# Patient Record
Sex: MALE | Race: WHITE
[De-identification: names, ages, dates, MRNs, and addresses within clinical notes are randomized per-mention and may not be internally consistent; named-entity substitution may affect disease eponyms.]

---

## 2017-02-09 ENCOUNTER — RX ONLY (OUTPATIENT)
Age: 67
Setting detail: RX ONLY
End: 2017-02-09

## 2017-02-09 PROBLEM — R21 RASH AND OTHER NONSPECIFIC SKIN ERUPTION: Status: ACTIVE | Noted: 2017-02-09

## 2017-02-13 ENCOUNTER — RX ONLY (OUTPATIENT)
Age: 67
Setting detail: RX ONLY
End: 2017-02-13

## 2017-02-23 PROBLEM — D49.2 NEOPLASM OF UNSPECIFIED BEHAVIOR OF BONE, SOFT TISSUE, AND SKIN: Status: RESOLVED | Noted: 2017-02-09 | Resolved: 2017-02-23

## 2017-03-02 ENCOUNTER — RX ONLY (OUTPATIENT)
Age: 67
Setting detail: RX ONLY
End: 2017-03-02

## 2017-03-29 PROBLEM — L40.9 PSORIASIS, UNSPECIFIED: Status: ACTIVE | Noted: 2017-03-29

## 2017-04-13 ENCOUNTER — RX ONLY (OUTPATIENT)
Age: 67
Setting detail: RX ONLY
End: 2017-04-13

## 2017-04-21 ENCOUNTER — RX ONLY (OUTPATIENT)
Age: 67
Setting detail: RX ONLY
End: 2017-04-21

## 2017-07-07 ENCOUNTER — RX ONLY (OUTPATIENT)
Age: 67
Setting detail: RX ONLY
End: 2017-07-07

## 2017-07-07 PROBLEM — D49.2 NEOPLASM OF UNSPECIFIED BEHAVIOR OF BONE, SOFT TISSUE, AND SKIN: Status: ACTIVE | Noted: 2017-07-07

## 2017-07-07 PROBLEM — R21 RASH AND OTHER NONSPECIFIC SKIN ERUPTION: Status: ACTIVE | Noted: 2017-07-07

## 2017-10-02 ENCOUNTER — APPOINTMENT (RX ONLY)
Dept: URBAN - METROPOLITAN AREA CLINIC 4 | Facility: CLINIC | Age: 67
Setting detail: DERMATOLOGY
End: 2017-10-02

## 2017-10-02 DIAGNOSIS — Z79.899 OTHER LONG TERM (CURRENT) DRUG THERAPY: ICD-10-CM

## 2017-10-02 DIAGNOSIS — L40.8 OTHER PSORIASIS: ICD-10-CM

## 2017-10-02 PROBLEM — J44.9 CHRONIC OBSTRUCTIVE PULMONARY DISEASE, UNSPECIFIED: Status: ACTIVE | Noted: 2017-10-02

## 2017-10-02 PROBLEM — I10 ESSENTIAL (PRIMARY) HYPERTENSION: Status: ACTIVE | Noted: 2017-10-02

## 2017-10-02 PROBLEM — J45.909 UNSPECIFIED ASTHMA, UNCOMPLICATED: Status: ACTIVE | Noted: 2017-10-02

## 2017-10-02 PROCEDURE — ? PRESCRIPTION

## 2017-10-02 PROCEDURE — ? MEDICATION COUNSELING

## 2017-10-02 PROCEDURE — ? METHOTREXATE INITIATION

## 2017-10-02 PROCEDURE — 99214 OFFICE O/P EST MOD 30 MIN: CPT

## 2017-10-02 PROCEDURE — ? ORDER TESTS

## 2017-10-02 RX ORDER — METHOTREXATE SODIUM 2.5 MG/1
TABLET ORAL
Qty: 32 | Refills: 0 | Status: ERX | COMMUNITY
Start: 2017-10-02

## 2017-10-02 RX ORDER — TRIAMCINOLONE ACETONIDE 1 MG/G
OINTMENT TOPICAL
Qty: 2 | Refills: 5 | Status: ERX | COMMUNITY
Start: 2017-10-02

## 2017-10-02 RX ADMIN — TRIAMCINOLONE ACETONIDE: 1 OINTMENT TOPICAL at 00:00

## 2017-10-02 RX ADMIN — METHOTREXATE SODIUM: 2.5 TABLET ORAL at 00:00

## 2017-10-02 ASSESSMENT — LOCATION DETAILED DESCRIPTION DERM
LOCATION DETAILED: STERNUM
LOCATION DETAILED: RIGHT SUPERIOR MEDIAL UPPER BACK

## 2017-10-02 ASSESSMENT — LOCATION SIMPLE DESCRIPTION DERM
LOCATION SIMPLE: RIGHT UPPER BACK
LOCATION SIMPLE: CHEST

## 2017-10-02 ASSESSMENT — LOCATION ZONE DERM: LOCATION ZONE: TRUNK

## 2017-10-02 NOTE — HPI: RASH (PSORIASIFORM DERMATITIS)
How Severe Is Your Dermatitis?: severe
Is This A New Presentation, Or A Follow-Up?: Follow Up Psoriasiform Dermatitis
Additional History: Pt states when he was taking methotrexate in the past he experienced felling cold all the time. No recent infections.

## 2017-10-30 ENCOUNTER — APPOINTMENT (RX ONLY)
Dept: URBAN - METROPOLITAN AREA CLINIC 4 | Facility: CLINIC | Age: 67
Setting detail: DERMATOLOGY
End: 2017-10-30

## 2017-10-30 DIAGNOSIS — L40.8 OTHER PSORIASIS: ICD-10-CM

## 2017-10-30 DIAGNOSIS — L0390 CELLULITIS AND ABSCESS OF UNSPECIFIED SITES: ICD-10-CM

## 2017-10-30 DIAGNOSIS — L0391 CELLULITIS AND ABSCESS OF UNSPECIFIED SITES: ICD-10-CM

## 2017-10-30 PROBLEM — L03.818 CELLULITIS OF OTHER SITES: Status: ACTIVE | Noted: 2017-10-30

## 2017-10-30 PROCEDURE — 96372 THER/PROPH/DIAG INJ SC/IM: CPT

## 2017-10-30 PROCEDURE — ? TREATMENT REGIMEN

## 2017-10-30 PROCEDURE — 99213 OFFICE O/P EST LOW 20 MIN: CPT | Mod: 25

## 2017-10-30 PROCEDURE — ? MEDICATION COUNSELING

## 2017-10-30 PROCEDURE — ? LAB REPORTS REVIEWED

## 2017-10-30 PROCEDURE — ? COUNSELING

## 2017-10-30 PROCEDURE — ? TREMFYA INJECTION

## 2017-10-30 ASSESSMENT — LOCATION ZONE DERM: LOCATION ZONE: TRUNK

## 2017-10-30 ASSESSMENT — LOCATION DETAILED DESCRIPTION DERM: LOCATION DETAILED: PERIUMBILICAL SKIN

## 2017-10-30 ASSESSMENT — LOCATION SIMPLE DESCRIPTION DERM: LOCATION SIMPLE: ABDOMEN

## 2017-10-30 NOTE — PROCEDURE: TREMFYA INJECTION
Expiration Date (Optional): 01/2019
Tremfya Amount: 100 mg
Administered By (Optional): Jose Lujan M.D
Treatment Number (Optional): 1
Consent: The risks of pain and injection site reactions were reviewed with the patient prior to the injection.
Lot # (Optional): [id number]
Detail Level: Simple

## 2017-11-28 ENCOUNTER — APPOINTMENT (RX ONLY)
Dept: URBAN - METROPOLITAN AREA CLINIC 4 | Facility: CLINIC | Age: 67
Setting detail: DERMATOLOGY
End: 2017-11-28

## 2017-11-28 DIAGNOSIS — L40.8 OTHER PSORIASIS: ICD-10-CM

## 2017-11-28 DIAGNOSIS — L30.9 DERMATITIS, UNSPECIFIED: ICD-10-CM

## 2017-11-28 PROCEDURE — ? ORDER TESTS

## 2017-11-28 PROCEDURE — ? BIOPSY BY SHAVE METHOD

## 2017-11-28 PROCEDURE — ? TREATMENT REGIMEN

## 2017-11-28 PROCEDURE — 99213 OFFICE O/P EST LOW 20 MIN: CPT | Mod: 25

## 2017-11-28 PROCEDURE — 11100: CPT

## 2017-11-28 PROCEDURE — 11101: CPT

## 2017-11-28 ASSESSMENT — LOCATION DETAILED DESCRIPTION DERM
LOCATION DETAILED: RIGHT SUPERIOR LATERAL UPPER BACK
LOCATION DETAILED: RIGHT LATERAL PROXIMAL UPPER ARM
LOCATION DETAILED: SUPERIOR THORACIC SPINE
LOCATION DETAILED: RIGHT ANTERIOR MEDIAL PROXIMAL UPPER ARM

## 2017-11-28 ASSESSMENT — LOCATION SIMPLE DESCRIPTION DERM
LOCATION SIMPLE: RIGHT UPPER BACK
LOCATION SIMPLE: RIGHT UPPER ARM
LOCATION SIMPLE: UPPER BACK

## 2017-11-28 ASSESSMENT — LOCATION ZONE DERM
LOCATION ZONE: ARM
LOCATION ZONE: TRUNK

## 2017-11-28 NOTE — PROCEDURE: TREATMENT REGIMEN
Detail Level: Zone
Plan: Pt scheduled this Friday to have tremfya shot, biopsy results pending, culture on right upper arm done today
Continue Regimen: Methotrexate 2.5 mg every Friday and TAC cream as prescribed

## 2017-11-28 NOTE — PROCEDURE: BIOPSY BY SHAVE METHOD
Electrodesiccation Text: The wound bed was treated with electrodesiccation after the biopsy was performed.
Additional Anesthesia Volume In Cc (Will Not Render If 0): 0
Biopsy Type: H and E
Bill For Surgical Tray: no
Consent: Written consent was obtained and risks were reviewed including but not limited to scarring, infection, bleeding, scabbing, incomplete removal, nerve damage and allergy to anesthesia.
Hemostasis: Drysol
Type Of Destruction Used: Curettage
Anesthesia Type: 1% lidocaine with epinephrine
Notification Instructions: Patient will be notified of biopsy results. However, patient instructed to call the office if not contacted within 2 weeks.
Biopsy Method: Personna blade
Cryotherapy Text: The wound bed was treated with cryotherapy after the biopsy was performed.
Lab: [id number]
Curettage Text: The wound bed was treated with curettage after the biopsy was performed.
Detail Level: Detailed
Electrodesiccation And Curettage Text: The wound bed was treated with electrodesiccation and curettage after the biopsy was performed.
Lab Facility: 
Dressing: bandage
Wound Care: Petrolatum
Anesthesia Volume In Cc: 0.5
Silver Nitrate Text: The wound bed was treated with silver nitrate after the biopsy was performed.
Post-Care Instructions: I reviewed with the patient in detail post-care instructions. Patient is to keep the biopsy site dry overnight, and then apply bacitracin twice daily until healed. Patient may apply hydrogen peroxide soaks to remove any crusting.
Billing Type: Third-Party Bill

## 2017-12-07 ENCOUNTER — APPOINTMENT (RX ONLY)
Dept: URBAN - METROPOLITAN AREA CLINIC 4 | Facility: CLINIC | Age: 67
Setting detail: DERMATOLOGY
End: 2017-12-07

## 2017-12-07 DIAGNOSIS — C85.8 OTHER SPECIFIED TYPES OF NON-HODGKIN LYMPHOMA: ICD-10-CM

## 2017-12-07 DIAGNOSIS — C84.A CUTANEOUS T-CELL LYMPHOMA, UNSPECIFIED: ICD-10-CM

## 2017-12-07 PROBLEM — C84.A0 CUTANEOUS T-CELL LYMPHOMA, UNSPECIFIED, UNSPECIFIED SITE: Status: ACTIVE | Noted: 2017-12-07

## 2017-12-07 PROBLEM — C85.80 OTHER SPECIFIED TYPES OF NON-HODGKIN LYMPHOMA, UNSPECIFIED SITE: Status: ACTIVE | Noted: 2017-12-07

## 2017-12-08 ENCOUNTER — RX ONLY (OUTPATIENT)
Age: 67
Setting detail: RX ONLY
End: 2017-12-08

## 2017-12-08 RX ORDER — LIDOCAINE 50 MG/G
OINTMENT TOPICAL
Qty: 1 | Refills: 2 | Status: ACTIVE

## 2017-12-08 RX ORDER — LIDOCAINE 50 MG/G
OINTMENT TOPICAL
Qty: 1 | Refills: 2 | Status: ERX | COMMUNITY
Start: 2017-12-08

## 2017-12-11 ENCOUNTER — HOSPITAL ENCOUNTER (OUTPATIENT)
Dept: RADIOLOGY | Facility: MEDICAL CENTER | Age: 67
End: 2017-12-11
Attending: DERMATOLOGY
Payer: COMMERCIAL

## 2017-12-11 DIAGNOSIS — C85.90 LYMPHOMA, UNSPECIFIED BODY REGION, UNSPECIFIED LYMPHOMA TYPE (HCC): ICD-10-CM

## 2017-12-11 PROCEDURE — A9552 F18 FDG: HCPCS

## 2018-01-01 ENCOUNTER — APPOINTMENT (OUTPATIENT)
Dept: CARDIOLOGY | Facility: MEDICAL CENTER | Age: 68
End: 2018-01-01
Attending: INTERNAL MEDICINE
Payer: MEDICARE

## 2018-01-01 ENCOUNTER — HOSPITAL ENCOUNTER (OUTPATIENT)
Facility: MEDICAL CENTER | Age: 68
End: 2018-12-13
Attending: INTERNAL MEDICINE | Admitting: INTERNAL MEDICINE
Payer: MEDICARE

## 2018-01-01 ENCOUNTER — APPOINTMENT (OUTPATIENT)
Dept: RADIOLOGY | Facility: MEDICAL CENTER | Age: 68
End: 2018-01-01
Attending: NURSE PRACTITIONER
Payer: MEDICARE

## 2018-01-01 ENCOUNTER — HOSPITAL ENCOUNTER (OUTPATIENT)
Dept: RADIOLOGY | Facility: MEDICAL CENTER | Age: 68
End: 2018-12-13
Attending: INTERNAL MEDICINE
Payer: MEDICARE

## 2018-01-01 VITALS
HEIGHT: 72 IN | OXYGEN SATURATION: 91 % | TEMPERATURE: 98.2 F | HEART RATE: 49 BPM | RESPIRATION RATE: 18 BRPM | WEIGHT: 229.94 LBS | BODY MASS INDEX: 31.14 KG/M2 | SYSTOLIC BLOOD PRESSURE: 120 MMHG | DIASTOLIC BLOOD PRESSURE: 73 MMHG

## 2018-01-01 DIAGNOSIS — C84.04: ICD-10-CM

## 2018-01-01 LAB
INR PPP: 0.99 (ref 0.87–1.13)
LV EJECT FRACT  99904: 60
LV EJECT FRACT MOD 2C 99903: 57.25
LV EJECT FRACT MOD 4C 99902: 76.75
LV EJECT FRACT MOD BP 99901: 67.24
PROTHROMBIN TIME: 13.2 SEC (ref 12–14.6)

## 2018-01-01 PROCEDURE — 93306 TTE W/DOPPLER COMPLETE: CPT

## 2018-01-01 PROCEDURE — A9552 F18 FDG: HCPCS

## 2018-01-01 PROCEDURE — 85610 PROTHROMBIN TIME: CPT

## 2018-01-01 PROCEDURE — 700111 HCHG RX REV CODE 636 W/ 250 OVERRIDE (IP)

## 2018-01-01 PROCEDURE — 700101 HCHG RX REV CODE 250

## 2018-01-01 PROCEDURE — 99153 MOD SED SAME PHYS/QHP EA: CPT

## 2018-01-01 PROCEDURE — 93306 TTE W/DOPPLER COMPLETE: CPT | Mod: 26 | Performed by: INTERNAL MEDICINE

## 2018-01-01 PROCEDURE — 160002 HCHG RECOVERY MINUTES (STAT)

## 2018-01-01 PROCEDURE — 700111 HCHG RX REV CODE 636 W/ 250 OVERRIDE (IP): Performed by: RADIOLOGY

## 2018-01-01 RX ORDER — MIDAZOLAM HYDROCHLORIDE 1 MG/ML
INJECTION INTRAMUSCULAR; INTRAVENOUS
Status: COMPLETED
Start: 2018-01-01 | End: 2018-01-01

## 2018-01-01 RX ORDER — LIDOCAINE HYDROCHLORIDE AND EPINEPHRINE BITARTRATE 20; .01 MG/ML; MG/ML
INJECTION, SOLUTION SUBCUTANEOUS
Status: COMPLETED
Start: 2018-01-01 | End: 2018-01-01

## 2018-01-01 RX ORDER — CEFAZOLIN SODIUM 1 G/3ML
INJECTION, POWDER, FOR SOLUTION INTRAMUSCULAR; INTRAVENOUS
Status: COMPLETED
Start: 2018-01-01 | End: 2018-01-01

## 2018-01-01 RX ORDER — CEFAZOLIN SODIUM 2 G/100ML
2 INJECTION, SOLUTION INTRAVENOUS ONCE
Status: COMPLETED | OUTPATIENT
Start: 2018-01-01 | End: 2018-01-01

## 2018-01-01 RX ORDER — SODIUM CHLORIDE 9 MG/ML
500 INJECTION, SOLUTION INTRAVENOUS
Status: DISCONTINUED | OUTPATIENT
Start: 2018-01-01 | End: 2018-01-01 | Stop reason: HOSPADM

## 2018-01-01 RX ORDER — MIDAZOLAM HYDROCHLORIDE 1 MG/ML
.5-2 INJECTION INTRAMUSCULAR; INTRAVENOUS PRN
Status: DISCONTINUED | OUTPATIENT
Start: 2018-01-01 | End: 2018-01-01 | Stop reason: HOSPADM

## 2018-01-01 RX ORDER — ONDANSETRON 2 MG/ML
4 INJECTION INTRAMUSCULAR; INTRAVENOUS PRN
Status: DISCONTINUED | OUTPATIENT
Start: 2018-01-01 | End: 2018-01-01 | Stop reason: HOSPADM

## 2018-01-01 RX ADMIN — FENTANYL CITRATE 50 MCG: 50 INJECTION, SOLUTION INTRAMUSCULAR; INTRAVENOUS at 14:30

## 2018-01-01 RX ADMIN — HEPARIN 500 UNITS: 100 SYRINGE at 14:36

## 2018-01-01 RX ADMIN — CEFAZOLIN 2000 MG: 330 INJECTION, POWDER, FOR SOLUTION INTRAMUSCULAR; INTRAVENOUS at 14:35

## 2018-01-01 RX ADMIN — MIDAZOLAM 2 MG: 1 INJECTION INTRAMUSCULAR; INTRAVENOUS at 14:30

## 2018-01-01 RX ADMIN — MIDAZOLAM 2 MG: 1 INJECTION INTRAMUSCULAR; INTRAVENOUS at 14:38

## 2018-01-01 RX ADMIN — LIDOCAINE HYDROCHLORIDE AND EPINEPHRINE: 20; 10 INJECTION, SOLUTION INFILTRATION; PERINEURAL at 14:36

## 2018-01-01 ASSESSMENT — PAIN SCALES - GENERAL
PAINLEVEL_OUTOF10: 0

## 2018-03-19 ENCOUNTER — APPOINTMENT (RX ONLY)
Dept: URBAN - METROPOLITAN AREA CLINIC 4 | Facility: CLINIC | Age: 68
Setting detail: DERMATOLOGY
End: 2018-03-19

## 2018-03-19 DIAGNOSIS — C84.A CUTANEOUS T-CELL LYMPHOMA, UNSPECIFIED: ICD-10-CM

## 2018-03-19 PROBLEM — C84.A0 CUTANEOUS T-CELL LYMPHOMA, UNSPECIFIED, UNSPECIFIED SITE: Status: ACTIVE | Noted: 2018-03-19

## 2018-03-19 PROCEDURE — ? COUNSELING

## 2018-03-19 PROCEDURE — ? PRESCRIPTION

## 2018-03-19 PROCEDURE — ? COUNSELING: TOPICAL STEROIDS

## 2018-03-19 PROCEDURE — 99214 OFFICE O/P EST MOD 30 MIN: CPT

## 2018-03-19 RX ORDER — CLOBETASOL PROPIONATE 0.5 MG/G
CREAM TOPICAL BID
Qty: 3 | Refills: 3 | Status: ERX | COMMUNITY
Start: 2018-03-19

## 2018-03-19 RX ADMIN — CLOBETASOL PROPIONATE: 0.5 CREAM TOPICAL at 18:45

## 2018-03-19 NOTE — HPI: SKIN LESION
Is This A New Presentation, Or A Follow-Up?: Growth
How Severe Is Your Skin Lesion?: mild
Has Your Skin Lesion Been Treated?: not been treated
Additional History: Pt needs to be up in [place] for three weeks for treatments.Apt on April 2nd for [facility], getting a ct scan due to accident on dec 13. Pt still has pain when coughing and sneezing.pt does not want to go to [place] until after April 4th.tumor on the arm has gone away and is not present. There is some swelling in the left neck that was palpate and is most likely edema.

## 2018-03-30 ENCOUNTER — RX ONLY (OUTPATIENT)
Age: 68
Setting detail: RX ONLY
End: 2018-03-30

## 2018-03-30 RX ORDER — CLOBETASOL PROPIONATE 0.05 G/100ML
SHAMPOO TOPICAL
Qty: 2 | Refills: 6 | Status: ERX | COMMUNITY
Start: 2018-03-30

## 2018-03-30 RX ORDER — CLOBETASOL PROPIONATE 0.5 MG/ML
SOLUTION TOPICAL
Qty: 2 | Refills: 6 | Status: ERX | COMMUNITY
Start: 2018-03-30

## 2018-04-19 ENCOUNTER — APPOINTMENT (RX ONLY)
Dept: URBAN - METROPOLITAN AREA CLINIC 4 | Facility: CLINIC | Age: 68
Setting detail: DERMATOLOGY
End: 2018-04-19

## 2018-04-19 DIAGNOSIS — C84.0 MYCOSIS FUNGOIDES: ICD-10-CM

## 2018-04-19 PROBLEM — C84.00 MYCOSIS FUNGOIDES, UNSPECIFIED SITE: Status: ACTIVE | Noted: 2018-04-19

## 2018-04-19 PROCEDURE — ? PRESCRIPTION

## 2018-04-19 PROCEDURE — ? TREATMENT REGIMEN

## 2018-04-19 PROCEDURE — ? ORDER TESTS

## 2018-04-19 PROCEDURE — ? COUNSELING

## 2018-04-19 PROCEDURE — 99214 OFFICE O/P EST MOD 30 MIN: CPT

## 2018-04-19 RX ORDER — MUPIROCIN 20 MG/G
OINTMENT TOPICAL
Qty: 1 | Refills: 4 | Status: ERX | COMMUNITY
Start: 2018-04-19

## 2018-04-19 RX ADMIN — MUPIROCIN: 20 OINTMENT TOPICAL at 18:48

## 2018-04-19 ASSESSMENT — LOCATION DETAILED DESCRIPTION DERM
LOCATION DETAILED: RIGHT ANTERIOR PROXIMAL THIGH
LOCATION DETAILED: RIGHT ANTERIOR PROXIMAL UPPER ARM
LOCATION DETAILED: LEFT MID-UPPER BACK
LOCATION DETAILED: GENITALIA
LOCATION DETAILED: RIGHT SUPERIOR UPPER BACK
LOCATION DETAILED: RIGHT DISTAL POSTERIOR THIGH
LOCATION DETAILED: LEFT MEDIAL SUPERIOR CHEST
LOCATION DETAILED: LEFT DISTAL POSTERIOR THIGH
LOCATION DETAILED: LEFT LATERAL ABDOMEN
LOCATION DETAILED: RIGHT SUPERIOR MEDIAL MIDBACK

## 2018-04-19 ASSESSMENT — LOCATION SIMPLE DESCRIPTION DERM
LOCATION SIMPLE: LEFT UPPER BACK
LOCATION SIMPLE: RIGHT UPPER ARM
LOCATION SIMPLE: RIGHT THIGH
LOCATION SIMPLE: RIGHT UPPER BACK
LOCATION SIMPLE: CHEST
LOCATION SIMPLE: GENITALIA
LOCATION SIMPLE: ABDOMEN
LOCATION SIMPLE: RIGHT POSTERIOR THIGH
LOCATION SIMPLE: RIGHT LOWER BACK
LOCATION SIMPLE: LEFT POSTERIOR THIGH

## 2018-04-19 ASSESSMENT — LOCATION ZONE DERM
LOCATION ZONE: TRUNK
LOCATION ZONE: GENITALIA
LOCATION ZONE: LEG
LOCATION ZONE: TRUNK
LOCATION ZONE: ARM

## 2018-04-19 NOTE — PROCEDURE: TREATMENT REGIMEN
Plan: Patient to get pet scan on the 30th of April at 1:15, patient aware of pre op and voiced understanding. Once pet scan and labs are done patient will then see dr. Lyon. Dr. Lyon will treat with a cd30 medication and then targeted radiation. Patient likes this option and will the. Follow up with dr. Chapa in [place] every six months or so.
Detail Level: Zone
Initiate Treatment: Mupiricin to open sores

## 2018-04-19 NOTE — HPI: BOILS
Is This A New Presentation, Or A Follow-Up?: Boil
How Severe Are Your Boils?: mild
Additional History: Patient has a history of ctcl, patient has been being tracked in [place] but due to financial reasons has only been able to keep one appointment. Patient is developing new tumors that are draining and are quite tender and painful. Patient is wondering what things can be coordinated locally to get treatment started.

## 2018-04-30 ENCOUNTER — HOSPITAL ENCOUNTER (OUTPATIENT)
Dept: RADIOLOGY | Facility: MEDICAL CENTER | Age: 68
End: 2018-04-30
Attending: DERMATOLOGY
Payer: COMMERCIAL

## 2018-04-30 DIAGNOSIS — C84.A3: ICD-10-CM

## 2018-04-30 PROCEDURE — A9552 F18 FDG: HCPCS

## 2018-05-03 ENCOUNTER — APPOINTMENT (RX ONLY)
Dept: URBAN - METROPOLITAN AREA CLINIC 4 | Facility: CLINIC | Age: 68
Setting detail: DERMATOLOGY
End: 2018-05-03

## 2018-05-03 DIAGNOSIS — C84.0 MYCOSIS FUNGOIDES: ICD-10-CM

## 2018-05-03 PROBLEM — L30.9 DERMATITIS, UNSPECIFIED: Status: ACTIVE | Noted: 2018-05-03

## 2018-05-03 PROBLEM — C84.00 MYCOSIS FUNGOIDES, UNSPECIFIED SITE: Status: ACTIVE | Noted: 2018-05-03

## 2018-05-03 PROCEDURE — ? TREATMENT REGIMEN

## 2018-05-03 PROCEDURE — ? PRESCRIPTION

## 2018-05-03 PROCEDURE — 99213 OFFICE O/P EST LOW 20 MIN: CPT | Mod: 25

## 2018-05-03 PROCEDURE — ? COUNSELING

## 2018-05-03 PROCEDURE — ? MEDICATION COUNSELING

## 2018-05-03 PROCEDURE — 11100: CPT

## 2018-05-03 PROCEDURE — ? BIOPSY BY SHAVE METHOD

## 2018-05-03 PROCEDURE — 11101: CPT

## 2018-05-03 RX ORDER — TRIAMCINOLONE ACETONIDE 1 MG/G
CREAM TOPICAL BID
Qty: 1 | Refills: 3 | Status: ERX | COMMUNITY
Start: 2018-05-03

## 2018-05-03 RX ORDER — MUPIROCIN 20 MG/G
OINTMENT TOPICAL
Qty: 1 | Refills: 3 | Status: ERX

## 2018-05-03 RX ORDER — GABAPENTIN 300 MG/1
CAPSULE ORAL
Qty: 120 | Refills: 1 | Status: ERX | COMMUNITY
Start: 2018-05-03

## 2018-05-03 RX ORDER — CEPHALEXIN 500 MG/1
CAPSULE ORAL
Qty: 30 | Refills: 0 | Status: ERX | COMMUNITY
Start: 2018-05-03

## 2018-05-03 RX ADMIN — GABAPENTIN: 300 CAPSULE ORAL at 18:33

## 2018-05-03 RX ADMIN — TRIAMCINOLONE ACETONIDE: 1 CREAM TOPICAL at 18:27

## 2018-05-03 RX ADMIN — CEPHALEXIN: 500 CAPSULE ORAL at 18:30

## 2018-05-03 ASSESSMENT — LOCATION DETAILED DESCRIPTION DERM
LOCATION DETAILED: LEFT ANTERIOR PROXIMAL THIGH
LOCATION DETAILED: RIGHT ANTERIOR PROXIMAL UPPER ARM
LOCATION DETAILED: LEFT SUPERIOR LATERAL UPPER BACK
LOCATION DETAILED: SUPRAPUBIC SKIN

## 2018-05-03 ASSESSMENT — LOCATION ZONE DERM
LOCATION ZONE: TRUNK
LOCATION ZONE: LEG
LOCATION ZONE: ARM
LOCATION ZONE: TRUNK

## 2018-05-03 ASSESSMENT — LOCATION SIMPLE DESCRIPTION DERM
LOCATION SIMPLE: LEFT THIGH
LOCATION SIMPLE: RIGHT UPPER ARM
LOCATION SIMPLE: LEFT UPPER BACK
LOCATION SIMPLE: GROIN

## 2018-05-03 NOTE — PROCEDURE: MEDICATION COUNSELING
Gabapentin Counseling: I discussed with the patient the risks of gabapentin including but not limited to dizziness, somnolence, fatigue and ataxia.
Fluconazole Counseling:  Patient counseled regarding adverse effects of fluconazole including but not limited to headache, diarrhea, nausea, upset stomach, liver function test abnormalities, taste disturbance, and stomach pain.  There is a rare possibility of liver failure that can occur when taking fluconazole.  The patient understands that monitoring of LFTs and kidney function test may be required, especially at baseline. The patient verbalized understanding of the proper use and possible adverse effects of fluconazole.  All of the patient's questions and concerns were addressed.
Cimetidine Pregnancy And Lactation Text: This medication is Pregnancy Category B and is considered safe during pregnancy. It is also excreted in breast milk and breast feeding isn't recommended.
Xolair Counseling:  Patient informed of potential adverse effects including but not limited to fever, muscle aches, rash and allergic reactions.  The patient verbalized understanding of the proper use and possible adverse effects of Xolair.  All of the patient's questions and concerns were addressed.
Imiquimod Counseling:  I discussed with the patient the risks of imiquimod including but not limited to erythema, scaling, itching, weeping, crusting, and pain.  Patient understands that the inflammatory response to imiquimod is variable from person to person and was educated regarded proper titration schedule.  If flu-like symptoms develop, patient knows to discontinue the medication and contact us.
Dupixent Counseling: I discussed with the patient the risks of dupilumab including but not limited to eye infection and irritation, cold sores, injection site reactions, worsening of asthma, allergic reactions and increased risk of parasitic infection.  Live vaccines should be avoided while taking dupilumab. Dupilumab will also interact with certain medications such as warfarin and cyclosporine. The patient understands that monitoring is required and they must alert us or the primary physician if symptoms of infection or other concerning signs are noted.
Eucrisa Pregnancy And Lactation Text: This medication has not been assigned a Pregnancy Risk Category but animal studies failed to show danger with the topical medication. It is unknown if the medication is excreted in breast milk.
Rituxan Pregnancy And Lactation Text: This medication is Pregnancy Category C and it isn't know if it is safe during pregnancy. It is unknown if this medication is excreted in breast milk but similar antibodies are known to be excreted.
Enbrel Counseling:  I discussed with the patient the risks of etanercept including but not limited to myelosuppression, immunosuppression, autoimmune hepatitis, demyelinating diseases, lymphoma, and infections.  The patient understands that monitoring is required including a PPD at baseline and must alert us or the primary physician if symptoms of infection or other concerning signs are noted.
Cosentyx Counseling:  I discussed with the patient the risks of Cosentyx including but not limited to worsening of Crohn's disease, immunosuppression, allergic reactions and infections.  The patient understands that monitoring is required including a PPD at baseline and must alert us or the primary physician if symptoms of infection or other concerning signs are noted.
Dapsone Pregnancy And Lactation Text: This medication is Pregnancy Category C and is not considered safe during pregnancy or breast feeding.
Stelara Pregnancy And Lactation Text: This medication is Pregnancy Category B and is considered safe during pregnancy. It is unknown if this medication is excreted in breast milk.
Valtrex Pregnancy And Lactation Text: this medication is Pregnancy Category B and is considered safe during pregnancy. This medication is not directly found in breast milk but it's metabolite acyclovir is present.
Benzoyl Peroxide Counseling: Patient counseled that medicine may cause skin irritation and bleach clothing.  In the event of skin irritation, the patient was advised to reduce the amount of the drug applied or use it less frequently.   The patient verbalized understanding of the proper use and possible adverse effects of benzoyl peroxide.  All of the patient's questions and concerns were addressed.
Simponi Pregnancy And Lactation Text: The risk during pregnancy and breastfeeding is uncertain with this medication.
Nsaids Counseling: NSAID Counseling: I discussed with the patient that NSAIDs should be taken with food. Prolonged use of NSAIDs can result in the development of stomach ulcers.  Patient advised to stop taking NSAIDs if abdominal pain occurs.  The patient verbalized understanding of the proper use and possible adverse effects of NSAIDs.  All of the patient's questions and concerns were addressed.
Methotrexate Counseling:  Patient counseled regarding adverse effects of methotrexate including but not limited to nausea, vomiting, abnormalities in liver function tests. Patients may develop mouth sores, rash, diarrhea, and abnormalities in blood counts. The patient understands that monitoring is required including LFT's and blood counts.  There is a rare possibility of scarring of the liver and lung problems that can occur when taking methotrexate. Persistent nausea, loss of appetite, pale stools, dark urine, cough, and shortness of breath should be reported immediately. Patient advised to discontinue methotrexate treatment at least three months before attempting to become pregnant.  I discussed the need for folate supplements while taking methotrexate.  These supplements can decrease side effects during methotrexate treatment. The patient verbalized understanding of the proper use and possible adverse effects of methotrexate.  All of the patient's questions and concerns were addressed.
Erivedge Counseling- I discussed with the patient the risks of Erivedge including but not limited to nausea, vomiting, diarrhea, constipation, weight loss, changes in the sense of taste, decreased appetite, muscle spasms, and hair loss.  The patient verbalized understanding of the proper use and possible adverse effects of Erivedge.  All of the patient's questions and concerns were addressed.
Arava Pregnancy And Lactation Text: This medication is Pregnancy Category X and is absolutely contraindicated during pregnancy. It is unknown if it is excreted in breast milk.
Otezla Pregnancy And Lactation Text: This medication is Pregnancy Category C and it isn't known if it is safe during pregnancy. It is unknown if it is excreted in breast milk.
Solaraze Counseling:  I discussed with the patient the risks of Solaraze including but not limited to erythema, scaling, itching, weeping, crusting, and pain.
High Dose Vitamin A Pregnancy And Lactation Text: High dose vitamin A therapy is contraindicated during pregnancy and breast feeding.
SSKI Counseling:  I discussed with the patient the risks of SSKI including but not limited to thyroid abnormalities, metallic taste, GI upset, fever, headache, acne, arthralgias, paraesthesias, lymphadenopathy, easy bleeding, arrhythmias, and allergic reaction.
Topical Sulfur Applications Counseling: Topical Sulfur Counseling: Patient counseled that this medication may cause skin irritation or allergic reactions.  In the event of skin irritation, the patient was advised to reduce the amount of the drug applied or use it less frequently.   The patient verbalized understanding of the proper use and possible adverse effects of topical sulfur application.  All of the patient's questions and concerns were addressed.
Elidel Pregnancy And Lactation Text: This medication is Pregnancy Category C. It is unknown if this medication is excreted in breast milk.
Minocycline Counseling: Patient advised regarding possible photosensitivity and discoloration of the teeth, skin, lips, tongue and gums.  Patient instructed to avoid sunlight, if possible.  When exposed to sunlight, patients should wear protective clothing, sunglasses, and sunscreen.  The patient was instructed to call the office immediately if the following severe adverse effects occur:  hearing changes, easy bruising/bleeding, severe headache, or vision changes.  The patient verbalized understanding of the proper use and possible adverse effects of minocycline.  All of the patient's questions and concerns were addressed.
Use Enhanced Medication Counseling?: No
Glycopyrrolate Counseling:  I discussed with the patient the risks of glycopyrrolate including but not limited to skin rash, drowsiness, dry mouth, difficulty urinating, and blurred vision.
Drysol Pregnancy And Lactation Text: This medication is considered safe during pregnancy and breast feeding.
Acitretin Pregnancy And Lactation Text: This medication is Pregnancy Category X and should not be given to women who are pregnant or may become pregnant in the future. This medication is excreted in breast milk.
Cellcept Pregnancy And Lactation Text: This medication is Pregnancy Category D and isn't considered safe during pregnancy. It is unknown if this medication is excreted in breast milk.
Isotretinoin Pregnancy And Lactation Text: This medication is Pregnancy Category X and is considered extremely dangerous during pregnancy. It is unknown if it is excreted in breast milk.
Doxepin Pregnancy And Lactation Text: This medication is Pregnancy Category C and it isn't known if it is safe during pregnancy. It is also excreted in breast milk and breast feeding isn't recommended.
Bactrim Pregnancy And Lactation Text: This medication is Pregnancy Category D and is known to cause fetal risk.  It is also excreted in breast milk.
Birth Control Pills Pregnancy And Lactation Text: This medication should be avoided if pregnant and for the first 30 days post-partum.
5-Fu Pregnancy And Lactation Text: This medication is Pregnancy Category X and contraindicated in pregnancy and in women who may become pregnant. It is unknown if this medication is excreted in breast milk.
Itraconazole Counseling:  I discussed with the patient the risks of itraconazole including but not limited to liver damage, nausea/vomiting, neuropathy, and severe allergy.  The patient understands that this medication is best absorbed when taken with acidic beverages such as non-diet cola or ginger ale.  The patient understands that monitoring is required including baseline LFTs and repeat LFTs at intervals.  The patient understands that they are to contact us or the primary physician if concerning signs are noted.
Clofazimine Pregnancy And Lactation Text: This medication is Pregnancy Category C and isn't considered safe during pregnancy. It is excreted in breast milk.
Thalidomide Counseling: I discussed with the patient the risks of thalidomide including but not limited to birth defects, anxiety, weakness, chest pain, dizziness, cough and severe allergy.
Hydroxyzine Counseling: Patient advised that the medication is sedating and not to drive a car after taking this medication.  Patient informed of potential adverse effects including but not limited to dry mouth, urinary retention, and blurry vision.  The patient verbalized understanding of the proper use and possible adverse effects of hydroxyzine.  All of the patient's questions and concerns were addressed.
Rituxan Counseling:  I discussed with the patient the risks of Rituxan infusions. Side effects can include infusion reactions, severe drug rashes including mucocutaneous reactions, reactivation of latent hepatitis and other infections and rarely progressive multifocal leukoencephalopathy.  All of the patient's questions and concerns were addressed.
Cephalexin Pregnancy And Lactation Text: This medication is Pregnancy Category B and considered safe during pregnancy.  It is also excreted in breast milk but can be used safely for shorter doses.
Erythromycin Pregnancy And Lactation Text: This medication is Pregnancy Category B and is considered safe during pregnancy. It is also excreted in breast milk.
Xolair Pregnancy And Lactation Text: This medication is Pregnancy Category B and is considered safe during pregnancy. This medication is excreted in breast milk.
Protopic Pregnancy And Lactation Text: This medication is Pregnancy Category C. It is unknown if this medication is excreted in breast milk when applied topically.
Acitretin Counseling:  I discussed with the patient the risks of acitretin including but not limited to hair loss, dry lips/skin/eyes, liver damage, hyperlipidemia, depression/suicidal ideation, photosensitivity.  Serious rare side effects can include but are not limited to pancreatitis, pseudotumor cerebri, bony changes, clot formation/stroke/heart attack.  Patient understands that alcohol is contraindicated since it can result in liver toxicity and significantly prolong the elimination of the drug by many years.
Azithromycin Counseling:  I discussed with the patient the risks of azithromycin including but not limited to GI upset, allergic reaction, drug rash, diarrhea, and yeast infections.
Arava Counseling:  Patient counseled regarding adverse effects of Arava including but not limited to nausea, vomiting, abnormalities in liver function tests. Patients may develop mouth sores, rash, diarrhea, and abnormalities in blood counts. The patient understands that monitoring is required including LFTs and blood counts.  There is a rare possibility of scarring of the liver and lung problems that can occur when taking methotrexate. Persistent nausea, loss of appetite, pale stools, dark urine, cough, and shortness of breath should be reported immediately. Patient advised to discontinue Arava treatment and consult with a physician prior to attempting conception. The patient will have to undergo a treatment to eliminate Arava from the body prior to conception.
Erythromycin Counseling:  I discussed with the patient the risks of erythromycin including but not limited to GI upset, allergic reaction, drug rash, diarrhea, increase in liver enzymes, and yeast infections.
Ketoconazole Counseling:   Patient counseled regarding improving absorption with orange juice.  Adverse effects include but are not limited to breast enlargement, headache, diarrhea, nausea, upset stomach, liver function test abnormalities, taste disturbance, and stomach pain.  There is a rare possibility of liver failure that can occur when taking ketoconazole. The patient understands that monitoring of LFTs may be required, especially at baseline. The patient verbalized understanding of the proper use and possible adverse effects of ketoconazole.  All of the patient's questions and concerns were addressed.
Tetracycline Counseling: Patient counseled regarding possible photosensitivity and increased risk for sunburn.  Patient instructed to avoid sunlight, if possible.  When exposed to sunlight, patients should wear protective clothing, sunglasses, and sunscreen.  The patient was instructed to call the office immediately if the following severe adverse effects occur:  hearing changes, easy bruising/bleeding, severe headache, or vision changes.  The patient verbalized understanding of the proper use and possible adverse effects of tetracycline.  All of the patient's questions and concerns were addressed. Patient understands to avoid pregnancy while on therapy due to potential birth defects.
Terbinafine Counseling: Patient counseling regarding adverse effects of terbinafine including but not limited to headache, diarrhea, rash, upset stomach, liver function test abnormalities, itching, taste/smell disturbance, nausea, abdominal pain, and flatulence.  There is a rare possibility of liver failure that can occur when taking terbinafine.  The patient understands that a baseline LFT and kidney function test may be required. The patient verbalized understanding of the proper use and possible adverse effects of terbinafine.  All of the patient's questions and concerns were addressed.
Taltz Counseling: I discussed with the patient the risks of ixekizumab including but not limited to immunosuppression, serious infections, worsening of inflammatory bowel disease and drug reactions.  The patient understands that monitoring is required including a PPD at baseline and must alert us or the primary physician if symptoms of infection or other concerning signs are noted.
Oxybutynin Counseling:  I discussed with the patient the risks of oxybutynin including but not limited to skin rash, drowsiness, dry mouth, difficulty urinating, and blurred vision.
Solaraze Pregnancy And Lactation Text: This medication is Pregnancy Category B and is considered safe. There is some data to suggest avoiding during the third trimester. It is unknown if this medication is excreted in breast milk.
Rifampin Pregnancy And Lactation Text: This medication is Pregnancy Category C and it isn't know if it is safe during pregnancy. It is also excreted in breast milk and should not be used if you are breast feeding.
Methotrexate Pregnancy And Lactation Text: This medication is Pregnancy Category X and is known to cause fetal harm. This medication is excreted in breast milk.
Detail Level: Simple
Tazorac Counseling:  Patient advised that medication is irritating and drying.  Patient may need to apply sparingly and wash off after an hour before eventually leaving it on overnight.  The patient verbalized understanding of the proper use and possible adverse effects of tazorac.  All of the patient's questions and concerns were addressed.
Cimetidine Counseling:  I discussed with the patient the risks of Cimetidine including but not limited to gynecomastia, headache, diarrhea, nausea, drowsiness, arrhythmias, pancreatitis, skin rashes, psychosis, bone marrow suppression and kidney toxicity.
Picato Counseling:  I discussed with the patient the risks of Picato including but not limited to erythema, scaling, itching, weeping, crusting, and pain.
Tremfya Counseling: I discussed with the patient the risks of guselkumab including but not limited to immunosuppression, serious infections, worsening of inflammatory bowel disease and drug reactions.  The patient understands that monitoring is required including a PPD at baseline and must alert us or the primary physician if symptoms of infection or other concerning signs are noted.
Bactrim Counseling:  I discussed with the patient the risks of sulfa antibiotics including but not limited to GI upset, allergic reaction, drug rash, diarrhea, dizziness, photosensitivity, and yeast infections.  Rarely, more serious reactions can occur including but not limited to aplastic anemia, agranulocytosis, methemoglobinemia, blood dyscrasias, liver or kidney failure, lung infiltrates or desquamative/blistering drug rashes.
Xeljanz Counseling: I discussed with the patient the risks of Xeljanz therapy including increased risk of infection, liver issues, headache, diarrhea, or cold symptoms. Live vaccines should be avoided. They were instructed to call if they have any problems.
Hydroquinone Counseling:  Patient advised that medication may result in skin irritation, lightening (hypopigmentation), dryness, and burning.  In the event of skin irritation, the patient was advised to reduce the amount of the drug applied or use it less frequently.  Rarely, spots that are treated with hydroquinone can become darker (pseudoochronosis).  Should this occur, patient instructed to stop medication and call the office. The patient verbalized understanding of the proper use and possible adverse effects of hydroquinone.  All of the patient's questions and concerns were addressed.
Tazorac Pregnancy And Lactation Text: This medication is not safe during pregnancy. It is unknown if this medication is excreted in breast milk.
Cephalexin Counseling: I counseled the patient regarding use of cephalexin as an antibiotic for prophylactic and/or therapeutic purposes. Cephalexin (commonly prescribed under brand name Keflex) is a cephalosporin antibiotic which is active against numerous classes of bacteria, including most skin bacteria. Side effects may include nausea, diarrhea, gastrointestinal upset, rash, hives, yeast infections, and in rare cases, hepatitis, kidney disease, seizures, fever, confusion, neurologic symptoms, and others. Patients with severe allergies to penicillin medications are cautioned that there is about a 10% incidence of cross-reactivity with cephalosporins. When possible, patients with penicillin allergies should use alternatives to cephalosporins for antibiotic therapy.
Cyclosporine Counseling:  I discussed with the patient the risks of cyclosporine including but not limited to hypertension, gingival hyperplasia,myelosuppression, immunosuppression, liver damage, kidney damage, neurotoxicity, lymphoma, and serious infections. The patient understands that monitoring is required including baseline blood pressure, CBC, CMP, lipid panel and uric acid, and then 1-2 times monthly CMP and blood pressure.
Topical Clindamycin Counseling: Patient counseled that this medication may cause skin irritation or allergic reactions.  In the event of skin irritation, the patient was advised to reduce the amount of the drug applied or use it less frequently.   The patient verbalized understanding of the proper use and possible adverse effects of clindamycin.  All of the patient's questions and concerns were addressed.
Stelara Counseling:  I discussed with the patient the risks of ustekinumab including but not limited to immunosuppression, malignancy, posterior leukoencephalopathy syndrome, and serious infections.  The patient understands that monitoring is required including a PPD at baseline and must alert us or the primary physician if symptoms of infection or other concerning signs are noted.
High Dose Vitamin A Counseling: Side effects reviewed, pt to contact office should one occur.
Odomzo Counseling- I discussed with the patient the risks of Odomzo including but not limited to nausea, vomiting, diarrhea, constipation, weight loss, changes in the sense of taste, decreased appetite, muscle spasms, and hair loss.  The patient verbalized understanding of the proper use and possible adverse effects of Odomzo.  All of the patient's questions and concerns were addressed.
Topical Clindamycin Pregnancy And Lactation Text: This medication is Pregnancy Category B and is considered safe during pregnancy. It is unknown if it is excreted in breast milk.
Eucrisa Counseling: Patient may experience a mild burning sensation during topical application. Eucrisa is not approved in children less than 2 years of age.
Albendazole Counseling:  I discussed with the patient the risks of albendazole including but not limited to cytopenia, kidney damage, nausea/vomiting and severe allergy.  The patient understands that this medication is being used in an off-label manner.
Doxycycline Counseling:  Patient counseled regarding possible photosensitivity and increased risk for sunburn.  Patient instructed to avoid sunlight, if possible.  When exposed to sunlight, patients should wear protective clothing, sunglasses, and sunscreen.  The patient was instructed to call the office immediately if the following severe adverse effects occur:  hearing changes, easy bruising/bleeding, severe headache, or vision changes.  The patient verbalized understanding of the proper use and possible adverse effects of doxycycline.  All of the patient's questions and concerns were addressed.
Quinolones Counseling:  I discussed with the patient the risks of fluoroquinolones including but not limited to GI upset, allergic reaction, drug rash, diarrhea, dizziness, photosensitivity, yeast infections, liver function test abnormalities, tendonitis/tendon rupture.
Azithromycin Pregnancy And Lactation Text: This medication is considered safe during pregnancy and is also secreted in breast milk.
Benzoyl Peroxide Pregnancy And Lactation Text: This medication is Pregnancy Category C. It is unknown if benzoyl peroxide is excreted in breast milk.
Zyclara Counseling:  I discussed with the patient the risks of imiquimod including but not limited to erythema, scaling, itching, weeping, crusting, and pain.  Patient understands that the inflammatory response to imiquimod is variable from person to person and was educated regarded proper titration schedule.  If flu-like symptoms develop, patient knows to discontinue the medication and contact us.
Metronidazole Pregnancy And Lactation Text: This medication is Pregnancy Category B and considered safe during pregnancy.  It is also excreted in breast milk.
Metronidazole Counseling:  I discussed with the patient the risks of metronidazole including but not limited to seizures, nausea/vomiting, a metallic taste in the mouth, nausea/vomiting and severe allergy.
Cellcept Counseling:  I discussed with the patient the risks of mycophenolate mofetil including but not limited to infection/immunosuppression, GI upset, hypokalemia, hypercholesterolemia, bone marrow suppression, lymphoproliferative disorders, malignancy, GI ulceration/bleed/perforation, colitis, interstitial lung disease, kidney failure, progressive multifocal leukoencephalopathy, and birth defects.  The patient understands that monitoring is required including a baseline creatinine and regular CBC testing. In addition, patient must alert us immediately if symptoms of infection or other concerning signs are noted.
Minoxidil Counseling: Minoxidil is a topical medication which can increase blood flow where it is applied. It is uncertain how this medication increases hair growth. Side effects are uncommon and include stinging and allergic reactions.
Griseofulvin Counseling:  I discussed with the patient the risks of griseofulvin including but not limited to photosensitivity, cytopenia, liver damage, nausea/vomiting and severe allergy.  The patient understands that this medication is best absorbed when taken with a fatty meal (e.g., ice cream or french fries).
Simponi Counseling:  I discussed with the patient the risks of golimumab including but not limited to myelosuppression, immunosuppression, autoimmune hepatitis, demyelinating diseases, lymphoma, and serious infections.  The patient understands that monitoring is required including a PPD at baseline and must alert us or the primary physician if symptoms of infection or other concerning signs are noted.
Cyclosporine Pregnancy And Lactation Text: This medication is Pregnancy Category C and it isn't know if it is safe during pregnancy. This medication is excreted in breast milk.
Ivermectin Counseling:  Patient instructed to take medication on an empty stomach with a full glass of water.  Patient informed of potential adverse effects including but not limited to nausea, diarrhea, dizziness, itching, and swelling of the extremities or lymph nodes.  The patient verbalized understanding of the proper use and possible adverse effects of ivermectin.  All of the patient's questions and concerns were addressed.
Spironolactone Pregnancy And Lactation Text: This medication can cause feminization of the male fetus and should be avoided during pregnancy. The active metabolite is also found in breast milk.
Clofazimine Counseling:  I discussed with the patient the risks of clofazimine including but not limited to skin and eye pigmentation, liver damage, nausea/vomiting, gastrointestinal bleeding and allergy.
Bexarotene Pregnancy And Lactation Text: This medication is Pregnancy Category X and should not be given to women who are pregnant or may become pregnant. This medication should not be used if you are breast feeding.
Ivermectin Pregnancy And Lactation Text: This medication is Pregnancy Category C and it isn't known if it is safe during pregnancy. It is also excreted in breast milk.
Elidel Counseling: Patient may experience a mild burning sensation during topical application. Elidel is not approved in children less than 2 years of age. There have been case reports of hematologic and skin malignancies in patients using topical calcineurin inhibitors although causality is questionable.
Doxepin Counseling:  Patient advised that the medication is sedating and not to drive a car after taking this medication. Patient informed of potential adverse effects including but not limited to dry mouth, urinary retention, and blurry vision.  The patient verbalized understanding of the proper use and possible adverse effects of doxepin.  All of the patient's questions and concerns were addressed.
Hydroxychloroquine Counseling:  I discussed with the patient that a baseline ophthalmologic exam is needed at the start of therapy and every year thereafter while on therapy. A CBC may also be warranted for monitoring.  The side effects of this medication were discussed with the patient, including but not limited to agranulocytosis, aplastic anemia, seizures, rashes, retinopathy, and liver toxicity. Patient instructed to call the office should any adverse effect occur.  The patient verbalized understanding of the proper use and possible adverse effects of Plaquenil.  All the patient's questions and concerns were addressed.
Fluconazole Pregnancy And Lactation Text: This medication is Pregnancy Category C and it isn't know if it is safe during pregnancy. It is also excreted in breast milk.
Dapsone Counseling: I discussed with the patient the risks of dapsone including but not limited to hemolytic anemia, agranulocytosis, rashes, methemoglobinemia, kidney failure, peripheral neuropathy, headaches, GI upset, and liver toxicity.  Patients who start dapsone require monitoring including baseline LFTs and weekly CBCs for the first month, then every month thereafter.  The patient verbalized understanding of the proper use and possible adverse effects of dapsone.  All of the patient's questions and concerns were addressed.
Spironolactone Counseling: Patient advised regarding risks of diarrhea, abdominal pain, hyperkalemia, birth defects (for female patients), liver toxicity and renal toxicity. The patient may need blood work to monitor liver and kidney function and potassium levels while on therapy. The patient verbalized understanding of the proper use and possible adverse effects of spironolactone.  All of the patient's questions and concerns were addressed.
Topical Retinoid counseling:  Patient advised to apply a pea-sized amount only at bedtime and wait 30 minutes after washing their face before applying.  If too drying, patient may add a non-comedogenic moisturizer. The patient verbalized understanding of the proper use and possible adverse effects of retinoids.  All of the patient's questions and concerns were addressed.
Sski Pregnancy And Lactation Text: This medication is Pregnancy Category D and isn't considered safe during pregnancy. It is excreted in breast milk.
Hydroxyzine Pregnancy And Lactation Text: This medication is not safe during pregnancy and should not be taken. It is also excreted in breast milk and breast feeding isn't recommended.
Glycopyrrolate Pregnancy And Lactation Text: This medication is Pregnancy Category B and is considered safe during pregnancy. It is unknown if it is excreted breast milk.
Nsaids Pregnancy And Lactation Text: These medications are considered safe up to 30 weeks gestation. It is excreted in breast milk.
Infliximab Counseling:  I discussed with the patient the risks of infliximab including but not limited to myelosuppression, immunosuppression, autoimmune hepatitis, demyelinating diseases, lymphoma, and serious infections.  The patient understands that monitoring is required including a PPD at baseline and must alert us or the primary physician if symptoms of infection or other concerning signs are noted.
Ketoconazole Pregnancy And Lactation Text: This medication is Pregnancy Category C and it isn't know if it is safe during pregnancy. It is also excreted in breast milk and breast feeding isn't recommended.
Drysol Counseling:  I discussed with the patient the risks of drysol/aluminum chloride including but not limited to skin rash, itching, irritation, burning.
Griseofulvin Pregnancy And Lactation Text: This medication is Pregnancy Category X and is known to cause serious birth defects. It is unknown if this medication is excreted in breast milk but breast feeding should be avoided.
Topical Sulfur Applications Pregnancy And Lactation Text: This medication is Pregnancy Category C and has an unknown safety profile during pregnancy. It is unknown if this topical medication is excreted in breast milk.
Otezla Counseling: The side effects of Otezla were discussed with the patient, including but not limited to worsening or new depression, weight loss, diarrhea, nausea, upper respiratory tract infection, and headache. Patient instructed to call the office should any adverse effect occur.  The patient verbalized understanding of the proper use and possible adverse effects of Otezla.  All the patient's questions and concerns were addressed.
Colchicine Counseling:  Patient counseled regarding adverse effects including but not limited to stomach upset (nausea, vomiting, stomach pain, or diarrhea).  Patient instructed to limit alcohol consumption while taking this medication.  Colchicine may reduce blood counts especially with prolonged use.  The patient understands that monitoring of kidney function and blood counts may be required, especially at baseline. The patient verbalized understanding of the proper use and possible adverse effects of colchicine.  All of the patient's questions and concerns were addressed.
Azathioprine Counseling:  I discussed with the patient the risks of azathioprine including but not limited to myelosuppression, immunosuppression, hepatotoxicity, lymphoma, and infections.  The patient understands that monitoring is required including baseline LFTs, Creatinine, possible TPMP genotyping and weekly CBCs for the first month and then every 2 weeks thereafter.  The patient verbalized understanding of the proper use and possible adverse effects of azathioprine.  All of the patient's questions and concerns were addressed.
Xelpaulaz Pregnancy And Lactation Text: This medication is Pregnancy Category D and is not considered safe during pregnancy.  The risk during breast feeding is also uncertain.
Bexarotene Counseling:  I discussed with the patient the risks of bexarotene including but not limited to hair loss, dry lips/skin/eyes, liver abnormalities, hyperlipidemia, pancreatitis, depression/suicidal ideation, photosensitivity, drug rash/allergic reactions, hypothyroidism, anemia, leukopenia, infection, cataracts, and teratogenicity.  Patient understands that they will need regular blood tests to check lipid profile, liver function tests, white blood cell count, thyroid function tests and pregnancy test if applicable.
Doxycycline Pregnancy And Lactation Text: This medication is Pregnancy Category D and not consider safe during pregnancy. It is also excreted in breast milk but is considered safe for shorter treatment courses.
Protopic Counseling: Patient may experience a mild burning sensation during topical application. Protopic is not approved in children less than 2 years of age. There have been case reports of hematologic and skin malignancies in patients using topical calcineurin inhibitors although causality is questionable.
Carac Counseling:  I discussed with the patient the risks of Carac including but not limited to erythema, scaling, itching, weeping, crusting, and pain.
Humira Counseling:  I discussed with the patient the risks of adalimumab including but not limited to myelosuppression, immunosuppression, autoimmune hepatitis, demyelinating diseases, lymphoma, and serious infections.  The patient understands that monitoring is required including a PPD at baseline and must alert us or the primary physician if symptoms of infection or other concerning signs are noted.
Tetracycline Pregnancy And Lactation Text: This medication is Pregnancy Category D and not consider safe during pregnancy. It is also excreted in breast milk.
Dupixent Pregnancy And Lactation Text: This medication likely crosses the placenta but the risk for the fetus is uncertain. This medication is excreted in breast milk.
Rifampin Counseling: I discussed with the patient the risks of rifampin including but not limited to liver damage, kidney damage, red-orange body fluids, nausea/vomiting and severe allergy.
Prednisone Counseling:  I discussed with the patient the risks of prolonged use of prednisone including but not limited to weight gain, insomnia, osteoporosis, mood changes, diabetes, susceptibility to infection, glaucoma and high blood pressure.  In cases where prednisone use is prolonged, patients should be monitored with blood pressure checks, serum glucose levels and an eye exam.  Additionally, the patient may need to be placed on GI prophylaxis, PCP prophylaxis, and calcium and vitamin D supplementation and/or a bisphosphonate.  The patient verbalized understanding of the proper use and the possible adverse effects of prednisone.  All of the patient's questions and concerns were addressed.
5-Fu Counseling: 5-Fluorouracil Counseling:  I discussed with the patient the risks of 5-fluorouracil including but not limited to erythema, scaling, itching, weeping, crusting, and pain.
Isotretinoin Counseling: Patient should get monthly blood tests, not donate blood, not drive at night if vision affected, not share medication, and not undergo elective surgery for 6 months after tx completed. Side effects reviewed, pt to contact office should one occur.
Hydroxychloroquine Pregnancy And Lactation Text: This medication has been shown to cause fetal harm but it isn't assigned a Pregnancy Risk Category. There are small amounts excreted in breast milk.
Birth Control Pills Counseling: Birth Control Pill Counseling: I discussed with the patient the potential side effects of OCPs including but not limited to increased risk of stroke, heart attack, thrombophlebitis, deep venous thrombosis, hepatic adenomas, breast changes, GI upset, headaches, and depression.  The patient verbalized understanding of the proper use and possible adverse effects of OCPs. All of the patient's questions and concerns were addressed.
Valtrex Counseling: I discussed with the patient the risks of valacyclovir including but not limited to kidney damage, nausea, vomiting and severe allergy.  The patient understands that if the infection seems to be worsening or is not improving, they are to call.

## 2018-05-03 NOTE — PROCEDURE: BIOPSY BY SHAVE METHOD
Anesthesia Type: 1% lidocaine with epinephrine
Anesthesia Volume In Cc: 0.5
Wound Care: Petrolatum
Bill 99419 For Specimen Handling/Conveyance To Laboratory?: no
Type Of Destruction Used: Curettage
Consent: Written consent was obtained and risks were reviewed including but not limited to scarring, infection, bleeding, scabbing, incomplete removal, nerve damage and allergy to anesthesia.
Biopsy Method: Personna blade
X Size Of Lesion In Cm: 0
Post-Care Instructions: I reviewed with the patient in detail post-care instructions. Patient is to keep the biopsy site dry overnight, and then apply bacitracin twice daily until healed. Patient may apply hydrogen peroxide soaks to remove any crusting.
Electrodesiccation Text: The wound bed was treated with electrodesiccation after the biopsy was performed.
Curettage Text: The wound bed was treated with curettage after the biopsy was performed.
Dressing: bandage
Notification Instructions: Patient will be notified of biopsy results. However, patient instructed to call the office if not contacted within 2 weeks.
Biopsy Type: H and E
Cryotherapy Text: The wound bed was treated with cryotherapy after the biopsy was performed.
Lab: [id number]
Hemostasis: Drysol
Lab Facility: 
Was A Bandage Applied: Yes
Billing Type: Third-Party Bill
Silver Nitrate Text: The wound bed was treated with silver nitrate after the biopsy was performed.
Detail Level: Detailed
Electrodesiccation And Curettage Text: The wound bed was treated with electrodesiccation and curettage after the biopsy was performed.
Body Location Override (Optional - Billing Will Still Be Based On Selected Body Map Location If Applicable): right groin

## 2018-05-03 NOTE — PROCEDURE: TREATMENT REGIMEN
Detail Level: Zone
Initiate Treatment: Pt is scheduled to see Dr. Lyon on Monday may 7th at 400.
Plan: Gabapentin 300 mg up to four times a day , mupirocin to lesions and biopsy sites, Keflex 500mg TID x10 days

## 2018-05-24 ENCOUNTER — RX ONLY (OUTPATIENT)
Age: 68
Setting detail: RX ONLY
End: 2018-05-24

## 2018-05-24 RX ORDER — CEPHALEXIN 500 MG/1
CAPSULE ORAL
Qty: 42 | Refills: 0 | Status: ERX

## 2018-06-08 ENCOUNTER — RX ONLY (OUTPATIENT)
Age: 68
Setting detail: RX ONLY
End: 2018-06-08

## 2018-06-08 RX ORDER — GABAPENTIN 300 MG/1
CAPSULE ORAL
Qty: 120 | Refills: 0 | Status: ERX

## 2018-12-13 NOTE — PROGRESS NOTES
IR Procedure RN's Note:    Patient consented by Dr. Chacko in [facility]; pt and MD signed consent and placed in chart; verified by Nando ARREDONDO RN.    Implanted port placement done by Dr. Saavedra; RIGHT IJ access site; pt assessed upon arrival, pt A/Ox4, pt aware of the procedure; BARD PowerPort Clear [place] 8F x 23cm REF#[id number] LOT#[id number]; pt appears comfortable throughout the procedure with no signs of distress or discomfort; ETCO2 monitored with a baseline of 30 mmHg and ranged between 29-44 mmHg throughout the procedure; access site CDI, soft with no signs of hematoma or bleeding, gauze and tegaderm for dressing; telephone report given to Nando; pt is awake and on 2L NC O2 post proceudre and during transport; pt transported to [facility].

## 2018-12-13 NOTE — OR SURGEON
Immediate Post- Operative Note        PostOp Diagnosis: lymphoma      Procedure(s): chest port      Estimated Blood Loss: Less than 5 ml        Complications: None            12/13/2018     2:51 PM     Osmel Saavedra

## 2018-12-13 NOTE — DISCHARGE INSTRUCTIONS
ACTIVITY: Rest and take it easy for the first 24 hours.  A responsible adult is recommended to remain with you during that time.  It is normal to feel sleepy.  We encourage you to not do anything that requires balance, judgment or coordination.    MILD FLU-LIKE SYMPTOMS ARE NORMAL. YOU MAY EXPERIENCE GENERALIZED MUSCLE ACHES, THROAT IRRITATION, HEADACHE AND/OR SOME NAUSEA.    FOR 24 HOURS DO NOT:  Drive, operate machinery or run household appliances.  Drink beer or alcoholic beverages.   Make important decisions or sign legal documents.    DIET: To avoid nausea, slowly advance diet as tolerated, avoiding spicy or greasy foods for the first day.  Add more substantial food to your diet according to your physician's instructions.  Babies can be fed formula or breast milk as soon as they are hungry.  INCREASE FLUIDS AND FIBER TO AVOID CONSTIPATION.    SURGICAL DRESSING/BATHING: Leave dressing in place until first chemo. Keep dry. You may shower in 3 days, BUT KEEP DRESSING DRY. Do not submerge in water or get dressing wet.     FOLLOW-UP APPOINTMENT:  A follow-up appointment should be arranged with your doctor; call to schedule.    You should CALL YOUR PHYSICIAN if you develop:  Fever greater than 101 degrees F.  Pain not relieved by medication, or persistent nausea or vomiting.  Excessive bleeding (blood soaking through dressing) or unexpected drainage from the wound.  Extreme redness or swelling around the incision site, drainage of pus or foul smelling drainage.  Inability to urinate or empty your bladder within 8 hours.  Problems with breathing or chest pain.    You should call 911 if you develop problems with breathing or chest pain.  If you are unable to contact your doctor or surgical center, you should go to the nearest emergency room or urgent care center.  Physician's telephone #: [phone]    If any questions arise, call your doctor.  If your doctor is not available, please feel free to call the  Surgical Center at [phone].  The Center is open Monday through Friday from 7AM to 7PM.  You can also call the HEALTH HOTLINE open 24 hours/day, 7 days/week and speak to a nurse at [phone], or toll free at [phone].    A registered nurse may call you a few days after your surgery to see how you are doing after your procedure.    MEDICATIONS: Resume taking daily medication.  Take prescribed pain medication with food.  If no medication is prescribed, you may take non-aspirin pain medication if needed.  PAIN MEDICATION CAN BE VERY CONSTIPATING.  Take a stool softener or laxative such as senokot, pericolace, or milk of magnesia if needed.    If your physician has prescribed pain medication that includes Acetaminophen (Tylenol), do not take additional Acetaminophen (Tylenol) while taking the prescribed medication.    Depression / Suicide Risk    As you are discharged from this [facility] facility, it is important to learn how to keep safe from harming yourself.    Recognize the warning signs:  · Abrupt changes in personality, positive or negative- including increase in energy   · Giving away possessions  · Change in eating patterns- significant weight changes-  positive or negative  · Change in sleeping patterns- unable to sleep or sleeping all the time   · Unwillingness or inability to communicate  · Depression  · Unusual sadness, discouragement and loneliness  · Talk of wanting to die  · Neglect of personal appearance   · Rebelliousness- reckless behavior  · Withdrawal from people/activities they love  · Confusion- inability to concentrate     If you or a loved one observes any of these behaviors or has concerns about self-harm, here's what you can do:  · Talk about it- your feelings and reasons for harming yourself  · Remove any means that you might use to hurt yourself (examples: pills, rope, extension cords, firearm)  · Get professional help from the community (Mental Health, Substance Abuse,  psychological counseling)  · Do not be alone:Call your Safe Contact- someone whom you trust who will be there for you.  · Call your local CRISIS HOTLINE 164-5975 or 437-607-0017  · Call your local Children's Mobile Crisis Response Team [place] (026) 584-1331 or www.[employer]id[employer]  · Call the toll free National Suicide Prevention Hotlines   · National Suicide Prevention Lifeline 255-294-TSUZ (9834)  · National Hope Line Network 800-SUICIDE (977-3382)    Implanted Port Insertion, Care After  Refer to this sheet in the next few weeks. These instructions provide you with information on caring for yourself after your procedure. Your health care provider may also give you more specific instructions. Your treatment has been planned according to current medical practices, but problems sometimes occur. Call your health care provider if you have any problems or questions after your procedure.  WHAT TO EXPECT AFTER THE PROCEDURE  After your procedure, it is typical to have the following:   · Discomfort at the port insertion site. Ice packs to the area will help.  · Bruising on the skin over the port. This will subside in 3-4 days.  HOME CARE INSTRUCTIONS  · After your port is placed, you will get a 's information card. The card has information about your port. Keep this card with you at all times.    · Know what kind of port you have. There are many types of ports available.    · Wear a medical alert bracelet in case of an emergency. This can help alert health care workers that you have a port.    · The port can stay in for as long as your health care provider believes it is necessary.    · A home health care nurse may give medicines and take care of the port.    · You or a family member can get special training and directions for giving medicine and taking care of the port at home.    SEEK MEDICAL CARE IF:   · Your port does not flush or you are unable to get a blood return.    · You have a fever or  chills.  SEEK IMMEDIATE MEDICAL CARE IF:  · You have new fluid or pus coming from your incision.    · You notice a bad smell coming from your incision site.    · You have swelling, pain, or more redness at the incision or port site.    · You have chest pain or shortness of breath.     This information is not intended to replace advice given to you by your health care provider. Make sure you discuss any questions you have with your health care provider.     Document Released: 10/08/2014 Document Revised: 12/23/2014 Document Reviewed: 10/08/2014  [employer] Interactive Patient Education ©2016 [employer]

## 2018-12-14 NOTE — PROGRESS NOTES
1511 Pt arrived to PPU with IR RN. AAOx4. VSS. Denies pain and nausea. Right upper chest dressing is CDI and soft. Belongings returned to pt bedside. Family brought to bedside.   1534 Clarified with IR RNViry that pt can leave port accessed for lab work in [place] tomorrow and for first chemo next week.   1600 Discharge instructions reviewed with pt and family.   1614 PIV removed, pt dressed, ambulated to bathroom and voided.   1620 Pt discharged via wheelchair to responsible adult with volunteer escort.

## 2019-01-01 ENCOUNTER — HOME CARE VISIT (OUTPATIENT)
Dept: HOSPICE | Facility: HOSPICE | Age: 69
End: 2019-01-01
Payer: MEDICARE

## 2019-01-01 ENCOUNTER — HOSPITAL ENCOUNTER (INPATIENT)
Facility: MEDICAL CENTER | Age: 69
LOS: 20 days | DRG: 314 | End: 2019-05-28
Attending: EMERGENCY MEDICINE | Admitting: HOSPITALIST
Payer: MEDICARE

## 2019-01-01 ENCOUNTER — APPOINTMENT (OUTPATIENT)
Dept: RADIOLOGY | Facility: MEDICAL CENTER | Age: 69
DRG: 314 | End: 2019-01-01
Attending: INTERNAL MEDICINE
Payer: MEDICARE

## 2019-01-01 ENCOUNTER — HOSPICE ADMISSION (OUTPATIENT)
Dept: HOSPICE | Facility: HOSPICE | Age: 69
End: 2019-01-01
Payer: MEDICARE

## 2019-01-01 ENCOUNTER — APPOINTMENT (OUTPATIENT)
Dept: RADIOLOGY | Facility: MEDICAL CENTER | Age: 69
DRG: 314 | End: 2019-01-01
Attending: HOSPITALIST
Payer: MEDICARE

## 2019-01-01 ENCOUNTER — APPOINTMENT (OUTPATIENT)
Dept: RADIOLOGY | Facility: MEDICAL CENTER | Age: 69
DRG: 314 | End: 2019-01-01
Attending: EMERGENCY MEDICINE
Payer: MEDICARE

## 2019-01-01 ENCOUNTER — HOSPITAL ENCOUNTER (INPATIENT)
Facility: MEDICAL CENTER | Age: 69
LOS: 5 days | DRG: 842 | End: 2019-04-13
Attending: EMERGENCY MEDICINE | Admitting: HOSPITALIST
Payer: MEDICARE

## 2019-01-01 ENCOUNTER — HOSPITAL ENCOUNTER (INPATIENT)
Facility: MEDICAL CENTER | Age: 69
LOS: 4 days | DRG: 871 | End: 2019-06-01
Attending: INTERNAL MEDICINE | Admitting: INTERNAL MEDICINE
Payer: COMMERCIAL

## 2019-01-01 ENCOUNTER — APPOINTMENT (OUTPATIENT)
Dept: CARDIOLOGY | Facility: MEDICAL CENTER | Age: 69
DRG: 314 | End: 2019-01-01
Attending: HOSPITALIST
Payer: MEDICARE

## 2019-01-01 ENCOUNTER — APPOINTMENT (OUTPATIENT)
Dept: RADIOLOGY | Facility: MEDICAL CENTER | Age: 69
DRG: 842 | End: 2019-01-01
Attending: EMERGENCY MEDICINE
Payer: MEDICARE

## 2019-01-01 VITALS
OXYGEN SATURATION: 96 % | SYSTOLIC BLOOD PRESSURE: 77 MMHG | HEART RATE: 84 BPM | TEMPERATURE: 97.5 F | DIASTOLIC BLOOD PRESSURE: 43 MMHG | WEIGHT: 238.1 LBS | HEIGHT: 68 IN | BODY MASS INDEX: 36.09 KG/M2 | RESPIRATION RATE: 20 BRPM

## 2019-01-01 VITALS — RESPIRATION RATE: 18 BRPM | BODY MASS INDEX: 36.07 KG/M2 | WEIGHT: 238 LBS | HEIGHT: 68 IN

## 2019-01-01 VITALS
TEMPERATURE: 98.1 F | WEIGHT: 228.84 LBS | HEART RATE: 73 BPM | DIASTOLIC BLOOD PRESSURE: 62 MMHG | OXYGEN SATURATION: 99 % | HEIGHT: 72 IN | SYSTOLIC BLOOD PRESSURE: 111 MMHG | BODY MASS INDEX: 31 KG/M2 | RESPIRATION RATE: 15 BRPM

## 2019-01-01 VITALS — HEART RATE: 98 BPM | RESPIRATION RATE: 20 BRPM

## 2019-01-01 DIAGNOSIS — R65.20 SEVERE SEPSIS (HCC): ICD-10-CM

## 2019-01-01 DIAGNOSIS — A41.9 SEPSIS, DUE TO UNSPECIFIED ORGANISM: ICD-10-CM

## 2019-01-01 DIAGNOSIS — L03.90 CELLULITIS, UNSPECIFIED CELLULITIS SITE: ICD-10-CM

## 2019-01-01 DIAGNOSIS — C84.00 MYCOSIS FUNGOIDES, UNSPECIFIED BODY REGION (HCC): ICD-10-CM

## 2019-01-01 DIAGNOSIS — E87.0 HYPERNATREMIA: ICD-10-CM

## 2019-01-01 DIAGNOSIS — R65.21 SEPTIC SHOCK (HCC): ICD-10-CM

## 2019-01-01 DIAGNOSIS — D64.9 ANEMIA, UNSPECIFIED TYPE: ICD-10-CM

## 2019-01-01 DIAGNOSIS — A41.9 SEPTIC SHOCK (HCC): ICD-10-CM

## 2019-01-01 DIAGNOSIS — A41.9 SEVERE SEPSIS (HCC): ICD-10-CM

## 2019-01-01 DIAGNOSIS — B95.7 BACTEREMIA DUE TO COAGULASE-NEGATIVE STAPHYLOCOCCUS: ICD-10-CM

## 2019-01-01 DIAGNOSIS — R60.1 GENERALIZED EDEMA: ICD-10-CM

## 2019-01-01 DIAGNOSIS — C84.99: ICD-10-CM

## 2019-01-01 DIAGNOSIS — C84.18 SEZARY DISEASE INVOLVING LYMPH NODES OF MULTIPLE REGIONS (HCC): ICD-10-CM

## 2019-01-01 DIAGNOSIS — J45.40 MODERATE PERSISTENT ASTHMA WITHOUT COMPLICATION: ICD-10-CM

## 2019-01-01 DIAGNOSIS — C84.A0 CUTANEOUS T-CELL LYMPHOMA, UNSPECIFIED BODY REGION (HCC): ICD-10-CM

## 2019-01-01 DIAGNOSIS — C84.A0 PLEOMORPHIC SMALL OR MEDIUM-SIZED CELL CUTANEOUS T-CELL LYMPHOMA (HCC): ICD-10-CM

## 2019-01-01 DIAGNOSIS — R78.81 BACTEREMIA DUE TO COAGULASE-NEGATIVE STAPHYLOCOCCUS: ICD-10-CM

## 2019-01-01 DIAGNOSIS — K21.9 GASTROESOPHAGEAL REFLUX DISEASE, ESOPHAGITIS PRESENCE NOT SPECIFIED: ICD-10-CM

## 2019-01-01 DIAGNOSIS — R65.21 SEVERE SEPSIS WITH SEPTIC SHOCK (HCC): ICD-10-CM

## 2019-01-01 DIAGNOSIS — A41.9 SEVERE SEPSIS WITH SEPTIC SHOCK (HCC): ICD-10-CM

## 2019-01-01 DIAGNOSIS — N17.9 AKI (ACUTE KIDNEY INJURY) (HCC): ICD-10-CM

## 2019-01-01 LAB
ALBUMIN SERPL BCP-MCNC: 1.5 G/DL (ref 3.2–4.9)
ALBUMIN SERPL BCP-MCNC: 1.9 G/DL (ref 3.2–4.9)
ALBUMIN SERPL BCP-MCNC: 2 G/DL (ref 3.2–4.9)
ALBUMIN SERPL BCP-MCNC: 2.3 G/DL (ref 3.2–4.9)
ALBUMIN SERPL BCP-MCNC: 3 G/DL (ref 3.2–4.9)
ALBUMIN SERPL BCP-MCNC: <1.5 G/DL (ref 3.2–4.9)
ALBUMIN SERPL BCP-MCNC: <1.5 G/DL (ref 3.2–4.9)
ALBUMIN/GLOB SERPL: 0.6 G/DL
ALBUMIN/GLOB SERPL: 0.7 G/DL
ALBUMIN/GLOB SERPL: 0.9 G/DL
ALBUMIN/GLOB SERPL: 1 G/DL
ALP SERPL-CCNC: 106 U/L (ref 30–99)
ALP SERPL-CCNC: 62 U/L (ref 30–99)
ALP SERPL-CCNC: 76 U/L (ref 30–99)
ALP SERPL-CCNC: 86 U/L (ref 30–99)
ALT SERPL-CCNC: 11 U/L (ref 2–50)
ALT SERPL-CCNC: 7 U/L (ref 2–50)
ALT SERPL-CCNC: 9 U/L (ref 2–50)
ALT SERPL-CCNC: 9 U/L (ref 2–50)
ANION GAP SERPL CALC-SCNC: 10 MMOL/L (ref 0–11.9)
ANION GAP SERPL CALC-SCNC: 10 MMOL/L (ref 0–11.9)
ANION GAP SERPL CALC-SCNC: 3 MMOL/L (ref 0–11.9)
ANION GAP SERPL CALC-SCNC: 4 MMOL/L (ref 0–11.9)
ANION GAP SERPL CALC-SCNC: 5 MMOL/L (ref 0–11.9)
ANION GAP SERPL CALC-SCNC: 6 MMOL/L (ref 0–11.9)
ANION GAP SERPL CALC-SCNC: 7 MMOL/L (ref 0–11.9)
ANION GAP SERPL CALC-SCNC: 7 MMOL/L (ref 0–11.9)
ANION GAP SERPL CALC-SCNC: 8 MMOL/L (ref 0–11.9)
ANION GAP SERPL CALC-SCNC: 9 MMOL/L (ref 0–11.9)
ANISOCYTOSIS BLD QL SMEAR: ABNORMAL
APPEARANCE UR: ABNORMAL
APPEARANCE UR: CLEAR
APPEARANCE UR: CLEAR
APTT PPP: 35.9 SEC (ref 24.7–36)
AST SERPL-CCNC: 11 U/L (ref 12–45)
AST SERPL-CCNC: 12 U/L (ref 12–45)
AST SERPL-CCNC: 16 U/L (ref 12–45)
AST SERPL-CCNC: 17 U/L (ref 12–45)
BACTERIA #/AREA URNS HPF: NEGATIVE /HPF
BACTERIA BLD CULT: ABNORMAL
BACTERIA BLD CULT: ABNORMAL
BACTERIA BLD CULT: NORMAL
BACTERIA WND AEROBE CULT: ABNORMAL
BACTERIA WND AEROBE CULT: NORMAL
BASOPHILS # BLD AUTO: 0 % (ref 0–1.8)
BASOPHILS # BLD AUTO: 0.2 % (ref 0–1.8)
BASOPHILS # BLD AUTO: 0.9 % (ref 0–1.8)
BASOPHILS # BLD AUTO: 1.8 % (ref 0–1.8)
BASOPHILS # BLD AUTO: 2.5 % (ref 0–1.8)
BASOPHILS # BLD: 0 K/UL (ref 0–0.12)
BASOPHILS # BLD: 0.01 K/UL (ref 0–0.12)
BASOPHILS # BLD: 0.05 K/UL (ref 0–0.12)
BASOPHILS # BLD: 0.06 K/UL (ref 0–0.12)
BASOPHILS # BLD: 0.09 K/UL (ref 0–0.12)
BASOPHILS # BLD: 0.13 K/UL (ref 0–0.12)
BASOPHILS # BLD: 0.16 K/UL (ref 0–0.12)
BILIRUB SERPL-MCNC: 0.3 MG/DL (ref 0.1–1.5)
BILIRUB SERPL-MCNC: 0.4 MG/DL (ref 0.1–1.5)
BILIRUB SERPL-MCNC: 0.4 MG/DL (ref 0.1–1.5)
BILIRUB SERPL-MCNC: 0.5 MG/DL (ref 0.1–1.5)
BILIRUB UR QL STRIP.AUTO: NEGATIVE
BLASTS NFR BLD MANUAL: 0 %
BUN SERPL-MCNC: 10 MG/DL (ref 8–22)
BUN SERPL-MCNC: 11 MG/DL (ref 8–22)
BUN SERPL-MCNC: 13 MG/DL (ref 8–22)
BUN SERPL-MCNC: 15 MG/DL (ref 8–22)
BUN SERPL-MCNC: 15 MG/DL (ref 8–22)
BUN SERPL-MCNC: 16 MG/DL (ref 8–22)
BUN SERPL-MCNC: 17 MG/DL (ref 8–22)
BUN SERPL-MCNC: 18 MG/DL (ref 8–22)
BUN SERPL-MCNC: 18 MG/DL (ref 8–22)
BUN SERPL-MCNC: 19 MG/DL (ref 8–22)
BUN SERPL-MCNC: 20 MG/DL (ref 8–22)
BUN SERPL-MCNC: 21 MG/DL (ref 8–22)
BUN SERPL-MCNC: 22 MG/DL (ref 8–22)
BUN SERPL-MCNC: 23 MG/DL (ref 8–22)
BUN SERPL-MCNC: 23 MG/DL (ref 8–22)
BUN SERPL-MCNC: 27 MG/DL (ref 8–22)
BUN SERPL-MCNC: 31 MG/DL (ref 8–22)
BURR CELLS BLD QL SMEAR: NORMAL
C DIFF DNA SPEC QL NAA+PROBE: NEGATIVE
C DIFF TOX GENS STL QL NAA+PROBE: NEGATIVE
CALCIUM SERPL-MCNC: 6.7 MG/DL (ref 8.5–10.5)
CALCIUM SERPL-MCNC: 6.8 MG/DL (ref 8.5–10.5)
CALCIUM SERPL-MCNC: 6.9 MG/DL (ref 8.5–10.5)
CALCIUM SERPL-MCNC: 6.9 MG/DL (ref 8.5–10.5)
CALCIUM SERPL-MCNC: 7 MG/DL (ref 8.5–10.5)
CALCIUM SERPL-MCNC: 7.1 MG/DL (ref 8.5–10.5)
CALCIUM SERPL-MCNC: 7.2 MG/DL (ref 8.5–10.5)
CALCIUM SERPL-MCNC: 7.2 MG/DL (ref 8.5–10.5)
CALCIUM SERPL-MCNC: 7.3 MG/DL (ref 8.5–10.5)
CALCIUM SERPL-MCNC: 7.3 MG/DL (ref 8.5–10.5)
CALCIUM SERPL-MCNC: 7.4 MG/DL (ref 8.5–10.5)
CALCIUM SERPL-MCNC: 7.5 MG/DL (ref 8.5–10.5)
CALCIUM SERPL-MCNC: 7.7 MG/DL (ref 8.5–10.5)
CALCIUM SERPL-MCNC: 7.9 MG/DL (ref 8.5–10.5)
CHLORIDE SERPL-SCNC: 101 MMOL/L (ref 96–112)
CHLORIDE SERPL-SCNC: 102 MMOL/L (ref 96–112)
CHLORIDE SERPL-SCNC: 103 MMOL/L (ref 96–112)
CHLORIDE SERPL-SCNC: 105 MMOL/L (ref 96–112)
CHLORIDE SERPL-SCNC: 106 MMOL/L (ref 96–112)
CHLORIDE SERPL-SCNC: 107 MMOL/L (ref 96–112)
CHLORIDE SERPL-SCNC: 107 MMOL/L (ref 96–112)
CHLORIDE SERPL-SCNC: 108 MMOL/L (ref 96–112)
CHLORIDE SERPL-SCNC: 108 MMOL/L (ref 96–112)
CHLORIDE SERPL-SCNC: 109 MMOL/L (ref 96–112)
CHLORIDE SERPL-SCNC: 109 MMOL/L (ref 96–112)
CHLORIDE SERPL-SCNC: 112 MMOL/L (ref 96–112)
CHLORIDE SERPL-SCNC: 113 MMOL/L (ref 96–112)
CHLORIDE SERPL-SCNC: 113 MMOL/L (ref 96–112)
CHLORIDE SERPL-SCNC: 114 MMOL/L (ref 96–112)
CHLORIDE SERPL-SCNC: 115 MMOL/L (ref 96–112)
CHLORIDE SERPL-SCNC: 115 MMOL/L (ref 96–112)
CHLORIDE SERPL-SCNC: 117 MMOL/L (ref 96–112)
CHLORIDE SERPL-SCNC: 117 MMOL/L (ref 96–112)
CHLORIDE SERPL-SCNC: 118 MMOL/L (ref 96–112)
CHLORIDE SERPL-SCNC: 119 MMOL/L (ref 96–112)
CHLORIDE SERPL-SCNC: 123 MMOL/L (ref 96–112)
CHLORIDE SERPL-SCNC: 123 MMOL/L (ref 96–112)
CHLORIDE SERPL-SCNC: 124 MMOL/L (ref 96–112)
CK SERPL-CCNC: 39 U/L (ref 0–154)
CO2 SERPL-SCNC: 17 MMOL/L (ref 20–33)
CO2 SERPL-SCNC: 19 MMOL/L (ref 20–33)
CO2 SERPL-SCNC: 19 MMOL/L (ref 20–33)
CO2 SERPL-SCNC: 21 MMOL/L (ref 20–33)
CO2 SERPL-SCNC: 21 MMOL/L (ref 20–33)
CO2 SERPL-SCNC: 22 MMOL/L (ref 20–33)
CO2 SERPL-SCNC: 23 MMOL/L (ref 20–33)
CO2 SERPL-SCNC: 23 MMOL/L (ref 20–33)
CO2 SERPL-SCNC: 24 MMOL/L (ref 20–33)
CO2 SERPL-SCNC: 24 MMOL/L (ref 20–33)
CO2 SERPL-SCNC: 25 MMOL/L (ref 20–33)
CO2 SERPL-SCNC: 26 MMOL/L (ref 20–33)
CO2 SERPL-SCNC: 27 MMOL/L (ref 20–33)
CO2 SERPL-SCNC: 28 MMOL/L (ref 20–33)
CO2 SERPL-SCNC: 28 MMOL/L (ref 20–33)
COLOR UR: YELLOW
CREAT SERPL-MCNC: 0.75 MG/DL (ref 0.5–1.4)
CREAT SERPL-MCNC: 0.75 MG/DL (ref 0.5–1.4)
CREAT SERPL-MCNC: 0.76 MG/DL (ref 0.5–1.4)
CREAT SERPL-MCNC: 0.82 MG/DL (ref 0.5–1.4)
CREAT SERPL-MCNC: 0.88 MG/DL (ref 0.5–1.4)
CREAT SERPL-MCNC: 0.9 MG/DL (ref 0.5–1.4)
CREAT SERPL-MCNC: 1.02 MG/DL (ref 0.5–1.4)
CREAT SERPL-MCNC: 1.07 MG/DL (ref 0.5–1.4)
CREAT SERPL-MCNC: 1.09 MG/DL (ref 0.5–1.4)
CREAT SERPL-MCNC: 1.19 MG/DL (ref 0.5–1.4)
CREAT SERPL-MCNC: 1.22 MG/DL (ref 0.5–1.4)
CREAT SERPL-MCNC: 1.29 MG/DL (ref 0.5–1.4)
CREAT SERPL-MCNC: 1.34 MG/DL (ref 0.5–1.4)
CREAT SERPL-MCNC: 1.41 MG/DL (ref 0.5–1.4)
CREAT SERPL-MCNC: 1.45 MG/DL (ref 0.5–1.4)
CREAT SERPL-MCNC: 1.49 MG/DL (ref 0.5–1.4)
CREAT SERPL-MCNC: 1.51 MG/DL (ref 0.5–1.4)
CREAT SERPL-MCNC: 1.55 MG/DL (ref 0.5–1.4)
CREAT SERPL-MCNC: 1.56 MG/DL (ref 0.5–1.4)
CREAT SERPL-MCNC: 1.59 MG/DL (ref 0.5–1.4)
CREAT SERPL-MCNC: 1.59 MG/DL (ref 0.5–1.4)
CREAT SERPL-MCNC: 1.6 MG/DL (ref 0.5–1.4)
CREAT SERPL-MCNC: 1.69 MG/DL (ref 0.5–1.4)
CREAT SERPL-MCNC: 1.73 MG/DL (ref 0.5–1.4)
CREAT SERPL-MCNC: 2.04 MG/DL (ref 0.5–1.4)
CREAT SERPL-MCNC: 2.34 MG/DL (ref 0.5–1.4)
DACRYOCYTES BLD QL SMEAR: NORMAL
EKG IMPRESSION: NORMAL
EOSINOPHIL # BLD AUTO: 0 K/UL (ref 0–0.51)
EOSINOPHIL # BLD AUTO: 0.11 K/UL (ref 0–0.51)
EOSINOPHIL # BLD AUTO: 0.16 K/UL (ref 0–0.51)
EOSINOPHIL # BLD AUTO: 0.18 K/UL (ref 0–0.51)
EOSINOPHIL # BLD AUTO: 0.25 K/UL (ref 0–0.51)
EOSINOPHIL # BLD AUTO: 0.27 K/UL (ref 0–0.51)
EOSINOPHIL # BLD AUTO: 0.34 K/UL (ref 0–0.51)
EOSINOPHIL # BLD AUTO: 0.37 K/UL (ref 0–0.51)
EOSINOPHIL # BLD AUTO: 0.42 K/UL (ref 0–0.51)
EOSINOPHIL # BLD AUTO: 0.44 K/UL (ref 0–0.51)
EOSINOPHIL # BLD AUTO: 0.45 K/UL (ref 0–0.51)
EOSINOPHIL # BLD AUTO: 0.53 K/UL (ref 0–0.51)
EOSINOPHIL # BLD AUTO: 0.53 K/UL (ref 0–0.51)
EOSINOPHIL # BLD AUTO: 0.55 K/UL (ref 0–0.51)
EOSINOPHIL # BLD AUTO: 0.66 K/UL (ref 0–0.51)
EOSINOPHIL # BLD AUTO: 0.76 K/UL (ref 0–0.51)
EOSINOPHIL # BLD AUTO: 0.81 K/UL (ref 0–0.51)
EOSINOPHIL # BLD AUTO: 0.95 K/UL (ref 0–0.51)
EOSINOPHIL # BLD AUTO: 1.13 K/UL (ref 0–0.51)
EOSINOPHIL # BLD AUTO: 1.47 K/UL (ref 0–0.51)
EOSINOPHIL NFR BLD: 0 % (ref 0–6.9)
EOSINOPHIL NFR BLD: 0.9 % (ref 0–6.9)
EOSINOPHIL NFR BLD: 1.8 % (ref 0–6.9)
EOSINOPHIL NFR BLD: 10.5 % (ref 0–6.9)
EOSINOPHIL NFR BLD: 10.5 % (ref 0–6.9)
EOSINOPHIL NFR BLD: 12.2 % (ref 0–6.9)
EOSINOPHIL NFR BLD: 12.6 % (ref 0–6.9)
EOSINOPHIL NFR BLD: 18.6 % (ref 0–6.9)
EOSINOPHIL NFR BLD: 2.5 % (ref 0–6.9)
EOSINOPHIL NFR BLD: 2.6 % (ref 0–6.9)
EOSINOPHIL NFR BLD: 21.6 % (ref 0–6.9)
EOSINOPHIL NFR BLD: 3.5 % (ref 0–6.9)
EOSINOPHIL NFR BLD: 3.5 % (ref 0–6.9)
EOSINOPHIL NFR BLD: 5.2 % (ref 0–6.9)
EOSINOPHIL NFR BLD: 6.1 % (ref 0–6.9)
EOSINOPHIL NFR BLD: 7 % (ref 0–6.9)
EOSINOPHIL NFR BLD: 7.8 % (ref 0–6.9)
EOSINOPHIL NFR BLD: 7.8 % (ref 0–6.9)
EOSINOPHIL NFR BLD: 8 % (ref 0–6.9)
EOSINOPHIL NFR BLD: 8.7 % (ref 0–6.9)
EPI CELLS #/AREA URNS HPF: ABNORMAL /HPF
EPI CELLS #/AREA URNS HPF: NEGATIVE /HPF
EPI CELLS #/AREA URNS HPF: NEGATIVE /HPF
ERYTHROCYTE [DISTWIDTH] IN BLOOD BY AUTOMATED COUNT: 50.6 FL (ref 35.9–50)
ERYTHROCYTE [DISTWIDTH] IN BLOOD BY AUTOMATED COUNT: 50.7 FL (ref 35.9–50)
ERYTHROCYTE [DISTWIDTH] IN BLOOD BY AUTOMATED COUNT: 51.3 FL (ref 35.9–50)
ERYTHROCYTE [DISTWIDTH] IN BLOOD BY AUTOMATED COUNT: 52 FL (ref 35.9–50)
ERYTHROCYTE [DISTWIDTH] IN BLOOD BY AUTOMATED COUNT: 52.4 FL (ref 35.9–50)
ERYTHROCYTE [DISTWIDTH] IN BLOOD BY AUTOMATED COUNT: 52.6 FL (ref 35.9–50)
ERYTHROCYTE [DISTWIDTH] IN BLOOD BY AUTOMATED COUNT: 54.2 FL (ref 35.9–50)
ERYTHROCYTE [DISTWIDTH] IN BLOOD BY AUTOMATED COUNT: 55.4 FL (ref 35.9–50)
ERYTHROCYTE [DISTWIDTH] IN BLOOD BY AUTOMATED COUNT: 55.6 FL (ref 35.9–50)
ERYTHROCYTE [DISTWIDTH] IN BLOOD BY AUTOMATED COUNT: 58.4 FL (ref 35.9–50)
ERYTHROCYTE [DISTWIDTH] IN BLOOD BY AUTOMATED COUNT: 58.9 FL (ref 35.9–50)
ERYTHROCYTE [DISTWIDTH] IN BLOOD BY AUTOMATED COUNT: 59.9 FL (ref 35.9–50)
ERYTHROCYTE [DISTWIDTH] IN BLOOD BY AUTOMATED COUNT: 60 FL (ref 35.9–50)
ERYTHROCYTE [DISTWIDTH] IN BLOOD BY AUTOMATED COUNT: 60.9 FL (ref 35.9–50)
ERYTHROCYTE [DISTWIDTH] IN BLOOD BY AUTOMATED COUNT: 61.5 FL (ref 35.9–50)
ERYTHROCYTE [DISTWIDTH] IN BLOOD BY AUTOMATED COUNT: 61.8 FL (ref 35.9–50)
ERYTHROCYTE [DISTWIDTH] IN BLOOD BY AUTOMATED COUNT: 61.8 FL (ref 35.9–50)
ERYTHROCYTE [DISTWIDTH] IN BLOOD BY AUTOMATED COUNT: 62.3 FL (ref 35.9–50)
ERYTHROCYTE [DISTWIDTH] IN BLOOD BY AUTOMATED COUNT: 62.4 FL (ref 35.9–50)
ERYTHROCYTE [DISTWIDTH] IN BLOOD BY AUTOMATED COUNT: 63.3 FL (ref 35.9–50)
ERYTHROCYTE [DISTWIDTH] IN BLOOD BY AUTOMATED COUNT: 63.4 FL (ref 35.9–50)
ERYTHROCYTE [DISTWIDTH] IN BLOOD BY AUTOMATED COUNT: 66 FL (ref 35.9–50)
FERRITIN SERPL-MCNC: 491.3 NG/ML (ref 22–322)
FOLATE SERPL-MCNC: 6.8 NG/ML
GLOBULIN SER CALC-MCNC: 2.6 G/DL (ref 1.9–3.5)
GLOBULIN SER CALC-MCNC: 2.9 G/DL (ref 1.9–3.5)
GLOBULIN SER CALC-MCNC: 2.9 G/DL (ref 1.9–3.5)
GLOBULIN SER CALC-MCNC: 3.1 G/DL (ref 1.9–3.5)
GLUCOSE BLD-MCNC: 100 MG/DL (ref 65–99)
GLUCOSE BLD-MCNC: 82 MG/DL (ref 65–99)
GLUCOSE SERPL-MCNC: 100 MG/DL (ref 65–99)
GLUCOSE SERPL-MCNC: 100 MG/DL (ref 65–99)
GLUCOSE SERPL-MCNC: 101 MG/DL (ref 65–99)
GLUCOSE SERPL-MCNC: 102 MG/DL (ref 65–99)
GLUCOSE SERPL-MCNC: 104 MG/DL (ref 65–99)
GLUCOSE SERPL-MCNC: 104 MG/DL (ref 65–99)
GLUCOSE SERPL-MCNC: 105 MG/DL (ref 65–99)
GLUCOSE SERPL-MCNC: 106 MG/DL (ref 65–99)
GLUCOSE SERPL-MCNC: 107 MG/DL (ref 65–99)
GLUCOSE SERPL-MCNC: 109 MG/DL (ref 65–99)
GLUCOSE SERPL-MCNC: 110 MG/DL (ref 65–99)
GLUCOSE SERPL-MCNC: 116 MG/DL (ref 65–99)
GLUCOSE SERPL-MCNC: 121 MG/DL (ref 65–99)
GLUCOSE SERPL-MCNC: 126 MG/DL (ref 65–99)
GLUCOSE SERPL-MCNC: 128 MG/DL (ref 65–99)
GLUCOSE SERPL-MCNC: 185 MG/DL (ref 65–99)
GLUCOSE SERPL-MCNC: 64 MG/DL (ref 65–99)
GLUCOSE SERPL-MCNC: 78 MG/DL (ref 65–99)
GLUCOSE SERPL-MCNC: 85 MG/DL (ref 65–99)
GLUCOSE SERPL-MCNC: 94 MG/DL (ref 65–99)
GLUCOSE SERPL-MCNC: 95 MG/DL (ref 65–99)
GLUCOSE SERPL-MCNC: 97 MG/DL (ref 65–99)
GLUCOSE SERPL-MCNC: 98 MG/DL (ref 65–99)
GLUCOSE SERPL-MCNC: 99 MG/DL (ref 65–99)
GLUCOSE UR STRIP.AUTO-MCNC: NEGATIVE MG/DL
GRAM STN SPEC: ABNORMAL
GRAM STN SPEC: NORMAL
HCT VFR BLD AUTO: 27.7 % (ref 42–52)
HCT VFR BLD AUTO: 30.5 % (ref 42–52)
HCT VFR BLD AUTO: 30.6 % (ref 42–52)
HCT VFR BLD AUTO: 30.6 % (ref 42–52)
HCT VFR BLD AUTO: 30.8 % (ref 42–52)
HCT VFR BLD AUTO: 30.8 % (ref 42–52)
HCT VFR BLD AUTO: 31.1 % (ref 42–52)
HCT VFR BLD AUTO: 31.6 % (ref 42–52)
HCT VFR BLD AUTO: 31.9 % (ref 42–52)
HCT VFR BLD AUTO: 32 % (ref 42–52)
HCT VFR BLD AUTO: 32 % (ref 42–52)
HCT VFR BLD AUTO: 32.5 % (ref 42–52)
HCT VFR BLD AUTO: 32.6 % (ref 42–52)
HCT VFR BLD AUTO: 33.3 % (ref 42–52)
HCT VFR BLD AUTO: 33.6 % (ref 42–52)
HCT VFR BLD AUTO: 34.2 % (ref 42–52)
HCT VFR BLD AUTO: 34.4 % (ref 42–52)
HCT VFR BLD AUTO: 35.1 % (ref 42–52)
HCT VFR BLD AUTO: 35.6 % (ref 42–52)
HCT VFR BLD AUTO: 35.9 % (ref 42–52)
HCT VFR BLD AUTO: 37.8 % (ref 42–52)
HCT VFR BLD AUTO: 39.3 % (ref 42–52)
HGB BLD-MCNC: 10 G/DL (ref 14–18)
HGB BLD-MCNC: 10.4 G/DL (ref 14–18)
HGB BLD-MCNC: 10.5 G/DL (ref 14–18)
HGB BLD-MCNC: 10.6 G/DL (ref 14–18)
HGB BLD-MCNC: 10.7 G/DL (ref 14–18)
HGB BLD-MCNC: 11.2 G/DL (ref 14–18)
HGB BLD-MCNC: 11.4 G/DL (ref 14–18)
HGB BLD-MCNC: 11.8 G/DL (ref 14–18)
HGB BLD-MCNC: 8.2 G/DL (ref 14–18)
HGB BLD-MCNC: 9.1 G/DL (ref 14–18)
HGB BLD-MCNC: 9.1 G/DL (ref 14–18)
HGB BLD-MCNC: 9.3 G/DL (ref 14–18)
HGB BLD-MCNC: 9.4 G/DL (ref 14–18)
HGB BLD-MCNC: 9.4 G/DL (ref 14–18)
HGB BLD-MCNC: 9.5 G/DL (ref 14–18)
HGB BLD-MCNC: 9.6 G/DL (ref 14–18)
HGB BLD-MCNC: 9.6 G/DL (ref 14–18)
HGB BLD-MCNC: 9.7 G/DL (ref 14–18)
HGB BLD-MCNC: 9.7 G/DL (ref 14–18)
HGB BLD-MCNC: 9.8 G/DL (ref 14–18)
HGB BLD-MCNC: 9.9 G/DL (ref 14–18)
HGB BLD-MCNC: 9.9 G/DL (ref 14–18)
HYALINE CASTS #/AREA URNS LPF: ABNORMAL /LPF
IMM GRANULOCYTES # BLD AUTO: 0.02 K/UL (ref 0–0.11)
IMM GRANULOCYTES NFR BLD AUTO: 0.4 % (ref 0–0.9)
INR PPP: 1.22 (ref 0.87–1.13)
INR PPP: 1.34 (ref 0.87–1.13)
IRON SATN MFR SERPL: ABNORMAL % (ref 15–55)
IRON SERPL-MCNC: 22 UG/DL (ref 50–180)
KETONES UR STRIP.AUTO-MCNC: ABNORMAL MG/DL
KETONES UR STRIP.AUTO-MCNC: NEGATIVE MG/DL
KETONES UR STRIP.AUTO-MCNC: NEGATIVE MG/DL
LACTATE BLD-SCNC: 1 MMOL/L (ref 0.5–2)
LACTATE BLD-SCNC: 1.6 MMOL/L (ref 0.5–2)
LACTATE BLD-SCNC: 1.7 MMOL/L (ref 0.5–2)
LACTATE BLD-SCNC: 2.9 MMOL/L (ref 0.5–2)
LEUKOCYTE ESTERASE UR QL STRIP.AUTO: ABNORMAL
LEUKOCYTE ESTERASE UR QL STRIP.AUTO: NEGATIVE
LEUKOCYTE ESTERASE UR QL STRIP.AUTO: NEGATIVE
LG PLATELETS BLD QL SMEAR: NORMAL
LIPASE SERPL-CCNC: 18 U/L (ref 11–82)
LV EJECT FRACT  99904: 65
LYMPHOCYTES # BLD AUTO: 0.76 K/UL (ref 1–4.8)
LYMPHOCYTES # BLD AUTO: 0.78 K/UL (ref 1–4.8)
LYMPHOCYTES # BLD AUTO: 0.82 K/UL (ref 1–4.8)
LYMPHOCYTES # BLD AUTO: 0.99 K/UL (ref 1–4.8)
LYMPHOCYTES # BLD AUTO: 1.05 K/UL (ref 1–4.8)
LYMPHOCYTES # BLD AUTO: 1.07 K/UL (ref 1–4.8)
LYMPHOCYTES # BLD AUTO: 1.32 K/UL (ref 1–4.8)
LYMPHOCYTES # BLD AUTO: 1.37 K/UL (ref 1–4.8)
LYMPHOCYTES # BLD AUTO: 1.48 K/UL (ref 1–4.8)
LYMPHOCYTES # BLD AUTO: 1.52 K/UL (ref 1–4.8)
LYMPHOCYTES # BLD AUTO: 1.52 K/UL (ref 1–4.8)
LYMPHOCYTES # BLD AUTO: 1.54 K/UL (ref 1–4.8)
LYMPHOCYTES # BLD AUTO: 1.54 K/UL (ref 1–4.8)
LYMPHOCYTES # BLD AUTO: 1.64 K/UL (ref 1–4.8)
LYMPHOCYTES # BLD AUTO: 1.78 K/UL (ref 1–4.8)
LYMPHOCYTES # BLD AUTO: 1.87 K/UL (ref 1–4.8)
LYMPHOCYTES # BLD AUTO: 2.16 K/UL (ref 1–4.8)
LYMPHOCYTES # BLD AUTO: 2.21 K/UL (ref 1–4.8)
LYMPHOCYTES # BLD AUTO: 2.31 K/UL (ref 1–4.8)
LYMPHOCYTES # BLD AUTO: 3.15 K/UL (ref 1–4.8)
LYMPHOCYTES NFR BLD: 11.8 % (ref 22–41)
LYMPHOCYTES NFR BLD: 13.2 % (ref 22–41)
LYMPHOCYTES NFR BLD: 13.3 % (ref 22–41)
LYMPHOCYTES NFR BLD: 14.1 % (ref 22–41)
LYMPHOCYTES NFR BLD: 14.9 % (ref 22–41)
LYMPHOCYTES NFR BLD: 15.3 % (ref 22–41)
LYMPHOCYTES NFR BLD: 15.5 % (ref 22–41)
LYMPHOCYTES NFR BLD: 17.7 % (ref 22–41)
LYMPHOCYTES NFR BLD: 18.4 % (ref 22–41)
LYMPHOCYTES NFR BLD: 19.1 % (ref 22–41)
LYMPHOCYTES NFR BLD: 20 % (ref 22–41)
LYMPHOCYTES NFR BLD: 21.7 % (ref 22–41)
LYMPHOCYTES NFR BLD: 21.7 % (ref 22–41)
LYMPHOCYTES NFR BLD: 25.4 % (ref 22–41)
LYMPHOCYTES NFR BLD: 27.9 % (ref 22–41)
LYMPHOCYTES NFR BLD: 31.6 % (ref 22–41)
LYMPHOCYTES NFR BLD: 33.9 % (ref 22–41)
LYMPHOCYTES NFR BLD: 35.4 % (ref 22–41)
LYMPHOCYTES NFR BLD: 39.5 % (ref 22–41)
LYMPHOCYTES NFR BLD: 7.8 % (ref 22–41)
MACROCYTES BLD QL SMEAR: ABNORMAL
MANUAL DIFF BLD: ABNORMAL
MANUAL DIFF BLD: NORMAL
MCH RBC QN AUTO: 27 PG (ref 27–33)
MCH RBC QN AUTO: 27.5 PG (ref 27–33)
MCH RBC QN AUTO: 27.5 PG (ref 27–33)
MCH RBC QN AUTO: 27.7 PG (ref 27–33)
MCH RBC QN AUTO: 27.8 PG (ref 27–33)
MCH RBC QN AUTO: 27.8 PG (ref 27–33)
MCH RBC QN AUTO: 27.9 PG (ref 27–33)
MCH RBC QN AUTO: 27.9 PG (ref 27–33)
MCH RBC QN AUTO: 28 PG (ref 27–33)
MCH RBC QN AUTO: 28.1 PG (ref 27–33)
MCH RBC QN AUTO: 28.3 PG (ref 27–33)
MCH RBC QN AUTO: 28.5 PG (ref 27–33)
MCH RBC QN AUTO: 28.7 PG (ref 27–33)
MCH RBC QN AUTO: 28.8 PG (ref 27–33)
MCH RBC QN AUTO: 28.9 PG (ref 27–33)
MCHC RBC AUTO-ENTMCNC: 28.8 G/DL (ref 33.7–35.3)
MCHC RBC AUTO-ENTMCNC: 29.5 G/DL (ref 33.7–35.3)
MCHC RBC AUTO-ENTMCNC: 29.5 G/DL (ref 33.7–35.3)
MCHC RBC AUTO-ENTMCNC: 29.6 G/DL (ref 33.7–35.3)
MCHC RBC AUTO-ENTMCNC: 29.7 G/DL (ref 33.7–35.3)
MCHC RBC AUTO-ENTMCNC: 29.8 G/DL (ref 33.7–35.3)
MCHC RBC AUTO-ENTMCNC: 30 G/DL (ref 33.7–35.3)
MCHC RBC AUTO-ENTMCNC: 30.1 G/DL (ref 33.7–35.3)
MCHC RBC AUTO-ENTMCNC: 30.2 G/DL (ref 33.7–35.3)
MCHC RBC AUTO-ENTMCNC: 30.2 G/DL (ref 33.7–35.3)
MCHC RBC AUTO-ENTMCNC: 30.4 G/DL (ref 33.7–35.3)
MCHC RBC AUTO-ENTMCNC: 30.5 G/DL (ref 33.7–35.3)
MCHC RBC AUTO-ENTMCNC: 30.5 G/DL (ref 33.7–35.3)
MCHC RBC AUTO-ENTMCNC: 30.7 G/DL (ref 33.7–35.3)
MCHC RBC AUTO-ENTMCNC: 30.9 G/DL (ref 33.7–35.3)
MCHC RBC AUTO-ENTMCNC: 31.2 G/DL (ref 33.7–35.3)
MCHC RBC AUTO-ENTMCNC: 31.5 G/DL (ref 33.7–35.3)
MCHC RBC AUTO-ENTMCNC: 31.5 G/DL (ref 33.7–35.3)
MCV RBC AUTO: 90.8 FL (ref 81.4–97.8)
MCV RBC AUTO: 91.1 FL (ref 81.4–97.8)
MCV RBC AUTO: 91.1 FL (ref 81.4–97.8)
MCV RBC AUTO: 91.3 FL (ref 81.4–97.8)
MCV RBC AUTO: 91.5 FL (ref 81.4–97.8)
MCV RBC AUTO: 92.2 FL (ref 81.4–97.8)
MCV RBC AUTO: 92.4 FL (ref 81.4–97.8)
MCV RBC AUTO: 92.5 FL (ref 81.4–97.8)
MCV RBC AUTO: 92.6 FL (ref 81.4–97.8)
MCV RBC AUTO: 92.7 FL (ref 81.4–97.8)
MCV RBC AUTO: 93.1 FL (ref 81.4–97.8)
MCV RBC AUTO: 93.1 FL (ref 81.4–97.8)
MCV RBC AUTO: 93.3 FL (ref 81.4–97.8)
MCV RBC AUTO: 93.5 FL (ref 81.4–97.8)
MCV RBC AUTO: 93.8 FL (ref 81.4–97.8)
MCV RBC AUTO: 93.9 FL (ref 81.4–97.8)
MCV RBC AUTO: 93.9 FL (ref 81.4–97.8)
MCV RBC AUTO: 94.4 FL (ref 81.4–97.8)
MCV RBC AUTO: 95.5 FL (ref 81.4–97.8)
METAMYELOCYTES NFR BLD MANUAL: 0.9 %
MICRO URNS: ABNORMAL
MICROCYTES BLD QL SMEAR: ABNORMAL
MONOCYTES # BLD AUTO: 0 K/UL (ref 0–0.85)
MONOCYTES # BLD AUTO: 0 K/UL (ref 0–0.85)
MONOCYTES # BLD AUTO: 0.05 K/UL (ref 0–0.85)
MONOCYTES # BLD AUTO: 0.08 K/UL (ref 0–0.85)
MONOCYTES # BLD AUTO: 0.08 K/UL (ref 0–0.85)
MONOCYTES # BLD AUTO: 0.12 K/UL (ref 0–0.85)
MONOCYTES # BLD AUTO: 0.13 K/UL (ref 0–0.85)
MONOCYTES # BLD AUTO: 0.23 K/UL (ref 0–0.85)
MONOCYTES # BLD AUTO: 0.24 K/UL (ref 0–0.85)
MONOCYTES # BLD AUTO: 0.25 K/UL (ref 0–0.85)
MONOCYTES # BLD AUTO: 0.29 K/UL (ref 0–0.85)
MONOCYTES # BLD AUTO: 0.3 K/UL (ref 0–0.85)
MONOCYTES # BLD AUTO: 0.3 K/UL (ref 0–0.85)
MONOCYTES # BLD AUTO: 0.33 K/UL (ref 0–0.85)
MONOCYTES # BLD AUTO: 0.35 K/UL (ref 0–0.85)
MONOCYTES # BLD AUTO: 0.37 K/UL (ref 0–0.85)
MONOCYTES # BLD AUTO: 0.41 K/UL (ref 0–0.85)
MONOCYTES # BLD AUTO: 0.59 K/UL (ref 0–0.85)
MONOCYTES NFR BLD AUTO: 0 % (ref 0–13.4)
MONOCYTES NFR BLD AUTO: 0 % (ref 0–13.4)
MONOCYTES NFR BLD AUTO: 0.9 % (ref 0–13.4)
MONOCYTES NFR BLD AUTO: 1.7 % (ref 0–13.4)
MONOCYTES NFR BLD AUTO: 1.8 % (ref 0–13.4)
MONOCYTES NFR BLD AUTO: 2.6 % (ref 0–13.4)
MONOCYTES NFR BLD AUTO: 3.5 % (ref 0–13.4)
MONOCYTES NFR BLD AUTO: 4.3 % (ref 0–13.4)
MONOCYTES NFR BLD AUTO: 4.4 % (ref 0–13.4)
MONOCYTES NFR BLD AUTO: 4.4 % (ref 0–13.4)
MONOCYTES NFR BLD AUTO: 4.5 % (ref 0–13.4)
MONOCYTES NFR BLD AUTO: 5.2 % (ref 0–13.4)
MONOCYTES NFR BLD AUTO: 5.6 % (ref 0–13.4)
MONOCYTES NFR BLD AUTO: 6 % (ref 0–13.4)
MONOCYTES NFR BLD AUTO: 9.2 % (ref 0–13.4)
MORPHOLOGY BLD-IMP: NORMAL
MRSA DNA SPEC QL NAA+PROBE: NORMAL
MYELOCYTES NFR BLD MANUAL: 0.9 %
MYELOCYTES NFR BLD MANUAL: 1.7 %
MYELOCYTES NFR BLD MANUAL: 1.7 %
MYELOCYTES NFR BLD MANUAL: 1.8 %
NEUTROPHILS # BLD AUTO: 2.74 K/UL (ref 1.82–7.42)
NEUTROPHILS # BLD AUTO: 2.8 K/UL (ref 1.82–7.42)
NEUTROPHILS # BLD AUTO: 3.09 K/UL (ref 1.82–7.42)
NEUTROPHILS # BLD AUTO: 3.26 K/UL (ref 1.82–7.42)
NEUTROPHILS # BLD AUTO: 3.6 K/UL (ref 1.82–7.42)
NEUTROPHILS # BLD AUTO: 3.69 K/UL (ref 1.82–7.42)
NEUTROPHILS # BLD AUTO: 3.95 K/UL (ref 1.82–7.42)
NEUTROPHILS # BLD AUTO: 3.98 K/UL (ref 1.82–7.42)
NEUTROPHILS # BLD AUTO: 4.38 K/UL (ref 1.82–7.42)
NEUTROPHILS # BLD AUTO: 4.74 K/UL (ref 1.82–7.42)
NEUTROPHILS # BLD AUTO: 5.09 K/UL (ref 1.82–7.42)
NEUTROPHILS # BLD AUTO: 5.51 K/UL (ref 1.82–7.42)
NEUTROPHILS # BLD AUTO: 5.62 K/UL (ref 1.82–7.42)
NEUTROPHILS # BLD AUTO: 5.72 K/UL (ref 1.82–7.42)
NEUTROPHILS # BLD AUTO: 5.99 K/UL (ref 1.82–7.42)
NEUTROPHILS # BLD AUTO: 6.22 K/UL (ref 1.82–7.42)
NEUTROPHILS # BLD AUTO: 8.66 K/UL (ref 1.82–7.42)
NEUTROPHILS # BLD AUTO: 8.85 K/UL (ref 1.82–7.42)
NEUTROPHILS # BLD AUTO: 9.44 K/UL (ref 1.82–7.42)
NEUTROPHILS # BLD AUTO: 9.93 K/UL (ref 1.82–7.42)
NEUTROPHILS NFR BLD: 43 % (ref 44–72)
NEUTROPHILS NFR BLD: 45.6 % (ref 44–72)
NEUTROPHILS NFR BLD: 46.5 % (ref 44–72)
NEUTROPHILS NFR BLD: 50.4 % (ref 44–72)
NEUTROPHILS NFR BLD: 51.8 % (ref 44–72)
NEUTROPHILS NFR BLD: 58.2 % (ref 44–72)
NEUTROPHILS NFR BLD: 59 % (ref 44–72)
NEUTROPHILS NFR BLD: 59.7 % (ref 44–72)
NEUTROPHILS NFR BLD: 60.9 % (ref 44–72)
NEUTROPHILS NFR BLD: 61.4 % (ref 44–72)
NEUTROPHILS NFR BLD: 61.9 % (ref 44–72)
NEUTROPHILS NFR BLD: 62.3 % (ref 44–72)
NEUTROPHILS NFR BLD: 66.1 % (ref 44–72)
NEUTROPHILS NFR BLD: 67 % (ref 44–72)
NEUTROPHILS NFR BLD: 67.5 % (ref 44–72)
NEUTROPHILS NFR BLD: 69.9 % (ref 44–72)
NEUTROPHILS NFR BLD: 74.6 % (ref 44–72)
NEUTROPHILS NFR BLD: 77.5 % (ref 44–72)
NEUTROPHILS NFR BLD: 81.4 % (ref 44–72)
NEUTROPHILS NFR BLD: 84.3 % (ref 44–72)
NEUTS BAND NFR BLD MANUAL: 0.8 % (ref 0–10)
NEUTS BAND NFR BLD MANUAL: 0.8 % (ref 0–10)
NEUTS BAND NFR BLD MANUAL: 0.9 % (ref 0–10)
NEUTS BAND NFR BLD MANUAL: 10.5 % (ref 0–10)
NEUTS BAND NFR BLD MANUAL: 11.5 % (ref 0–10)
NEUTS BAND NFR BLD MANUAL: 14 % (ref 0–10)
NEUTS BAND NFR BLD MANUAL: 2.6 % (ref 0–10)
NEUTS BAND NFR BLD MANUAL: 2.6 % (ref 0–10)
NEUTS BAND NFR BLD MANUAL: 2.7 % (ref 0–10)
NEUTS BAND NFR BLD MANUAL: 3.5 % (ref 0–10)
NEUTS BAND NFR BLD MANUAL: 3.5 % (ref 0–10)
NEUTS BAND NFR BLD MANUAL: 4.2 % (ref 0–10)
NEUTS BAND NFR BLD MANUAL: 4.4 % (ref 0–10)
NEUTS BAND NFR BLD MANUAL: 6.1 % (ref 0–10)
NEUTS BAND NFR BLD MANUAL: 7.8 % (ref 0–10)
NEUTS BAND NFR BLD MANUAL: 8.7 % (ref 0–10)
NEUTS BAND NFR BLD MANUAL: 9.6 % (ref 0–10)
NITRITE UR QL STRIP.AUTO: NEGATIVE
NRBC # BLD AUTO: 0 K/UL
NRBC # BLD AUTO: 0.02 K/UL
NRBC BLD-RTO: 0 /100 WBC
NRBC BLD-RTO: 0.2 /100 WBC
NRBC BLD-RTO: 0.3 /100 WBC
NRBC BLD-RTO: 0.4 /100 WBC
OVALOCYTES BLD QL SMEAR: NORMAL
OVALOCYTES BLD QL SMEAR: NORMAL
PH UR STRIP.AUTO: 5 [PH]
PH UR STRIP.AUTO: 5 [PH]
PH UR STRIP.AUTO: 6.5 [PH]
PLATELET # BLD AUTO: 164 K/UL (ref 164–446)
PLATELET # BLD AUTO: 175 K/UL (ref 164–446)
PLATELET # BLD AUTO: 185 K/UL (ref 164–446)
PLATELET # BLD AUTO: 186 K/UL (ref 164–446)
PLATELET # BLD AUTO: 194 K/UL (ref 164–446)
PLATELET # BLD AUTO: 195 K/UL (ref 164–446)
PLATELET # BLD AUTO: 225 K/UL (ref 164–446)
PLATELET # BLD AUTO: 252 K/UL (ref 164–446)
PLATELET # BLD AUTO: 253 K/UL (ref 164–446)
PLATELET # BLD AUTO: 273 K/UL (ref 164–446)
PLATELET # BLD AUTO: 274 K/UL (ref 164–446)
PLATELET # BLD AUTO: 300 K/UL (ref 164–446)
PLATELET # BLD AUTO: 314 K/UL (ref 164–446)
PLATELET # BLD AUTO: 318 K/UL (ref 164–446)
PLATELET # BLD AUTO: 323 K/UL (ref 164–446)
PLATELET # BLD AUTO: 323 K/UL (ref 164–446)
PLATELET # BLD AUTO: 333 K/UL (ref 164–446)
PLATELET # BLD AUTO: 339 K/UL (ref 164–446)
PLATELET # BLD AUTO: 344 K/UL (ref 164–446)
PLATELET # BLD AUTO: 349 K/UL (ref 164–446)
PLATELET # BLD AUTO: 365 K/UL (ref 164–446)
PLATELET # BLD AUTO: 389 K/UL (ref 164–446)
PLATELET BLD QL SMEAR: NORMAL
PMV BLD AUTO: 10 FL (ref 9–12.9)
PMV BLD AUTO: 8.3 FL (ref 9–12.9)
PMV BLD AUTO: 8.3 FL (ref 9–12.9)
PMV BLD AUTO: 8.4 FL (ref 9–12.9)
PMV BLD AUTO: 8.5 FL (ref 9–12.9)
PMV BLD AUTO: 8.6 FL (ref 9–12.9)
PMV BLD AUTO: 8.6 FL (ref 9–12.9)
PMV BLD AUTO: 9 FL (ref 9–12.9)
PMV BLD AUTO: 9.1 FL (ref 9–12.9)
PMV BLD AUTO: 9.3 FL (ref 9–12.9)
PMV BLD AUTO: 9.4 FL (ref 9–12.9)
PMV BLD AUTO: 9.5 FL (ref 9–12.9)
PMV BLD AUTO: 9.7 FL (ref 9–12.9)
PMV BLD AUTO: 9.8 FL (ref 9–12.9)
PMV BLD AUTO: 9.9 FL (ref 9–12.9)
POIKILOCYTOSIS BLD QL SMEAR: NORMAL
POLYCHROMASIA BLD QL SMEAR: NORMAL
POTASSIUM SERPL-SCNC: 3.4 MMOL/L (ref 3.6–5.5)
POTASSIUM SERPL-SCNC: 3.5 MMOL/L (ref 3.6–5.5)
POTASSIUM SERPL-SCNC: 3.5 MMOL/L (ref 3.6–5.5)
POTASSIUM SERPL-SCNC: 3.7 MMOL/L (ref 3.6–5.5)
POTASSIUM SERPL-SCNC: 3.8 MMOL/L (ref 3.6–5.5)
POTASSIUM SERPL-SCNC: 3.8 MMOL/L (ref 3.6–5.5)
POTASSIUM SERPL-SCNC: 3.9 MMOL/L (ref 3.6–5.5)
POTASSIUM SERPL-SCNC: 4 MMOL/L (ref 3.6–5.5)
POTASSIUM SERPL-SCNC: 4.1 MMOL/L (ref 3.6–5.5)
POTASSIUM SERPL-SCNC: 4.2 MMOL/L (ref 3.6–5.5)
POTASSIUM SERPL-SCNC: 4.3 MMOL/L (ref 3.6–5.5)
POTASSIUM SERPL-SCNC: 4.3 MMOL/L (ref 3.6–5.5)
POTASSIUM SERPL-SCNC: 4.4 MMOL/L (ref 3.6–5.5)
POTASSIUM SERPL-SCNC: 4.5 MMOL/L (ref 3.6–5.5)
POTASSIUM SERPL-SCNC: 4.5 MMOL/L (ref 3.6–5.5)
POTASSIUM SERPL-SCNC: 5.1 MMOL/L (ref 3.6–5.5)
POTASSIUM SERPL-SCNC: 5.6 MMOL/L (ref 3.6–5.5)
PROT SERPL-MCNC: 4.8 G/DL (ref 6–8.2)
PROT SERPL-MCNC: 4.9 G/DL (ref 6–8.2)
PROT SERPL-MCNC: 5.1 G/DL (ref 6–8.2)
PROT SERPL-MCNC: 5.9 G/DL (ref 6–8.2)
PROT UR QL STRIP: 30 MG/DL
PROT UR QL STRIP: 30 MG/DL
PROT UR QL STRIP: NEGATIVE MG/DL
PROTHROMBIN TIME: 15.5 SEC (ref 12–14.6)
PROTHROMBIN TIME: 16.7 SEC (ref 12–14.6)
RBC # BLD AUTO: 2.9 M/UL (ref 4.7–6.1)
RBC # BLD AUTO: 3.25 M/UL (ref 4.7–6.1)
RBC # BLD AUTO: 3.28 M/UL (ref 4.7–6.1)
RBC # BLD AUTO: 3.35 M/UL (ref 4.7–6.1)
RBC # BLD AUTO: 3.36 M/UL (ref 4.7–6.1)
RBC # BLD AUTO: 3.38 M/UL (ref 4.7–6.1)
RBC # BLD AUTO: 3.39 M/UL (ref 4.7–6.1)
RBC # BLD AUTO: 3.4 M/UL (ref 4.7–6.1)
RBC # BLD AUTO: 3.42 M/UL (ref 4.7–6.1)
RBC # BLD AUTO: 3.46 M/UL (ref 4.7–6.1)
RBC # BLD AUTO: 3.47 M/UL (ref 4.7–6.1)
RBC # BLD AUTO: 3.5 M/UL (ref 4.7–6.1)
RBC # BLD AUTO: 3.56 M/UL (ref 4.7–6.1)
RBC # BLD AUTO: 3.56 M/UL (ref 4.7–6.1)
RBC # BLD AUTO: 3.58 M/UL (ref 4.7–6.1)
RBC # BLD AUTO: 3.7 M/UL (ref 4.7–6.1)
RBC # BLD AUTO: 3.79 M/UL (ref 4.7–6.1)
RBC # BLD AUTO: 3.79 M/UL (ref 4.7–6.1)
RBC # BLD AUTO: 3.84 M/UL (ref 4.7–6.1)
RBC # BLD AUTO: 3.88 M/UL (ref 4.7–6.1)
RBC # BLD AUTO: 4.14 M/UL (ref 4.7–6.1)
RBC # BLD AUTO: 4.22 M/UL (ref 4.7–6.1)
RBC # URNS HPF: ABNORMAL /HPF
RBC BLD AUTO: NORMAL
RBC BLD AUTO: PRESENT
RBC UR QL AUTO: ABNORMAL
RBC UR QL AUTO: NEGATIVE
RBC UR QL AUTO: NEGATIVE
SIGNIFICANT IND 70042: ABNORMAL
SIGNIFICANT IND 70042: ABNORMAL
SIGNIFICANT IND 70042: NORMAL
SITE SITE: ABNORMAL
SITE SITE: ABNORMAL
SITE SITE: NORMAL
SMUDGE CELLS BLD QL SMEAR: NORMAL
SMUDGE CELLS BLD QL SMEAR: NORMAL
SODIUM SERPL-SCNC: 135 MMOL/L (ref 135–145)
SODIUM SERPL-SCNC: 135 MMOL/L (ref 135–145)
SODIUM SERPL-SCNC: 136 MMOL/L (ref 135–145)
SODIUM SERPL-SCNC: 137 MMOL/L (ref 135–145)
SODIUM SERPL-SCNC: 138 MMOL/L (ref 135–145)
SODIUM SERPL-SCNC: 139 MMOL/L (ref 135–145)
SODIUM SERPL-SCNC: 140 MMOL/L (ref 135–145)
SODIUM SERPL-SCNC: 141 MMOL/L (ref 135–145)
SODIUM SERPL-SCNC: 142 MMOL/L (ref 135–145)
SODIUM SERPL-SCNC: 143 MMOL/L (ref 135–145)
SODIUM SERPL-SCNC: 145 MMOL/L (ref 135–145)
SODIUM SERPL-SCNC: 145 MMOL/L (ref 135–145)
SODIUM SERPL-SCNC: 147 MMOL/L (ref 135–145)
SODIUM SERPL-SCNC: 148 MMOL/L (ref 135–145)
SODIUM SERPL-SCNC: 149 MMOL/L (ref 135–145)
SODIUM SERPL-SCNC: 150 MMOL/L (ref 135–145)
SODIUM SERPL-SCNC: 151 MMOL/L (ref 135–145)
SODIUM SERPL-SCNC: 154 MMOL/L (ref 135–145)
SODIUM SERPL-SCNC: 156 MMOL/L (ref 135–145)
SODIUM SERPL-SCNC: 157 MMOL/L (ref 135–145)
SODIUM SERPL-SCNC: 157 MMOL/L (ref 135–145)
SOURCE SOURCE: ABNORMAL
SOURCE SOURCE: ABNORMAL
SOURCE SOURCE: NORMAL
SP GR UR STRIP.AUTO: 1.02
SP GR UR STRIP.AUTO: 1.02
SP GR UR STRIP.AUTO: 1.03
TIBC SERPL-MCNC: <105 UG/DL (ref 250–450)
UROBILINOGEN UR STRIP.AUTO-MCNC: 0.2 MG/DL
VANCOMYCIN SERPL-MCNC: 18.5 UG/ML
VANCOMYCIN SERPL-MCNC: 23.4 UG/ML
VANCOMYCIN SERPL-MCNC: 25.5 UG/ML
VANCOMYCIN TROUGH SERPL-MCNC: 31.2 UG/ML (ref 10–20)
VIT B12 SERPL-MCNC: 425 PG/ML (ref 211–911)
WBC # BLD AUTO: 10.3 K/UL (ref 4.8–10.8)
WBC # BLD AUTO: 10.5 K/UL (ref 4.8–10.8)
WBC # BLD AUTO: 11.7 K/UL (ref 4.8–10.8)
WBC # BLD AUTO: 12.2 K/UL (ref 4.8–10.8)
WBC # BLD AUTO: 5.1 K/UL (ref 4.8–10.8)
WBC # BLD AUTO: 5.2 K/UL (ref 4.8–10.8)
WBC # BLD AUTO: 5.4 K/UL (ref 4.8–10.8)
WBC # BLD AUTO: 5.5 K/UL (ref 4.8–10.8)
WBC # BLD AUTO: 5.6 K/UL (ref 4.8–10.8)
WBC # BLD AUTO: 6 K/UL (ref 4.8–10.8)
WBC # BLD AUTO: 6 K/UL (ref 4.8–10.8)
WBC # BLD AUTO: 6.4 K/UL (ref 4.8–10.8)
WBC # BLD AUTO: 6.8 K/UL (ref 4.8–10.8)
WBC # BLD AUTO: 7 K/UL (ref 4.8–10.8)
WBC # BLD AUTO: 7.2 K/UL (ref 4.8–10.8)
WBC # BLD AUTO: 7.2 K/UL (ref 4.8–10.8)
WBC # BLD AUTO: 7.6 K/UL (ref 4.8–10.8)
WBC # BLD AUTO: 8.6 K/UL (ref 4.8–10.8)
WBC # BLD AUTO: 8.9 K/UL (ref 4.8–10.8)
WBC # BLD AUTO: 9.3 K/UL (ref 4.8–10.8)
WBC #/AREA URNS HPF: ABNORMAL /HPF
YEAST BUDDING URNS QL: PRESENT /HPF
YEAST HYPHAE #/AREA URNS HPF: PRESENT /HPF

## 2019-01-01 PROCEDURE — A9270 NON-COVERED ITEM OR SERVICE: HCPCS | Performed by: NURSE PRACTITIONER

## 2019-01-01 PROCEDURE — A9270 NON-COVERED ITEM OR SERVICE: HCPCS | Performed by: INTERNAL MEDICINE

## 2019-01-01 PROCEDURE — 99497 ADVNCD CARE PLAN 30 MIN: CPT | Performed by: INTERNAL MEDICINE

## 2019-01-01 PROCEDURE — A9270 NON-COVERED ITEM OR SERVICE: HCPCS | Performed by: HOSPITALIST

## 2019-01-01 PROCEDURE — 85007 BL SMEAR W/DIFF WBC COUNT: CPT

## 2019-01-01 PROCEDURE — 700102 HCHG RX REV CODE 250 W/ 637 OVERRIDE(OP): Performed by: NURSE PRACTITIONER

## 2019-01-01 PROCEDURE — 700102 HCHG RX REV CODE 250 W/ 637 OVERRIDE(OP): Performed by: INTERNAL MEDICINE

## 2019-01-01 PROCEDURE — 36556 INSERT NON-TUNNEL CV CATH: CPT

## 2019-01-01 PROCEDURE — 302053 SPONGE GAUZE N/S 4X4 200/PK: Performed by: HOSPITALIST

## 2019-01-01 PROCEDURE — 302307 BAG SINGLE USE 2000ML COLLECT: Performed by: INTERNAL MEDICINE

## 2019-01-01 PROCEDURE — 80048 BASIC METABOLIC PNL TOTAL CA: CPT

## 2019-01-01 PROCEDURE — 700111 HCHG RX REV CODE 636 W/ 250 OVERRIDE (IP): Performed by: NURSE PRACTITIONER

## 2019-01-01 PROCEDURE — 85610 PROTHROMBIN TIME: CPT

## 2019-01-01 PROCEDURE — 700111 HCHG RX REV CODE 636 W/ 250 OVERRIDE (IP): Mod: JG | Performed by: INTERNAL MEDICINE

## 2019-01-01 PROCEDURE — 770022 HCHG ROOM/CARE - ICU (200)

## 2019-01-01 PROCEDURE — 770004 HCHG ROOM/CARE - ONCOLOGY PRIVATE *

## 2019-01-01 PROCEDURE — 99231 SBSQ HOSP IP/OBS SF/LOW 25: CPT | Mod: 25 | Performed by: NURSE PRACTITIONER

## 2019-01-01 PROCEDURE — 770021 HCHG ROOM/CARE - ISO PRIVATE

## 2019-01-01 PROCEDURE — 700105 HCHG RX REV CODE 258: Performed by: EMERGENCY MEDICINE

## 2019-01-01 PROCEDURE — 36415 COLL VENOUS BLD VENIPUNCTURE: CPT

## 2019-01-01 PROCEDURE — 700111 HCHG RX REV CODE 636 W/ 250 OVERRIDE (IP): Performed by: INTERNAL MEDICINE

## 2019-01-01 PROCEDURE — 87077 CULTURE AEROBIC IDENTIFY: CPT

## 2019-01-01 PROCEDURE — 85027 COMPLETE CBC AUTOMATED: CPT

## 2019-01-01 PROCEDURE — 87205 SMEAR GRAM STAIN: CPT

## 2019-01-01 PROCEDURE — 71260 CT THORAX DX C+: CPT

## 2019-01-01 PROCEDURE — 700111 HCHG RX REV CODE 636 W/ 250 OVERRIDE (IP): Performed by: HOSPITALIST

## 2019-01-01 PROCEDURE — 99233 SBSQ HOSP IP/OBS HIGH 50: CPT | Performed by: HOSPITALIST

## 2019-01-01 PROCEDURE — 99285 EMERGENCY DEPT VISIT HI MDM: CPT

## 2019-01-01 PROCEDURE — 700102 HCHG RX REV CODE 250 W/ 637 OVERRIDE(OP): Performed by: HOSPITALIST

## 2019-01-01 PROCEDURE — 99232 SBSQ HOSP IP/OBS MODERATE 35: CPT | Performed by: INTERNAL MEDICINE

## 2019-01-01 PROCEDURE — 700101 HCHG RX REV CODE 250

## 2019-01-01 PROCEDURE — 82728 ASSAY OF FERRITIN: CPT

## 2019-01-01 PROCEDURE — 99239 HOSP IP/OBS DSCHRG MGMT >30: CPT | Performed by: INTERNAL MEDICINE

## 2019-01-01 PROCEDURE — 700105 HCHG RX REV CODE 258: Performed by: HOSPITALIST

## 2019-01-01 PROCEDURE — 82040 ASSAY OF SERUM ALBUMIN: CPT

## 2019-01-01 PROCEDURE — G0299 HHS/HOSPICE OF RN EA 15 MIN: HCPCS

## 2019-01-01 PROCEDURE — 99232 SBSQ HOSP IP/OBS MODERATE 35: CPT | Performed by: HOSPITALIST

## 2019-01-01 PROCEDURE — 83605 ASSAY OF LACTIC ACID: CPT

## 2019-01-01 PROCEDURE — 85730 THROMBOPLASTIN TIME PARTIAL: CPT

## 2019-01-01 PROCEDURE — 99233 SBSQ HOSP IP/OBS HIGH 50: CPT | Performed by: INTERNAL MEDICINE

## 2019-01-01 PROCEDURE — 82550 ASSAY OF CK (CPK): CPT

## 2019-01-01 PROCEDURE — 700101 HCHG RX REV CODE 250: Performed by: INTERNAL MEDICINE

## 2019-01-01 PROCEDURE — 80202 ASSAY OF VANCOMYCIN: CPT

## 2019-01-01 PROCEDURE — 93005 ELECTROCARDIOGRAM TRACING: CPT | Performed by: HOSPITALIST

## 2019-01-01 PROCEDURE — T2045 HOSPICE GENERAL CARE: HCPCS

## 2019-01-01 PROCEDURE — 97166 OT EVAL MOD COMPLEX 45 MIN: CPT

## 2019-01-01 PROCEDURE — 99232 SBSQ HOSP IP/OBS MODERATE 35: CPT | Performed by: NURSE PRACTITIONER

## 2019-01-01 PROCEDURE — 99497 ADVNCD CARE PLAN 30 MIN: CPT | Performed by: NURSE PRACTITIONER

## 2019-01-01 PROCEDURE — 93308 TTE F-UP OR LMTD: CPT | Mod: 26 | Performed by: INTERNAL MEDICINE

## 2019-01-01 PROCEDURE — 700105 HCHG RX REV CODE 258: Performed by: INTERNAL MEDICINE

## 2019-01-01 PROCEDURE — 97163 PT EVAL HIGH COMPLEX 45 MIN: CPT

## 2019-01-01 PROCEDURE — 99358 PROLONG SERVICE W/O CONTACT: CPT | Performed by: HOSPITALIST

## 2019-01-01 PROCEDURE — 99223 1ST HOSP IP/OBS HIGH 75: CPT | Performed by: INTERNAL MEDICINE

## 2019-01-01 PROCEDURE — 99153 MOD SED SAME PHYS/QHP EA: CPT

## 2019-01-01 PROCEDURE — 87102 FUNGUS ISOLATION CULTURE: CPT

## 2019-01-01 PROCEDURE — 97161 PT EVAL LOW COMPLEX 20 MIN: CPT

## 2019-01-01 PROCEDURE — C1751 CATH, INF, PER/CENT/MIDLINE: HCPCS

## 2019-01-01 PROCEDURE — 306802 CATHETER,INSTAFLOW BOWEL: Performed by: HOSPITALIST

## 2019-01-01 PROCEDURE — 02PYX3Z REMOVAL OF INFUSION DEVICE FROM GREAT VESSEL, EXTERNAL APPROACH: ICD-10-PCS | Performed by: RADIOLOGY

## 2019-01-01 PROCEDURE — 99291 CRITICAL CARE FIRST HOUR: CPT | Performed by: HOSPITALIST

## 2019-01-01 PROCEDURE — 99221 1ST HOSP IP/OBS SF/LOW 40: CPT | Performed by: NURSE PRACTITIONER

## 2019-01-01 PROCEDURE — 87040 BLOOD CULTURE FOR BACTERIA: CPT | Mod: 91

## 2019-01-01 PROCEDURE — 81001 URINALYSIS AUTO W/SCOPE: CPT

## 2019-01-01 PROCEDURE — 304561 HCHG STAT O2

## 2019-01-01 PROCEDURE — 99291 CRITICAL CARE FIRST HOUR: CPT

## 2019-01-01 PROCEDURE — 36556 INSERT NON-TUNNEL CV CATH: CPT | Mod: LT | Performed by: INTERNAL MEDICINE

## 2019-01-01 PROCEDURE — 302307 BAG SINGLE USE 2000ML COLLECT: Performed by: HOSPITALIST

## 2019-01-01 PROCEDURE — 97530 THERAPEUTIC ACTIVITIES: CPT

## 2019-01-01 PROCEDURE — 302009 SKINTEGRITY WOUND CLEANSER: Performed by: HOSPITALIST

## 2019-01-01 PROCEDURE — G0155 HHCP-SVS OF CSW,EA 15 MIN: HCPCS

## 2019-01-01 PROCEDURE — 99291 CRITICAL CARE FIRST HOUR: CPT | Mod: 25 | Performed by: INTERNAL MEDICINE

## 2019-01-01 PROCEDURE — 87040 BLOOD CULTURE FOR BACTERIA: CPT

## 2019-01-01 PROCEDURE — 665036 HSPC NOTICE OF ELECTION NOE

## 2019-01-01 PROCEDURE — 87106 FUNGI IDENTIFICATION YEAST: CPT

## 2019-01-01 PROCEDURE — 87186 SC STD MICRODIL/AGAR DIL: CPT

## 2019-01-01 PROCEDURE — 306565 RIGID MIT RESTRAINT(PAIR): Performed by: NURSE PRACTITIONER

## 2019-01-01 PROCEDURE — 71045 X-RAY EXAM CHEST 1 VIEW: CPT

## 2019-01-01 PROCEDURE — 85025 COMPLETE CBC W/AUTO DIFF WBC: CPT

## 2019-01-01 PROCEDURE — 700117 HCHG RX CONTRAST REV CODE 255: Performed by: INTERNAL MEDICINE

## 2019-01-01 PROCEDURE — 99292 CRITICAL CARE ADDL 30 MIN: CPT | Mod: 25 | Performed by: INTERNAL MEDICINE

## 2019-01-01 PROCEDURE — 306802 CATHETER,INSTAFLOW BOWEL: Performed by: INTERNAL MEDICINE

## 2019-01-01 PROCEDURE — 82962 GLUCOSE BLOOD TEST: CPT

## 2019-01-01 PROCEDURE — 99239 HOSP IP/OBS DSCHRG MGMT >30: CPT | Mod: 25 | Performed by: INTERNAL MEDICINE

## 2019-01-01 PROCEDURE — 96367 TX/PROPH/DG ADDL SEQ IV INF: CPT

## 2019-01-01 PROCEDURE — 99498 ADVNCD CARE PLAN ADDL 30 MIN: CPT | Performed by: NURSE PRACTITIONER

## 2019-01-01 PROCEDURE — 700111 HCHG RX REV CODE 636 W/ 250 OVERRIDE (IP)

## 2019-01-01 PROCEDURE — 05HY33Z INSERTION OF INFUSION DEVICE INTO UPPER VEIN, PERCUTANEOUS APPROACH: ICD-10-PCS | Performed by: INTERNAL MEDICINE

## 2019-01-01 PROCEDURE — 83550 IRON BINDING TEST: CPT

## 2019-01-01 PROCEDURE — 87150 DNA/RNA AMPLIFIED PROBE: CPT

## 2019-01-01 PROCEDURE — 93005 ELECTROCARDIOGRAM TRACING: CPT | Performed by: EMERGENCY MEDICINE

## 2019-01-01 PROCEDURE — 83540 ASSAY OF IRON: CPT

## 2019-01-01 PROCEDURE — 02HV33Z INSERTION OF INFUSION DEVICE INTO SUPERIOR VENA CAVA, PERCUTANEOUS APPROACH: ICD-10-PCS | Performed by: INTERNAL MEDICINE

## 2019-01-01 PROCEDURE — 302146: Performed by: NURSE PRACTITIONER

## 2019-01-01 PROCEDURE — 87070 CULTURE OTHR SPECIMN AEROBIC: CPT

## 2019-01-01 PROCEDURE — 83690 ASSAY OF LIPASE: CPT

## 2019-01-01 PROCEDURE — 96375 TX/PRO/DX INJ NEW DRUG ADDON: CPT

## 2019-01-01 PROCEDURE — 87493 C DIFF AMPLIFIED PROBE: CPT

## 2019-01-01 PROCEDURE — 80053 COMPREHEN METABOLIC PANEL: CPT

## 2019-01-01 PROCEDURE — 770001 HCHG ROOM/CARE - MED/SURG/GYN PRIV*

## 2019-01-01 PROCEDURE — 97535 SELF CARE MNGMENT TRAINING: CPT

## 2019-01-01 PROCEDURE — 96366 THER/PROPH/DIAG IV INF ADDON: CPT

## 2019-01-01 PROCEDURE — 93010 ELECTROCARDIOGRAM REPORT: CPT | Performed by: INTERNAL MEDICINE

## 2019-01-01 PROCEDURE — 76937 US GUIDE VASCULAR ACCESS: CPT

## 2019-01-01 PROCEDURE — 700111 HCHG RX REV CODE 636 W/ 250 OVERRIDE (IP): Performed by: EMERGENCY MEDICINE

## 2019-01-01 PROCEDURE — 99221 1ST HOSP IP/OBS SF/LOW 40: CPT | Performed by: INTERNAL MEDICINE

## 2019-01-01 PROCEDURE — 700111 HCHG RX REV CODE 636 W/ 250 OVERRIDE (IP): Performed by: RADIOLOGY

## 2019-01-01 PROCEDURE — 96365 THER/PROPH/DIAG IV INF INIT: CPT

## 2019-01-01 PROCEDURE — 99292 CRITICAL CARE ADDL 30 MIN: CPT | Performed by: INTERNAL MEDICINE

## 2019-01-01 PROCEDURE — 99223 1ST HOSP IP/OBS HIGH 75: CPT | Performed by: HOSPITALIST

## 2019-01-01 PROCEDURE — 700111 HCHG RX REV CODE 636 W/ 250 OVERRIDE (IP): Mod: JG | Performed by: HOSPITALIST

## 2019-01-01 PROCEDURE — 82607 VITAMIN B-12: CPT

## 2019-01-01 PROCEDURE — 51798 US URINE CAPACITY MEASURE: CPT

## 2019-01-01 PROCEDURE — 82746 ASSAY OF FOLIC ACID SERUM: CPT

## 2019-01-01 PROCEDURE — 05HY33Z INSERTION OF INFUSION DEVICE INTO UPPER VEIN, PERCUTANEOUS APPROACH: ICD-10-PCS | Performed by: HOSPITALIST

## 2019-01-01 PROCEDURE — 93325 DOPPLER ECHO COLOR FLOW MAPG: CPT

## 2019-01-01 PROCEDURE — 0JPT0WZ REMOVAL OF TOTALLY IMPLANTABLE VASCULAR ACCESS DEVICE FROM TRUNK SUBCUTANEOUS TISSUE AND FASCIA, OPEN APPROACH: ICD-10-PCS | Performed by: RADIOLOGY

## 2019-01-01 PROCEDURE — 87641 MR-STAPH DNA AMP PROBE: CPT

## 2019-01-01 RX ORDER — ALBUTEROL SULFATE 90 UG/1
2 AEROSOL, METERED RESPIRATORY (INHALATION) EVERY 4 HOURS PRN
Status: DISCONTINUED | OUTPATIENT
Start: 2019-01-01 | End: 2019-01-01 | Stop reason: HOSPADM

## 2019-01-01 RX ORDER — OXYCODONE HYDROCHLORIDE 10 MG/1
20 TABLET ORAL
Status: DISCONTINUED | OUTPATIENT
Start: 2019-01-01 | End: 2019-01-01 | Stop reason: HOSPADM

## 2019-01-01 RX ORDER — SODIUM CHLORIDE 450 MG/100ML
INJECTION, SOLUTION INTRAVENOUS CONTINUOUS
Status: DISCONTINUED | OUTPATIENT
Start: 2019-01-01 | End: 2019-01-01

## 2019-01-01 RX ORDER — ONDANSETRON 4 MG/1
4 TABLET, ORALLY DISINTEGRATING ORAL EVERY 4 HOURS PRN
Status: DISCONTINUED | OUTPATIENT
Start: 2019-01-01 | End: 2019-01-01 | Stop reason: HOSPADM

## 2019-01-01 RX ORDER — BUDESONIDE AND FORMOTEROL FUMARATE DIHYDRATE 160; 4.5 UG/1; UG/1
2 AEROSOL RESPIRATORY (INHALATION) 2 TIMES DAILY
COMMUNITY

## 2019-01-01 RX ORDER — LORAZEPAM 2 MG/ML
1 CONCENTRATE ORAL
Status: DISCONTINUED | OUTPATIENT
Start: 2019-01-01 | End: 2019-01-01 | Stop reason: HOSPADM

## 2019-01-01 RX ORDER — OXYCODONE HYDROCHLORIDE 10 MG/1
10-20 TABLET ORAL
Qty: 60 TAB | Refills: 0 | Status: SHIPPED | OUTPATIENT
Start: 2019-01-01 | End: 2019-01-01

## 2019-01-01 RX ORDER — HYDROMORPHONE HYDROCHLORIDE 2 MG/ML
.2-.4 INJECTION, SOLUTION INTRAMUSCULAR; INTRAVENOUS; SUBCUTANEOUS
Status: DISCONTINUED | OUTPATIENT
Start: 2019-01-01 | End: 2019-01-01

## 2019-01-01 RX ORDER — BUDESONIDE AND FORMOTEROL FUMARATE DIHYDRATE 160; 4.5 UG/1; UG/1
2 AEROSOL RESPIRATORY (INHALATION) 2 TIMES DAILY
Status: DISCONTINUED | OUTPATIENT
Start: 2019-01-01 | End: 2019-01-01 | Stop reason: HOSPADM

## 2019-01-01 RX ORDER — ACETAMINOPHEN 500 MG
1000 TABLET ORAL EVERY 6 HOURS
Status: DISCONTINUED | OUTPATIENT
Start: 2019-01-01 | End: 2019-01-01

## 2019-01-01 RX ORDER — AMOXICILLIN AND CLAVULANATE POTASSIUM 875; 125 MG/1; MG/1
1 TABLET, FILM COATED ORAL EVERY 12 HOURS
Status: DISCONTINUED | OUTPATIENT
Start: 2019-01-01 | End: 2019-01-01

## 2019-01-01 RX ORDER — SODIUM CHLORIDE 9 MG/ML
250 INJECTION, SOLUTION INTRAVENOUS CONTINUOUS
Status: DISCONTINUED | OUTPATIENT
Start: 2019-01-01 | End: 2019-01-01 | Stop reason: HOSPADM

## 2019-01-01 RX ORDER — DOXYCYCLINE 100 MG/1
100 TABLET ORAL EVERY 12 HOURS
Qty: 20 TAB | Refills: 0 | Status: SHIPPED | OUTPATIENT
Start: 2019-01-01 | End: 2019-01-01

## 2019-01-01 RX ORDER — SODIUM CHLORIDE 9 MG/ML
INJECTION, SOLUTION INTRAVENOUS CONTINUOUS
Status: DISCONTINUED | OUTPATIENT
Start: 2019-01-01 | End: 2019-01-01

## 2019-01-01 RX ORDER — ALBUTEROL SULFATE 90 UG/1
1 AEROSOL, METERED RESPIRATORY (INHALATION) EVERY 4 HOURS PRN
COMMUNITY

## 2019-01-01 RX ORDER — OMEPRAZOLE 20 MG/1
20 CAPSULE, DELAYED RELEASE ORAL DAILY
Status: ON HOLD | COMMUNITY
End: 2019-01-01

## 2019-01-01 RX ORDER — MORPHINE SULFATE 10 MG/ML
10 INJECTION, SOLUTION INTRAMUSCULAR; INTRAVENOUS
Status: DISCONTINUED | OUTPATIENT
Start: 2019-01-01 | End: 2019-01-01 | Stop reason: HOSPADM

## 2019-01-01 RX ORDER — ONDANSETRON 8 MG/1
8 TABLET, ORALLY DISINTEGRATING ORAL EVERY 8 HOURS PRN
Status: DISCONTINUED | OUTPATIENT
Start: 2019-01-01 | End: 2019-01-01 | Stop reason: HOSPADM

## 2019-01-01 RX ORDER — POTASSIUM CHLORIDE 20 MEQ/1
60 TABLET, EXTENDED RELEASE ORAL ONCE
Status: COMPLETED | OUTPATIENT
Start: 2019-01-01 | End: 2019-01-01

## 2019-01-01 RX ORDER — ACETAMINOPHEN 325 MG/1
650 TABLET ORAL EVERY 6 HOURS PRN
Status: DISCONTINUED | OUTPATIENT
Start: 2019-01-01 | End: 2019-01-01 | Stop reason: HOSPADM

## 2019-01-01 RX ORDER — OXYCODONE HYDROCHLORIDE 5 MG/1
5-10 TABLET ORAL
Status: DISCONTINUED | OUTPATIENT
Start: 2019-01-01 | End: 2019-01-01 | Stop reason: HOSPADM

## 2019-01-01 RX ORDER — MIRTAZAPINE 15 MG/1
7.5 TABLET, FILM COATED ORAL
Status: DISCONTINUED | OUTPATIENT
Start: 2019-01-01 | End: 2019-01-01

## 2019-01-01 RX ORDER — ACETAMINOPHEN 325 MG/1
650 TABLET ORAL EVERY 4 HOURS PRN
Status: DISCONTINUED | OUTPATIENT
Start: 2019-01-01 | End: 2019-01-01 | Stop reason: HOSPADM

## 2019-01-01 RX ORDER — ALBUTEROL SULFATE 90 UG/1
1 AEROSOL, METERED RESPIRATORY (INHALATION) EVERY 4 HOURS PRN
Status: DISCONTINUED | OUTPATIENT
Start: 2019-01-01 | End: 2019-01-01 | Stop reason: HOSPADM

## 2019-01-01 RX ORDER — AMOXICILLIN 250 MG
2 CAPSULE ORAL 2 TIMES DAILY
Status: DISCONTINUED | OUTPATIENT
Start: 2019-01-01 | End: 2019-01-01 | Stop reason: HOSPADM

## 2019-01-01 RX ORDER — HYDROMORPHONE HYDROCHLORIDE 1 MG/ML
0.5 INJECTION, SOLUTION INTRAMUSCULAR; INTRAVENOUS; SUBCUTANEOUS ONCE
Status: DISPENSED | OUTPATIENT
Start: 2019-01-01 | End: 2019-01-01

## 2019-01-01 RX ORDER — ONDANSETRON 2 MG/ML
8 INJECTION INTRAMUSCULAR; INTRAVENOUS EVERY 8 HOURS PRN
Status: DISCONTINUED | OUTPATIENT
Start: 2019-01-01 | End: 2019-01-01 | Stop reason: HOSPADM

## 2019-01-01 RX ORDER — HYDROMORPHONE HYDROCHLORIDE 2 MG/ML
0.5 INJECTION, SOLUTION INTRAMUSCULAR; INTRAVENOUS; SUBCUTANEOUS
Status: DISCONTINUED | OUTPATIENT
Start: 2019-01-01 | End: 2019-01-01

## 2019-01-01 RX ORDER — POLYVINYL ALCOHOL 14 MG/ML
2 SOLUTION/ DROPS OPHTHALMIC PRN
Status: DISCONTINUED | OUTPATIENT
Start: 2019-01-01 | End: 2019-01-01 | Stop reason: HOSPADM

## 2019-01-01 RX ORDER — HYDROMORPHONE HYDROCHLORIDE 1 MG/ML
0.5 INJECTION, SOLUTION INTRAMUSCULAR; INTRAVENOUS; SUBCUTANEOUS
Status: COMPLETED | OUTPATIENT
Start: 2019-01-01 | End: 2019-01-01

## 2019-01-01 RX ORDER — CALCIUM GLUCONATE 94 MG/ML
1 INJECTION, SOLUTION INTRAVENOUS ONCE
Status: DISCONTINUED | OUTPATIENT
Start: 2019-01-01 | End: 2019-01-01

## 2019-01-01 RX ORDER — LIDOCAINE HYDROCHLORIDE AND EPINEPHRINE BITARTRATE 20; .01 MG/ML; MG/ML
INJECTION, SOLUTION SUBCUTANEOUS
Status: COMPLETED
Start: 2019-01-01 | End: 2019-01-01

## 2019-01-01 RX ORDER — SODIUM CHLORIDE 9 MG/ML
500 INJECTION, SOLUTION INTRAVENOUS
Status: DISCONTINUED | OUTPATIENT
Start: 2019-01-01 | End: 2019-01-01 | Stop reason: HOSPADM

## 2019-01-01 RX ORDER — FLUCONAZOLE 100 MG/1
400 TABLET ORAL DAILY
Status: DISCONTINUED | OUTPATIENT
Start: 2019-01-01 | End: 2019-01-01

## 2019-01-01 RX ORDER — LORAZEPAM 2 MG/ML
1 INJECTION INTRAMUSCULAR
Status: DISCONTINUED | OUTPATIENT
Start: 2019-01-01 | End: 2019-01-01 | Stop reason: HOSPADM

## 2019-01-01 RX ORDER — POLYETHYLENE GLYCOL 3350 17 G/17G
1 POWDER, FOR SOLUTION ORAL
Status: DISCONTINUED | OUTPATIENT
Start: 2019-01-01 | End: 2019-01-01

## 2019-01-01 RX ORDER — MIDAZOLAM HYDROCHLORIDE 1 MG/ML
INJECTION INTRAMUSCULAR; INTRAVENOUS
Status: COMPLETED
Start: 2019-01-01 | End: 2019-01-01

## 2019-01-01 RX ORDER — POLYETHYLENE GLYCOL 3350 17 G/17G
1 POWDER, FOR SOLUTION ORAL
Status: DISCONTINUED | OUTPATIENT
Start: 2019-01-01 | End: 2019-01-01 | Stop reason: HOSPADM

## 2019-01-01 RX ORDER — OMEPRAZOLE 20 MG/1
20 CAPSULE, DELAYED RELEASE ORAL DAILY
Status: DISCONTINUED | OUTPATIENT
Start: 2019-01-01 | End: 2019-01-01

## 2019-01-01 RX ORDER — ONDANSETRON 2 MG/ML
4 INJECTION INTRAMUSCULAR; INTRAVENOUS EVERY 4 HOURS PRN
Status: DISCONTINUED | OUTPATIENT
Start: 2019-01-01 | End: 2019-01-01 | Stop reason: HOSPADM

## 2019-01-01 RX ORDER — DEXTROSE MONOHYDRATE 50 MG/ML
INJECTION, SOLUTION INTRAVENOUS CONTINUOUS
Status: DISCONTINUED | OUTPATIENT
Start: 2019-01-01 | End: 2019-01-01

## 2019-01-01 RX ORDER — SODIUM CHLORIDE 9 MG/ML
1000 INJECTION, SOLUTION INTRAVENOUS ONCE
Status: COMPLETED | OUTPATIENT
Start: 2019-01-01 | End: 2019-01-01

## 2019-01-01 RX ORDER — OXYCODONE HYDROCHLORIDE 10 MG/1
10 TABLET ORAL
Status: DISCONTINUED | OUTPATIENT
Start: 2019-01-01 | End: 2019-01-01 | Stop reason: HOSPADM

## 2019-01-01 RX ORDER — POTASSIUM CHLORIDE 20 MEQ/1
40 TABLET, EXTENDED RELEASE ORAL ONCE
Status: COMPLETED | OUTPATIENT
Start: 2019-01-01 | End: 2019-01-01

## 2019-01-01 RX ORDER — CHOLECALCIFEROL (VITAMIN D3) 125 MCG
1000 CAPSULE ORAL DAILY
Status: DISCONTINUED | OUTPATIENT
Start: 2019-01-01 | End: 2019-01-01 | Stop reason: HOSPADM

## 2019-01-01 RX ORDER — MORPHINE SULFATE 4 MG/ML
2-5 INJECTION, SOLUTION INTRAMUSCULAR; INTRAVENOUS EVERY 4 HOURS PRN
Status: DISCONTINUED | OUTPATIENT
Start: 2019-01-01 | End: 2019-01-01

## 2019-01-01 RX ORDER — MIDODRINE HYDROCHLORIDE 5 MG/1
5 TABLET ORAL
Status: DISCONTINUED | OUTPATIENT
Start: 2019-01-01 | End: 2019-01-01

## 2019-01-01 RX ORDER — MORPHINE SULFATE 10 MG/ML
5 INJECTION, SOLUTION INTRAMUSCULAR; INTRAVENOUS
Status: DISCONTINUED | OUTPATIENT
Start: 2019-01-01 | End: 2019-01-01 | Stop reason: HOSPADM

## 2019-01-01 RX ORDER — DOXYCYCLINE 100 MG/1
100 TABLET ORAL EVERY 12 HOURS
Status: DISCONTINUED | OUTPATIENT
Start: 2019-01-01 | End: 2019-01-01

## 2019-01-01 RX ORDER — HYDROMORPHONE HYDROCHLORIDE 2 MG/ML
.2-.6 INJECTION, SOLUTION INTRAMUSCULAR; INTRAVENOUS; SUBCUTANEOUS
Status: DISCONTINUED | OUTPATIENT
Start: 2019-01-01 | End: 2019-01-01

## 2019-01-01 RX ORDER — LACTULOSE 20 G/30ML
30 SOLUTION ORAL 3 TIMES DAILY
Status: DISCONTINUED | OUTPATIENT
Start: 2019-01-01 | End: 2019-01-01

## 2019-01-01 RX ORDER — DULOXETIN HYDROCHLORIDE 20 MG/1
20 CAPSULE, DELAYED RELEASE ORAL DAILY
Status: DISCONTINUED | OUTPATIENT
Start: 2019-01-01 | End: 2019-01-01

## 2019-01-01 RX ORDER — DOCUSATE SODIUM 100 MG/1
100 CAPSULE, LIQUID FILLED ORAL EVERY 12 HOURS
Status: DISCONTINUED | OUTPATIENT
Start: 2019-01-01 | End: 2019-01-01 | Stop reason: HOSPADM

## 2019-01-01 RX ORDER — HYDROCODONE BITARTRATE AND ACETAMINOPHEN 10; 325 MG/1; MG/1
1 TABLET ORAL EVERY 4 HOURS PRN
Status: DISCONTINUED | OUTPATIENT
Start: 2019-01-01 | End: 2019-01-01

## 2019-01-01 RX ORDER — DOXYCYCLINE 100 MG/1
100 TABLET ORAL EVERY 12 HOURS
Status: DISCONTINUED | OUTPATIENT
Start: 2019-01-01 | End: 2019-01-01 | Stop reason: HOSPADM

## 2019-01-01 RX ORDER — BUDESONIDE AND FORMOTEROL FUMARATE DIHYDRATE 160; 4.5 UG/1; UG/1
2 AEROSOL RESPIRATORY (INHALATION)
Status: DISCONTINUED | OUTPATIENT
Start: 2019-01-01 | End: 2019-01-01 | Stop reason: SDUPTHER

## 2019-01-01 RX ORDER — OXYCODONE HYDROCHLORIDE 5 MG/1
5-10 TABLET ORAL EVERY 4 HOURS PRN
Status: DISCONTINUED | OUTPATIENT
Start: 2019-01-01 | End: 2019-01-01

## 2019-01-01 RX ORDER — DEXTROSE MONOHYDRATE, SODIUM CHLORIDE, AND POTASSIUM CHLORIDE 50; 1.49; 4.5 G/1000ML; G/1000ML; G/1000ML
INJECTION, SOLUTION INTRAVENOUS CONTINUOUS
Status: DISCONTINUED | OUTPATIENT
Start: 2019-01-01 | End: 2019-01-01

## 2019-01-01 RX ORDER — OMEPRAZOLE 20 MG/1
20 CAPSULE, DELAYED RELEASE ORAL DAILY
Status: DISCONTINUED | OUTPATIENT
Start: 2019-01-01 | End: 2019-01-01 | Stop reason: HOSPADM

## 2019-01-01 RX ORDER — ONDANSETRON 2 MG/ML
4 INJECTION INTRAMUSCULAR; INTRAVENOUS PRN
Status: ACTIVE | OUTPATIENT
Start: 2019-01-01 | End: 2019-01-01

## 2019-01-01 RX ORDER — LORAZEPAM 2 MG/ML
1-2 INJECTION INTRAMUSCULAR
Status: DISCONTINUED | OUTPATIENT
Start: 2019-01-01 | End: 2019-01-01 | Stop reason: HOSPADM

## 2019-01-01 RX ORDER — DIPHENHYDRAMINE HYDROCHLORIDE 50 MG/ML
25 INJECTION INTRAMUSCULAR; INTRAVENOUS ONCE
Status: COMPLETED | OUTPATIENT
Start: 2019-01-01 | End: 2019-01-01

## 2019-01-01 RX ORDER — HYDROMORPHONE HYDROCHLORIDE 1 MG/ML
0.5 INJECTION, SOLUTION INTRAMUSCULAR; INTRAVENOUS; SUBCUTANEOUS ONCE
Status: COMPLETED | OUTPATIENT
Start: 2019-01-01 | End: 2019-01-01

## 2019-01-01 RX ORDER — FERROUS SULFATE 325(65) MG
325 TABLET ORAL 2 TIMES DAILY WITH MEALS
Status: DISCONTINUED | OUTPATIENT
Start: 2019-01-01 | End: 2019-01-01

## 2019-01-01 RX ORDER — AMOXICILLIN 250 MG
2 CAPSULE ORAL 2 TIMES DAILY
Status: DISCONTINUED | OUTPATIENT
Start: 2019-01-01 | End: 2019-01-01

## 2019-01-01 RX ORDER — HEPARIN SODIUM 5000 [USP'U]/ML
5000 INJECTION, SOLUTION INTRAVENOUS; SUBCUTANEOUS EVERY 8 HOURS
Status: DISCONTINUED | OUTPATIENT
Start: 2019-01-01 | End: 2019-01-01 | Stop reason: HOSPADM

## 2019-01-01 RX ORDER — ASCORBIC ACID 500 MG
500 TABLET ORAL DAILY
Status: DISCONTINUED | OUTPATIENT
Start: 2019-01-01 | End: 2019-01-01

## 2019-01-01 RX ORDER — LORAZEPAM 2 MG/ML
0.5 INJECTION INTRAMUSCULAR ONCE
Status: DISCONTINUED | OUTPATIENT
Start: 2019-01-01 | End: 2019-01-01 | Stop reason: CLARIF

## 2019-01-01 RX ORDER — DIPHENHYDRAMINE HYDROCHLORIDE 50 MG/ML
12.5 INJECTION INTRAMUSCULAR; INTRAVENOUS EVERY 6 HOURS PRN
Status: DISCONTINUED | OUTPATIENT
Start: 2019-01-01 | End: 2019-01-01

## 2019-01-01 RX ORDER — HYDROMORPHONE HYDROCHLORIDE 2 MG/ML
0.5 INJECTION, SOLUTION INTRAMUSCULAR; INTRAVENOUS; SUBCUTANEOUS
Status: DISCONTINUED | OUTPATIENT
Start: 2019-01-01 | End: 2019-01-01 | Stop reason: HOSPADM

## 2019-01-01 RX ORDER — HYDROMORPHONE HYDROCHLORIDE 1 MG/ML
1 INJECTION, SOLUTION INTRAMUSCULAR; INTRAVENOUS; SUBCUTANEOUS
Status: DISCONTINUED | OUTPATIENT
Start: 2019-01-01 | End: 2019-01-01 | Stop reason: HOSPADM

## 2019-01-01 RX ORDER — SODIUM CHLORIDE 9 MG/ML
30 INJECTION, SOLUTION INTRAVENOUS
Status: DISCONTINUED | OUTPATIENT
Start: 2019-01-01 | End: 2019-01-01

## 2019-01-01 RX ORDER — ACETAMINOPHEN 650 MG/1
650 SUPPOSITORY RECTAL EVERY 4 HOURS PRN
Status: DISCONTINUED | OUTPATIENT
Start: 2019-01-01 | End: 2019-01-01 | Stop reason: HOSPADM

## 2019-01-01 RX ORDER — BISACODYL 10 MG
10 SUPPOSITORY, RECTAL RECTAL
Status: DISCONTINUED | OUTPATIENT
Start: 2019-01-01 | End: 2019-01-01

## 2019-01-01 RX ORDER — HYDROCODONE BITARTRATE AND ACETAMINOPHEN 10; 325 MG/1; MG/1
1 TABLET ORAL EVERY 4 HOURS PRN
Status: ON HOLD | COMMUNITY
End: 2019-01-01

## 2019-01-01 RX ORDER — SODIUM CHLORIDE 9 MG/ML
30 INJECTION, SOLUTION INTRAVENOUS ONCE
Status: COMPLETED | OUTPATIENT
Start: 2019-01-01 | End: 2019-01-01

## 2019-01-01 RX ORDER — GLYCOPYRROLATE 0.2 MG/ML
0.2 INJECTION INTRAMUSCULAR; INTRAVENOUS EVERY 4 HOURS PRN
Status: DISCONTINUED | OUTPATIENT
Start: 2019-01-01 | End: 2019-01-01 | Stop reason: HOSPADM

## 2019-01-01 RX ORDER — MIDAZOLAM HYDROCHLORIDE 1 MG/ML
.5-2 INJECTION INTRAMUSCULAR; INTRAVENOUS PRN
Status: ACTIVE | OUTPATIENT
Start: 2019-01-01 | End: 2019-01-01

## 2019-01-01 RX ORDER — FUROSEMIDE 10 MG/ML
20 INJECTION INTRAMUSCULAR; INTRAVENOUS
Status: DISCONTINUED | OUTPATIENT
Start: 2019-01-01 | End: 2019-01-01

## 2019-01-01 RX ORDER — SODIUM CHLORIDE 9 MG/ML
500 INJECTION, SOLUTION INTRAVENOUS
Status: ACTIVE | OUTPATIENT
Start: 2019-01-01 | End: 2019-01-01

## 2019-01-01 RX ORDER — LANOLIN ALCOHOL/MO/W.PET/CERES
1000 CREAM (GRAM) TOPICAL DAILY
Status: ON HOLD | COMMUNITY
End: 2019-01-01

## 2019-01-01 RX ORDER — HYDROMORPHONE HYDROCHLORIDE 2 MG/ML
0.25 INJECTION, SOLUTION INTRAMUSCULAR; INTRAVENOUS; SUBCUTANEOUS
Status: DISCONTINUED | OUTPATIENT
Start: 2019-01-01 | End: 2019-01-01

## 2019-01-01 RX ORDER — ATROPINE SULFATE 10 MG/ML
2 SOLUTION/ DROPS OPHTHALMIC EVERY 4 HOURS PRN
Status: DISCONTINUED | OUTPATIENT
Start: 2019-01-01 | End: 2019-01-01 | Stop reason: HOSPADM

## 2019-01-01 RX ORDER — BISACODYL 10 MG
10 SUPPOSITORY, RECTAL RECTAL
Status: DISCONTINUED | OUTPATIENT
Start: 2019-01-01 | End: 2019-01-01 | Stop reason: HOSPADM

## 2019-01-01 RX ADMIN — HEPARIN SODIUM 5000 UNITS: 5000 INJECTION, SOLUTION INTRAVENOUS; SUBCUTANEOUS at 05:33

## 2019-01-01 RX ADMIN — LORAZEPAM 1 MG: 2 SOLUTION, CONCENTRATE ORAL at 10:34

## 2019-01-01 RX ADMIN — OXYCODONE HYDROCHLORIDE 10 MG: 5 TABLET ORAL at 07:47

## 2019-01-01 RX ADMIN — AMPICILLIN SODIUM AND SULBACTAM SODIUM 3 G: 2; 1 INJECTION, POWDER, FOR SOLUTION INTRAMUSCULAR; INTRAVENOUS at 13:01

## 2019-01-01 RX ADMIN — POTASSIUM CHLORIDE, DEXTROSE MONOHYDRATE AND SODIUM CHLORIDE: 150; 5; 450 INJECTION, SOLUTION INTRAVENOUS at 09:31

## 2019-01-01 RX ADMIN — FLUCONAZOLE 400 MG: 100 TABLET ORAL at 04:31

## 2019-01-01 RX ADMIN — FLUCONAZOLE 400 MG: 100 TABLET ORAL at 05:22

## 2019-01-01 RX ADMIN — NYSTATIN 500000 UNITS: 100000 SUSPENSION ORAL at 13:05

## 2019-01-01 RX ADMIN — OMEPRAZOLE 20 MG: 20 CAPSULE, DELAYED RELEASE ORAL at 05:33

## 2019-01-01 RX ADMIN — HEPARIN SODIUM 5000 UNITS: 5000 INJECTION, SOLUTION INTRAVENOUS; SUBCUTANEOUS at 21:33

## 2019-01-01 RX ADMIN — MORPHINE SULFATE 5 MG: 10 INJECTION INTRAVENOUS at 12:20

## 2019-01-01 RX ADMIN — LACTULOSE 30 ML: 10 SOLUTION ORAL at 04:48

## 2019-01-01 RX ADMIN — CYANOCOBALAMIN TAB 500 MCG 1000 MCG: 500 TAB at 04:29

## 2019-01-01 RX ADMIN — OXYCODONE HYDROCHLORIDE 10 MG: 5 TABLET ORAL at 03:02

## 2019-01-01 RX ADMIN — ACETAMINOPHEN 1000 MG: 500 TABLET ORAL at 00:28

## 2019-01-01 RX ADMIN — DOXYCYCLINE 100 MG: 100 TABLET, FILM COATED ORAL at 06:19

## 2019-01-01 RX ADMIN — MIDODRINE HYDROCHLORIDE 5 MG: 5 TABLET ORAL at 11:08

## 2019-01-01 RX ADMIN — MORPHINE SULFATE 5 MG: 10 INJECTION INTRAVENOUS at 09:09

## 2019-01-01 RX ADMIN — HYDROMORPHONE HYDROCHLORIDE 0.5 MG: 1 INJECTION, SOLUTION INTRAMUSCULAR; INTRAVENOUS; SUBCUTANEOUS at 11:54

## 2019-01-01 RX ADMIN — BUDESONIDE AND FORMOTEROL FUMARATE DIHYDRATE 2 PUFF: 160; 4.5 AEROSOL RESPIRATORY (INHALATION) at 06:08

## 2019-01-01 RX ADMIN — NYSTATIN 500000 UNITS: 100000 SUSPENSION ORAL at 17:49

## 2019-01-01 RX ADMIN — LORAZEPAM 1 MG: 2 INJECTION INTRAMUSCULAR; INTRAVENOUS at 23:52

## 2019-01-01 RX ADMIN — MORPHINE SULFATE 5 MG: 10 INJECTION INTRAVENOUS at 08:28

## 2019-01-01 RX ADMIN — MIRTAZAPINE 7.5 MG: 15 TABLET, FILM COATED ORAL at 22:09

## 2019-01-01 RX ADMIN — LACTULOSE 30 ML: 10 SOLUTION ORAL at 11:59

## 2019-01-01 RX ADMIN — THERA TABS 1 TABLET: TAB at 16:43

## 2019-01-01 RX ADMIN — MIDODRINE HYDROCHLORIDE 5 MG: 5 TABLET ORAL at 06:12

## 2019-01-01 RX ADMIN — MIDODRINE HYDROCHLORIDE 5 MG: 5 TABLET ORAL at 10:44

## 2019-01-01 RX ADMIN — MIDODRINE HYDROCHLORIDE 5 MG: 5 TABLET ORAL at 08:05

## 2019-01-01 RX ADMIN — SODIUM CHLORIDE: 4.5 INJECTION, SOLUTION INTRAVENOUS at 01:21

## 2019-01-01 RX ADMIN — OXYCODONE HYDROCHLORIDE 10 MG: 5 TABLET ORAL at 22:57

## 2019-01-01 RX ADMIN — AMOXICILLIN AND CLAVULANATE POTASSIUM 1 TABLET: 875; 125 TABLET, FILM COATED ORAL at 21:22

## 2019-01-01 RX ADMIN — HEPARIN SODIUM 5000 UNITS: 5000 INJECTION, SOLUTION INTRAVENOUS; SUBCUTANEOUS at 20:46

## 2019-01-01 RX ADMIN — FERROUS SULFATE TAB 325 MG (65 MG ELEMENTAL FE) 325 MG: 325 (65 FE) TAB at 09:07

## 2019-01-01 RX ADMIN — SILVER SULFADIAZINE 1 G: 10 CREAM TOPICAL at 11:30

## 2019-01-01 RX ADMIN — MIDODRINE HYDROCHLORIDE 5 MG: 5 TABLET ORAL at 17:16

## 2019-01-01 RX ADMIN — CYANOCOBALAMIN TAB 500 MCG 1000 MCG: 500 TAB at 05:03

## 2019-01-01 RX ADMIN — FERROUS SULFATE TAB 325 MG (65 MG ELEMENTAL FE) 325 MG: 325 (65 FE) TAB at 17:14

## 2019-01-01 RX ADMIN — POTASSIUM CHLORIDE, DEXTROSE MONOHYDRATE AND SODIUM CHLORIDE: 150; 5; 450 INJECTION, SOLUTION INTRAVENOUS at 02:37

## 2019-01-01 RX ADMIN — AMPICILLIN SODIUM AND SULBACTAM SODIUM 3 G: 2; 1 INJECTION, POWDER, FOR SOLUTION INTRAMUSCULAR; INTRAVENOUS at 17:38

## 2019-01-01 RX ADMIN — MORPHINE SULFATE 5 MG: 10 INJECTION INTRAVENOUS at 01:04

## 2019-01-01 RX ADMIN — MIDODRINE HYDROCHLORIDE 5 MG: 5 TABLET ORAL at 11:39

## 2019-01-01 RX ADMIN — AMPICILLIN SODIUM AND SULBACTAM SODIUM 3 G: 2; 1 INJECTION, POWDER, FOR SOLUTION INTRAMUSCULAR; INTRAVENOUS at 00:52

## 2019-01-01 RX ADMIN — IOHEXOL 100 ML: 350 INJECTION, SOLUTION INTRAVENOUS at 21:45

## 2019-01-01 RX ADMIN — DEXTROSE MONOHYDRATE: 50 INJECTION, SOLUTION INTRAVENOUS at 13:52

## 2019-01-01 RX ADMIN — MIDODRINE HYDROCHLORIDE 5 MG: 5 TABLET ORAL at 12:27

## 2019-01-01 RX ADMIN — AMPICILLIN SODIUM AND SULBACTAM SODIUM 3 G: 2; 1 INJECTION, POWDER, FOR SOLUTION INTRAMUSCULAR; INTRAVENOUS at 14:39

## 2019-01-01 RX ADMIN — FUROSEMIDE 20 MG: 10 INJECTION, SOLUTION INTRAMUSCULAR; INTRAVENOUS at 17:27

## 2019-01-01 RX ADMIN — MIDAZOLAM 1 MG: 1 INJECTION INTRAMUSCULAR; INTRAVENOUS at 10:51

## 2019-01-01 RX ADMIN — THERA TABS 1 TABLET: TAB at 05:22

## 2019-01-01 RX ADMIN — LORAZEPAM 1 MG: 2 INJECTION INTRAMUSCULAR; INTRAVENOUS at 18:34

## 2019-01-01 RX ADMIN — THERA TABS 1 TABLET: TAB at 05:20

## 2019-01-01 RX ADMIN — VANCOMYCIN HYDROCHLORIDE 2600 MG: 100 INJECTION, POWDER, LYOPHILIZED, FOR SOLUTION INTRAVENOUS at 14:46

## 2019-01-01 RX ADMIN — LACTULOSE 30 ML: 10 SOLUTION ORAL at 05:21

## 2019-01-01 RX ADMIN — DOXYCYCLINE 100 MG: 100 TABLET, FILM COATED ORAL at 17:14

## 2019-01-01 RX ADMIN — BUDESONIDE AND FORMOTEROL FUMARATE DIHYDRATE 2 PUFF: 160; 4.5 AEROSOL RESPIRATORY (INHALATION) at 17:24

## 2019-01-01 RX ADMIN — OXYCODONE HYDROCHLORIDE 10 MG: 10 TABLET ORAL at 10:41

## 2019-01-01 RX ADMIN — MIDAZOLAM 2 MG: 1 INJECTION INTRAMUSCULAR; INTRAVENOUS at 10:01

## 2019-01-01 RX ADMIN — DEXTROSE MONOHYDRATE: 50 INJECTION, SOLUTION INTRAVENOUS at 12:04

## 2019-01-01 RX ADMIN — OXYCODONE HYDROCHLORIDE 10 MG: 10 TABLET ORAL at 01:29

## 2019-01-01 RX ADMIN — HYDROMORPHONE HYDROCHLORIDE 0.25 MG: 2 INJECTION, SOLUTION INTRAMUSCULAR; INTRAVENOUS; SUBCUTANEOUS at 02:28

## 2019-01-01 RX ADMIN — FERROUS SULFATE TAB 325 MG (65 MG ELEMENTAL FE) 325 MG: 325 (65 FE) TAB at 06:28

## 2019-01-01 RX ADMIN — FUROSEMIDE 20 MG: 10 INJECTION, SOLUTION INTRAMUSCULAR; INTRAVENOUS at 09:53

## 2019-01-01 RX ADMIN — DEXTROSE MONOHYDRATE: 50 INJECTION, SOLUTION INTRAVENOUS at 11:16

## 2019-01-01 RX ADMIN — ACETAMINOPHEN 650 MG: 325 TABLET, FILM COATED ORAL at 19:23

## 2019-01-01 RX ADMIN — DEXTROSE MONOHYDRATE: 50 INJECTION, SOLUTION INTRAVENOUS at 17:40

## 2019-01-01 RX ADMIN — OXYCODONE HYDROCHLORIDE 10 MG: 5 TABLET ORAL at 01:49

## 2019-01-01 RX ADMIN — VANCOMYCIN HYDROCHLORIDE 1600 MG: 100 INJECTION, POWDER, LYOPHILIZED, FOR SOLUTION INTRAVENOUS at 05:01

## 2019-01-01 RX ADMIN — OXYCODONE HYDROCHLORIDE 5 MG: 5 TABLET ORAL at 17:16

## 2019-01-01 RX ADMIN — VANCOMYCIN HYDROCHLORIDE 2000 MG: 100 INJECTION, POWDER, LYOPHILIZED, FOR SOLUTION INTRAVENOUS at 06:00

## 2019-01-01 RX ADMIN — LACTULOSE 30 ML: 10 SOLUTION ORAL at 17:24

## 2019-01-01 RX ADMIN — THERA TABS 1 TABLET: TAB at 05:57

## 2019-01-01 RX ADMIN — MIDODRINE HYDROCHLORIDE 5 MG: 5 TABLET ORAL at 17:43

## 2019-01-01 RX ADMIN — AMPICILLIN SODIUM AND SULBACTAM SODIUM 3 G: 2; 1 INJECTION, POWDER, FOR SOLUTION INTRAMUSCULAR; INTRAVENOUS at 13:13

## 2019-01-01 RX ADMIN — NYSTATIN 500000 UNITS: 100000 SUSPENSION ORAL at 20:08

## 2019-01-01 RX ADMIN — HYDROMORPHONE HYDROCHLORIDE 0.5 MG: 2 INJECTION, SOLUTION INTRAMUSCULAR; INTRAVENOUS; SUBCUTANEOUS at 14:14

## 2019-01-01 RX ADMIN — FERROUS SULFATE TAB 325 MG (65 MG ELEMENTAL FE) 325 MG: 325 (65 FE) TAB at 09:12

## 2019-01-01 RX ADMIN — NYSTATIN 500000 UNITS: 100000 SUSPENSION ORAL at 12:27

## 2019-01-01 RX ADMIN — ALBUTEROL SULFATE 1 PUFF: 90 AEROSOL, METERED RESPIRATORY (INHALATION) at 16:17

## 2019-01-01 RX ADMIN — HEPARIN SODIUM 5000 UNITS: 5000 INJECTION, SOLUTION INTRAVENOUS; SUBCUTANEOUS at 13:32

## 2019-01-01 RX ADMIN — VANCOMYCIN HYDROCHLORIDE 1200 MG: 100 INJECTION, POWDER, LYOPHILIZED, FOR SOLUTION INTRAVENOUS at 23:35

## 2019-01-01 RX ADMIN — AMOXICILLIN AND CLAVULANATE POTASSIUM 1 TABLET: 875; 125 TABLET, FILM COATED ORAL at 19:37

## 2019-01-01 RX ADMIN — CYANOCOBALAMIN TAB 500 MCG 1000 MCG: 500 TAB at 05:35

## 2019-01-01 RX ADMIN — MIDODRINE HYDROCHLORIDE 5 MG: 5 TABLET ORAL at 08:45

## 2019-01-01 RX ADMIN — HYDROMORPHONE HYDROCHLORIDE 0.5 MG: 2 INJECTION, SOLUTION INTRAMUSCULAR; INTRAVENOUS; SUBCUTANEOUS at 20:52

## 2019-01-01 RX ADMIN — DOXYCYCLINE 100 MG: 100 TABLET, FILM COATED ORAL at 17:42

## 2019-01-01 RX ADMIN — LORAZEPAM 1 MG: 2 INJECTION INTRAMUSCULAR; INTRAVENOUS at 04:02

## 2019-01-01 RX ADMIN — OXYCODONE HYDROCHLORIDE 10 MG: 5 TABLET ORAL at 13:04

## 2019-01-01 RX ADMIN — AMPICILLIN SODIUM AND SULBACTAM SODIUM 3 G: 2; 1 INJECTION, POWDER, FOR SOLUTION INTRAMUSCULAR; INTRAVENOUS at 05:37

## 2019-01-01 RX ADMIN — MIDODRINE HYDROCHLORIDE 5 MG: 5 TABLET ORAL at 11:48

## 2019-01-01 RX ADMIN — LACTULOSE 30 ML: 10 SOLUTION ORAL at 06:26

## 2019-01-01 RX ADMIN — DEXTROSE MONOHYDRATE: 50 INJECTION, SOLUTION INTRAVENOUS at 22:41

## 2019-01-01 RX ADMIN — FLUCONAZOLE 400 MG: 100 TABLET ORAL at 05:59

## 2019-01-01 RX ADMIN — LACTULOSE 30 ML: 10 SOLUTION ORAL at 04:32

## 2019-01-01 RX ADMIN — VANCOMYCIN HYDROCHLORIDE 1400 MG: 100 INJECTION, POWDER, LYOPHILIZED, FOR SOLUTION INTRAVENOUS at 03:31

## 2019-01-01 RX ADMIN — MORPHINE SULFATE 5 MG: 10 INJECTION INTRAVENOUS at 15:49

## 2019-01-01 RX ADMIN — FUROSEMIDE 20 MG: 10 INJECTION, SOLUTION INTRAMUSCULAR; INTRAVENOUS at 15:39

## 2019-01-01 RX ADMIN — HEPARIN SODIUM 5000 UNITS: 5000 INJECTION, SOLUTION INTRAVENOUS; SUBCUTANEOUS at 05:19

## 2019-01-01 RX ADMIN — BUDESONIDE AND FORMOTEROL FUMARATE DIHYDRATE 2 PUFF: 160; 4.5 AEROSOL RESPIRATORY (INHALATION) at 17:13

## 2019-01-01 RX ADMIN — THERA TABS 1 TABLET: TAB at 04:47

## 2019-01-01 RX ADMIN — MIDODRINE HYDROCHLORIDE 5 MG: 5 TABLET ORAL at 08:10

## 2019-01-01 RX ADMIN — SILVER SULFADIAZINE 1 G: 10 CREAM TOPICAL at 01:35

## 2019-01-01 RX ADMIN — DOXYCYCLINE 100 MG: 100 TABLET, FILM COATED ORAL at 16:50

## 2019-01-01 RX ADMIN — HYDROMORPHONE HYDROCHLORIDE 0.4 MG: 2 INJECTION, SOLUTION INTRAMUSCULAR; INTRAVENOUS; SUBCUTANEOUS at 18:59

## 2019-01-01 RX ADMIN — HYDROMORPHONE HYDROCHLORIDE 0.4 MG: 2 INJECTION, SOLUTION INTRAMUSCULAR; INTRAVENOUS; SUBCUTANEOUS at 00:09

## 2019-01-01 RX ADMIN — LIDOCAINE HYDROCHLORIDE AND EPINEPHRINE: 20; 10 INJECTION, SOLUTION INFILTRATION; PERINEURAL at 10:59

## 2019-01-01 RX ADMIN — BUDESONIDE AND FORMOTEROL FUMARATE DIHYDRATE 2 PUFF: 160; 4.5 AEROSOL RESPIRATORY (INHALATION) at 21:16

## 2019-01-01 RX ADMIN — THERA TABS 1 TABLET: TAB at 06:12

## 2019-01-01 RX ADMIN — OXYCODONE HYDROCHLORIDE 10 MG: 5 TABLET ORAL at 05:21

## 2019-01-01 RX ADMIN — OXYCODONE HYDROCHLORIDE 10 MG: 5 TABLET ORAL at 11:08

## 2019-01-01 RX ADMIN — LORAZEPAM 1 MG: 2 INJECTION INTRAMUSCULAR; INTRAVENOUS at 18:04

## 2019-01-01 RX ADMIN — HEPARIN SODIUM 5000 UNITS: 5000 INJECTION, SOLUTION INTRAVENOUS; SUBCUTANEOUS at 13:13

## 2019-01-01 RX ADMIN — VANCOMYCIN HYDROCHLORIDE 1400 MG: 100 INJECTION, POWDER, LYOPHILIZED, FOR SOLUTION INTRAVENOUS at 14:04

## 2019-01-01 RX ADMIN — MIDODRINE HYDROCHLORIDE 5 MG: 5 TABLET ORAL at 08:55

## 2019-01-01 RX ADMIN — DEXTROSE MONOHYDRATE: 50 INJECTION, SOLUTION INTRAVENOUS at 03:23

## 2019-01-01 RX ADMIN — HEPARIN SODIUM 5000 UNITS: 5000 INJECTION, SOLUTION INTRAVENOUS; SUBCUTANEOUS at 06:44

## 2019-01-01 RX ADMIN — OMEPRAZOLE 20 MG: 20 CAPSULE, DELAYED RELEASE ORAL at 06:01

## 2019-01-01 RX ADMIN — MORPHINE SULFATE 5 MG: 10 INJECTION INTRAVENOUS at 03:41

## 2019-01-01 RX ADMIN — MORPHINE SULFATE 5 MG: 10 INJECTION INTRAVENOUS at 16:54

## 2019-01-01 RX ADMIN — AMOXICILLIN AND CLAVULANATE POTASSIUM 1 TABLET: 875; 125 TABLET, FILM COATED ORAL at 06:27

## 2019-01-01 RX ADMIN — LORAZEPAM 2 MG: 2 INJECTION INTRAMUSCULAR; INTRAVENOUS at 11:03

## 2019-01-01 RX ADMIN — NYSTATIN 500000 UNITS: 100000 SUSPENSION ORAL at 14:50

## 2019-01-01 RX ADMIN — HEPARIN SODIUM 5000 UNITS: 5000 INJECTION, SOLUTION INTRAVENOUS; SUBCUTANEOUS at 21:36

## 2019-01-01 RX ADMIN — OXYCODONE HYDROCHLORIDE 10 MG: 5 TABLET ORAL at 17:42

## 2019-01-01 RX ADMIN — NYSTATIN 500000 UNITS: 100000 SUSPENSION ORAL at 17:13

## 2019-01-01 RX ADMIN — OXYCODONE HYDROCHLORIDE 10 MG: 5 TABLET ORAL at 03:01

## 2019-01-01 RX ADMIN — HEPARIN SODIUM 5000 UNITS: 5000 INJECTION, SOLUTION INTRAVENOUS; SUBCUTANEOUS at 22:32

## 2019-01-01 RX ADMIN — DIPHENHYDRAMINE HYDROCHLORIDE 25 MG: 50 INJECTION INTRAMUSCULAR; INTRAVENOUS at 14:01

## 2019-01-01 RX ADMIN — AMPICILLIN SODIUM AND SULBACTAM SODIUM 3 G: 2; 1 INJECTION, POWDER, FOR SOLUTION INTRAMUSCULAR; INTRAVENOUS at 11:30

## 2019-01-01 RX ADMIN — HEPARIN SODIUM 5000 UNITS: 5000 INJECTION, SOLUTION INTRAVENOUS; SUBCUTANEOUS at 17:28

## 2019-01-01 RX ADMIN — AMPICILLIN SODIUM AND SULBACTAM SODIUM 3 G: 2; 1 INJECTION, POWDER, FOR SOLUTION INTRAMUSCULAR; INTRAVENOUS at 16:14

## 2019-01-01 RX ADMIN — BUDESONIDE AND FORMOTEROL FUMARATE DIHYDRATE 2 PUFF: 160; 4.5 AEROSOL RESPIRATORY (INHALATION) at 05:57

## 2019-01-01 RX ADMIN — LORAZEPAM 1 MG: 2 INJECTION INTRAMUSCULAR; INTRAVENOUS at 15:49

## 2019-01-01 RX ADMIN — LACTULOSE 30 ML: 10 SOLUTION ORAL at 11:48

## 2019-01-01 RX ADMIN — FERROUS SULFATE TAB 325 MG (65 MG ELEMENTAL FE) 325 MG: 325 (65 FE) TAB at 05:22

## 2019-01-01 RX ADMIN — OXYCODONE HYDROCHLORIDE 10 MG: 5 TABLET ORAL at 21:14

## 2019-01-01 RX ADMIN — THERA TABS 1 TABLET: TAB at 06:44

## 2019-01-01 RX ADMIN — LACTULOSE 30 ML: 10 SOLUTION ORAL at 11:39

## 2019-01-01 RX ADMIN — HEPARIN SODIUM 5000 UNITS: 5000 INJECTION, SOLUTION INTRAVENOUS; SUBCUTANEOUS at 06:27

## 2019-01-01 RX ADMIN — HEPARIN SODIUM 5000 UNITS: 5000 INJECTION, SOLUTION INTRAVENOUS; SUBCUTANEOUS at 05:56

## 2019-01-01 RX ADMIN — BUDESONIDE AND FORMOTEROL FUMARATE DIHYDRATE 2 PUFF: 160; 4.5 AEROSOL RESPIRATORY (INHALATION) at 06:18

## 2019-01-01 RX ADMIN — OXYCODONE HYDROCHLORIDE 10 MG: 5 TABLET ORAL at 13:34

## 2019-01-01 RX ADMIN — HYDROMORPHONE HYDROCHLORIDE 0.4 MG: 2 INJECTION, SOLUTION INTRAMUSCULAR; INTRAVENOUS; SUBCUTANEOUS at 09:17

## 2019-01-01 RX ADMIN — OXYCODONE HYDROCHLORIDE 5 MG: 5 TABLET ORAL at 03:28

## 2019-01-01 RX ADMIN — THERA TABS 1 TABLET: TAB at 06:27

## 2019-01-01 RX ADMIN — FLUCONAZOLE 400 MG: 100 TABLET ORAL at 06:12

## 2019-01-01 RX ADMIN — HEPARIN SODIUM 5000 UNITS: 5000 INJECTION, SOLUTION INTRAVENOUS; SUBCUTANEOUS at 06:12

## 2019-01-01 RX ADMIN — BUDESONIDE AND FORMOTEROL FUMARATE DIHYDRATE 2 PUFF: 160; 4.5 AEROSOL RESPIRATORY (INHALATION) at 17:15

## 2019-01-01 RX ADMIN — OMEPRAZOLE 20 MG: 20 CAPSULE, DELAYED RELEASE ORAL at 20:27

## 2019-01-01 RX ADMIN — FLUCONAZOLE 400 MG: 100 TABLET ORAL at 05:56

## 2019-01-01 RX ADMIN — LACTULOSE 30 ML: 10 SOLUTION ORAL at 17:13

## 2019-01-01 RX ADMIN — SODIUM CHLORIDE 3114 ML: 9 INJECTION, SOLUTION INTRAVENOUS at 14:01

## 2019-01-01 RX ADMIN — HEPARIN SODIUM 5000 UNITS: 5000 INJECTION, SOLUTION INTRAVENOUS; SUBCUTANEOUS at 20:26

## 2019-01-01 RX ADMIN — MIDODRINE HYDROCHLORIDE 5 MG: 5 TABLET ORAL at 11:59

## 2019-01-01 RX ADMIN — AMPICILLIN SODIUM AND SULBACTAM SODIUM 3 G: 2; 1 INJECTION, POWDER, FOR SOLUTION INTRAMUSCULAR; INTRAVENOUS at 14:01

## 2019-01-01 RX ADMIN — OXYCODONE HYDROCHLORIDE 10 MG: 10 TABLET ORAL at 09:31

## 2019-01-01 RX ADMIN — DEXTROSE MONOHYDRATE: 50 INJECTION, SOLUTION INTRAVENOUS at 00:22

## 2019-01-01 RX ADMIN — LIDOCAINE HYDROCHLORIDE 5 ML: 20 SOLUTION OROPHARYNGEAL at 12:04

## 2019-01-01 RX ADMIN — LACTULOSE 30 ML: 10 SOLUTION ORAL at 13:10

## 2019-01-01 RX ADMIN — HEPARIN SODIUM 5000 UNITS: 5000 INJECTION, SOLUTION INTRAVENOUS; SUBCUTANEOUS at 22:02

## 2019-01-01 RX ADMIN — DOXYCYCLINE 100 MG: 100 TABLET, FILM COATED ORAL at 04:29

## 2019-01-01 RX ADMIN — LORAZEPAM 1 MG: 2 INJECTION INTRAMUSCULAR; INTRAVENOUS at 21:47

## 2019-01-01 RX ADMIN — DOXYCYCLINE 100 MG: 100 TABLET, FILM COATED ORAL at 17:28

## 2019-01-01 RX ADMIN — ALTEPLASE 2 MG: 2.2 INJECTION, POWDER, LYOPHILIZED, FOR SOLUTION INTRAVENOUS at 06:04

## 2019-01-01 RX ADMIN — BUDESONIDE AND FORMOTEROL FUMARATE DIHYDRATE 2 PUFF: 160; 4.5 AEROSOL RESPIRATORY (INHALATION) at 05:22

## 2019-01-01 RX ADMIN — OXYCODONE HYDROCHLORIDE 10 MG: 5 TABLET ORAL at 03:27

## 2019-01-01 RX ADMIN — OXYCODONE HYDROCHLORIDE 10 MG: 10 TABLET ORAL at 01:16

## 2019-01-01 RX ADMIN — HEPARIN SODIUM 5000 UNITS: 5000 INJECTION, SOLUTION INTRAVENOUS; SUBCUTANEOUS at 04:32

## 2019-01-01 RX ADMIN — AMPICILLIN SODIUM AND SULBACTAM SODIUM 3 G: 2; 1 INJECTION, POWDER, FOR SOLUTION INTRAMUSCULAR; INTRAVENOUS at 11:51

## 2019-01-01 RX ADMIN — LACTULOSE 30 ML: 10 SOLUTION ORAL at 16:50

## 2019-01-01 RX ADMIN — HYDROMORPHONE HYDROCHLORIDE 0.5 MG: 2 INJECTION, SOLUTION INTRAMUSCULAR; INTRAVENOUS; SUBCUTANEOUS at 14:32

## 2019-01-01 RX ADMIN — HYDROMORPHONE HYDROCHLORIDE 0.5 MG: 2 INJECTION, SOLUTION INTRAMUSCULAR; INTRAVENOUS; SUBCUTANEOUS at 22:01

## 2019-01-01 RX ADMIN — AMPICILLIN SODIUM AND SULBACTAM SODIUM 3 G: 2; 1 INJECTION, POWDER, FOR SOLUTION INTRAMUSCULAR; INTRAVENOUS at 23:32

## 2019-01-01 RX ADMIN — OXYCODONE HYDROCHLORIDE 10 MG: 10 TABLET ORAL at 23:32

## 2019-01-01 RX ADMIN — FLUCONAZOLE 400 MG: 100 TABLET ORAL at 06:47

## 2019-01-01 RX ADMIN — POTASSIUM CHLORIDE 40 MEQ: 1500 TABLET, EXTENDED RELEASE ORAL at 09:53

## 2019-01-01 RX ADMIN — THERA TABS 1 TABLET: TAB at 05:21

## 2019-01-01 RX ADMIN — DEXTROSE MONOHYDRATE: 50 INJECTION, SOLUTION INTRAVENOUS at 04:04

## 2019-01-01 RX ADMIN — OXYCODONE HYDROCHLORIDE AND ACETAMINOPHEN 500 MG: 500 TABLET ORAL at 06:44

## 2019-01-01 RX ADMIN — FUROSEMIDE 20 MG: 10 INJECTION, SOLUTION INTRAMUSCULAR; INTRAVENOUS at 05:36

## 2019-01-01 RX ADMIN — NYSTATIN 500000 UNITS: 100000 SUSPENSION ORAL at 09:08

## 2019-01-01 RX ADMIN — HEPARIN SODIUM 5000 UNITS: 5000 INJECTION, SOLUTION INTRAVENOUS; SUBCUTANEOUS at 21:16

## 2019-01-01 RX ADMIN — HEPARIN SODIUM 5000 UNITS: 5000 INJECTION, SOLUTION INTRAVENOUS; SUBCUTANEOUS at 13:05

## 2019-01-01 RX ADMIN — MIDODRINE HYDROCHLORIDE 5 MG: 5 TABLET ORAL at 16:50

## 2019-01-01 RX ADMIN — HEPARIN SODIUM 5000 UNITS: 5000 INJECTION, SOLUTION INTRAVENOUS; SUBCUTANEOUS at 06:29

## 2019-01-01 RX ADMIN — FERROUS SULFATE TAB 325 MG (65 MG ELEMENTAL FE) 325 MG: 325 (65 FE) TAB at 17:12

## 2019-01-01 RX ADMIN — DULOXETINE HYDROCHLORIDE 20 MG: 20 CAPSULE, DELAYED RELEASE ORAL at 05:19

## 2019-01-01 RX ADMIN — FERROUS SULFATE TAB 325 MG (65 MG ELEMENTAL FE) 325 MG: 325 (65 FE) TAB at 16:51

## 2019-01-01 RX ADMIN — AMPICILLIN SODIUM AND SULBACTAM SODIUM 3 G: 2; 1 INJECTION, POWDER, FOR SOLUTION INTRAMUSCULAR; INTRAVENOUS at 01:17

## 2019-01-01 RX ADMIN — SILVER SULFADIAZINE 1 G: 10 CREAM TOPICAL at 14:00

## 2019-01-01 RX ADMIN — FENTANYL CITRATE 25 MCG: 50 INJECTION, SOLUTION INTRAMUSCULAR; INTRAVENOUS at 10:01

## 2019-01-01 RX ADMIN — BUDESONIDE AND FORMOTEROL FUMARATE DIHYDRATE 2 PUFF: 160; 4.5 AEROSOL RESPIRATORY (INHALATION) at 04:29

## 2019-01-01 RX ADMIN — AMPICILLIN SODIUM AND SULBACTAM SODIUM 3 G: 2; 1 INJECTION, POWDER, FOR SOLUTION INTRAMUSCULAR; INTRAVENOUS at 05:32

## 2019-01-01 RX ADMIN — BUDESONIDE AND FORMOTEROL FUMARATE DIHYDRATE 2 PUFF: 160; 4.5 AEROSOL RESPIRATORY (INHALATION) at 05:59

## 2019-01-01 RX ADMIN — BUDESONIDE AND FORMOTEROL FUMARATE DIHYDRATE 2 PUFF: 160; 4.5 AEROSOL RESPIRATORY (INHALATION) at 21:22

## 2019-01-01 RX ADMIN — BUDESONIDE AND FORMOTEROL FUMARATE DIHYDRATE 2 PUFF: 160; 4.5 AEROSOL RESPIRATORY (INHALATION) at 05:20

## 2019-01-01 RX ADMIN — THERA TABS 1 TABLET: TAB at 04:31

## 2019-01-01 RX ADMIN — OXYCODONE HYDROCHLORIDE AND ACETAMINOPHEN 500 MG: 500 TABLET ORAL at 05:57

## 2019-01-01 RX ADMIN — BUDESONIDE AND FORMOTEROL FUMARATE DIHYDRATE 2 PUFF: 160; 4.5 AEROSOL RESPIRATORY (INHALATION) at 17:28

## 2019-01-01 RX ADMIN — AMPICILLIN SODIUM AND SULBACTAM SODIUM 3 G: 2; 1 INJECTION, POWDER, FOR SOLUTION INTRAMUSCULAR; INTRAVENOUS at 05:07

## 2019-01-01 RX ADMIN — NYSTATIN 500000 UNITS: 100000 SUSPENSION ORAL at 22:10

## 2019-01-01 RX ADMIN — ACETAMINOPHEN 650 MG: 325 TABLET, FILM COATED ORAL at 03:27

## 2019-01-01 RX ADMIN — HEPARIN SODIUM 5000 UNITS: 5000 INJECTION, SOLUTION INTRAVENOUS; SUBCUTANEOUS at 13:01

## 2019-01-01 RX ADMIN — BUDESONIDE AND FORMOTEROL FUMARATE DIHYDRATE 2 PUFF: 160; 4.5 AEROSOL RESPIRATORY (INHALATION) at 05:18

## 2019-01-01 RX ADMIN — OXYCODONE HYDROCHLORIDE 5 MG: 5 TABLET ORAL at 20:53

## 2019-01-01 RX ADMIN — SODIUM CHLORIDE 250 ML: 9 INJECTION, SOLUTION INTRAVENOUS at 16:15

## 2019-01-01 RX ADMIN — HEPARIN SODIUM 5000 UNITS: 5000 INJECTION, SOLUTION INTRAVENOUS; SUBCUTANEOUS at 14:14

## 2019-01-01 RX ADMIN — BUDESONIDE AND FORMOTEROL FUMARATE DIHYDRATE 2 PUFF: 160; 4.5 AEROSOL RESPIRATORY (INHALATION) at 10:59

## 2019-01-01 RX ADMIN — LORAZEPAM 1 MG: 2 INJECTION INTRAMUSCULAR; INTRAVENOUS at 12:18

## 2019-01-01 RX ADMIN — BUDESONIDE AND FORMOTEROL FUMARATE DIHYDRATE 2 PUFF: 160; 4.5 AEROSOL RESPIRATORY (INHALATION) at 17:20

## 2019-01-01 RX ADMIN — ACETAMINOPHEN 650 MG: 325 TABLET, FILM COATED ORAL at 14:10

## 2019-01-01 RX ADMIN — HYDROMORPHONE HYDROCHLORIDE 0.4 MG: 2 INJECTION, SOLUTION INTRAMUSCULAR; INTRAVENOUS; SUBCUTANEOUS at 09:07

## 2019-01-01 RX ADMIN — DAPTOMYCIN 700 MG: 350 INJECTION, POWDER, LYOPHILIZED, FOR SOLUTION INTRAVENOUS at 15:10

## 2019-01-01 RX ADMIN — LACTULOSE 30 ML: 10 SOLUTION ORAL at 12:30

## 2019-01-01 RX ADMIN — OMEPRAZOLE 20 MG: 20 CAPSULE, DELAYED RELEASE ORAL at 05:35

## 2019-01-01 RX ADMIN — MIDODRINE HYDROCHLORIDE 5 MG: 5 TABLET ORAL at 06:29

## 2019-01-01 RX ADMIN — OXYCODONE HYDROCHLORIDE 10 MG: 5 TABLET ORAL at 20:34

## 2019-01-01 RX ADMIN — OXYCODONE HYDROCHLORIDE 10 MG: 10 TABLET ORAL at 13:20

## 2019-01-01 RX ADMIN — OXYCODONE HYDROCHLORIDE 5 MG: 5 TABLET ORAL at 17:14

## 2019-01-01 RX ADMIN — MORPHINE SULFATE 5 MG: 10 INJECTION INTRAVENOUS at 06:02

## 2019-01-01 RX ADMIN — NYSTATIN 500000 UNITS: 100000 SUSPENSION ORAL at 18:25

## 2019-01-01 RX ADMIN — AMPICILLIN SODIUM AND SULBACTAM SODIUM 3 G: 2; 1 INJECTION, POWDER, FOR SOLUTION INTRAMUSCULAR; INTRAVENOUS at 05:22

## 2019-01-01 RX ADMIN — LACTULOSE 30 ML: 10 SOLUTION ORAL at 12:19

## 2019-01-01 RX ADMIN — FLUCONAZOLE 400 MG: 100 TABLET ORAL at 05:19

## 2019-01-01 RX ADMIN — AMPICILLIN SODIUM AND SULBACTAM SODIUM 3 G: 2; 1 INJECTION, POWDER, FOR SOLUTION INTRAMUSCULAR; INTRAVENOUS at 05:59

## 2019-01-01 RX ADMIN — AMPICILLIN SODIUM AND SULBACTAM SODIUM 3 G: 2; 1 INJECTION, POWDER, FOR SOLUTION INTRAMUSCULAR; INTRAVENOUS at 01:33

## 2019-01-01 RX ADMIN — HEPARIN SODIUM 5000 UNITS: 5000 INJECTION, SOLUTION INTRAVENOUS; SUBCUTANEOUS at 22:09

## 2019-01-01 RX ADMIN — OXYCODONE HYDROCHLORIDE 10 MG: 5 TABLET ORAL at 23:42

## 2019-01-01 RX ADMIN — FUROSEMIDE 20 MG: 10 INJECTION, SOLUTION INTRAMUSCULAR; INTRAVENOUS at 05:05

## 2019-01-01 RX ADMIN — LACTULOSE 30 ML: 10 SOLUTION ORAL at 11:08

## 2019-01-01 RX ADMIN — OXYCODONE HYDROCHLORIDE 10 MG: 10 TABLET ORAL at 05:03

## 2019-01-01 RX ADMIN — NYSTATIN 500000 UNITS: 100000 SUSPENSION ORAL at 21:21

## 2019-01-01 RX ADMIN — ACETAMINOPHEN 1000 MG: 500 TABLET ORAL at 20:23

## 2019-01-01 RX ADMIN — LACTULOSE 30 ML: 10 SOLUTION ORAL at 13:04

## 2019-01-01 RX ADMIN — AMPICILLIN SODIUM AND SULBACTAM SODIUM 3 G: 2; 1 INJECTION, POWDER, FOR SOLUTION INTRAMUSCULAR; INTRAVENOUS at 05:05

## 2019-01-01 RX ADMIN — HEPARIN SODIUM 5000 UNITS: 5000 INJECTION, SOLUTION INTRAVENOUS; SUBCUTANEOUS at 05:07

## 2019-01-01 RX ADMIN — NYSTATIN 500000 UNITS: 100000 SUSPENSION ORAL at 11:09

## 2019-01-01 RX ADMIN — MIDODRINE HYDROCHLORIDE 5 MG: 5 TABLET ORAL at 09:11

## 2019-01-01 RX ADMIN — OXYCODONE HYDROCHLORIDE AND ACETAMINOPHEN 500 MG: 500 TABLET ORAL at 05:20

## 2019-01-01 RX ADMIN — ACETAMINOPHEN 650 MG: 325 TABLET, FILM COATED ORAL at 11:19

## 2019-01-01 RX ADMIN — AMPICILLIN SODIUM AND SULBACTAM SODIUM 3 G: 2; 1 INJECTION, POWDER, FOR SOLUTION INTRAMUSCULAR; INTRAVENOUS at 02:29

## 2019-01-01 RX ADMIN — HEPARIN SODIUM 5000 UNITS: 5000 INJECTION, SOLUTION INTRAVENOUS; SUBCUTANEOUS at 14:50

## 2019-01-01 RX ADMIN — DEXTROSE MONOHYDRATE: 50 INJECTION, SOLUTION INTRAVENOUS at 07:08

## 2019-01-01 RX ADMIN — OXYCODONE HYDROCHLORIDE 5 MG: 5 TABLET ORAL at 11:04

## 2019-01-01 RX ADMIN — HEPARIN SODIUM 5000 UNITS: 5000 INJECTION, SOLUTION INTRAVENOUS; SUBCUTANEOUS at 13:20

## 2019-01-01 RX ADMIN — MORPHINE SULFATE 5 MG: 10 INJECTION INTRAVENOUS at 07:29

## 2019-01-01 RX ADMIN — DEXTROSE MONOHYDRATE: 50 INJECTION, SOLUTION INTRAVENOUS at 10:37

## 2019-01-01 RX ADMIN — OXYCODONE HYDROCHLORIDE 10 MG: 10 TABLET ORAL at 06:11

## 2019-01-01 RX ADMIN — MORPHINE SULFATE 5 MG: 10 INJECTION INTRAVENOUS at 23:53

## 2019-01-01 RX ADMIN — MIDODRINE HYDROCHLORIDE 5 MG: 5 TABLET ORAL at 07:56

## 2019-01-01 RX ADMIN — AMOXICILLIN AND CLAVULANATE POTASSIUM 1 TABLET: 875; 125 TABLET, FILM COATED ORAL at 06:48

## 2019-01-01 RX ADMIN — LACTULOSE 30 ML: 10 SOLUTION ORAL at 06:12

## 2019-01-01 RX ADMIN — BUDESONIDE AND FORMOTEROL FUMARATE DIHYDRATE 2 PUFF: 160; 4.5 AEROSOL RESPIRATORY (INHALATION) at 04:35

## 2019-01-01 RX ADMIN — NYSTATIN 500000 UNITS: 100000 SUSPENSION ORAL at 12:56

## 2019-01-01 RX ADMIN — AMPICILLIN SODIUM AND SULBACTAM SODIUM 3 G: 2; 1 INJECTION, POWDER, FOR SOLUTION INTRAMUSCULAR; INTRAVENOUS at 21:45

## 2019-01-01 RX ADMIN — ACETAMINOPHEN 650 MG: 325 TABLET, FILM COATED ORAL at 03:36

## 2019-01-01 RX ADMIN — HYDROMORPHONE HYDROCHLORIDE 0.25 MG: 2 INJECTION, SOLUTION INTRAMUSCULAR; INTRAVENOUS; SUBCUTANEOUS at 02:14

## 2019-01-01 RX ADMIN — AMPICILLIN SODIUM AND SULBACTAM SODIUM 3 G: 2; 1 INJECTION, POWDER, FOR SOLUTION INTRAMUSCULAR; INTRAVENOUS at 06:00

## 2019-01-01 RX ADMIN — ACETAMINOPHEN 1000 MG: 500 TABLET ORAL at 05:35

## 2019-01-01 RX ADMIN — MORPHINE SULFATE 5 MG: 10 INJECTION INTRAVENOUS at 11:03

## 2019-01-01 RX ADMIN — BUDESONIDE AND FORMOTEROL FUMARATE DIHYDRATE 2 PUFF: 160; 4.5 AEROSOL RESPIRATORY (INHALATION) at 18:23

## 2019-01-01 RX ADMIN — FERROUS SULFATE TAB 325 MG (65 MG ELEMENTAL FE) 325 MG: 325 (65 FE) TAB at 17:42

## 2019-01-01 RX ADMIN — HYDROMORPHONE HYDROCHLORIDE 0.5 MG: 2 INJECTION, SOLUTION INTRAMUSCULAR; INTRAVENOUS; SUBCUTANEOUS at 16:13

## 2019-01-01 RX ADMIN — MIDODRINE HYDROCHLORIDE 5 MG: 5 TABLET ORAL at 13:04

## 2019-01-01 RX ADMIN — OXYCODONE HYDROCHLORIDE 10 MG: 5 TABLET ORAL at 08:17

## 2019-01-01 RX ADMIN — FLUCONAZOLE 400 MG: 100 TABLET ORAL at 05:07

## 2019-01-01 RX ADMIN — MIDODRINE HYDROCHLORIDE 5 MG: 5 TABLET ORAL at 19:48

## 2019-01-01 RX ADMIN — BUDESONIDE AND FORMOTEROL FUMARATE DIHYDRATE 2 PUFF: 160; 4.5 AEROSOL RESPIRATORY (INHALATION) at 06:00

## 2019-01-01 RX ADMIN — MIDODRINE HYDROCHLORIDE 5 MG: 5 TABLET ORAL at 17:28

## 2019-01-01 RX ADMIN — BUDESONIDE AND FORMOTEROL FUMARATE DIHYDRATE 2 PUFF: 160; 4.5 AEROSOL RESPIRATORY (INHALATION) at 06:29

## 2019-01-01 RX ADMIN — DOXYCYCLINE 100 MG: 100 TABLET, FILM COATED ORAL at 04:30

## 2019-01-01 RX ADMIN — CYANOCOBALAMIN TAB 500 MCG 1000 MCG: 500 TAB at 06:01

## 2019-01-01 RX ADMIN — MIDODRINE HYDROCHLORIDE 5 MG: 5 TABLET ORAL at 12:19

## 2019-01-01 RX ADMIN — OXYCODONE HYDROCHLORIDE 10 MG: 10 TABLET ORAL at 20:54

## 2019-01-01 RX ADMIN — MIDODRINE HYDROCHLORIDE 5 MG: 5 TABLET ORAL at 18:25

## 2019-01-01 RX ADMIN — NYSTATIN 500000 UNITS: 100000 SUSPENSION ORAL at 13:40

## 2019-01-01 RX ADMIN — LIDOCAINE HYDROCHLORIDE 5 ML: 20 SOLUTION OROPHARYNGEAL at 03:04

## 2019-01-01 RX ADMIN — HYDROMORPHONE HYDROCHLORIDE 0.25 MG: 2 INJECTION, SOLUTION INTRAMUSCULAR; INTRAVENOUS; SUBCUTANEOUS at 14:35

## 2019-01-01 RX ADMIN — MIDODRINE HYDROCHLORIDE 5 MG: 5 TABLET ORAL at 05:22

## 2019-01-01 RX ADMIN — NYSTATIN 500000 UNITS: 100000 SUSPENSION ORAL at 10:37

## 2019-01-01 RX ADMIN — DAPTOMYCIN 700 MG: 350 INJECTION, POWDER, LYOPHILIZED, FOR SOLUTION INTRAVENOUS at 13:57

## 2019-01-01 RX ADMIN — MORPHINE SULFATE 5 MG: 10 INJECTION INTRAVENOUS at 18:34

## 2019-01-01 RX ADMIN — HYDROMORPHONE HYDROCHLORIDE 0.5 MG: 2 INJECTION, SOLUTION INTRAMUSCULAR; INTRAVENOUS; SUBCUTANEOUS at 13:27

## 2019-01-01 RX ADMIN — SILVER SULFADIAZINE 1 G: 10 CREAM TOPICAL at 04:30

## 2019-01-01 RX ADMIN — MORPHINE SULFATE 5 MG: 10 INJECTION INTRAVENOUS at 09:39

## 2019-01-01 RX ADMIN — LORAZEPAM 1 MG: 2 INJECTION INTRAMUSCULAR; INTRAVENOUS at 08:28

## 2019-01-01 RX ADMIN — OXYCODONE HYDROCHLORIDE 5 MG: 5 TABLET ORAL at 04:25

## 2019-01-01 RX ADMIN — SILVER SULFADIAZINE 1 G: 10 CREAM TOPICAL at 06:08

## 2019-01-01 RX ADMIN — AMPICILLIN SODIUM AND SULBACTAM SODIUM 3 G: 2; 1 INJECTION, POWDER, FOR SOLUTION INTRAMUSCULAR; INTRAVENOUS at 21:05

## 2019-01-01 RX ADMIN — AMPICILLIN SODIUM AND SULBACTAM SODIUM 3 G: 2; 1 INJECTION, POWDER, FOR SOLUTION INTRAMUSCULAR; INTRAVENOUS at 17:27

## 2019-01-01 RX ADMIN — HEPARIN SODIUM 5000 UNITS: 5000 INJECTION, SOLUTION INTRAVENOUS; SUBCUTANEOUS at 21:37

## 2019-01-01 RX ADMIN — HEPARIN SODIUM 5000 UNITS: 5000 INJECTION, SOLUTION INTRAVENOUS; SUBCUTANEOUS at 13:41

## 2019-01-01 RX ADMIN — FLUCONAZOLE 400 MG: 100 TABLET ORAL at 06:28

## 2019-01-01 RX ADMIN — BUDESONIDE AND FORMOTEROL FUMARATE DIHYDRATE 2 PUFF: 160; 4.5 AEROSOL RESPIRATORY (INHALATION) at 17:29

## 2019-01-01 RX ADMIN — DULOXETINE HYDROCHLORIDE 20 MG: 20 CAPSULE, DELAYED RELEASE ORAL at 17:49

## 2019-01-01 RX ADMIN — HEPARIN SODIUM 5000 UNITS: 5000 INJECTION, SOLUTION INTRAVENOUS; SUBCUTANEOUS at 21:19

## 2019-01-01 RX ADMIN — OXYCODONE HYDROCHLORIDE 5 MG: 5 TABLET ORAL at 09:42

## 2019-01-01 RX ADMIN — CYANOCOBALAMIN TAB 500 MCG 1000 MCG: 500 TAB at 06:19

## 2019-01-01 RX ADMIN — FERROUS SULFATE TAB 325 MG (65 MG ELEMENTAL FE) 325 MG: 325 (65 FE) TAB at 08:17

## 2019-01-01 RX ADMIN — POTASSIUM CHLORIDE, DEXTROSE MONOHYDRATE AND SODIUM CHLORIDE: 150; 5; 450 INJECTION, SOLUTION INTRAVENOUS at 17:55

## 2019-01-01 RX ADMIN — OXYCODONE HYDROCHLORIDE AND ACETAMINOPHEN 500 MG: 500 TABLET ORAL at 04:47

## 2019-01-01 RX ADMIN — VANCOMYCIN HYDROCHLORIDE 1400 MG: 100 INJECTION, POWDER, LYOPHILIZED, FOR SOLUTION INTRAVENOUS at 13:11

## 2019-01-01 RX ADMIN — ACETAMINOPHEN 650 MG: 325 TABLET, FILM COATED ORAL at 20:27

## 2019-01-01 RX ADMIN — NYSTATIN 500000 UNITS: 100000 SUSPENSION ORAL at 09:44

## 2019-01-01 RX ADMIN — FERROUS SULFATE TAB 325 MG (65 MG ELEMENTAL FE) 325 MG: 325 (65 FE) TAB at 06:12

## 2019-01-01 RX ADMIN — OMEPRAZOLE 20 MG: 20 CAPSULE, DELAYED RELEASE ORAL at 05:36

## 2019-01-01 RX ADMIN — AMPICILLIN SODIUM AND SULBACTAM SODIUM 3 G: 2; 1 INJECTION, POWDER, FOR SOLUTION INTRAMUSCULAR; INTRAVENOUS at 00:03

## 2019-01-01 RX ADMIN — MIDODRINE HYDROCHLORIDE 5 MG: 5 TABLET ORAL at 13:11

## 2019-01-01 RX ADMIN — MORPHINE SULFATE 5 MG: 10 INJECTION INTRAVENOUS at 10:17

## 2019-01-01 RX ADMIN — AMPICILLIN SODIUM AND SULBACTAM SODIUM 3 G: 2; 1 INJECTION, POWDER, FOR SOLUTION INTRAMUSCULAR; INTRAVENOUS at 11:32

## 2019-01-01 RX ADMIN — BUDESONIDE AND FORMOTEROL FUMARATE DIHYDRATE 2 PUFF: 160; 4.5 AEROSOL RESPIRATORY (INHALATION) at 21:45

## 2019-01-01 RX ADMIN — FENTANYL CITRATE 25 MCG: 50 INJECTION, SOLUTION INTRAMUSCULAR; INTRAVENOUS at 04:19

## 2019-01-01 RX ADMIN — HEPARIN SODIUM 5000 UNITS: 5000 INJECTION, SOLUTION INTRAVENOUS; SUBCUTANEOUS at 13:45

## 2019-01-01 RX ADMIN — HEPARIN SODIUM 5000 UNITS: 5000 INJECTION, SOLUTION INTRAVENOUS; SUBCUTANEOUS at 05:01

## 2019-01-01 RX ADMIN — OXYCODONE HYDROCHLORIDE 10 MG: 5 TABLET ORAL at 04:55

## 2019-01-01 RX ADMIN — LORAZEPAM 1 MG: 2 INJECTION INTRAMUSCULAR; INTRAVENOUS at 13:21

## 2019-01-01 RX ADMIN — OXYCODONE HYDROCHLORIDE AND ACETAMINOPHEN 500 MG: 500 TABLET ORAL at 04:31

## 2019-01-01 RX ADMIN — NYSTATIN 500000 UNITS: 100000 SUSPENSION ORAL at 10:01

## 2019-01-01 RX ADMIN — ACETAMINOPHEN 650 MG: 325 TABLET, FILM COATED ORAL at 16:29

## 2019-01-01 RX ADMIN — HYDROMORPHONE HYDROCHLORIDE 0.25 MG: 2 INJECTION, SOLUTION INTRAMUSCULAR; INTRAVENOUS; SUBCUTANEOUS at 00:56

## 2019-01-01 RX ADMIN — MORPHINE SULFATE 5 MG: 10 INJECTION INTRAVENOUS at 12:18

## 2019-01-01 RX ADMIN — HEPARIN SODIUM 5000 UNITS: 5000 INJECTION, SOLUTION INTRAVENOUS; SUBCUTANEOUS at 05:21

## 2019-01-01 RX ADMIN — SILVER SULFADIAZINE 2 G: 10 CREAM TOPICAL at 17:26

## 2019-01-01 RX ADMIN — NYSTATIN 500000 UNITS: 100000 SUSPENSION ORAL at 17:24

## 2019-01-01 RX ADMIN — THERA TABS 1 TABLET: TAB at 06:28

## 2019-01-01 RX ADMIN — LORAZEPAM 1 MG: 2 INJECTION INTRAMUSCULAR; INTRAVENOUS at 09:43

## 2019-01-01 RX ADMIN — MIDODRINE HYDROCHLORIDE 5 MG: 5 TABLET ORAL at 17:12

## 2019-01-01 RX ADMIN — OXYCODONE HYDROCHLORIDE 10 MG: 5 TABLET ORAL at 00:00

## 2019-01-01 RX ADMIN — FENTANYL CITRATE 25 MCG: 50 INJECTION, SOLUTION INTRAMUSCULAR; INTRAVENOUS at 02:07

## 2019-01-01 RX ADMIN — BUDESONIDE AND FORMOTEROL FUMARATE DIHYDRATE 2 PUFF: 160; 4.5 AEROSOL RESPIRATORY (INHALATION) at 17:35

## 2019-01-01 RX ADMIN — DOXYCYCLINE 100 MG: 100 TABLET, FILM COATED ORAL at 06:28

## 2019-01-01 RX ADMIN — HEPARIN SODIUM 5000 UNITS: 5000 INJECTION, SOLUTION INTRAVENOUS; SUBCUTANEOUS at 05:59

## 2019-01-01 RX ADMIN — ACETAMINOPHEN 1000 MG: 500 TABLET ORAL at 05:36

## 2019-01-01 RX ADMIN — OXYCODONE HYDROCHLORIDE 10 MG: 5 TABLET ORAL at 06:28

## 2019-01-01 RX ADMIN — HYDROMORPHONE HYDROCHLORIDE 0.5 MG: 2 INJECTION, SOLUTION INTRAMUSCULAR; INTRAVENOUS; SUBCUTANEOUS at 17:08

## 2019-01-01 RX ADMIN — FERROUS SULFATE TAB 325 MG (65 MG ELEMENTAL FE) 325 MG: 325 (65 FE) TAB at 08:05

## 2019-01-01 RX ADMIN — MIDODRINE HYDROCHLORIDE 5 MG: 5 TABLET ORAL at 17:24

## 2019-01-01 RX ADMIN — CALCIUM CHLORIDE 1 G: 100 INJECTION, SOLUTION INTRAVENOUS at 02:38

## 2019-01-01 RX ADMIN — OXYCODONE HYDROCHLORIDE 10 MG: 10 TABLET ORAL at 17:27

## 2019-01-01 RX ADMIN — MIDAZOLAM 1 MG: 1 INJECTION INTRAMUSCULAR; INTRAVENOUS at 10:58

## 2019-01-01 RX ADMIN — FLUCONAZOLE 400 MG: 100 TABLET ORAL at 05:20

## 2019-01-01 RX ADMIN — DOXYCYCLINE 100 MG: 100 TABLET, FILM COATED ORAL at 05:20

## 2019-01-01 RX ADMIN — OXYCODONE HYDROCHLORIDE 10 MG: 5 TABLET ORAL at 11:39

## 2019-01-01 RX ADMIN — NYSTATIN 500000 UNITS: 100000 SUSPENSION ORAL at 14:14

## 2019-01-01 RX ADMIN — OXYCODONE HYDROCHLORIDE AND ACETAMINOPHEN 500 MG: 500 TABLET ORAL at 05:22

## 2019-01-01 RX ADMIN — FLUCONAZOLE 400 MG: 100 TABLET ORAL at 05:21

## 2019-01-01 RX ADMIN — ACETAMINOPHEN 1000 MG: 500 TABLET ORAL at 13:01

## 2019-01-01 RX ADMIN — LORAZEPAM 1 MG: 2 INJECTION INTRAMUSCULAR; INTRAVENOUS at 03:41

## 2019-01-01 RX ADMIN — MORPHINE SULFATE 5 MG: 10 INJECTION INTRAVENOUS at 04:16

## 2019-01-01 RX ADMIN — OXYCODONE HYDROCHLORIDE 10 MG: 5 TABLET ORAL at 20:26

## 2019-01-01 RX ADMIN — FLUCONAZOLE 400 MG: 100 TABLET ORAL at 15:50

## 2019-01-01 RX ADMIN — SILVER SULFADIAZINE 1 G: 10 CREAM TOPICAL at 03:37

## 2019-01-01 RX ADMIN — BUDESONIDE AND FORMOTEROL FUMARATE DIHYDRATE 2 PUFF: 160; 4.5 AEROSOL RESPIRATORY (INHALATION) at 05:08

## 2019-01-01 RX ADMIN — SODIUM CHLORIDE: 4.5 INJECTION, SOLUTION INTRAVENOUS at 06:03

## 2019-01-01 RX ADMIN — SILVER SULFADIAZINE 1 G: 10 CREAM TOPICAL at 20:33

## 2019-01-01 RX ADMIN — HYDROMORPHONE HYDROCHLORIDE: 10 INJECTION, SOLUTION INTRAMUSCULAR; INTRAVENOUS; SUBCUTANEOUS at 11:54

## 2019-01-01 RX ADMIN — BUDESONIDE AND FORMOTEROL FUMARATE DIHYDRATE 2 PUFF: 160; 4.5 AEROSOL RESPIRATORY (INHALATION) at 08:55

## 2019-01-01 RX ADMIN — OXYCODONE HYDROCHLORIDE 10 MG: 5 TABLET ORAL at 21:44

## 2019-01-01 RX ADMIN — AMPICILLIN SODIUM AND SULBACTAM SODIUM 3 G: 2; 1 INJECTION, POWDER, FOR SOLUTION INTRAMUSCULAR; INTRAVENOUS at 17:48

## 2019-01-01 RX ADMIN — ACETAMINOPHEN 1000 MG: 500 TABLET ORAL at 13:04

## 2019-01-01 RX ADMIN — SILVER SULFADIAZINE 1 G: 10 CREAM TOPICAL at 06:23

## 2019-01-01 RX ADMIN — SODIUM CHLORIDE: 9 INJECTION, SOLUTION INTRAVENOUS at 22:19

## 2019-01-01 RX ADMIN — DOXYCYCLINE 100 MG: 100 TABLET, FILM COATED ORAL at 17:50

## 2019-01-01 RX ADMIN — BUDESONIDE AND FORMOTEROL FUMARATE DIHYDRATE 2 PUFF: 160; 4.5 AEROSOL RESPIRATORY (INHALATION) at 05:04

## 2019-01-01 RX ADMIN — LACTULOSE 30 ML: 10 SOLUTION ORAL at 17:16

## 2019-01-01 RX ADMIN — ACETAMINOPHEN 650 MG: 325 TABLET, FILM COATED ORAL at 21:46

## 2019-01-01 RX ADMIN — DOXYCYCLINE 100 MG: 100 TABLET, FILM COATED ORAL at 05:22

## 2019-01-01 RX ADMIN — OXYCODONE HYDROCHLORIDE AND ACETAMINOPHEN 500 MG: 500 TABLET ORAL at 06:28

## 2019-01-01 RX ADMIN — OMEPRAZOLE 20 MG: 20 CAPSULE, DELAYED RELEASE ORAL at 04:29

## 2019-01-01 RX ADMIN — LACTULOSE 30 ML: 10 SOLUTION ORAL at 17:42

## 2019-01-01 RX ADMIN — HYDROMORPHONE HYDROCHLORIDE 0.4 MG: 2 INJECTION, SOLUTION INTRAMUSCULAR; INTRAVENOUS; SUBCUTANEOUS at 16:43

## 2019-01-01 RX ADMIN — HYDROMORPHONE HYDROCHLORIDE 0.4 MG: 2 INJECTION, SOLUTION INTRAMUSCULAR; INTRAVENOUS; SUBCUTANEOUS at 21:49

## 2019-01-01 RX ADMIN — HYDROMORPHONE HYDROCHLORIDE 0.5 MG: 2 INJECTION, SOLUTION INTRAMUSCULAR; INTRAVENOUS; SUBCUTANEOUS at 16:54

## 2019-01-01 RX ADMIN — LACTULOSE 30 ML: 10 SOLUTION ORAL at 17:51

## 2019-01-01 RX ADMIN — DAPTOMYCIN 700 MG: 350 INJECTION, POWDER, LYOPHILIZED, FOR SOLUTION INTRAVENOUS at 15:26

## 2019-01-01 RX ADMIN — MORPHINE SULFATE 5 MG: 10 INJECTION INTRAVENOUS at 11:10

## 2019-01-01 RX ADMIN — OXYCODONE HYDROCHLORIDE 10 MG: 10 TABLET ORAL at 15:37

## 2019-01-01 RX ADMIN — HYDROMORPHONE HYDROCHLORIDE 0.25 MG: 2 INJECTION, SOLUTION INTRAMUSCULAR; INTRAVENOUS; SUBCUTANEOUS at 08:11

## 2019-01-01 RX ADMIN — OXYCODONE HYDROCHLORIDE 10 MG: 10 TABLET ORAL at 04:30

## 2019-01-01 RX ADMIN — HYDROMORPHONE HYDROCHLORIDE 0.5 MG: 2 INJECTION, SOLUTION INTRAMUSCULAR; INTRAVENOUS; SUBCUTANEOUS at 10:42

## 2019-01-01 RX ADMIN — SILVER SULFADIAZINE 1 G: 10 CREAM TOPICAL at 16:00

## 2019-01-01 RX ADMIN — AMPICILLIN SODIUM AND SULBACTAM SODIUM 3 G: 2; 1 INJECTION, POWDER, FOR SOLUTION INTRAMUSCULAR; INTRAVENOUS at 17:19

## 2019-01-01 RX ADMIN — AMOXICILLIN AND CLAVULANATE POTASSIUM 1 TABLET: 875; 125 TABLET, FILM COATED ORAL at 05:21

## 2019-01-01 RX ADMIN — LORAZEPAM 1 MG: 2 INJECTION INTRAMUSCULAR; INTRAVENOUS at 22:52

## 2019-01-01 RX ADMIN — AMPICILLIN SODIUM AND SULBACTAM SODIUM 3 G: 2; 1 INJECTION, POWDER, FOR SOLUTION INTRAMUSCULAR; INTRAVENOUS at 00:56

## 2019-01-01 RX ADMIN — AMPICILLIN SODIUM AND SULBACTAM SODIUM 3 G: 2; 1 INJECTION, POWDER, FOR SOLUTION INTRAMUSCULAR; INTRAVENOUS at 00:00

## 2019-01-01 RX ADMIN — OXYCODONE HYDROCHLORIDE 10 MG: 10 TABLET ORAL at 03:59

## 2019-01-01 RX ADMIN — POTASSIUM CHLORIDE, DEXTROSE MONOHYDRATE AND SODIUM CHLORIDE: 150; 5; 450 INJECTION, SOLUTION INTRAVENOUS at 01:22

## 2019-01-01 RX ADMIN — POTASSIUM CHLORIDE, DEXTROSE MONOHYDRATE AND SODIUM CHLORIDE: 150; 5; 450 INJECTION, SOLUTION INTRAVENOUS at 17:34

## 2019-01-01 RX ADMIN — DOXYCYCLINE 100 MG: 100 TABLET, FILM COATED ORAL at 05:03

## 2019-01-01 RX ADMIN — NYSTATIN 500000 UNITS: 100000 SUSPENSION ORAL at 10:03

## 2019-01-01 RX ADMIN — SODIUM CHLORIDE: 4.5 INJECTION, SOLUTION INTRAVENOUS at 17:42

## 2019-01-01 RX ADMIN — FERROUS SULFATE TAB 325 MG (65 MG ELEMENTAL FE) 325 MG: 325 (65 FE) TAB at 08:45

## 2019-01-01 RX ADMIN — OXYCODONE HYDROCHLORIDE 10 MG: 5 TABLET ORAL at 06:27

## 2019-01-01 RX ADMIN — BUDESONIDE AND FORMOTEROL FUMARATE DIHYDRATE 2 PUFF: 160; 4.5 AEROSOL RESPIRATORY (INHALATION) at 21:04

## 2019-01-01 RX ADMIN — DOXYCYCLINE 100 MG: 100 TABLET, FILM COATED ORAL at 16:35

## 2019-01-01 RX ADMIN — AMOXICILLIN AND CLAVULANATE POTASSIUM 1 TABLET: 875; 125 TABLET, FILM COATED ORAL at 06:12

## 2019-01-01 RX ADMIN — AMPICILLIN SODIUM AND SULBACTAM SODIUM 3 G: 2; 1 INJECTION, POWDER, FOR SOLUTION INTRAMUSCULAR; INTRAVENOUS at 00:27

## 2019-01-01 RX ADMIN — FERROUS SULFATE TAB 325 MG (65 MG ELEMENTAL FE) 325 MG: 325 (65 FE) TAB at 17:16

## 2019-01-01 RX ADMIN — OMEPRAZOLE 20 MG: 20 CAPSULE, DELAYED RELEASE ORAL at 06:19

## 2019-01-01 RX ADMIN — AMPICILLIN SODIUM AND SULBACTAM SODIUM 3 G: 2; 1 INJECTION, POWDER, FOR SOLUTION INTRAMUSCULAR; INTRAVENOUS at 17:36

## 2019-01-01 RX ADMIN — AMPICILLIN SODIUM AND SULBACTAM SODIUM 3 G: 2; 1 INJECTION, POWDER, FOR SOLUTION INTRAMUSCULAR; INTRAVENOUS at 12:27

## 2019-01-01 RX ADMIN — HEPARIN SODIUM 5000 UNITS: 5000 INJECTION, SOLUTION INTRAVENOUS; SUBCUTANEOUS at 15:49

## 2019-01-01 RX ADMIN — DEXTROSE MONOHYDRATE: 50 INJECTION, SOLUTION INTRAVENOUS at 00:19

## 2019-01-01 RX ADMIN — HYDROMORPHONE HYDROCHLORIDE 0.25 MG: 2 INJECTION, SOLUTION INTRAMUSCULAR; INTRAVENOUS; SUBCUTANEOUS at 08:18

## 2019-01-01 RX ADMIN — HEPARIN SODIUM 5000 UNITS: 5000 INJECTION, SOLUTION INTRAVENOUS; SUBCUTANEOUS at 20:09

## 2019-01-01 RX ADMIN — AMPICILLIN SODIUM AND SULBACTAM SODIUM 3 G: 2; 1 INJECTION, POWDER, FOR SOLUTION INTRAMUSCULAR; INTRAVENOUS at 10:40

## 2019-01-01 RX ADMIN — HEPARIN SODIUM 5000 UNITS: 5000 INJECTION, SOLUTION INTRAVENOUS; SUBCUTANEOUS at 09:30

## 2019-01-01 RX ADMIN — DAPTOMYCIN 700 MG: 350 INJECTION, POWDER, LYOPHILIZED, FOR SOLUTION INTRAVENOUS at 15:22

## 2019-01-01 RX ADMIN — CALCIUM GLUCONATE 1 G: 98 INJECTION, SOLUTION INTRAVENOUS at 08:50

## 2019-01-01 RX ADMIN — AMPICILLIN SODIUM AND SULBACTAM SODIUM 3 G: 2; 1 INJECTION, POWDER, FOR SOLUTION INTRAMUSCULAR; INTRAVENOUS at 10:38

## 2019-01-01 RX ADMIN — BUDESONIDE AND FORMOTEROL FUMARATE DIHYDRATE 2 PUFF: 160; 4.5 AEROSOL RESPIRATORY (INHALATION) at 06:48

## 2019-01-01 RX ADMIN — OXYCODONE HYDROCHLORIDE 5 MG: 5 TABLET ORAL at 18:30

## 2019-01-01 RX ADMIN — MIDODRINE HYDROCHLORIDE 5 MG: 5 TABLET ORAL at 14:15

## 2019-01-01 RX ADMIN — LORAZEPAM 2 MG: 2 INJECTION INTRAMUSCULAR; INTRAVENOUS at 17:16

## 2019-01-01 RX ADMIN — NYSTATIN 500000 UNITS: 100000 SUSPENSION ORAL at 20:26

## 2019-01-01 RX ADMIN — MIDODRINE HYDROCHLORIDE 5 MG: 5 TABLET ORAL at 17:51

## 2019-01-01 RX ADMIN — HEPARIN 500 UNITS: 100 SYRINGE at 05:35

## 2019-01-01 RX ADMIN — FERROUS SULFATE TAB 325 MG (65 MG ELEMENTAL FE) 325 MG: 325 (65 FE) TAB at 17:28

## 2019-01-01 RX ADMIN — AMPICILLIN SODIUM AND SULBACTAM SODIUM 3 G: 2; 1 INJECTION, POWDER, FOR SOLUTION INTRAMUSCULAR; INTRAVENOUS at 01:11

## 2019-01-01 RX ADMIN — LORAZEPAM 1 MG: 2 INJECTION INTRAMUSCULAR; INTRAVENOUS at 09:08

## 2019-01-01 RX ADMIN — MORPHINE SULFATE 5 MG: 10 INJECTION INTRAVENOUS at 21:47

## 2019-01-01 RX ADMIN — FERROUS SULFATE TAB 325 MG (65 MG ELEMENTAL FE) 325 MG: 325 (65 FE) TAB at 17:24

## 2019-01-01 RX ADMIN — NYSTATIN 500000 UNITS: 100000 SUSPENSION ORAL at 17:48

## 2019-01-01 RX ADMIN — AMPICILLIN SODIUM AND SULBACTAM SODIUM 3 G: 2; 1 INJECTION, POWDER, FOR SOLUTION INTRAMUSCULAR; INTRAVENOUS at 20:16

## 2019-01-01 RX ADMIN — ACETAMINOPHEN 1000 MG: 500 TABLET ORAL at 00:52

## 2019-01-01 RX ADMIN — FLUCONAZOLE 400 MG: 100 TABLET ORAL at 06:26

## 2019-01-01 RX ADMIN — HYDROMORPHONE HYDROCHLORIDE 0.5 MG: 1 INJECTION, SOLUTION INTRAMUSCULAR; INTRAVENOUS; SUBCUTANEOUS at 01:45

## 2019-01-01 RX ADMIN — OXYCODONE HYDROCHLORIDE 10 MG: 10 TABLET ORAL at 21:33

## 2019-01-01 RX ADMIN — DOXYCYCLINE 100 MG: 100 TABLET, FILM COATED ORAL at 06:27

## 2019-01-01 RX ADMIN — AMPICILLIN SODIUM AND SULBACTAM SODIUM 3 G: 2; 1 INJECTION, POWDER, FOR SOLUTION INTRAMUSCULAR; INTRAVENOUS at 05:56

## 2019-01-01 RX ADMIN — VANCOMYCIN HYDROCHLORIDE 2000 MG: 100 INJECTION, POWDER, LYOPHILIZED, FOR SOLUTION INTRAVENOUS at 05:42

## 2019-01-01 RX ADMIN — ACETAMINOPHEN 650 MG: 325 TABLET, FILM COATED ORAL at 05:03

## 2019-01-01 RX ADMIN — FERROUS SULFATE TAB 325 MG (65 MG ELEMENTAL FE) 325 MG: 325 (65 FE) TAB at 16:43

## 2019-01-01 RX ADMIN — FLUCONAZOLE 400 MG: 100 TABLET ORAL at 14:53

## 2019-01-01 RX ADMIN — OXYCODONE HYDROCHLORIDE AND ACETAMINOPHEN 500 MG: 500 TABLET ORAL at 05:21

## 2019-01-01 RX ADMIN — FLUCONAZOLE 400 MG: 100 TABLET ORAL at 04:47

## 2019-01-01 RX ADMIN — BUDESONIDE AND FORMOTEROL FUMARATE DIHYDRATE 2 PUFF: 160; 4.5 AEROSOL RESPIRATORY (INHALATION) at 06:13

## 2019-01-01 RX ADMIN — BUDESONIDE AND FORMOTEROL FUMARATE DIHYDRATE 2 PUFF: 160; 4.5 AEROSOL RESPIRATORY (INHALATION) at 17:42

## 2019-01-01 RX ADMIN — HYDROMORPHONE HYDROCHLORIDE 0.25 MG: 2 INJECTION, SOLUTION INTRAMUSCULAR; INTRAVENOUS; SUBCUTANEOUS at 05:58

## 2019-01-01 RX ADMIN — LIDOCAINE HYDROCHLORIDE 5 ML: 20 SOLUTION OROPHARYNGEAL at 09:06

## 2019-01-01 RX ADMIN — AMPICILLIN SODIUM AND SULBACTAM SODIUM 3 G: 2; 1 INJECTION, POWDER, FOR SOLUTION INTRAMUSCULAR; INTRAVENOUS at 02:28

## 2019-01-01 RX ADMIN — DEXTROSE MONOHYDRATE: 50 INJECTION, SOLUTION INTRAVENOUS at 01:00

## 2019-01-01 RX ADMIN — ACETAMINOPHEN 1000 MG: 500 TABLET ORAL at 17:38

## 2019-01-01 RX ADMIN — DOXYCYCLINE 100 MG: 100 TABLET, FILM COATED ORAL at 17:51

## 2019-01-01 RX ADMIN — HEPARIN SODIUM 5000 UNITS: 5000 INJECTION, SOLUTION INTRAVENOUS; SUBCUTANEOUS at 05:58

## 2019-01-01 RX ADMIN — HEPARIN SODIUM 5000 UNITS: 5000 INJECTION, SOLUTION INTRAVENOUS; SUBCUTANEOUS at 13:04

## 2019-01-01 RX ADMIN — MIDAZOLAM HYDROCHLORIDE 1 MG: 1 INJECTION, SOLUTION INTRAMUSCULAR; INTRAVENOUS at 10:51

## 2019-01-01 RX ADMIN — HEPARIN SODIUM 5000 UNITS: 5000 INJECTION, SOLUTION INTRAVENOUS; SUBCUTANEOUS at 21:11

## 2019-01-01 RX ADMIN — MORPHINE SULFATE 5 MG: 10 INJECTION INTRAVENOUS at 21:33

## 2019-01-01 RX ADMIN — OMEPRAZOLE 20 MG: 20 CAPSULE, DELAYED RELEASE ORAL at 05:04

## 2019-01-01 RX ADMIN — SODIUM CHLORIDE 1000 ML: 9 INJECTION, SOLUTION INTRAVENOUS at 05:20

## 2019-01-01 RX ADMIN — OXYCODONE HYDROCHLORIDE 10 MG: 5 TABLET ORAL at 21:36

## 2019-01-01 RX ADMIN — HEPARIN SODIUM 5000 UNITS: 5000 INJECTION, SOLUTION INTRAVENOUS; SUBCUTANEOUS at 00:27

## 2019-01-01 RX ADMIN — DOXYCYCLINE 100 MG: 100 TABLET, FILM COATED ORAL at 04:47

## 2019-01-01 RX ADMIN — OXYCODONE HYDROCHLORIDE 10 MG: 10 TABLET ORAL at 16:47

## 2019-01-01 RX ADMIN — OXYCODONE HYDROCHLORIDE AND ACETAMINOPHEN 500 MG: 500 TABLET ORAL at 09:44

## 2019-01-01 RX ADMIN — HEPARIN SODIUM 5000 UNITS: 5000 INJECTION, SOLUTION INTRAVENOUS; SUBCUTANEOUS at 04:48

## 2019-01-01 RX ADMIN — FERROUS SULFATE TAB 325 MG (65 MG ELEMENTAL FE) 325 MG: 325 (65 FE) TAB at 11:08

## 2019-01-01 RX ADMIN — MIDODRINE HYDROCHLORIDE 5 MG: 5 TABLET ORAL at 17:15

## 2019-01-01 RX ADMIN — OXYCODONE HYDROCHLORIDE 10 MG: 5 TABLET ORAL at 05:58

## 2019-01-01 RX ADMIN — HEPARIN SODIUM 5000 UNITS: 5000 INJECTION, SOLUTION INTRAVENOUS; SUBCUTANEOUS at 15:22

## 2019-01-01 RX ADMIN — MORPHINE SULFATE 5 MG: 10 INJECTION INTRAVENOUS at 13:21

## 2019-01-01 RX ADMIN — OXYCODONE HYDROCHLORIDE 10 MG: 5 TABLET ORAL at 10:37

## 2019-01-01 RX ADMIN — BUDESONIDE AND FORMOTEROL FUMARATE DIHYDRATE 2 PUFF: 160; 4.5 AEROSOL RESPIRATORY (INHALATION) at 07:17

## 2019-01-01 RX ADMIN — HEPARIN SODIUM 5000 UNITS: 5000 INJECTION, SOLUTION INTRAVENOUS; SUBCUTANEOUS at 23:37

## 2019-01-01 RX ADMIN — MIDODRINE HYDROCHLORIDE 5 MG: 5 TABLET ORAL at 12:30

## 2019-01-01 RX ADMIN — OXYCODONE HYDROCHLORIDE 10 MG: 5 TABLET ORAL at 00:37

## 2019-01-01 RX ADMIN — FENTANYL CITRATE 50 MCG: 50 INJECTION, SOLUTION INTRAMUSCULAR; INTRAVENOUS at 10:52

## 2019-01-01 RX ADMIN — LACTULOSE 30 ML: 10 SOLUTION ORAL at 18:00

## 2019-01-01 RX ADMIN — OXYCODONE HYDROCHLORIDE 10 MG: 5 TABLET ORAL at 17:27

## 2019-01-01 RX ADMIN — HEPARIN SODIUM 5000 UNITS: 5000 INJECTION, SOLUTION INTRAVENOUS; SUBCUTANEOUS at 13:10

## 2019-01-01 RX ADMIN — DULOXETINE HYDROCHLORIDE 20 MG: 20 CAPSULE, DELAYED RELEASE ORAL at 05:59

## 2019-01-01 RX ADMIN — DOXYCYCLINE 100 MG: 100 TABLET, FILM COATED ORAL at 17:18

## 2019-01-01 RX ADMIN — LACTULOSE 30 ML: 10 SOLUTION ORAL at 06:45

## 2019-01-01 RX ADMIN — BUDESONIDE AND FORMOTEROL FUMARATE DIHYDRATE 2 PUFF: 160; 4.5 AEROSOL RESPIRATORY (INHALATION) at 16:51

## 2019-01-01 RX ADMIN — NYSTATIN 500000 UNITS: 100000 SUSPENSION ORAL at 22:45

## 2019-01-01 RX ADMIN — POTASSIUM CHLORIDE 60 MEQ: 1500 TABLET, EXTENDED RELEASE ORAL at 07:56

## 2019-01-01 RX ADMIN — OXYCODONE HYDROCHLORIDE 10 MG: 5 TABLET ORAL at 17:50

## 2019-01-01 RX ADMIN — HEPARIN SODIUM 5000 UNITS: 5000 INJECTION, SOLUTION INTRAVENOUS; SUBCUTANEOUS at 14:18

## 2019-01-01 RX ADMIN — OXYCODONE HYDROCHLORIDE 5 MG: 5 TABLET ORAL at 23:34

## 2019-01-01 RX ADMIN — MIRTAZAPINE 7.5 MG: 15 TABLET, FILM COATED ORAL at 21:20

## 2019-01-01 RX ADMIN — LACTULOSE 30 ML: 10 SOLUTION ORAL at 05:38

## 2019-01-01 RX ADMIN — SILVER SULFADIAZINE 1 G: 10 CREAM TOPICAL at 14:10

## 2019-01-01 RX ADMIN — OXYCODONE HYDROCHLORIDE 10 MG: 5 TABLET ORAL at 10:43

## 2019-01-01 RX ADMIN — FERROUS SULFATE TAB 325 MG (65 MG ELEMENTAL FE) 325 MG: 325 (65 FE) TAB at 17:51

## 2019-01-01 RX ADMIN — OXYCODONE HYDROCHLORIDE 10 MG: 5 TABLET ORAL at 22:05

## 2019-01-01 RX ADMIN — LACTULOSE 30 ML: 10 SOLUTION ORAL at 17:14

## 2019-01-01 RX ADMIN — SODIUM CHLORIDE: 9 INJECTION, SOLUTION INTRAVENOUS at 16:15

## 2019-01-01 RX ADMIN — HYDROMORPHONE HYDROCHLORIDE 0.25 MG: 2 INJECTION, SOLUTION INTRAMUSCULAR; INTRAVENOUS; SUBCUTANEOUS at 15:21

## 2019-01-01 RX ADMIN — BUDESONIDE AND FORMOTEROL FUMARATE DIHYDRATE 2 PUFF: 160; 4.5 AEROSOL RESPIRATORY (INHALATION) at 05:36

## 2019-01-01 RX ADMIN — HEPARIN SODIUM 5000 UNITS: 5000 INJECTION, SOLUTION INTRAVENOUS; SUBCUTANEOUS at 22:01

## 2019-01-01 RX ADMIN — LACTULOSE 30 ML: 10 SOLUTION ORAL at 10:43

## 2019-01-01 RX ADMIN — BUDESONIDE AND FORMOTEROL FUMARATE DIHYDRATE 2 PUFF: 160; 4.5 AEROSOL RESPIRATORY (INHALATION) at 08:17

## 2019-01-01 RX ADMIN — LACTULOSE 30 ML: 10 SOLUTION ORAL at 06:27

## 2019-01-01 RX ADMIN — FERROUS SULFATE TAB 325 MG (65 MG ELEMENTAL FE) 325 MG: 325 (65 FE) TAB at 09:45

## 2019-01-01 RX ADMIN — MORPHINE SULFATE 5 MG: 10 INJECTION INTRAVENOUS at 17:16

## 2019-01-01 RX ADMIN — NYSTATIN 500000 UNITS: 100000 SUSPENSION ORAL at 21:36

## 2019-01-01 RX ADMIN — HYDROMORPHONE HYDROCHLORIDE 0.4 MG: 2 INJECTION, SOLUTION INTRAMUSCULAR; INTRAVENOUS; SUBCUTANEOUS at 11:50

## 2019-01-01 RX ADMIN — HEPARIN SODIUM 5000 UNITS: 5000 INJECTION, SOLUTION INTRAVENOUS; SUBCUTANEOUS at 22:54

## 2019-01-01 RX ADMIN — OXYCODONE HYDROCHLORIDE AND ACETAMINOPHEN 500 MG: 500 TABLET ORAL at 06:12

## 2019-01-01 RX ADMIN — MORPHINE SULFATE 2 MG: 4 INJECTION INTRAVENOUS at 14:04

## 2019-01-01 RX ADMIN — AMPICILLIN SODIUM AND SULBACTAM SODIUM 3 G: 2; 1 INJECTION, POWDER, FOR SOLUTION INTRAMUSCULAR; INTRAVENOUS at 13:00

## 2019-01-01 RX ADMIN — OXYCODONE HYDROCHLORIDE 5 MG: 5 TABLET ORAL at 08:05

## 2019-01-01 RX ADMIN — OXYCODONE HYDROCHLORIDE 10 MG: 5 TABLET ORAL at 13:33

## 2019-01-01 RX ADMIN — HYDROMORPHONE HYDROCHLORIDE 0.5 MG: 2 INJECTION, SOLUTION INTRAMUSCULAR; INTRAVENOUS; SUBCUTANEOUS at 14:01

## 2019-01-01 RX ADMIN — HYDROMORPHONE HYDROCHLORIDE 0.25 MG: 2 INJECTION, SOLUTION INTRAMUSCULAR; INTRAVENOUS; SUBCUTANEOUS at 05:32

## 2019-01-01 RX ADMIN — MIDODRINE HYDROCHLORIDE 5 MG: 5 TABLET ORAL at 08:17

## 2019-01-01 RX ADMIN — AMPICILLIN SODIUM AND SULBACTAM SODIUM 3 G: 2; 1 INJECTION, POWDER, FOR SOLUTION INTRAMUSCULAR; INTRAVENOUS at 18:23

## 2019-01-01 RX ADMIN — FENTANYL CITRATE 50 MCG: 50 INJECTION, SOLUTION INTRAMUSCULAR; INTRAVENOUS at 10:58

## 2019-01-01 RX ADMIN — BUDESONIDE AND FORMOTEROL FUMARATE DIHYDRATE 2 PUFF: 160; 4.5 AEROSOL RESPIRATORY (INHALATION) at 17:57

## 2019-01-01 RX ADMIN — MIDODRINE HYDROCHLORIDE 5 MG: 5 TABLET ORAL at 17:50

## 2019-01-01 RX ADMIN — OXYCODONE HYDROCHLORIDE AND ACETAMINOPHEN 500 MG: 500 TABLET ORAL at 06:27

## 2019-01-01 RX ADMIN — AMOXICILLIN AND CLAVULANATE POTASSIUM 1 TABLET: 875; 125 TABLET, FILM COATED ORAL at 17:24

## 2019-01-01 RX ADMIN — LORAZEPAM 1 MG: 2 INJECTION INTRAMUSCULAR; INTRAVENOUS at 09:09

## 2019-01-01 RX ADMIN — HEPARIN SODIUM 5000 UNITS: 5000 INJECTION, SOLUTION INTRAVENOUS; SUBCUTANEOUS at 22:44

## 2019-01-01 RX ADMIN — BUDESONIDE AND FORMOTEROL FUMARATE DIHYDRATE 2 PUFF: 160; 4.5 AEROSOL RESPIRATORY (INHALATION) at 02:00

## 2019-01-01 RX ADMIN — OXYCODONE HYDROCHLORIDE 10 MG: 10 TABLET ORAL at 10:38

## 2019-01-01 RX ADMIN — MORPHINE SULFATE 5 MG: 10 INJECTION INTRAVENOUS at 15:51

## 2019-01-01 SDOH — ECONOMIC STABILITY: GENERAL

## 2019-01-01 ASSESSMENT — PATIENT HEALTH QUESTIONNAIRE - PHQ9
8. MOVING OR SPEAKING SO SLOWLY THAT OTHER PEOPLE COULD HAVE NOTICED. OR THE OPPOSITE, BEING SO FIGETY OR RESTLESS THAT YOU HAVE BEEN MOVING AROUND A LOT MORE THAN USUAL: NOT AT ALL
7. TROUBLE CONCENTRATING ON THINGS, SUCH AS READING THE NEWSPAPER OR WATCHING TELEVISION: NOT AT ALL
5. POOR APPETITE OR OVEREATING: NEARLY EVERY DAY
SUM OF ALL RESPONSES TO PHQ9 QUESTIONS 1 AND 2: 2
2. FEELING DOWN, DEPRESSED, IRRITABLE, OR HOPELESS: NOT AT ALL
3. TROUBLE FALLING OR STAYING ASLEEP OR SLEEPING TOO MUCH: NEARLY EVERY DAY
1. LITTLE INTEREST OR PLEASURE IN DOING THINGS: NOT AT ALL
1. LITTLE INTEREST OR PLEASURE IN DOING THINGS: SEVERAL DAYS
CLINICAL INTERPRETATION OF PHQ2 SCORE: 0
2. FEELING DOWN, DEPRESSED, IRRITABLE, OR HOPELESS: SEVERAL DAYS
6. FEELING BAD ABOUT YOURSELF - OR THAT YOU ARE A FAILURE OR HAVE LET YOURSELF OR YOUR FAMILY DOWN: NOT AL ALL
SUM OF ALL RESPONSES TO PHQ QUESTIONS 1-9: 12
SUM OF ALL RESPONSES TO PHQ9 QUESTIONS 1 AND 2: 0
9. THOUGHTS THAT YOU WOULD BE BETTER OFF DEAD, OR OF HURTING YOURSELF: SEVERAL DAYS
4. FEELING TIRED OR HAVING LITTLE ENERGY: NEARLY EVERY DAY

## 2019-01-01 ASSESSMENT — ENCOUNTER SYMPTOMS
SPEECH CHANGE: 0
DIAPHORESIS: 0
DIARRHEA: 0
SENSORY CHANGE: 0
WEAKNESS: 1
CHILLS: 0
DIARRHEA: 0
SPUTUM PRODUCTION: 0
STRIDOR: 0
CHILLS: 0
FOCAL WEAKNESS: 0
BACK PAIN: 1
SENSORY CHANGE: 0
COUGH: 0
WEAKNESS: 0
NECK PAIN: 0
MYALGIAS: 0
HEADACHES: 0
DIZZINESS: 0
CHILLS: 0
SHORTNESS OF BREATH: 0
VOMITING: 0
CLAUDICATION: 0
DIAPHORESIS: 0
HEMOPTYSIS: 0
VOMITING: 0
HEADACHES: 0
MYALGIAS: 0
CLAUDICATION: 0
NAUSEA: 0
DEPRESSION: 0
VOMITING: 0
SENSORY CHANGE: 0
DIARRHEA: 0
FEVER: 0
VOMITING: 0
HEARTBURN: 0
CHILLS: 0
CARDIOVASCULAR NEGATIVE: 1
SPEECH CHANGE: 0
FOCAL WEAKNESS: 0
SENSORY CHANGE: 0
VOMITING: 0
NERVOUS/ANXIOUS: 0
CHILLS: 0
WEAKNESS: 0
FOCAL WEAKNESS: 0
HEMOPTYSIS: 0
MYALGIAS: 0
SENSORY CHANGE: 0
SPEECH CHANGE: 0
MYALGIAS: 1
COUGH: 0
SORE THROAT: 0
CLAUDICATION: 0
FEVER: 1
NECK PAIN: 0
NAUSEA: 0
NAUSEA: 0
HEADACHES: 0
FEVER: 0
NAUSEA: 0
SHORTNESS OF BREATH: 0
PALPITATIONS: 0
WEAKNESS: 0
DIARRHEA: 0
SORE THROAT: 0
WHEEZING: 0
SPUTUM PRODUCTION: 0
ABDOMINAL PAIN: 0
NECK PAIN: 0
SENSORY CHANGE: 0
HEADACHES: 0
FOCAL WEAKNESS: 0
DIARRHEA: 0
FOCAL WEAKNESS: 0
DEPRESSION: 0
SHORTNESS OF BREATH: 0
PALPITATIONS: 0
COUGH: 0
DIZZINESS: 0
DIAPHORESIS: 0
FALLS: 0
DEPRESSION: 0
CONSTIPATION: 0
FEVER: 0
INSOMNIA: 0
EYE PAIN: 0
NERVOUS/ANXIOUS: 0
ROS GI COMMENTS: TOLERATING A DIET
WEAKNESS: 1
FOCAL WEAKNESS: 0
MYALGIAS: 0
SHORTNESS OF BREATH: 0
FEVER: 0
DEPRESSION: 0
HEARTBURN: 0
HEMOPTYSIS: 0
HEADACHES: 0
WEAKNESS: 0
SHORTNESS OF BREATH: 0
BLURRED VISION: 0
ABDOMINAL PAIN: 0
SHORTNESS OF BREATH: 0
FEVER: 1
SORE THROAT: 0
EYE PAIN: 0
LOSS OF CONSCIOUSNESS: 0
PHOTOPHOBIA: 0
CHILLS: 0
SPEECH CHANGE: 0
DEPRESSION: 0
DIARRHEA: 1
EYE DISCHARGE: 0
HEADACHES: 0
MYALGIAS: 0
CHILLS: 0
COUGH: 0
CONSTIPATION: 0
MYALGIAS: 0
BLURRED VISION: 0
DIZZINESS: 0
WEAKNESS: 1
PALPITATIONS: 0
SPEECH CHANGE: 0
CHILLS: 1
BACK PAIN: 1
FOCAL WEAKNESS: 0
GASTROINTESTINAL NEGATIVE: 1
HEARTBURN: 0
SHORTNESS OF BREATH: 1
FEVER: 1
ABDOMINAL PAIN: 0
DIZZINESS: 0
WEAKNESS: 0
EYE PAIN: 0
SPEECH CHANGE: 0
LOSS OF CONSCIOUSNESS: 0
NAUSEA: 0
PALPITATIONS: 0
MYALGIAS: 1
CLAUDICATION: 0
CHILLS: 0
PALPITATIONS: 0
SHORTNESS OF BREATH: 0
DIZZINESS: 0
CONSTIPATION: 0
DIZZINESS: 0
ROS GI COMMENTS: TOLERATING A DIET
BACK PAIN: 1
MYALGIAS: 1
SHORTNESS OF BREATH: 0
CLAUDICATION: 0
SHORTNESS OF BREATH: 0
FEVER: 0
MYALGIAS: 1
SORE THROAT: 0
BACK PAIN: 0
CLAUDICATION: 0
DIARRHEA: 1
CHILLS: 1
COUGH: 0
SPUTUM PRODUCTION: 0
VOMITING: 0
CHILLS: 0
WHEEZING: 0
DIZZINESS: 0
DIZZINESS: 0
NERVOUS/ANXIOUS: 0
HEMOPTYSIS: 0
CLAUDICATION: 0
CHILLS: 0
CLAUDICATION: 0
DIZZINESS: 0
SENSORY CHANGE: 0
CONSTIPATION: 0
NERVOUS/ANXIOUS: 0
SORE THROAT: 0
DOUBLE VISION: 0
SENSORY CHANGE: 0
WEAKNESS: 0
DIZZINESS: 0
CHILLS: 0
ABDOMINAL PAIN: 0
PHOTOPHOBIA: 0
SPUTUM PRODUCTION: 0
WEAKNESS: 1
HEADACHES: 0
DEPRESSION: 0
MUSCULOSKELETAL NEGATIVE: 1
BRUISES/BLEEDS EASILY: 0
BLURRED VISION: 0
HEADACHES: 0
WEAKNESS: 0
SPUTUM PRODUCTION: 0
ABDOMINAL PAIN: 0
PHOTOPHOBIA: 0
WEIGHT LOSS: 1
MYALGIAS: 0
CLAUDICATION: 0
DIZZINESS: 0
BLURRED VISION: 0
LOSS OF CONSCIOUSNESS: 0
DIARRHEA: 0
STRIDOR: 0
SORE THROAT: 0
BLURRED VISION: 0
DEPRESSION: 0
HEARTBURN: 0
HEADACHES: 0
FOCAL WEAKNESS: 0
WEAKNESS: 0
DIZZINESS: 0
CHILLS: 1
WEAKNESS: 0
INSOMNIA: 0
ROS GI COMMENTS: TOLERATING A DIET
SHORTNESS OF BREATH: 0
BACK PAIN: 1
MYALGIAS: 1
NECK PAIN: 0
INSOMNIA: 0
DIARRHEA: 0
DIARRHEA: 0
SENSORY CHANGE: 0
SKIN LESIONS: 1
SENSORY CHANGE: 0
SPEECH CHANGE: 0
INSOMNIA: 0
SORE THROAT: 0
FEVER: 0
STRIDOR: 0
PHOTOPHOBIA: 0
FEVER: 0
ABDOMINAL PAIN: 0
ABDOMINAL PAIN: 0
EYE DISCHARGE: 0
HEARTBURN: 0
CONSTIPATION: 0
HEADACHES: 0
CLAUDICATION: 0
SPEECH CHANGE: 0
MYALGIAS: 0
WEAKNESS: 1
VOMITING: 0
ABDOMINAL PAIN: 0
CHILLS: 0
NERVOUS/ANXIOUS: 0
DEPRESSION: 0
COUGH: 0
COUGH: 0
MYALGIAS: 0
NERVOUS/ANXIOUS: 0
SPUTUM PRODUCTION: 0
COUGH: 0
FEVER: 0
FOCAL WEAKNESS: 0
SHORTNESS OF BREATH: 0
NERVOUS/ANXIOUS: 0
SENSORY CHANGE: 0
SPUTUM PRODUCTION: 0
LOSS OF CONSCIOUSNESS: 0
CHILLS: 0
SPUTUM PRODUCTION: 0
FEVER: 0
DIZZINESS: 0
BRUISES/BLEEDS EASILY: 0
EYE PAIN: 0
BLURRED VISION: 0
SPUTUM PRODUCTION: 0
HEARTBURN: 0
SPEECH CHANGE: 0
CLAUDICATION: 0
SHORTNESS OF BREATH: 0
CHILLS: 0
BRUISES/BLEEDS EASILY: 0
FEVER: 0
CHILLS: 0
EYES NEGATIVE: 1
HEARTBURN: 0
NERVOUS/ANXIOUS: 0
COUGH: 0
DIZZINESS: 0
ABDOMINAL PAIN: 1
NAUSEA: 0
SPEECH CHANGE: 0
SHORTNESS OF BREATH: 0
NAUSEA: 0
COUGH: 0
DIAPHORESIS: 0
NECK PAIN: 0
PALPITATIONS: 0
FEVER: 0
PALPITATIONS: 0
CHILLS: 1
SORE THROAT: 0
INSOMNIA: 0
SENSORY CHANGE: 0
PHOTOPHOBIA: 0
BRUISES/BLEEDS EASILY: 0
SPUTUM PRODUCTION: 0
ABDOMINAL PAIN: 0
INSOMNIA: 0
VOMITING: 0
MYALGIAS: 1
VOMITING: 0
COUGH: 0
SHORTNESS OF BREATH: 0
DIARRHEA: 0
SPUTUM PRODUCTION: 0
SORE THROAT: 0
NERVOUS/ANXIOUS: 0
ABDOMINAL PAIN: 0
CLAUDICATION: 0
CHILLS: 0
FOCAL WEAKNESS: 0
WEAKNESS: 0
CONSTIPATION: 0
WHEEZING: 0
MYALGIAS: 1
SPEECH CHANGE: 0
PALPITATIONS: 0
FOCAL WEAKNESS: 0
CONSTIPATION: 0
CHANGE IN LEVEL OF CONSCIOUSNESS: 1
FEVER: 0
NAUSEA: 0
SENSORY CHANGE: 0
FEVER: 0
COUGH: 0
SPEECH CHANGE: 0
ABDOMINAL PAIN: 0
EYE DISCHARGE: 0
NAUSEA: 0
BACK PAIN: 1
NAUSEA: 0
HEMOPTYSIS: 1
ABDOMINAL PAIN: 0
MYALGIAS: 0
DIARRHEA: 0
FEVER: 0
HEMOPTYSIS: 0
FEVER: 0
NAUSEA: 0
BLURRED VISION: 0
BACK PAIN: 1
CONSTIPATION: 0
DEPRESSION: 0
HEMOPTYSIS: 0
VOMITING: 0
VOMITING: 0
CONSTIPATION: 0
DIARRHEA: 0
CONSTIPATION: 0
NECK PAIN: 0
WHEEZING: 0
MYALGIAS: 1
HEARTBURN: 0
DIZZINESS: 0
BRUISES/BLEEDS EASILY: 0
ABDOMINAL PAIN: 0
HEADACHES: 0
BLURRED VISION: 0
ABDOMINAL PAIN: 0
WEAKNESS: 0
CONSTIPATION: 0
WEAKNESS: 0
SHORTNESS OF BREATH: 0
EYES NEGATIVE: 1
CHILLS: 0
PALPITATIONS: 0
WHEEZING: 0
WEAKNESS: 1
DIARRHEA: 0
HEADACHES: 0
SPUTUM PRODUCTION: 0
DIARRHEA: 0
WEAKNESS: 0
PSYCHIATRIC NEGATIVE: 1
PHOTOPHOBIA: 0
BLURRED VISION: 0
DIARRHEA: 0
VOMITING: 0
HEADACHES: 0
HEADACHES: 0
SORE THROAT: 0
SENSORY CHANGE: 0
EYE DISCHARGE: 0
FEVER: 1
BLURRED VISION: 0
EYE PAIN: 0
PHOTOPHOBIA: 0
ORTHOPNEA: 0
CHILLS: 0
CONSTIPATION: 0
PHOTOPHOBIA: 0
DOUBLE VISION: 0
VOMITING: 0
STRIDOR: 0
CHILLS: 0
SENSORY CHANGE: 0
LOSS OF CONSCIOUSNESS: 0
COUGH: 0
SENSORY CHANGE: 0
CARDIOVASCULAR NEGATIVE: 1
CONSTIPATION: 0
ABDOMINAL PAIN: 0
DEPRESSION: 1
DEPRESSION: 0
WEAKNESS: 1
WHEEZING: 0
NAUSEA: 0
MYALGIAS: 1
MYALGIAS: 0
WEAKNESS: 0
COUGH: 0
INSOMNIA: 0
NERVOUS/ANXIOUS: 0
INSOMNIA: 0
MYALGIAS: 1
BRUISES/BLEEDS EASILY: 0
DIARRHEA: 0
FOCAL WEAKNESS: 0
CHILLS: 0
COUGH: 0
BRUISES/BLEEDS EASILY: 0
VOMITING: 0
CONSTIPATION: 0
INSOMNIA: 0
LOSS OF CONSCIOUSNESS: 0
SORE THROAT: 0
DIZZINESS: 0
TINGLING: 0
BACK PAIN: 1
HEADACHES: 0
CLAUDICATION: 0
FEVER: 0
COUGH: 0
WHEEZING: 0
HEARTBURN: 0
HEADACHES: 0
HEARTBURN: 0
NAUSEA: 0
SENSORY CHANGE: 0
DEPRESSION: 0
PALPITATIONS: 0
SHORTNESS OF BREATH: 0
FEVER: 0
SHORTNESS OF BREATH: 0
NAUSEA: 0
SHORTNESS OF BREATH: 0
DIZZINESS: 0
COUGH: 0
HYPOTENSION: 1
DIZZINESS: 0
CLAUDICATION: 0
WEAKNESS: 0
STRIDOR: 0
VOMITING: 0
SHORTNESS OF BREATH: 0
MYALGIAS: 0
EYE DISCHARGE: 0
ABDOMINAL PAIN: 0
NAUSEA: 0
ABDOMINAL PAIN: 0
HEADACHES: 0
LOSS OF CONSCIOUSNESS: 0
CHILLS: 0
VOMITING: 0
FEVER: 0
SHORTNESS OF BREATH: 1
COUGH: 0
WEAKNESS: 1
EYE PAIN: 0
HEARTBURN: 1
DIARRHEA: 0
DEPRESSION: 0
EYE DISCHARGE: 0
HALLUCINATIONS: 1
ABDOMINAL PAIN: 0
DIARRHEA: 0
HEARTBURN: 0
FEVER: 0
DIZZINESS: 0
MYALGIAS: 1
ABDOMINAL PAIN: 0
MYALGIAS: 1
WEAKNESS: 0
VOMITING: 0
VOMITING: 0
COUGH: 0
DIAPHORESIS: 0
BACK PAIN: 0
MYALGIAS: 0
SHORTNESS OF BREATH: 0
SENSORY CHANGE: 0
DEPRESSION: 0
CLAUDICATION: 0
HEADACHES: 0
SPEECH CHANGE: 0
SPEECH CHANGE: 0
DIZZINESS: 0
ABDOMINAL PAIN: 0
SHORTNESS OF BREATH: 0
NAUSEA: 0
FEVER: 0
DIAPHORESIS: 0
CONSTIPATION: 0
PHOTOPHOBIA: 0
BLURRED VISION: 0
NERVOUS/ANXIOUS: 0
ABDOMINAL PAIN: 0
PHOTOPHOBIA: 0
VOMITING: 0
COUGH: 0
DIZZINESS: 0
HEARTBURN: 0
DEPRESSION: 0
NAUSEA: 0
CONSTIPATION: 0
SENSORY CHANGE: 0
MYALGIAS: 1
SORE THROAT: 0
COUGH: 0
DEPRESSION: 0
DIARRHEA: 0
SHORTNESS OF BREATH: 0
DIARRHEA: 1
ABDOMINAL PAIN: 0
CLAUDICATION: 0
DEPRESSION: 0
FEVER: 0
DIZZINESS: 0
SENSORY CHANGE: 0
COUGH: 0
MYALGIAS: 0
CHILLS: 0
WEAKNESS: 0
VOMITING: 0
SHORTNESS OF BREATH: 0
SPEECH CHANGE: 0
HEADACHES: 0
VOMITING: 0
MYALGIAS: 0
COUGH: 0

## 2019-01-01 ASSESSMENT — ACTIVITIES OF DAILY LIVING (ADL)
AMBULATION_REQUIRES_ASSISTANCE: 1
PHYSICAL_TRANSFER_REQUIRES_ASSISTANCE: 1
BATHING_REQUIRES_ASSISTANCE: 1
DRESSING_REQUIRES_ASSISTANCE: 1
EATING_REQUIRES_ASSISTANCE: 1
MONEY MANAGEMENT (EXPENSES/BILLS): TOTALLY DEPENDENT
TOILETING: INDEPENDENT
CONTINENCE_REQUIRES_ASSISTANCE: 1

## 2019-01-01 ASSESSMENT — COGNITIVE AND FUNCTIONAL STATUS - GENERAL
SUGGESTED CMS G CODE MODIFIER MOBILITY: CM
DRESSING REGULAR LOWER BODY CLOTHING: A LITTLE
HELP NEEDED FOR BATHING: TOTAL
STANDING UP FROM CHAIR USING ARMS: TOTAL
DRESSING REGULAR UPPER BODY CLOTHING: A LOT
HELP NEEDED FOR BATHING: A LITTLE
STANDING UP FROM CHAIR USING ARMS: A LITTLE
SUGGESTED CMS G CODE MODIFIER MOBILITY: CM
CLIMB 3 TO 5 STEPS WITH RAILING: A LITTLE
MOVING FROM LYING ON BACK TO SITTING ON SIDE OF FLAT BED: UNABLE
SUGGESTED CMS G CODE MODIFIER DAILY ACTIVITY: CL
DRESSING REGULAR LOWER BODY CLOTHING: A LOT
DRESSING REGULAR LOWER BODY CLOTHING: A LOT
PERSONAL GROOMING: A LITTLE
SUGGESTED CMS G CODE MODIFIER DAILY ACTIVITY: CK
HELP NEEDED FOR BATHING: TOTAL
TURNING FROM BACK TO SIDE WHILE IN FLAT BAD: A LOT
DRESSING REGULAR LOWER BODY CLOTHING: A LOT
MOVING FROM LYING ON BACK TO SITTING ON SIDE OF FLAT BED: A LITTLE
EATING MEALS: A LITTLE
TOILETING: A LOT
MOVING TO AND FROM BED TO CHAIR: A LOT
STANDING UP FROM CHAIR USING ARMS: A LOT
CLIMB 3 TO 5 STEPS WITH RAILING: TOTAL
MOVING TO AND FROM BED TO CHAIR: A LOT
EATING MEALS: A LITTLE
TOILETING: A LITTLE
SUGGESTED CMS G CODE MODIFIER DAILY ACTIVITY: CK
TOILETING: A LOT
MOBILITY SCORE: 9
TURNING FROM BACK TO SIDE WHILE IN FLAT BAD: A LITTLE
PERSONAL GROOMING: A LITTLE
WALKING IN HOSPITAL ROOM: TOTAL
DAILY ACTIVITIY SCORE: 14
MOBILITY SCORE: 9
DRESSING REGULAR UPPER BODY CLOTHING: A LITTLE
CLIMB 3 TO 5 STEPS WITH RAILING: TOTAL
DAILY ACTIVITIY SCORE: 11
SUGGESTED CMS G CODE MODIFIER MOBILITY: CK
TURNING FROM BACK TO SIDE WHILE IN FLAT BAD: A LITTLE
EATING MEALS: A LITTLE
EATING MEALS: A LITTLE
PERSONAL GROOMING: A LOT
DAILY ACTIVITIY SCORE: 12
STANDING UP FROM CHAIR USING ARMS: TOTAL
SUGGESTED CMS G CODE MODIFIER MOBILITY: CL
WALKING IN HOSPITAL ROOM: A LITTLE
MOVING FROM LYING ON BACK TO SITTING ON SIDE OF FLAT BED: UNABLE
SUGGESTED CMS G CODE MODIFIER DAILY ACTIVITY: CL
DAILY ACTIVITIY SCORE: 18
MOVING TO AND FROM BED TO CHAIR: A LITTLE
TURNING FROM BACK TO SIDE WHILE IN FLAT BAD: A LITTLE
MOVING TO AND FROM BED TO CHAIR: A LOT
CLIMB 3 TO 5 STEPS WITH RAILING: A LOT
MOBILITY SCORE: 18
HELP NEEDED FOR BATHING: A LOT
WALKING IN HOSPITAL ROOM: A LOT
MOVING FROM LYING ON BACK TO SITTING ON SIDE OF FLAT BED: UNABLE
MOBILITY SCORE: 11
TOILETING: TOTAL
WALKING IN HOSPITAL ROOM: TOTAL
DRESSING REGULAR UPPER BODY CLOTHING: A LOT
DRESSING REGULAR UPPER BODY CLOTHING: A LOT
PERSONAL GROOMING: A LOT

## 2019-01-01 ASSESSMENT — LIFESTYLE VARIABLES
SUBSTANCE_ABUSE: 0
EVER_SMOKED: YES
EVER_SMOKED: YES
SUBSTANCE_ABUSE: 0
DO YOU DRINK ALCOHOL: NO
SUBSTANCE_ABUSE: 0
SUBSTANCE_ABUSE: 0
ALCOHOL_USE: NO
EVER_SMOKED: NEVER

## 2019-01-01 ASSESSMENT — COPD QUESTIONNAIRES
COPD SCREENING SCORE: 2
IN THE PAST 12 MONTHS DO YOU DO LESS THAN YOU USED TO BECAUSE OF YOUR BREATHING PROBLEMS: DISAGREE/UNSURE
DURING THE PAST 4 WEEKS HOW MUCH DID YOU FEEL SHORT OF BREATH: NONE/LITTLE OF THE TIME
COPD SCREENING SCORE: 2
DO YOU EVER COUGH UP ANY MUCUS OR PHLEGM?: NO/ONLY WITH OCCASIONAL COLDS OR INFECTIONS
HAVE YOU SMOKED AT LEAST 100 CIGARETTES IN YOUR ENTIRE LIFE: NO/DON'T KNOW
HAVE YOU SMOKED AT LEAST 100 CIGARETTES IN YOUR ENTIRE LIFE: NO/DON'T KNOW
DO YOU EVER COUGH UP ANY MUCUS OR PHLEGM?: NO/ONLY WITH OCCASIONAL COLDS OR INFECTIONS
DURING THE PAST 4 WEEKS HOW MUCH DID YOU FEEL SHORT OF BREATH: NONE/LITTLE OF THE TIME

## 2019-01-01 ASSESSMENT — GAIT ASSESSMENTS
GAIT LEVEL OF ASSIST: UNABLE TO PARTICIPATE
GAIT LEVEL OF ASSIST: UNABLE TO PARTICIPATE

## 2019-01-01 ASSESSMENT — PAIN SCALES - WONG BAKER: WONGBAKER_NUMERICALRESPONSE: DOESN'T HURT AT ALL

## 2019-04-08 PROBLEM — C84.00 MYCOSIS FUNGOIDES (HCC): Status: ACTIVE | Noted: 2019-01-01

## 2019-04-08 PROBLEM — J45.909 ASTHMA: Status: ACTIVE | Noted: 2019-01-01

## 2019-04-08 PROBLEM — A41.9 SEPSIS (HCC): Status: ACTIVE | Noted: 2019-01-01

## 2019-04-08 PROBLEM — K21.9 GERD (GASTROESOPHAGEAL REFLUX DISEASE): Status: ACTIVE | Noted: 2019-01-01

## 2019-04-08 NOTE — H&P
Hospital Medicine History & Physical Note    Date of Service  4/8/2019    Primary Care Physician  No primary care provider on file.    Consultants  Oncology     Code Status  Full code    Chief Complaint  Worsening rash     History of Presenting Illness  68 y.o. male with PMHx cutaneous T cell lymphoma (mycosis fungoides) who presented 4/8/2019 with worsening rash.   He was recently admitted to [facility] and hospital course is summarized:  He presented on 3/20 for worsening rash following chemotherapy on 3/11. He had been started on ceftaroline but this was discontinued as wound cultures grew skin fluora. He was evaluated by oncology during the admission and was given supportive care. He was discharged on 3/25.  Since discharge his rash had significantly improved without antibiotics and only following with wound care. This morning when he awoke he noticed his rash had flared up resulting in extreme pain and peeling of his skin, mostly on his back. He denies any fevers but at his oncology appt today was told he had a fever. He is currently feeling very cold but other wise denies any symptoms of headaches, cough, SOB, abdominal pain, diarrhea, dysuria, increased urinary frequency.     Review of Systems  Review of Systems   Constitutional: Negative for chills and fever.   HENT: Negative for congestion, hearing loss and sore throat.    Eyes: Negative for blurred vision and double vision.   Respiratory: Negative for cough, sputum production and shortness of breath.    Cardiovascular: Negative for chest pain and palpitations.   Gastrointestinal: Negative for abdominal pain, heartburn and nausea.   Genitourinary: Negative for dysuria and urgency.   Musculoskeletal: Negative for back pain and falls.   Skin: Positive for rash. Negative for itching.   Neurological: Negative for dizziness, loss of consciousness and headaches.   All other systems reviewed and are negative.      Past Medical History   has a past medical  history of Asthma; Cancer (HCC); Dental disorder; Heart burn; Hiatus hernia syndrome; Indigestion; and Snoring.    Surgical History   has a past surgical history that includes other abdominal surgery.     Family History  family history is not on file.     Social History   reports that he has quit smoking. He has never used smokeless tobacco. He reports that he does not drink alcohol or use drugs.    Allergies  No Known Allergies    Medications  Prior to Admission Medications   Prescriptions Last Dose Informant Patient Reported? Taking?   Cyanocobalamin (VITAMIN B-12) 1000 MCG Tab 4/8/2019 at AM Patient's Home Pharmacy Yes Yes   Sig: Take 1,000 mcg by mouth every day.   HYDROcodone/acetaminophen (NORCO)  MG Tab 4/8/2019 at AM Patient's Home Pharmacy Yes Yes   Sig: Take 1 Tab by mouth every four hours as needed.   albuterol 108 (90 Base) MCG/ACT Aero Soln inhalation aerosol PRN at PRN Patient's Home Pharmacy Yes Yes   Sig: Inhale 1 Puff by mouth every four hours as needed for Shortness of Breath.   budesonide-formoterol (SYMBICORT) 160-4.5 MCG/ACT Aerosol UNK at UNK Patient's Home Pharmacy Yes Yes   Sig: Inhale 2 Puffs by mouth 2 Times a Day.   omeprazole (PRILOSEC) 20 MG delayed-release capsule 4/7/2019 at AM Patient's Home Pharmacy Yes Yes   Sig: Take 20 mg by mouth every day.   silver sulfADIAZINE (SILVADENE) 1 % Cream UNK at [facility] Patient's Home Pharmacy Yes Yes   Sig: Apply 1 g to affected area(s) every day.      Facility-Administered Medications: None       Physical Exam  Temp:  [36.8 °C (98.2 °F)-36.9 °C (98.5 °F)] 36.8 °C (98.3 °F)  Pulse:  [] 81  Resp:  [16-18] 18  BP: (126-141)/(62-81) 126/69  SpO2:  [95 %-98 %] 98 %    Physical Exam   Constitutional: He is oriented to person, place, and time. He appears well-developed and well-nourished.   HENT:   Head: Normocephalic and atraumatic.   Ruptured blister on his lower lip   Eyes: Pupils are equal, round, and reactive to light. EOM are normal.   Neck:  Normal range of motion. Neck supple.   Cardiovascular: Normal rate, regular rhythm and normal heart sounds.    No murmur heard.  Pulmonary/Chest: Effort normal and breath sounds normal. No respiratory distress. He has no wheezes.   Abdominal: Soft. Bowel sounds are normal. He exhibits no distension. There is no tenderness.   Musculoskeletal: Normal range of motion. He exhibits no edema.   Neurological: He is alert and oriented to person, place, and time.   Skin: Skin is warm and dry.   Diffuse dusky erythematous and scaly rash over his entire body. Some areas of desquamation mostly just on his back. The rash is painful without any drainage   Nursing note and vitals reviewed.       Laboratory:  Recent Labs      04/08/19   1400   WBC  10.5   RBC  3.88*   HEMOGLOBIN  11.2*   HEMATOCRIT  35.9*   MCV  92.5   MCH  28.9   MCHC  31.2*   RDW  52.4*   PLATELETCT  344   MPV  8.3*     Recent Labs      04/08/19   1400   SODIUM  136   POTASSIUM  4.5   CHLORIDE  103   CO2  25   GLUCOSE  99   BUN  21   CREATININE  0.88   CALCIUM  7.9*     Recent Labs      04/08/19   1400   ALTSGPT  9   ASTSGOT  12   ALKPHOSPHAT  76   TBILIRUBIN  0.5   LIPASE  18   GLUCOSE  99                 No results for input(s): TROPONINI in the last 72 hours.    Urinalysis:    No results found     Imaging:  DX-CHEST-PORTABLE (1 VIEW)   Final Result      No acute cardiopulmonary abnormality identified.            Assessment/Plan:  I anticipate this patient will require at least two midnights for appropriate medical management, necessitating inpatient admission.    * Sepsis (HCC)   Assessment & Plan    This is sepsis (without associated acute organ dysfunction).   Likely due to superimposed infection of skin  He has denuded skin on his back  I have started him on vancomycin and unasyn  Monitor renal function and vanco trough levels  Trend lactic acid     Mycosis fungoides (HCC)   Assessment & Plan    Cutaneous t cell lymphoma  Last chemo on 3/11  Patient of   Alexandro who is aware of admission  Flare up of rash  Has been started empirically on antibiotics due to his fever while at oncologists office earlier today and tachycardia on presentation  I started him on vancomycin and unasyn. I will monitor renal function and vancomycin trough levels     GERD (gastroesophageal reflux disease)   Assessment & Plan    Cont prilosec     Asthma   Assessment & Plan    Cont inhalers         VTE prophylaxis: lovenox    I spent 20 mins of non face to face time reviewing records from [facility] and summarizing as above.

## 2019-04-08 NOTE — ED PROVIDER NOTES
ED Provider Note    Scribed for Abdoul Power M.D. by Christina Gold. 4/8/2019  1:19 PM    Means of arrival: walk-in  History limited by: none    CHIEF COMPLAINT  Chief Complaint   Patient presents with   • Sent by MD     pt sent by Dr Mc from the cancer center   • Skin Lesion     pt's entire back covered in purple dry flaky and raised lesions       HPI  Marco Antonio Ortiz is a 68 y.o. male who presents to the ED as advised by Dr. Mc from the cancer center for evaluation of diffuse, dry, flaky and purple colored lesions, that are located diffusely across his body with associated intermittent drainage. Patient has a history of non Hodgkin's lymphoma, diagnosed with this just over 1 year ago with additional history of psoriasis. Patient is currently receiving chemotherapy for management of his lymphoma. He reports that every time he received chemotherapy through his port he breaks out with some type of skin problem, including skin lesions, breakdown and bleeding from the cracks. Patient was recently admitted to [facility] 2 weeks ago secondary to one of these lesion breakouts. He received IV antibiotics and was then discharged with home health care. Patient has been dressing the skin breakdown with Silvadene and bandages, and the lesions improved with improvement to his energy level as well. Within the last 24 hours, patient reports another decompensation developing diffuse lesions along his entire body.  No complaints of fevers, chills, vision changes, sore throat, chest pain, cough, shortness of breath, abdominal pain, nausea, vomiting, diarrhea, weakness, headache.    REVIEW OF SYSTEMS    CONSTITUTIONAL:  Denies fever, chills  EYES:  Denies vision changes  ENT:  Denies sore throat, nose  CARDIOVASCULAR:  Denies chest pain  RESPIRATORY:  Denies cough, shortness of breath  GI:  Denies abdominal pain, nausea, vomiting, or diarrhea.  MUSCULOSKELETAL:  Denies weakness  SKIN:  diffuse, dry, flaky, painful and  purple colored lesions with intermittent drainage  NEUROLOGIC:  Denies headache, focal weakness, or numbness.  All other systems reviewed and are negative.    PAST MEDICAL HISTORY  Past Medical History:   Diagnosis Date   • Asthma    • Cancer (HCC)    • Dental disorder    • Heart burn    • Hiatus hernia syndrome    • Indigestion    • Snoring    Cutaneous T-cell lymphoma with secondary infection recently hospitalized for the    SOCIAL HISTORY   reports that he has quit smoking. He has never used smokeless tobacco. He reports that he does not drink alcohol or use drugs.    SURGICAL HISTORY  Past Surgical History:   Procedure Laterality Date   • OTHER ABDOMINAL SURGERY         CURRENT MEDICATIONS  No current facility-administered medications on file prior to encounter.      Current Outpatient Prescriptions on File Prior to Encounter   Medication Sig Dispense Refill   • OMEPRAZOLE PO Take  by mouth.         ALLERGIES  No Known Allergies    PHYSICAL EXAM  VITAL SIGNS: /81   Pulse (!) 104   Temp 36.9 °C (98.5 °F) (Temporal)   Resp 16   Ht 1.829 m (6')   Wt 103.8 kg (228 lb 13.4 oz)   SpO2 95%   BMI 31.04 kg/m²      Constitutional: Patient is awake and alert. No acute respiratory distress.  Ill looking male   hENT: Normocephalic, Atraumatic, Bilateral external ears normal, Oropharynx pink dry   with no exudates, Nose patent.  Eyes: Sclera and conjunctiva clear, No discharge.   Neck:  Supple no nuchal rigidity, no thyromegaly or mass. Non-tender  Cardiovascular: Heart is tachycardic with regular rhythm no murmur, rub or thrill.   Thorax & Lungs: Chest is symmetrical, with good breath sounds. No wheezing or crackles. No respiratory distress, No chest tenderness.   Abdomen: Soft, No tenderness no hepatosplenomegaly there is no guarding or rebound, No masses, No pulsatile masses.  Skin: Warm, entire skin, back thorax, legs arms, face, (not on the palms) have red scaling patches of lesions, some are oozing, some  are crusted, non blanchable, does not slough off.  Extremities: No edema. Non tender.   Musculoskeletal: Good range of motion to wrists, elbows, shoulders, hips, knees, and ankles. Pulses 2+ radially and femorally. No gross deformities noted.   Neurologic: Alert & oriented to person, time, and place.  Strength is 5 over 5 and symmetric in bilateral upper and lower extremities.  Psychiatric: Normal affect    LABS  Results for orders placed or performed during the hospital encounter of 04/08/19   LACTIC ACID   Result Value Ref Range    Lactic Acid 1.6 0.5 - 2.0 mmol/L       All labs reviewed    COURSE & MEDICAL DECISION MAKING  Pertinent Labs & Imaging studies reviewed. (See chart for details)        1:19 PM - Patient seen and examined at bedside. Ordered CBC, CMP, lipase, YA, blood culture 2.  Patient will be medicated with 25mg IV Benadryl for his symptoms. The patient will be resuscitated with NS IV for tachycardia and possible sepsis. I informed the patient that I would contact Dr. Mc's office for his diagnosis list as well as [facility] for their records. Patient understands and agrees with treatment plan.    1:32 PM Spoke with Dr. Mc, Oncology, about the patient's condition. Cutaneous T cell lymphoma, microcyst fungoides. Advised admission to the hospital.    2:32 PM Spoke with Dr. Duvall, Hospitalist, about the patient's condition. Agrees to admit the patient. Ordered for IV antibiotics and additional IV fluids. Patient was informed of his work up so far and informed he would be admitted for further evaluation and treated for any developing infection. He understands and agrees with treatment plan.    Decision Making  Patient with cutaneous T-cell lymphoma with secondary bacterial infections.  Probable sepsis.  We will treated with IV antibiotics.  We will follow the sepsis protocol.  With IV fluids as well.  I discussed the case with the oncologist and the [facility] Hospitalist.  He will be admitted to the  hospital continue workup and evaluation.  We did order his old records from [facility] as well.  Again at this point I rechecked him several times he is resting comfortably is receiving IV fluids.  Is received IV antibiotics.    Recheck IV fluids the patient's pulse rate has improved although he will continue to need IV fluids here in the hospital and in the emergency room for presumed sepsis and dehydration    DISPOSITION:  Patient will be admitted to Dr. Duvall, Hospitalist in guarded condition.     FINAL IMPRESSION  1. Cellulitis, unspecified cellulitis site    2. Cutaneous T-cell lymphoma, unspecified body region (HCC)    3. Sepsis, due to unspecified organism (HCC)        PLAN  1.  Following sepsis protocol IV antibiotics and fluids  2.  Admission [facility]ist's  3.  Continue treatment of his skin infections and dehydration and sepsis.       Christina CHARLES (Scribe), am scribing for, and in the presence of, Abdoul Power M.D..    Electronically signed by: Christina Gold (Scribe), 4/8/2019    IAbdoul M.D. personally performed the services described in this documentation, as scribed by Christina Gold in my presence, and it is both accurate and complete.    The note accurately reflects work and decisions made by me.  Abdoul Power  4/8/2019  4:49 PM

## 2019-04-08 NOTE — ED NOTES
Med Rec completed per patient and home pharmacy's  Allergies reviewed  No ORAL antibiotics in last 30 days

## 2019-04-08 NOTE — ED TRIAGE NOTES
Marco Antonio Ortiz 68 y.o. male ambulatory to triage for     Chief Complaint   Patient presents with   • Sent by MD     pt sent by Dr Mc from the cancer center   • Skin Lesion     pt's entire back covered in purple dry flaky and raised lesions     Pt has non-Hodgkin's lymphoma and is currently undergoing chemo.  Pt reports he was recently at [facility] for 6 days with this.  He was discharged with a burn cream that was initially helping but stopped working.  /81   Pulse (!) 104   Temp 36.9 °C (98.5 °F) (Temporal)   Resp 16   Ht 1.829 m (6')   Wt 103.8 kg (228 lb 13.4 oz)   SpO2 95%   BMI 31.04 kg/m²   Charge RN aware of the patient.

## 2019-04-09 NOTE — RESPIRATORY CARE
COPD EDUCATION by COPD CLINICAL EDUCATOR  4/9/2019 at 6:53 AM by Laura Stone     Patient reviewed by COPD education team. Patient does not qualify for COPD program.

## 2019-04-09 NOTE — PROGRESS NOTES
Assumed pt care - bedside report received.  Pt is aaox4 - vs stable.  Pt reports generalized pain from rash and worse on back but denies need for pain medication at this hour.  Pt has rash and dry scale/flakey and red open areas and bleeding soars.  silvadene had been put on pt and per noc RN - most of tube is used with each application - informed dr. Sanderson.  Port dressing pulling up with skin flaking under - cleaned and replaced with hypo alergenic dressing.  piv patent - saline locked.  Fluids and abx given.  Good po intake.  Up with sba - pt fatigued but steady.  Fall precautions in place - bed alarm on.  Room air/lungs clear.  Voids without difficulty.  Provided phone in room for pt.  Pt makes needs known - will continue to round.

## 2019-04-09 NOTE — ASSESSMENT & PLAN NOTE
Cutaneous t cell lymphoma  Last chemo on 3/11  Flare up of rash - improving   Has been started empirically on antibiotics due to his fever while at oncologists office earlier today and tachycardia on presentation - discussed with Dr Mc, no benefit from steroids in t cell lymphoma  Wound care consult appreciated  Silvadene bid and daptic dressings.  Empiric vancomycin and unasyn changed to PO doxycycline

## 2019-04-09 NOTE — ASSESSMENT & PLAN NOTE
This is sepsis (without associated acute organ dysfunction).   Likely due to superimposed infection of skin  He has denuded skin on his back -slowly improving  vancomycin and unasyn changed to PO doxycycline with negative cultures.

## 2019-04-09 NOTE — PROGRESS NOTES
Pharmacy Kinetics 68 y.o. male on vancomycin day # 1   2019    Currently on Vancomycin 2600 mg IV x1 followed by 2000 mg IV q24h    Indication for Treatment: SSTI    Pertinent history per medical record: Admitted on 2019 for sepsis. Patient with known cutaneous T-cell lymphoma (mycosis fungoides). Recent admit at [place] following chemotherapy after developing rash on back. He was initially started on ceftaroline, but this was stopped as only skin doreen grew out. His rash has apparently gotten better since discharge on 3/25, but this morning he woke up in severe pain and peeling skin on his back.    Other antibiotics: Unasyn 3 g IV q6h    Allergies: Patient has no known allergies.     List concerns for renal function: age    Pertinent cultures to date:   19: Blood, peripheral x2 = in process    Recent Labs      19   1400   WBC  10.5   NEUTSPOLYS  84.30*     Recent Labs      19   1400   BUN  21   CREATININE  0.88   ALBUMIN  3.0*     Intake/Output Summary (Last 24 hours) at 19 1719  Last data filed at 19 1431   Gross per 24 hour   Intake              100 ml   Output                0 ml   Net              100 ml      /81   Pulse (!) 104   Temp 36.9 °C (98.5 °F) (Temporal)   Resp 16   Ht 1.829 m (6')   Wt 103.8 kg (228 lb 13.4 oz)   SpO2 95%  Temp (24hrs), Av.9 °C (98.5 °F), Min:36.9 °C (98.5 °F), Max:36.9 °C (98.5 °F)    A/P   1. Vancomycin dose change: new start  2. Next vancomycin level: 2-3 days (not ordered)  3. Goal trough: 12-16 mcg/mL  4. Comments: Patient with h/o mycosis fungoides with denuded skin on back. Will start conservative scheduled dose for now and check trough in a couple of days.    Jose Monet, PharmD, BCPS

## 2019-04-09 NOTE — PROGRESS NOTES
Hospital Medicine Daily Progress Note    Date of Service  4/9/2019    Chief Complaint  68 y.o. male admitted 4/8/2019 with low grade fever at oncology office and worsening rash.    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  He was sent from Dr Mc's office for admission.        Interval Problem Update  Patient states he essentially feels same, no improvement in rash but has remained afebrile.  I discussed with Dr Mc if steroid would be contraindicated, he states they would be of little help in cutaneous T Cell lymphoma.  Will continue with empiric antibiotics, topical skin care and wound consult.    Consultants/Specialty  none    Code Status  full    Disposition  Tbd, likely home once skin improves.    Review of Systems  Review of Systems   Constitutional: Negative for chills and fever.   HENT: Negative for congestion and sore throat.    Eyes: Negative for blurred vision and photophobia.   Respiratory: Negative for cough and shortness of breath.    Cardiovascular: Negative for chest pain, claudication and leg swelling.   Gastrointestinal: Negative for abdominal pain, constipation, diarrhea, heartburn and vomiting.   Genitourinary: Negative for dysuria and hematuria.   Musculoskeletal: Negative for joint pain and myalgias.   Skin: Positive for itching (burning and weeping from lesions) and rash.   Neurological: Negative for dizziness, sensory change, speech change, weakness and headaches.   Psychiatric/Behavioral: Negative for depression. The patient is not nervous/anxious and does not have insomnia.         Physical Exam  Temp:  [36.8 °C (98.2 °F)-37.1 °C (98.7 °F)] 37.1 °C (98.7 °F)  Pulse:  [73-99] 73  Resp:  [16-20] 20  BP: (118-137)/(62-78) 118/74  SpO2:  [93 %-99 %]  99 %    Physical Exam   Constitutional: He is oriented to person, place, and time. He appears well-developed and well-nourished. No distress.   HENT:   Head: Normocephalic and atraumatic.   Eyes: Conjunctivae are normal. No scleral icterus.   Neck: Neck supple. No JVD present.   Cardiovascular: Normal rate and regular rhythm.  Exam reveals no gallop and no friction rub.    No murmur heard.  Pulmonary/Chest: Effort normal and breath sounds normal. No respiratory distress. He has no wheezes. He exhibits no tenderness.   Abdominal: Soft. Bowel sounds are normal. He exhibits no distension and no mass. There is no tenderness.   Musculoskeletal: He exhibits no edema or tenderness.   Neurological: He is alert and oriented to person, place, and time. No cranial nerve deficit.   Skin: Skin is warm. Rash noted. He is not diaphoretic. No erythema. No pallor.   Multiple crusting lesions throughout body, worse on back with lesions breaking open and weeping     Psychiatric: He has a normal mood and affect. His behavior is normal.   Nursing note and vitals reviewed.      Fluids    Intake/Output Summary (Last 24 hours) at 04/09/19 1326  Last data filed at 04/08/19 1746   Gross per 24 hour   Intake            600.1 ml   Output                0 ml   Net            600.1 ml       Laboratory  Recent Labs      04/08/19   1400  04/09/19   0303   WBC  10.5  6.0   RBC  3.88*  3.40*   HEMOGLOBIN  11.2*  9.8*   HEMATOCRIT  35.9*  31.1*   MCV  92.5  91.5   MCH  28.9  28.8   MCHC  31.2*  31.5*   RDW  52.4*  51.3*   PLATELETCT  344  253   MPV  8.3*  8.3*     Recent Labs      04/08/19   1400  04/09/19   0303   SODIUM  136  138   POTASSIUM  4.5  4.0   CHLORIDE  103  105   CO2  25  24   GLUCOSE  99  97   BUN  21  17   CREATININE  0.88  0.75   CALCIUM  7.9*  7.5*                   Imaging  DX-CHEST-PORTABLE (1 VIEW)   Final Result      No acute cardiopulmonary abnormality identified.           Assessment/Plan  * Sepsis (HCC)   Assessment & Plan     This is sepsis (without associated acute organ dysfunction).   Likely due to superimposed infection of skin  He has denuded skin on his back  vancomycin and unasyn       Mycosis fungoides (HCC)   Assessment & Plan    Cutaneous t cell lymphoma  Last chemo on 3/11  Flare up of rash  Has been started empirically on antibiotics due to his fever while at oncologists office earlier today and tachycardia on presentation - discussed with Dr Mc, no benefit from steroids in t cell lymphoma  Wound care consult  Silvadene and calamine  Empiric vancomycin and unasyn       GERD (gastroesophageal reflux disease)   Assessment & Plan    Cont prilosec     Asthma   Assessment & Plan    Cont inhalers          VTE prophylaxis: scds, lovenox

## 2019-04-09 NOTE — PROGRESS NOTES
2 RN skin check completed with MARIANNE Hill. Pt has red and scaly rash throughout body. His back has desquamation.

## 2019-04-09 NOTE — PROGRESS NOTES
Pharmacy Kinetics 68 y.o. male on vancomycin day # 2   2019    Currently on Vancomycin 2000 mg iv q24hr    Indication for Treatment: SSTI    Pertinent history per medical record: Admitted on 2019 for sepsis. Patient with known cutaneous T-cell lymphoma (mycosis fungoides). Recent admit at [facility] following chemotherapy after developing rash on back. He was initially started on ceftaroline, but this was stopped as only skin doreen grew out. His rash has apparently gotten better since discharge on 3/25, but on , he woke up in severe pain and peeling skin on his back.    Other antibiotics: Unasyn 3 g IV q6h    Allergies: Patient has no known allergies.     List concerns for renal function: advanced age    Pertinent cultures to date:   19: Blood, peripheral x2 = in process        Recent Labs      19   1400  19   0303   WBC  10.5  6.0   NEUTSPOLYS  84.30*   --      Recent Labs      19   1400  19   0303   BUN  21  17   CREATININE  0.88  0.75   ALBUMIN  3.0*  2.3*       Intake/Output Summary (Last 24 hours) at 19 1121  Last data filed at 19 1746   Gross per 24 hour   Intake            600.1 ml   Output                0 ml   Net            600.1 ml      /74   Pulse 73   Temp 37.1 °C (98.7 °F) (Temporal)   Resp 20   Ht 1.829 m (6')   Wt 103.8 kg (228 lb 13.4 oz)   SpO2 99%  Temp (24hrs), Av.9 °C (98.4 °F), Min:36.8 °C (98.2 °F), Max:37.1 °C (98.7 °F)      A/P   1. Vancomycin dose change: None  2. Next vancomycin level: 1-2 days  3. Goal trough: 12-16 mcg/mL  4. Comments: Pt has hx of cutaneous T-cell lymphoma; no growth on cultures; renal is stable. Continue conservative dosing with plan for level in 1-2 days.

## 2019-04-09 NOTE — CARE PLAN
Problem: Safety  Goal: Will remain free from falls    Intervention: Assess risk factors for falls  Fall precautions in place  bed alarm on.    Pt calls for assist  Up with sba      Problem: Pain Management  Goal: Pain level will decrease to patient's comfort goal    Intervention: Follow pain managment plan developed in collaboration with patient and Interdisciplinary Team  Rash on body - pt states painful at times but denies need for pain medicaiton

## 2019-04-09 NOTE — PROGRESS NOTES
/69   Pulse 81   Temp 36.8 °C (98.3 °F) (Temporal)   Resp 18   Ht 1.829 m (6')   Wt 103.8 kg (228 lb 13.4 oz)   SpO2 98%   BMI 31.04 kg/m²     Pt is AOX4. Denies SOB, chest pain, n/v. Mild pain at 2/10. Medicated per MAR. Right chest port is flushing with no blood return- Alteplase has been ordered. Bed is locked in lowest position. POC discussed. Call light within reach.

## 2019-04-09 NOTE — CARE PLAN
Problem: Communication  Goal: The ability to communicate needs accurately and effectively will improve  Outcome: PROGRESSING AS EXPECTED  Pt is AOX4. Call light within reach. Pt calls appropriately for assistance.     Problem: Safety  Goal: Will remain free from injury  Outcome: PROGRESSING AS EXPECTED  Oriented to room and unit. Bed alarm is on.     Problem: Skin Integrity  Goal: Risk for impaired skin integrity will decrease  Outcome: PROGRESSING SLOWER THAN EXPECTED  Pt has rash throughout body. Medicated per MAR.

## 2019-04-09 NOTE — DIETARY
"Nutrition services: Day 1 of admit.  Marco Antonio Ortiz is a 68 y.o. male with admitting DX of cutaneous T cell lymphoma (mycosis fungoides) and worsening rash.  Consult received for unplanned weight loss.    Visited pt beside. Pt appeared nourished and was in good spirits. Pt reported appetite as being \"so-so,\" stating that his \"food is alright - sometimes it tastes different.\" Pt denied any nausea, vomiting, diarrhea, constipation, or GI pain. Pt reported usual body weight between 234-239#, stating that he lost ~10# over a 1 month period. Pt made it clear that he does not like Ensure or Boost supplemental beverages. Pt was agreeable to adding a high-protein chocolate milkshake with his dinner and cheese w/ crackers for a snack.        Assessment:  Height: 182.9 cm (6')  Weight: 103.8 kg (228 lb 13.4 oz)  Body mass index is 31.04 kg/m².   Diet/Intake: Regular/ Per ADL's, pt ate % of breakfast today    Evaluation:   1. Hx of cancer  2. Labs: AST 11  3. Meds: B12, Prilosec, Zofran, Bowel Protocol   4. Pt reported weight loss of 4% over 1 month is insignificant, but worth noting.      Malnutrition Risk: No risk identified at this time.     Recommendations/Plan:  1. Adding high-protein milkshake and cheese/crackers for snack.  2. Encourage intake of meals and snacks.  3. Document intake of all meals and snacks as % taken in ADL's to provide interdisciplinary communication across all shifts.   4. Monitor weight.  5. Nutrition rep will continue to see patient for ongoing meal and snack preferences.     RD to monitor per department policy.    "

## 2019-04-10 NOTE — PROGRESS NOTES
Pt is aaox4-irritable at times - high demands.   vs stable.  Reports generalized pain - denies pain meds at this hour.  rash and dry scale/flakey and red open areas and bleeding soars on 90% skin.  Skin care prn and per - silvadene/trama dressing/abdomian binder.  Port now deaccessed due to skin breakdown and multi dressing changes per shift.  piv patent/tko/abx given.  Good po intake.  Up with sba - fatigued but steady.  Fall precautions in place - bed alarm on.  Pt makes needs known - will continue to round.

## 2019-04-10 NOTE — PROGRESS NOTES
/70   Pulse 82   Temp 37.1 °C (98.8 °F) (Temporal)   Resp 18   Ht 1.829 m (6')   Wt 103.8 kg (228 lb 13.4 oz)   SpO2 99%   BMI 31.04 kg/m²     Pt is AOX4. Denies SOB, chest pain, n/v. States that he has pain but refusing pain medications at this this time. Offered repositioning.  Right chest port is patent with positive blood return. Bed is locked in lowest position. POC discussed. Call light within reach.

## 2019-04-10 NOTE — CARE PLAN
Problem: Safety  Goal: Will remain free from falls    Intervention: Assess risk factors for falls  Fall precautions in place  Bed alarm on  Pt calling for assist      Problem: Knowledge Deficit  Goal: Knowledge of the prescribed therapeutic regimen will improve    Intervention: Discuss information regarding therpeutic regimen and document in education  Wound care prn and per shift  Silvadene/dressings/binders

## 2019-04-10 NOTE — WOUND TEAM
"[facility]  Inpatient Services  Initial Wound and Skin Care Evaluation    Admission Date: 4/8/2019     HPI, PMH, SH: Reviewed    Unit where seen by Wound Team: [id number]/[id number]     WOUND CONSULT RELATED TO:  Partial thickness wounds throughout      SUBJECTIVE:  \"I'm freezing!\"      Self Report / Pain Level:  Tender to touch.  Mostly complained of being so cold        OBJECTIVE:  In bed, up to the bathroom, linens changed.      WOUND TYPE, LOCATION, CHARACTERISTICS (Pressure Injuries: location, stage, POA or date identified)            Wound 04/08/19 Full Thickness Wound Abdomen;Back;Axilla;Coccyx;Buttocks;Hip cutaneous T cell lymphoma  (Active)   Wound Image      Site Assessment Red;Light purple    Jesica-wound Assessment Purple;Fragile;Hyperpigmented    Margins Attached edges    Wound Length (cm) 80-90% of total body    Wound Width (cm)     Wound Surface Area (cm^2)     Closure Secondary intention    Drainage Amount Scant    Drainage Description Serosanguineous    Non-staged Wound Description Partial thickness    Treatments Cleansed;Site care    Cleansing Normal Saline Irrigation    Dressing Options Nonadherent Contact Layer    Dressing Changed New    Dressing Status Clean;Dry;Intact    Dressing Change Frequency Every Shift    NEXT Dressing Change  04/09/19    NEXT Weekly Photo (Inpatient Only) 04/16/19    WOUND NURSE ONLY - Odor None    WOUND NURSE ONLY - Exposed Structures None    WOUND NURSE ONLY - Tissue Type and Percentage 100% red    WOUND NURSE ONLY - Time Spent with Patient (mins) 75      Vascular:    Dorsal Pedal pulses:  NA  Posterior tib pulses:   NA    DAMON:      NA    Lab Values:    WBC:       WBC   Date/Time Value Ref Range Status   04/09/2019 03:03 AM 6.0 4.8 - 10.8 K/uL Final     A1C:    No results found for: HBA1C        Culture:   NA    INTERVENTIONS BY WOUND TEAM:  Wound consult placed for patient with scattered open areas through out body related to cutaneous T cell lymphoma.  Back, hips, " buttocks, axilla, neck cleansed with warm wash cloth soap and water.  Open areas covered with silvadene, covered with adaptic and trauma pads and secured as best able with roll gauze.  Patient very uncomfortable due to being so cold all the time.  Legs and buttocks covered and truama pad laid down and patient laid on top of.      Dressing selection:  Silvadene, adaptic, trauma pads and roll gauze          Interdisciplinary consultation: Patient, Bedside RN (Cecilia), Dr. Sanderson     EVALUATION: 68 y.o. male admitted 4/8/2019 with low grade fever at oncology office and worsening rash.  Send to hospital.  Dr. Sanderson started patient on silvadene.  Updated orders with dressings and Qshift dressing changes as able.  Last chemo treatment 03/11    Silvadene will provide moisture and antimicrobial.    Factors affecting wound healing: patient discomfort, Asthma, sepsis   Goals: Steady decrease in wound area and depth weekly.    NURSING PLAN OF CARE ORDERS (X):    Dressing changes: See Dressing Care orders: X  Skin care: See Skin Care orders: X  Rectal tube care: See Rectal Tube Care orders:   Other orders:    RSKIN: CURRENT (X) ORDERED (O):   Q shift Joe:  X  Q shift pressure point assessments:  X  Pressure redistribution mattress          X  NOEMI          Bariatric NOEMI         Bariatric foam           Heel float boots          Float Heels off Bed with Pillows       X        Barrier wipes         Barrier Cream         Barrier paste          Sacral silicone dressing         Silicone O2 tubing         Anchorfast         Cannula fixation Device (Tender )          Gray Foam Ear protectors           Trach with Optifoam split foam                 Waffle cushion        Waffle Overlay         Rectal tube or BMS         Antifungal tx      Interdry          Reposition q 2 hours      X ensure patient is turning   Up to chair        Ambulate      PT/OT        Dietician        Diabetes Education      PO  X   TF     TPN     NPO   # days    Other        WOUND TEAM PLAN OF CARE (X):   NPWT change 3 x week:        Dressing changes by wound team:       Follow up as needed:     X wound team to follow up 2-3 times weekly  Other (explain):     Anticipated discharge plans (X): TBD, patients wife was preforming dressing changes at home, if wounds progress same plan can likely continue.   SNF:           Home Care:           Outpatient Wound Center:            Self Care:            Other:

## 2019-04-10 NOTE — PROGRESS NOTES
Pt agreed to de-access port since he is not using it for Chemo and unable to complete CHG bath due to wounds. Pt has 20 gauge PIV in right forearm.

## 2019-04-10 NOTE — PROGRESS NOTES
Pt has been educated on Port and risk for infection. Refusing to de-access port despite inability to do CHG baths. Currently not using Port for chemo.

## 2019-04-10 NOTE — PROGRESS NOTES
Pt was found to have own inhaler from home and stated he had been using last noc.  Pt states it is empty now and wants to have hospitals med at bedside.  Edu that he is not to have own medication and also no medication at bedside.  edu to call if needing inhaler.       Wound care has been completed for day shift- abdominal binder ordered and awaiting.      Port dressing replaced x3 today.  edu pt that we may need to deAccess port due to skin pulling up and port not staying in place and may cause infection.  Pt refusing and states he does not want to have lab draws.

## 2019-04-10 NOTE — PROGRESS NOTES
Hospital Medicine Daily Progress Note    Date of Service  4/10/2019    Chief Complaint  68 y.o. male admitted 4/8/2019 with low grade fever at oncology office and worsening rash.    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  He was sent from Dr Mc's office for admission.        Interval Problem Update  4/9 Patient states he essentially feels same, no improvement in rash but has remained afebrile.  I discussed with Dr Mc if steroid would be contraindicated, he states they would be of little help in cutaneous T Cell lymphoma.  Will continue with empiric antibiotics, topical skin care and wound consult.  4/10 Patient states he is feeling about the same, he has remained afebrile with antibiotics.  Blood cultures showing no growth, continue with abx for now.    Consultants/Specialty  none    Code Status  full    Disposition  Tbd, likely home once skin improves.    Review of Systems  Review of Systems   Constitutional: Negative for chills and fever.   HENT: Negative for congestion and sore throat.    Eyes: Negative for blurred vision and photophobia.   Respiratory: Negative for cough and shortness of breath.    Cardiovascular: Negative for chest pain, claudication and leg swelling.   Gastrointestinal: Negative for abdominal pain, constipation, diarrhea, heartburn and vomiting.   Genitourinary: Negative for dysuria and hematuria.   Musculoskeletal: Negative for joint pain and myalgias.   Skin: Positive for itching (burning and weeping from lesions) and rash.   Neurological: Negative for dizziness, sensory change, speech change, weakness and headaches.   Psychiatric/Behavioral: Negative for depression. The patient is not nervous/anxious and does not have insomnia.          Physical Exam  Temp:  [36.5 °C (97.7 °F)-37.1 °C (98.8 °F)] 37.1 °C (98.8 °F)  Pulse:  [72-82] 79  Resp:  [18-20] 18  BP: (115-137)/(65-78) 115/65  SpO2:  [97 %-99 %] 97 %    Physical Exam   Constitutional: He is oriented to person, place, and time. He appears well-developed and well-nourished. No distress.   HENT:   Head: Normocephalic and atraumatic.   Eyes: Conjunctivae are normal. No scleral icterus.   Neck: Neck supple. No JVD present.   Cardiovascular: Normal rate, regular rhythm and normal heart sounds.  Exam reveals no gallop and no friction rub.    No murmur heard.  Pulmonary/Chest: Effort normal and breath sounds normal. No respiratory distress. He has no wheezes. He exhibits no tenderness.   Abdominal: Soft. Bowel sounds are normal. He exhibits no distension and no mass. There is no tenderness.   Musculoskeletal: He exhibits no edema or tenderness.   Neurological: He is alert and oriented to person, place, and time. No cranial nerve deficit.   Skin: Skin is warm. Rash noted. He is not diaphoretic. No erythema. No pallor.   Multiple crusting lesions throughout body, worse on back with lesions breaking open and weeping     Psychiatric: He has a normal mood and affect. His behavior is normal.   Nursing note and vitals reviewed.      Fluids    Intake/Output Summary (Last 24 hours) at 04/10/19 1230  Last data filed at 04/09/19 1800   Gross per 24 hour   Intake             1980 ml   Output                0 ml   Net             1980 ml       Laboratory  Recent Labs      04/08/19   1400  04/09/19   0303  04/10/19   0812   WBC  10.5  6.0  7.2   RBC  3.88*  3.40*  4.22*   HEMOGLOBIN  11.2*  9.8*  11.8*   HEMATOCRIT  35.9*  31.1*  39.3*   MCV  92.5  91.5  93.1   MCH  28.9  28.8  28.0   MCHC  31.2*  31.5*  30.0*   RDW  52.4*  51.3*  52.6*   PLATELETCT  344  253  318   MPV  8.3*  8.3*  8.5*     Recent Labs      04/08/19   1400  04/09/19   0303  04/10/19   0812   SODIUM  136  138  137   POTASSIUM  4.5  4.0  3.7    CHLORIDE  103  105  106   CO2  25  24  21   GLUCOSE  99  97  94   BUN  21  17  10   CREATININE  0.88  0.75  0.75   CALCIUM  7.9*  7.5*  7.7*                   Imaging  DX-CHEST-PORTABLE (1 VIEW)   Final Result      No acute cardiopulmonary abnormality identified.           Assessment/Plan  * Sepsis (HCC)   Assessment & Plan    This is sepsis (without associated acute organ dysfunction).   Likely due to superimposed infection of skin  He has denuded skin on his back - if worsens then will transfer to burn unit at another hospital  vancomycin and unasyn       Mycosis fungoides (HCC)   Assessment & Plan    Cutaneous t cell lymphoma  Last chemo on 3/11  Flare up of rash  Has been started empirically on antibiotics due to his fever while at oncologists office earlier today and tachycardia on presentation - discussed with Dr Mc, no benefit from steroids in t cell lymphoma  Wound care consult appreciated  Silvadene and calamine  Empiric vancomycin and unasyn       GERD (gastroesophageal reflux disease)   Assessment & Plan    Cont prilosec     Asthma   Assessment & Plan    Cont inhalers          VTE prophylaxis: scds, lovenox

## 2019-04-10 NOTE — PROGRESS NOTES
Pharmacy Kinetics 68 y.o. male on vancomycin day # 3  4/10/2019    Currently on Vancomycin 2000 mg iv q24hr (0600)    Indication for Treatment: SSTI    Pertinent history per medical record: Admitted on 2019 for sepsis. Patient with known cutaneous T-cell lymphoma (mycosis fungoides). Recent admit at [facility] following chemotherapy after developing rash on back. He was initially started on ceftaroline, but this was stopped as only skin doreen grew out. His rash has apparently gotten better since discharge on 3/25, but on , he woke up in severe pain and peeling skin on his back. Pt was readmitted and empiric antibiotics initiated.       Other antibiotics: Unasyn 3 g IV q6h     Allergies: Patient has no known allergies.      List concerns for renal function: advanced age, low albumin (2.3 on )     Pertinent cultures to date:   19: Blood, peripheral x2 = NGTD    Recent Labs      19   1400  19   0303   WBC  10.5  6.0   NEUTSPOLYS  84.30*   --      Recent Labs      19   1400  19   0303   BUN  21  17   CREATININE  0.88  0.75   ALBUMIN  3.0*  2.3*       Intake/Output Summary (Last 24 hours) at 04/10/19 0803  Last data filed at 19 1800   Gross per 24 hour   Intake             2230 ml   Output                0 ml   Net             2230 ml      /65   Pulse 79   Temp 37.1 °C (98.8 °F) (Temporal)   Resp 18   Ht 1.829 m (6')   Wt 103.8 kg (228 lb 13.4 oz)   SpO2 97%  Temp (24hrs), Av.9 °C (98.5 °F), Min:36.5 °C (97.7 °F), Max:37.1 °C (98.8 °F)      A/P   1. Vancomycin dose change: continue vancomycin 2000 mg IV q24h   2. Next vancomycin level: 19 @ 0530 (ordered)  3. Goal trough: 12-16 mcg/mL  4. Comments: Pt is afebrile with stable vital signs. I/O's are incomplete, renal indices are stable.  Will obtain trough tomorrow morning prior to 4th total dose.  Pharmacy will continue to monitor and adjust dose as needed to maintain therapeutic trough levels.     Jo Ann  RAZA Mesa, PharmD, BCOP

## 2019-04-11 NOTE — PROGRESS NOTES
Pt A&Ox4. VS: /76   Pulse 82   Temp 37.2 °C (99 °F) (Temporal)   Resp 18   Ht 1.829 m (6')   Wt 103.8 kg (228 lb 13.4 oz)   SpO2 98%   BMI 31.04 kg/m² . Pt denies pain, n/v, SOB and chest pain. Pt states numbness and tingling in his hands, which is baseline and not new. Pt needs met at this time, call light within reach, hourly rounding in effect, and will continue to monitor.

## 2019-04-11 NOTE — PROGRESS NOTES
/80   Pulse 84   Temp 36.9 °C (98.4 °F) (Temporal)   Resp 18   Ht 1.829 m (6')   Wt 103.8 kg (228 lb 13.4 oz)   SpO2 97%   BMI 31.04 kg/m²     Pt is AOX4. Denies SOB, chest pain, n/v. Pain is 3/10- medicated per MAR. POC discussed. Bed is locked in lowest position with bed alarm on. Call light within reach.

## 2019-04-11 NOTE — PROGRESS NOTES
Hospital Medicine Daily Progress Note    Date of Service  4/11/2019    Chief Complaint  68 y.o. male admitted 4/8/2019 with low grade fever at oncology office and worsening rash.    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  He was sent from Dr Mc's office for admission.        Interval Problem Update  4/9 Patient states he essentially feels same, no improvement in rash but has remained afebrile.  I discussed with Dr Mc if steroid would be contraindicated, he states they would be of little help in cutaneous T Cell lymphoma.  Will continue with empiric antibiotics, topical skin care and wound consult.  4/10 Patient states he is feeling about the same, he has remained afebrile with antibiotics.  Blood cultures showing no growth, continue with abx for now.  4/11 Patient feeling okay today, had pain in the low back and buttock lesions earlier today but that pain resolved.  The remaining lesions have shown improvement.  Will continue with cutaneous treatment and have de-escalated antibiotic to PO doxycycline after reviewing with antibiotic stewardship committee.      Consultants/Specialty  none    Code Status  full    Disposition  Tbd, likely home once skin improves.    Review of Systems  Review of Systems   Constitutional: Negative for chills and fever.   HENT: Negative for congestion and sore throat.    Eyes: Negative for blurred vision and photophobia.   Respiratory: Negative for cough and shortness of breath.    Cardiovascular: Negative for chest pain, claudication and leg swelling.   Gastrointestinal: Negative for abdominal pain, constipation, diarrhea, heartburn and vomiting.   Genitourinary: Negative for dysuria and hematuria.   Musculoskeletal: Negative for joint pain  and myalgias.   Skin: Positive for rash (slow improvement). Negative for itching.   Neurological: Negative for dizziness, sensory change, speech change, weakness and headaches.   Psychiatric/Behavioral: Negative for depression. The patient is not nervous/anxious and does not have insomnia.         Physical Exam  Temp:  [36.6 °C (97.9 °F)-37.2 °C (99 °F)] 37.2 °C (99 °F)  Pulse:  [70-88] 82  Resp:  [16-18] 18  BP: (127-139)/(75-80) 127/76  SpO2:  [97 %-99 %] 98 %    Physical Exam   Constitutional: He is oriented to person, place, and time. He appears well-developed and well-nourished. No distress.   HENT:   Head: Normocephalic and atraumatic.   Eyes: Conjunctivae are normal. No scleral icterus.   Neck: Neck supple. No JVD present.   Cardiovascular: Normal rate, regular rhythm and normal heart sounds.  Exam reveals no gallop and no friction rub.    No murmur heard.  Pulmonary/Chest: Effort normal and breath sounds normal. No respiratory distress. He has no wheezes. He exhibits no tenderness.   Abdominal: Soft. Bowel sounds are normal. He exhibits no distension and no mass. There is no tenderness.   Musculoskeletal: He exhibits no edema or tenderness.   Neurological: He is alert and oriented to person, place, and time. No cranial nerve deficit.   Skin: Skin is warm. Rash noted. He is not diaphoretic. No erythema. No pallor.   Multiple crusting lesions throughout body, worse on back with lesions breaking open and weeping - mild improvement, daptic dressings in place with abd binder providing support to them.       Psychiatric: He has a normal mood and affect. His behavior is normal.   Nursing note and vitals reviewed.      Fluids    Intake/Output Summary (Last 24 hours) at 04/11/19 1317  Last data filed at 04/11/19 0900   Gross per 24 hour   Intake             2570 ml   Output              300 ml   Net             2270 ml       Laboratory  Recent Labs      04/09/19   0303  04/10/19   0812  04/11/19   0010   WBC  6.0   7.2  6.4   RBC  3.40*  4.22*  3.38*   HEMOGLOBIN  9.8*  11.8*  9.7*   HEMATOCRIT  31.1*  39.3*  30.8*   MCV  91.5  93.1  91.1   MCH  28.8  28.0  28.7   MCHC  31.5*  30.0*  31.5*   RDW  51.3*  52.6*  50.6*   PLATELETCT  253  318  314   MPV  8.3*  8.5*  8.4*     Recent Labs      04/09/19   0303  04/10/19   0812  04/11/19   0010   SODIUM  138  137  135   POTASSIUM  4.0  3.7  3.7   CHLORIDE  105  106  101   CO2  24  21  26   GLUCOSE  97  94  109*   BUN  17  10  11   CREATININE  0.75  0.75  0.82   CALCIUM  7.5*  7.7*  7.4*                   Imaging  DX-CHEST-PORTABLE (1 VIEW)   Final Result      No acute cardiopulmonary abnormality identified.           Assessment/Plan  * Sepsis (HCC)   Assessment & Plan    This is sepsis (without associated acute organ dysfunction).   Likely due to superimposed infection of skin  He has denuded skin on his back -slowly improving  vancomycin and unasyn changed to PO doxycycline with negative cultures.         Mycosis fungoides (HCC)   Assessment & Plan    Cutaneous t cell lymphoma  Last chemo on 3/11  Flare up of rash  Has been started empirically on antibiotics due to his fever while at oncologists office earlier today and tachycardia on presentation - discussed with Dr Mc, no benefit from steroids in t cell lymphoma  Wound care consult appreciated  Silvadene bid and daptic dressings.  Empiric vancomycin and unasyn changed to PO doxycycline         GERD (gastroesophageal reflux disease)   Assessment & Plan    Cont prilosec     Asthma   Assessment & Plan    Cont inhalers          VTE prophylaxis: scds, lovenox

## 2019-04-11 NOTE — CARE PLAN
Problem: Safety  Goal: Will remain free from injury  Hourly rounding in effect, pt instructed to call for assistance, bed locked and in lowest position. Bed alarm on. Pt calls appropriately for assistance.        Problem: Knowledge Deficit  Goal: Knowledge of disease process/condition, treatment plan, diagnostic tests, and medications will improve  Pt updated and educated on nursing interventions, medications and POC

## 2019-04-12 NOTE — PROGRESS NOTES
Pt complaining of urinary frequency. Dr. Sanderson notified and okay to place orders for UA.   New urinal provided.

## 2019-04-12 NOTE — FACE TO FACE
Face to Face Supporting Documentation - Home Health    The encounter with this patient was in whole or in part the primary reason for home health admission.    Date of encounter:   Patient:                    MRN:                       YOB: 2020  Marco Antonio Ortiz  [id number]  1950     Home health to see patient for:  Wound Care    Skilled need for:  Exacerbation of Chronic Disease State cutaneous t cell lymphoma    Skilled nursing interventions to include:  Wound Care    Homebound status evidenced by:  Needs the assistance of another person in order to leave the home. Leaving home requires a considerable and taxing effort. There is a normal inability to leave the home.    Community Physician to provide follow up care: No primary care provider on file.     Optional Interventions? No      I certify the face to face encounter for this home health care referral meets the CMS requirements and the encounter/clinical assessment with the patient was, in whole, or in part, for the medical condition(s) listed above, which is the primary reason for home health care. Based on my clinical findings: the service(s) are medically necessary, support the need for home health care, and the homebound criteria are met.  I certify that this patient has had a face to face encounter by myself.  Rose Sanderson D.O. - NPI: [id number]

## 2019-04-12 NOTE — WOUND TEAM
"[facility]  Inpatient Services  Wound and Skin Care Progress    Admission Date: 4/8/2019     HPI, PMH, SH: Reviewed    Unit where seen by Wound Team: [id number]     WOUND CONSULT RELATED TO:  Partial thickness wounds throughout      SUBJECTIVE:  \"I wasn't able to get in the shower.\" I'm still cold, can we make it fast.\"      Self Report / Pain Level:  Tender to touch.  Mostly complained of being so cold        OBJECTIVE:  In bed, sat to edge of bed for dressing change.     WOUND TYPE, LOCATION, CHARACTERISTICS (Pressure Injuries: location, stage, POA or date identified)         Wound 04/08/19 Full Thickness Wound Abdomen;Back;Axilla;Coccyx;Buttocks;Hip cutaneous T cell lymphoma  (Active)   Wound Image      Site Assessment Red;Light purple    Jesica-wound Assessment Purple;Fragile;Hyperpigmented;Maceration    Margins Attached edges    Wound Length (cm) Range 0.5 - 2 cm    Wound Width (cm) Range 0.5 - 2 cm    Wound Surface Area (cm^2)     Closure Secondary intention    Drainage Amount Small    Drainage Description Serosanguineous    Non-staged Wound Description Partial thickness    Treatments Cleansed;Site care    Cleansing sildvadene    Periwound Protectant Skin Protectant wipes to Periwound    Dressing Options Nonadherent Contact Layer, ABD pad, roll guaze    Dressing Cleansing/Solutions Not Applicable    Dressing Changed Changed    Dressing Status Clean;Dry;Intact    Dressing Change Frequency Every Shift    NEXT Dressing Change  04/12/19    NEXT Weekly Photo (Inpatient Only) 04/16/19    WOUND NURSE ONLY - Odor None    WOUND NURSE ONLY - Exposed Structures None    WOUND NURSE ONLY - Tissue Type and Percentage 100% red    WOUND NURSE ONLY - Time Spent with Patient (mins) 60      Vascular:    Dorsal Pedal pulses:  NA  Posterior tib pulses:   NA    DAMON:      NA    Lab Values:    WBC:       WBC   Date/Time Value Ref Range Status   04/12/2019 12:12 AM 5.1 4.8 - 10.8 K/uL Final     A1C:    No results found for: " HBA1C        Culture:   NA    INTERVENTIONS BY WOUND TEAM:  Wound consult placed for patient with scattered open areas through out body related to cutaneous T cell lymphoma.  Back, hips, buttocks, axilla, neck and pannus cleansed with warm wash cloth soap and water.  Areas of armando-wound appear macerated, applied no sting skin prep and open areas covered with silvadene, covered with adaptic and ABD pads and secured as best able with roll gauze.  Patient very uncomfortable due to being so cold all the time.  Legs and buttocks covered and truama pad laid down and patient laid on top of.  Applied sween 24 pink top moisturizer to ABD and dry areas of thighs.  Patient aware he can apply to dry flaking areas PRN.       Dressing selection:  Silvadene, adaptic, trauma pads and roll gauze          Interdisciplinary consultation: Patient, Bedside RN (Cecilia), Dr. Sanderson     EVALUATION: 68 y.o. male admitted 4/8/2019 with low grade fever at oncology office and worsening rash.  Send to hospital.  Dr. Sanderson started patient on silvadene.  Updated orders with dressings and Qshift dressing changes as able.  Last chemo treatment 03/11    Silvadene will provide moisture and antimicrobial.  Armando-wound not as discolored as previously assessed, no major change to assessment of wounds.     Factors affecting wound healing: patient discomfort, Asthma, sepsis   Goals: Steady decrease in wound area and depth weekly.    NURSING PLAN OF CARE ORDERS (X):    Dressing changes: See Dressing Care orders: X  Skin care: See Skin Care orders: X  Rectal tube care: See Rectal Tube Care orders:   Other orders:    WOUND TEAM PLAN OF CARE (X):   NPWT change 3 x week:        Dressing changes by wound team:       Follow up as needed:     X wound team to follow up 2-3 times weekly  Other (explain):     Anticipated discharge plans (X): TBD, patients wife was preforming dressing changes at home, if wounds progress same plan can likely continue.     Possible DC  home tomorrow.    SNF:           Home Care:           Outpatient Wound Center:            Self Care:            Other:

## 2019-04-12 NOTE — DISCHARGE PLANNING
Received Choice form at 9926 from [facility]  Agency/Facility Name: [facility]  Referral sent per Choice form @ 9300

## 2019-04-12 NOTE — CARE PLAN
Problem: Bowel/Gastric:  Goal: Normal bowel function is maintained or improved  Outcome: PROGRESSING AS EXPECTED  Patient reports BM today. BS active x4, denies nausea.    Problem: Pain Management  Goal: Pain level will decrease to patient's comfort goal  Outcome: PROGRESSING AS EXPECTED  Pt able to communicate pain. He reports he is comfortable at this time and does not require pain medication. Tylenol available when needed.

## 2019-04-12 NOTE — PROGRESS NOTES
Pt is aaox4-feels better today.   vs stable.  Reports generalized pain - denies pain meds at this hour.  rash and dry scale/flakey and red open areas improving.  Pt agreeable to shower about 1400 today and will do wound care.  Pt getting rash/breakdown around the abdominal binder - edu need to keep pad around the top etc - pt refusing wearing at this hour.  Port not accessed due to skin breakdown.  Pt now on po abx - refusing tko to piv.  Saline locked.  Good po intake.  Up with sba - less fatigued but steady.  Fall precautions in place - bed alarm on.  Pt makes needs known - will continue to round.

## 2019-04-12 NOTE — PROGRESS NOTES
Hospital Medicine Daily Progress Note    Date of Service  4/12/2019    Chief Complaint  68 y.o. male admitted 4/8/2019 with low grade fever at oncology office and worsening rash.    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  He was sent from Dr Mc's office for admission.        Interval Problem Update  4/9 Patient states he essentially feels same, no improvement in rash but has remained afebrile.  I discussed with Dr Mc if steroid would be contraindicated, he states they would be of little help in cutaneous T Cell lymphoma.  Will continue with empiric antibiotics, topical skin care and wound consult.  4/10 Patient states he is feeling about the same, he has remained afebrile with antibiotics.  Blood cultures showing no growth, continue with abx for now.  4/11 Patient feeling okay today, had pain in the low back and buttock lesions earlier today but that pain resolved.  The remaining lesions have shown improvement.  Will continue with cutaneous treatment and have de-escalated antibiotic to PO doxycycline after reviewing with antibiotic stewardship committee.    4/12 Patient feeling better other than intermittent pain from being in bed, required oxycodone dose this am and has resolved when examined.  Skin continues to show improvement and patient has remained afebrile.  I anticipate he should be ready to dc home tomorrow to continue with doxycycline and wound care.  Home health re-ordered.  I e-prescribed his doxycycline to his pharmacy in [place], [place] so it could be picked up today, his RX for oxycodone will be a paper RX to be given to him on discharge.    Consultants/Specialty  none    Code Status  full    Disposition  Home with , likely 4/13    Review of  Systems  Review of Systems   Constitutional: Negative for chills and fever.   HENT: Negative for congestion and sore throat.    Eyes: Negative for blurred vision and photophobia.   Respiratory: Negative for cough and shortness of breath.    Cardiovascular: Negative for chest pain, claudication and leg swelling.   Gastrointestinal: Negative for abdominal pain, constipation, diarrhea, heartburn and vomiting.   Genitourinary: Negative for dysuria and hematuria.   Musculoskeletal: Negative for joint pain and myalgias.   Skin: Positive for rash (slow improvement). Negative for itching.   Neurological: Negative for dizziness, sensory change, speech change, weakness and headaches.   Psychiatric/Behavioral: Negative for depression. The patient is not nervous/anxious and does not have insomnia.         Physical Exam  Temp:  [36.8 °C (98.3 °F)-37.2 °C (99 °F)] 36.8 °C (98.3 °F)  Pulse:  [80-85] 82  Resp:  [16-18] 16  BP: (114-123)/(61-75) 114/61  SpO2:  [91 %-97 %] 96 %    Physical Exam   Constitutional: He is oriented to person, place, and time. He appears well-developed and well-nourished. No distress.   HENT:   Head: Normocephalic and atraumatic.   Eyes: Conjunctivae are normal. No scleral icterus.   Neck: Neck supple. No JVD present.   Cardiovascular: Normal rate, regular rhythm and normal heart sounds.  Exam reveals no gallop and no friction rub.    No murmur heard.  Pulmonary/Chest: Effort normal and breath sounds normal. No respiratory distress. He has no wheezes. He exhibits no tenderness.   Abdominal: Soft. Bowel sounds are normal. He exhibits no distension and no mass. There is no tenderness.   Musculoskeletal: He exhibits no edema or tenderness.   Neurological: He is alert and oriented to person, place, and time. No cranial nerve deficit.   Skin: Skin is warm. Rash noted. He is not diaphoretic. No erythema. No pallor.   Improving - Multiple crusting lesions throughout body, worse on back with lesions breaking open  and weeping - mild improvement, daptic dressings in place with abd binder providing support to them.       Psychiatric: He has a normal mood and affect. His behavior is normal.   Nursing note and vitals reviewed.      Fluids    Intake/Output Summary (Last 24 hours) at 04/12/19 1304  Last data filed at 04/12/19 0236   Gross per 24 hour   Intake              562 ml   Output              325 ml   Net              237 ml       Laboratory  Recent Labs      04/10/19   0812  04/11/19   0010  04/12/19   0012   WBC  7.2  6.4  5.1   RBC  4.22*  3.38*  3.47*   HEMOGLOBIN  11.8*  9.7*  9.9*   HEMATOCRIT  39.3*  30.8*  32.0*   MCV  93.1  91.1  92.2   MCH  28.0  28.7  28.5   MCHC  30.0*  31.5*  30.9*   RDW  52.6*  50.6*  52.0*   PLATELETCT  318  314  323   MPV  8.5*  8.4*  8.6*     Recent Labs      04/10/19   0812  04/11/19   0010  04/12/19   0012   SODIUM  137  135  136   POTASSIUM  3.7  3.7  3.8   CHLORIDE  106  101  107   CO2  21  26  24   GLUCOSE  94  109*  106*   BUN  10  11  13   CREATININE  0.75  0.82  0.76   CALCIUM  7.7*  7.4*  7.5*                   Imaging  DX-CHEST-PORTABLE (1 VIEW)   Final Result      No acute cardiopulmonary abnormality identified.           Assessment/Plan  * Sepsis (formerly [facility])   Assessment & Plan    This is sepsis (without associated acute organ dysfunction).   Likely due to superimposed infection of skin  He has denuded skin on his back -slowly improving  vancomycin and unasyn changed to PO doxycycline with negative cultures.         Mycosis fungoides (formerly [facility])   Assessment & Plan    Cutaneous t cell lymphoma  Last chemo on 3/11  Flare up of rash - improving   Has been started empirically on antibiotics due to his fever while at oncologists office earlier today and tachycardia on presentation - discussed with Dr Mc, no benefit from steroids in t cell lymphoma  Wound care consult appreciated  Silvadene bid and daptic dressings.  Empiric vancomycin and unasyn changed to PO doxycycline         GERD  (gastroesophageal reflux disease)   Assessment & Plan    Cont prilosec     Asthma   Assessment & Plan    Cont inhalers          VTE prophylaxis: scds, lovenox

## 2019-04-12 NOTE — CARE PLAN
Problem: Safety  Goal: Will remain free from falls    Intervention: Assess risk factors for falls  Fall precautions in place  Bed alarm on  Pt up with sba  Calls for assist      Problem: Skin Integrity  Goal: Risk for impaired skin integrity will decrease    Intervention: Assess risk factors for impaired skin integrity and/or pressure ulcers  Skin rash improving  Encourage shower today  Pt now itching where dry areas - will address with dr. Sanderson  Wound dressing today - order supplies  Pt getting rash around binder and refusing - order larger binder/other options

## 2019-04-12 NOTE — PROGRESS NOTES
Patient A&O x4, up x1 assist. Patient c/o increased pain after dressing change and asked for something stronger than tylenol; oxycodone given per MAR. Patient did not want to shower before this mornings dressing change so warm wash clothes were used to cleanse the skin prior to new dressing. Abdominal binder applied and within a few minutes patient asked to remove it because it was causing increased pain. Ice packs applied and relieved some pain. PIV patent and intact, infusing tko. Patient using urinal at bedside to void and still having frequent urination. U/A collected last night, and per lab, the sample was entered into the wrong date and time- see 4/8/19 @ 2053 for last nights u/a results. Bed alarm in use, call light within reach, patient calls for help appropriately.

## 2019-04-12 NOTE — DISCHARGE PLANNING
"The RNCM met with PT at bedside. He stated that he lives in [place] with his wife him a two story home. He had home health in place with [facility]. And would like to continue with their service. Choice form obtained for this and faxed to [facility] @ [phone]. PT to discharge home possibly tomorrow. Wife will support him as well as [facility] services.   Care Transition Team Assessment    Information Source  Orientation : Oriented x 4  Information Given By: Patient    Readmission Evaluation  Is this a readmission?: No    Elopement Risk  Legal Hold: No  Ambulatory or Self Mobile in Wheelchair: Yes  Disoriented: No  Psychiatric Symptoms: None  History of Wandering: No  Elopement this Admit: No  Vocalizing Wanting to Leave: No  Displays Behaviors, Body Language Wanting to Leave: No-Not at Risk for Elopement  Elopement Risk: Not at Risk for Elopement    Interdisciplinary Discharge Planning  Patient or legal guardian wants to designate a caregiver (see row info): No    Discharge Preparedness  What is your plan after discharge?: Home with help, Home health care  Prior Functional Level: Independent with Activities of Daily Living, Independent with Medication Management    Functional Assesment  Prior Functional Level: Independent with Activities of Daily Living, Independent with Medication Management    Finances  Financial Barriers to Discharge: No  Prescription Coverage: Yes    Vision / Hearing Impairment  Right Eye Vision: Impaired, Wears Glasses  Left Eye Vision: Impaired, Wears Glasses  Hearing Impairment : No         Advance Directive  Advance Directive?: None  Advance Directive offered?: AD Booklet refused (Stated \"I already have one one at home\")    Domestic Abuse  Have you ever been the victim of abuse or violence?: No  Physical Abuse or Sexual Abuse: No    Psychological Assessment  History of Substance Abuse: None    Discharge Risks or Barriers  Discharge risks or barriers?: No  Patient risk factors: Complex medical " needs    Anticipated Discharge Information  Anticipated discharge disposition: [facility], Home

## 2019-04-13 NOTE — PROGRESS NOTES
Patient has been discharged home in care of family. Patient and wife have been updated regarding discharge instructions and wound care per wound team instruction. All questions have been answered at this time. Hard copy prescription provided to patient for oxycodone. Patient has bene transported to vehicle via wheelchair and staff member assistance.

## 2019-04-13 NOTE — PROGRESS NOTES
Assumed care of the patient at 0715, received report from the NOC Rn. Pt is AOx4, VSS, w/ no complaint of discomfort at this time.    Pt updated on POC: d/c today. All questions have been answered at this time.      Generalized rash noted. Dressings in place per wound care order.     Pt up SBA and calls appropriately. Fall precautions implemented.

## 2019-04-13 NOTE — CARE PLAN
Problem: Communication  Goal: The ability to communicate needs accurately and effectively will improve    Intervention: Educate patient and significant other/support system about the plan of care, procedures, treatments, medications and allow for questions  Patient updated on POC, all questions have been answered at this time.       Problem: Safety  Goal: Will remain free from injury    Intervention: Provide assistance with mobility  Patient up SBA, calls appropriately, fall precautions implemented.

## 2019-04-13 NOTE — CARE PLAN
Problem: Communication  Goal: The ability to communicate needs accurately and effectively will improve  Outcome: PROGRESSING AS EXPECTED  Plan of care discussed with patient, all questions answered.    Problem: Safety  Goal: Will remain free from injury  Outcome: PROGRESSING AS EXPECTED  Call light within reach, bed locked and in lowest position, urinal at bedside, hourly rounding.

## 2019-04-13 NOTE — DISCHARGE INSTRUCTIONS
Discharge Instructions    Discharged to home by car with relative. Discharged via wheelchair, hospital escort: Yes.  Special equipment needed: Not Applicable    Be sure to schedule a follow-up appointment with your primary care doctor or any specialists as instructed.     Discharge Plan:   Influenza Vaccine Indication: Patient Refuses    I understand that a diet low in cholesterol, fat, and sodium is recommended for good health. Unless I have been given specific instructions below for another diet, I accept this instruction as my diet prescription.   Other diet: Regular    Special Instructions: Sepsis, Adult  Sepsis is a serious infection of your blood or tissues that affects your whole body. The infection that causes sepsis may be bacterial, viral, fungal, or parasitic. Sepsis may be life threatening. Sepsis can cause your blood pressure to drop. This may result in shock. Shock causes your central nervous system and your organs to stop working correctly.  What increases the risk?  Sepsis can happen in anyone, but it is more likely to happen in people who have weakened immune systems.  What are the signs or symptoms?  Symptoms of sepsis can include:  · Fever or low body temperature (hypothermia).  · Rapid breathing (hyperventilation).  · Chills.  · Rapid heartbeat (tachycardia).  · Confusion or light-headedness.  · Trouble breathing.  · Urinating much less than usual.  · Cool, clammy skin or red, flushed skin.  · Other problems with the heart, kidneys, or brain.  How is this diagnosed?  Your health care provider will likely do tests to look for an infection, to see if the infection has spread to your blood, and to see how serious your condition is. Tests can include:  · Blood tests, including cultures of your blood.  · Cultures of other fluids from your body, such as:  ¨ Urine.  ¨ Pus from wounds.  ¨ Mucus coughed up from your lungs.  · Urine tests other than cultures.  · X-ray exams or other imaging tests.  How is  this treated?  Treatment will begin with elimination of the source of infection. If your sepsis is likely caused by a bacterial or fungal infection, you will be given antibiotic or antifungal medicines.  You may also receive:  · Oxygen.  · Fluids through an IV tube.  · Medicines to increase your blood pressure.  · A machine to clean your blood (dialysis) if your kidneys fail.  · A machine to help you breathe if your lungs fail.  Get help right away if:  You get an infection or develop any of the signs and symptoms of sepsis after surgery or a hospitalization.  This information is not intended to replace advice given to you by your health care provider. Make sure you discuss any questions you have with your health care provider.  Document Released: 09/15/2004 Document Revised: 05/25/2017 Document Reviewed: 08/25/2014  [employer] Interactive Patient Education © 2017 [employer]      · Is patient discharged on Warfarin / Coumadin?   No     Depression / Suicide Risk    As you are discharged from this [facility] facility, it is important to learn how to keep safe from harming yourself.    Recognize the warning signs:  · Abrupt changes in personality, positive or negative- including increase in energy   · Giving away possessions  · Change in eating patterns- significant weight changes-  positive or negative  · Change in sleeping patterns- unable to sleep or sleeping all the time   · Unwillingness or inability to communicate  · Depression  · Unusual sadness, discouragement and loneliness  · Talk of wanting to die  · Neglect of personal appearance   · Rebelliousness- reckless behavior  · Withdrawal from people/activities they love  · Confusion- inability to concentrate     If you or a loved one observes any of these behaviors or has concerns about self-harm, here's what you can do:  · Talk about it- your feelings and reasons for harming yourself  · Remove any means that you might use to hurt yourself (examples: pills,  rope, extension cords, firearm)  · Get professional help from the community (Mental Health, Substance Abuse, psychological counseling)  · Do not be alone:Call your Safe Contact- someone whom you trust who will be there for you.  · Call your local CRISIS HOTLINE 055-5024 or 972-608-2632  · Call your local Children's Mobile Crisis Response Team [place] (621) 859-9201 or www.[employer]  · Call the toll free National Suicide Prevention Hotlines   · National Suicide Prevention Lifeline 592-086-XUYP (5858)  · National Hope Line Network 800-SUICIDE (444-6422)

## 2019-04-13 NOTE — PROGRESS NOTES
Received report & assumed care of patient at 1900. Assessment completed. Patient is A&Ox4, up x1, using urinal at bedside. Port not accessed at this time due to skin breakdown. No PIV, MD aware. Patient denies any pain, n/v at this time. Patient has rash/skin breakdown on body, dressing in place, CDI. POC discussed & questions answered. Bed locked and in lowest position, call light within reach, non-skid socks in place, hourly rounding. Patient reports no further needs at this time.

## 2019-04-13 NOTE — DISCHARGE SUMMARY
Discharge Summary    CHIEF COMPLAINT ON ADMISSION  Chief Complaint   Patient presents with   • Sent by MD     pt sent by Dr Mc from the cancer center   • Skin Lesion     pt's entire back covered in purple dry flaky and raised lesions       Reason for Admission  Sent by MD     Admission Date  4/8/2019    CODE STATUS  Full Code    HPI & HOSPITAL COURSE  This is a 68 y.o. male here with fever and decompensation of rash related to T cell lymphoma.  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  He was sent from Dr Mc's office for admission.   Blood cultures were taken and no growth, source of fever likely the T cell lymphoma.  Vanco/unasyn de-escalated to PO doxycycline and he will continue this for 10 days following discharge.  Patient to follow up with Dr Mc in 1 week for review of medication change for chemotherapy.  Wound care consulted and patient will continue with dressing changes and silvadine cream BID.  Home health also organized again on discharge.    Therefore, he is discharged in good and stable condition to home with organized home healthcare and close outpatient follow-up.    The patient met 2-midnight criteria for an inpatient stay at the time of discharge.    Discharge Date  4/13/2019    FOLLOW UP ITEMS POST DISCHARGE  Dr Mc - 1-2 weeks  PCP 1-2 weeks    DISCHARGE DIAGNOSES  Principal Problem:    Sepsis (HCC) POA: Unknown  Active Problems:    Mycosis fungoides (HCC) POA: Unknown    Asthma POA: Unknown    GERD (gastroesophageal reflux disease) POA: Unknown  Resolved Problems:    * No resolved hospital problems. *      FOLLOW UP  No future appointments.  Luis Mvir Alexandro  [id number] [place] [id number] [phone]    Call on 4/15/2019        MEDICATIONS ON DISCHARGE      Medication List      START taking these medications      Instructions   doxycycline monohydrate 100 MG tablet  Commonly known as:  ADOXA   Take 1 Tab by mouth every 12 hours for 10 days.  Dose:  100 mg     oxyCODONE immediate release 10 MG immediate release tablet  Commonly known as:  ROXICODONE   Take 1-2 Tabs by mouth every 3 hours as needed for Severe Pain for up to 14 days.  Dose:  10-20 mg        CHANGE how you take these medications      Instructions   silver sulfADIAZINE 1 % Crea  What changed:  when to take this  Commonly known as:  SILVADENE   Apply 1 g to affected area(s) 2 Times a Day.  Dose:  1 g        CONTINUE taking these medications      Instructions   albuterol 108 (90 Base) MCG/ACT Aers inhalation aerosol   Inhale 1 Puff by mouth every four hours as needed for Shortness of Breath.  Dose:  1 Puff     budesonide-formoterol 160-4.5 MCG/ACT Aero  Commonly known as:  SYMBICORT   Inhale 2 Puffs by mouth 2 Times a Day.  Dose:  2 Puff     omeprazole 20 MG delayed-release capsule  Commonly known as:  PRILOSEC   Take 20 mg by mouth every day.  Dose:  20 mg     vitamin B-12 1000 MCG Tabs   Take 1,000 mcg by mouth every day.  Dose:  1000 mcg        STOP taking these medications    HYDROcodone/acetaminophen  MG Tabs  Commonly known as:  NORCO            Allergies  No Known Allergies    DIET  Orders Placed This Encounter   Procedures   • Diet Order Regular     Standing Status:   Standing     Number of Occurrences:   1     Order Specific Question:   Diet:     Answer:   Regular [1]       ACTIVITY  As tolerated.  Weight bearing as tolerated    CONSULTATIONS  none    PROCEDURES  none    LABORATORY  Lab Results   Component Value Date    SODIUM 136 04/13/2019    POTASSIUM 3.9 04/13/2019    CHLORIDE 102 04/13/2019    CO2 26 04/13/2019    GLUCOSE 104 (H) 04/13/2019    BUN 15 04/13/2019    CREATININE 0.90 04/13/2019        Lab Results   Component Value Date    WBC 6.8 04/13/2019    HEMOGLOBIN 9.9 (L) 04/13/2019     HEMATOCRIT 32.5 (L) 04/13/2019    PLATELETCT 349 04/13/2019        Total time of the discharge process exceeds 35 minutes.

## 2019-04-23 NOTE — DOCUMENTATION QUERY
[facility]                                                                       Query Response Note      PATIENT:               DOMENICO MUNOZ  ACCT #:                  [id number]  MRN:                     [id number]  :                      1950  ADMIT DATE:       2019 11:51 AM  DISCH DATE:        2019 12:16 PM  RESPONDING  PROVIDER #:        [id number]           QUERY TEXT:    Pt has documented sepsis noted as ?rule out? or similar terminology such as suspected, probable, etc.  Please clarify status of this condition:    NOTE:  If an appropriate response is not listed below, please respond with a new note.       The patient's Clinical Indicators include:  Patient admitted with fever and tachycardia diagnosed with sepsis. Patients blood cultures were negative and per ds source of fever likely T-Cell lymphoma.    Query created by: Jessika Nuñez on 2019 1:31 PM    RESPONSE TEXT:    sepsis is ruled out          Electronically signed by:  JESSE MACKEY DO 2019 2:03 PM

## 2019-05-08 PROBLEM — C85.90 LYMPHOMA (HCC): Status: ACTIVE | Noted: 2019-01-01

## 2019-05-08 PROBLEM — R65.20 SEVERE SEPSIS (HCC): Status: ACTIVE | Noted: 2019-01-01

## 2019-05-08 PROBLEM — A41.9 SEVERE SEPSIS (HCC): Status: ACTIVE | Noted: 2019-01-01

## 2019-05-09 PROBLEM — A41.9 SEPTIC SHOCK (HCC): Status: ACTIVE | Noted: 2019-01-01

## 2019-05-09 PROBLEM — R65.21 SEPTIC SHOCK (HCC): Status: ACTIVE | Noted: 2019-01-01

## 2019-05-09 PROBLEM — C84.18: Status: ACTIVE | Noted: 2019-01-01

## 2019-05-09 PROBLEM — D64.9 ANEMIA: Status: ACTIVE | Noted: 2019-01-01

## 2019-05-09 NOTE — CARE PLAN
Problem: Pain Management  Goal: Pain level will decrease to patient's comfort goal  Outcome: PROGRESSING AS EXPECTED  Pt struggles with constant pain r/t chronic generalized open wounds on skin.    Intervention: Follow pain managment plan developed in collaboration with patient and Interdisciplinary Team  Pt receiving fentanyl 25mcg q2 hrs for pain   Intervention: Educate and implement non-pharmacologic comfort measures. Examples: relaxation, distration, play therapy, activity therapy, massage, etc.  Done       Problem: Respiratory:  Goal: Respiratory status will improve  Outcome: PROGRESSING AS EXPECTED  Pt maintains o2 sats >92% on 3L NC   Intervention: Administer and titrate oxygen therapy  Pt admitted to unit on 4L NC, reduced to 3L NC, tolerating well  Intervention: Educate and encourage coughing and deep breathing  Done  Intervention: Educate and encourage incentive spirometry usage  Done   Intervention: Collaborate with respiratory therapist and Interdisciplinary Team on treatment measures to improve respiratory function  Done

## 2019-05-09 NOTE — WOUND TEAM
[facility]  Inpatient Services  Initial Wound and Skin Care Evaluation    Admission Date:  5/8/2019   HPI, PMH, SH: Reviewed  Unit where seen by Wound Team: R106/00    WOUND CONSULT RELATED TO:  Extensive superficial wounds trunk and legs    SUBJECTIVE:  Pt agreeable    Self Report / Pain Level:  Wounds are painful  Reporting 8/10    OBJECTIVE:  Pt in bed, A to roll.      WOUND TYPE, LOCATION, CHARACTERISTICS (Pressure ulcers: location, stage, POA or date identified)        Wound 04/08/19 Full Thickness Wound Abdomen;Back;Axilla;Coccyx;Buttocks;Hip cutaneous T cell lymphoma  (Active)       Wound 05/08/19 Abdomen;Back;Axilla;Buttocks;Flank;Leg;Arm;Chest Generalized Scabbing, Open weeping wounds (Active)   Site Assessment Red 5/9/2019  1:00 PM   Jesica-wound Assessment Dry;Denuded;Edema;Dark edges;Painful 5/9/2019  1:00 PM   Margins Undefined edges 5/9/2019  1:00 PM   Measurements 80% of body, most open areas on trunk, inguinal,  lower abdomen and axillae    Tunneling 0 cm 5/9/2019  1:00 PM   Undermining 0 cm 5/9/2019  1:00 PM   Closure Secondary intention 5/9/2019  1:00 PM   Drainage Amount Moderate 5/9/2019  1:00 PM   Drainage Description Serous 5/9/2019  1:00 PM   Treatments Site care;Cleansed 5/9/2019  1:00 PM   Cleansing Approved Wound Cleanser 5/9/2019  1:00 PM   Dressing Options Petrolatum Gauze (yellow) 5/9/2019  1:00 PM   Dressing Changed New 5/9/2019  1:00 PM   Dressing Change Frequency Every 72 hrs 5/9/2019  1:00 PM   NEXT Dressing Change  05/12/19 5/9/2019  1:00 PM   NEXT Weekly Photo (Inpatient Only) 05/16/19 5/9/2019  1:00 PM   WOUND NURSE ONLY - Odor None 5/9/2019  1:00 PM   WOUND NURSE ONLY - Exposed Structures None 5/9/2019  1:00 PM   WOUND NURSE ONLY - Tissue Type and Percentage 100% red 5/9/2019  1:00 PM   WOUND NURSE ONLY - Time Spent with Patient (mins) 60 5/9/2019  1:00 PM     Lab Values:    WBC:       WBC   Date/Time Value Ref Range Status   05/09/2019 05:35 AM 5.4 4.8 - 10.8 K/uL  Final     AIC:    No results found for: HBA1C      Culture:   Completed nt    INTERVENTIONS BY WOUND TEAM:  Pt's skin assesed with bedside RN. Pt rolled to observe back.  ID physician in room.  Recommend wounds bed cleaned with wound cleanser, dried with wash cloth, covered with xeroform of open areas and place a trauma pad under pt's back.      Dressing selection:  xeroform         Interdisciplinary consultation:  RN, patient, Dr Robbins    EVALUATION:  Pt being worked up for disease process that may be contributing to lesions found in various stages t/o pt's body.  Will utilize dressing that will prevent wounds from sticking to absorbant dressings or sheets without over hydrating areas.  Bismuth in xeroform may provide some pain relief.      Factors affecting wound healing:  Inability to receive tx for medical condition.    Goals:  Steady decrease in wound area and depth weekly     NURSING PLAN OF CARE ORDERS (X):    Dressing changes: See Dressing Care orders:   X  Skin care: See Skin Care orders:   Rectal tube care: See Rectal Tube Care orders:   Other orders:    RSKIN: CURRENT (X) ORDERED (O)  Q shift Joe:  X  Q shift pressure point assessments:  X  Pressure redistribution mattress        Waffle overlay  NOEMI    X  Bariatric NOEMI      Bariatric foam        Heel float boots       Heels floated on pillows      Barrier wipes      Barrier Cream      Barrier paste      Sacral silicone dressing      Silicone O2 tubing      Anchorfast      Trach with Optifoam split foam       Waffle cushion      Rectal tube or BMS      Antifungal tx    Turn q 2 hours   X  Up to chair     Ambulate   PT/OT     Dietician      PO     TF   TPN   NPO   # days   Other       WOUND TEAM PLAN OF CARE (X):   NPWT change 3 x week:        Dressing changes by wound team:       Follow up as needed:     X  Other (explain):    Anticipated discharge plans (X):  SNF:           Home Care:           Outpatient Wound Center:            Self Care:             Other:         tbd - pt likely to require wound care upon discharge from hospital -  vs SNF

## 2019-05-09 NOTE — ASSESSMENT & PLAN NOTE
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  Dr. Bradley, oncology, has been consulted.    CT done for staging  Code status has been addressed and he continues to request that everything is done.   Palliative care consult placed.  Palliative readdressed CODE STATUS - patient is now DNR per patient and wife

## 2019-05-09 NOTE — PROGRESS NOTES
IR Procedure Note:    Dr. Zavala consented patient prior to procedure; all questions answered.    Site confirmed with imaging guidance by patient, physician, RT, and RN. Per Dr. Zavala, begin sedation prior to movement to table secondary to pain from skin lesions.    Dr. Zavala completed a central venous catheter port removal with port and catheter sent for culture.  The patient tolerated the procedure well; ETCo2 = 30, with consistent waveform during the procedure.      Sutures, dermabond, and steristrips applied to right chest and lateral neck, CDI and soft.  Patient alert, oriented and verbally appropriate post procedure, patient remained hemodynamically stable during procedure and transport, see flow sheet for vital signs.  Report given to MARIANNE May.  RN transported patient to ICU, R106 with SaO2 monitor @ 98 % on room air.     Sedation End Time: 1120

## 2019-05-09 NOTE — ED TRIAGE NOTES
"Pt med flight to [facility]   Chief Complaint   Patient presents with   • Blood Infection     Transfer from [place] for sepsis related to recurrent skin infections    • Hypotension     Needing Levo to maintain a map >65     BP (!) 89/72   Pulse 100   Temp 37.1 °C (98.8 °F) (Temporal)   Resp 18   Ht 1.727 m (5' 8\")   Wt 108 kg (238 lb 1.6 oz)   SpO2 94%       Sepsis protocol ordered     "

## 2019-05-09 NOTE — PROGRESS NOTES
Hospital Medicine Daily Progress Note    Date of Service  5/9/2019    Chief Complaint  68 y.o. male admitted 5/8/2019 with weakness and fevers.     Hospital Course    Mr. Ortiz has a past medical history of T-cell lymphoma followed by Dr. Mc the most recently admitted here for a skin infection was discharged on April 13 due to a rash from his T-cell lymphoma.  He was discharged home on oral doxycycline.  He presented to the emergency room in [facility] with weakness, feeling poorly, and fevers.  Is found to be hypotensive and in septic shock.  He was transferred here for higher level of care.  Despite IV fluids, is required intravenous pressors ongoing.      Interval Problem Update  5/9: Patient seen and evaluated in the intensive care unit this morning. A central line has been placed and his right-sided catheter has been removed.  Patient notes significant pain from his skin.    Consultants/Specialty  Critical Care. I discussed with Dr. Hammonds on ICU Hot Rounds.   Infectious disease, I discussed with Dr. Barbour for consultation  Oncology, I discussed with Dr. Bradley for consultation  Code Status  full    Disposition  ICU    Review of Systems  Review of Systems   Constitutional: Positive for fever.   Gastrointestinal: Negative for nausea and vomiting.   Skin:        Worsening skin pain and redness   All other systems reviewed and are negative.       Physical Exam  Temp:  [36.3 °C (97.3 °F)-37.1 °C (98.8 °F)] 36.9 °C (98.4 °F)  Pulse:  [] 71  Resp:  [15-36] 18  BP: ()/(68-72) 104/68  SpO2:  [78 %-100 %] 100 %    Physical Exam   Constitutional: He is oriented to person, place, and time. No distress.   HENT:   Dry mucous membranes  No facial droop  Chapped lips   Neck: Normal range of motion. Neck supple.   Left sided central line   Cardiovascular: Normal rate and regular rhythm.    No murmur heard.  Pulmonary/Chest:   Right chest cath site covered  Clear lung sounds bilaterally   Abdominal:  Soft. He exhibits no distension. There is no tenderness.   Musculoskeletal: He exhibits edema. He exhibits no tenderness.   Neurological: He is alert and oriented to person, place, and time.   Skin: He is not diaphoretic.   Extensive xerosis of the entire skin more so on his scalp and face  The skin on his chest and back is red and raised with weeping of clear fluid and is effectively confluent   Psychiatric: He has a normal mood and affect. His behavior is normal.   Nursing note and vitals reviewed.      Fluids    Intake/Output Summary (Last 24 hours) at 05/09/19 0647  Last data filed at 05/09/19 0600   Gross per 24 hour   Intake          1708.33 ml   Output              375 ml   Net          1333.33 ml       Laboratory  Recent Labs      05/08/19 2034 05/09/19   0535   WBC  10.3  5.4   RBC  4.14*  3.35*   HEMOGLOBIN  11.4*  9.3*   HEMATOCRIT  37.8*  30.6*   MCV  91.3  91.3   MCH  27.5  27.8   MCHC  30.2*  30.4*   RDW  55.4*  54.2*   PLATELETCT  389  274   MPV  9.0  9.4     Recent Labs      05/08/19 2034   SODIUM  140   POTASSIUM  4.5   CHLORIDE  107   CO2  23   GLUCOSE  95   BUN  22   CREATININE  1.22   CALCIUM  7.0*                   Imaging  DX-CHEST-LIMITED (1 VIEW)   Final Result      Left central venous line in place. No pneumothorax.               INTERPRETING LOCATION: [address] [place] [address], [id number]      DX-CHEST-PORTABLE (1 VIEW)   Final Result      No acute cardiopulmonary abnormality. No interval change.      IR-CVC TUNNEL WITH PORT REMOVAL    (Results Pending)        Assessment/Plan  * Septic shock (HCC)- (present on admission)   Assessment & Plan    Septic shock present upon admission  Source may be cutaneous infection  IR consult placed to remove the indwelling port  Infectious disease consult  He meets the criteria for septic shock as evidenced by the need for intravenous pressor support  Organ system failure associated with this disease process is the cardiovascular as is noted by hypotension  requiring pressors  Broad-spectrum IV antibiotics to cover for skin infection in the setting of immunocompromised host     Lymphoma (HCC)- (present on admission)   Assessment & Plan    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  Dr. Bradley, oncology, has been consulted.       Anemia- (present on admission)   Assessment & Plan    Hemoglobin is 9.3 which is down from 11.4 upon admission  Given his IV fluids, this is likely delusional and a CBC will be checked in the morning     GERD (gastroesophageal reflux disease)- (present on admission)   Assessment & Plan    Continue home PPI.     Asthma   Assessment & Plan    Hemoglobin is 9.3 which is down from 11.4 upon admission  Given his IV fluids, this is likely delusional and a CBC will be checked in the morning          VTE prophylaxis: heparin

## 2019-05-09 NOTE — ASSESSMENT & PLAN NOTE
Patient presented from the outside facility with septic shock, he had hypotension requiring vasopressor therapy despite aggressive fluid hydration.  Since arrival in our emergency room, we have been able to wean him off of the vasopressor.  Nevertheless, given his lactic acid level greater than 4 and hypotension, he will be admitted to the ICU and I will start him on goal-directed therapy with broad-spectrum antibiotics with vancomycin and Unasyn for suspected cellulitis as the source of his sepsis.  I will continue gentle IV fluids, trend lactic acid levels, obtain a wound culture and monitor this along with his blood cultures for speciation and sensitivities.  The patient has no leukocytosis however he is immunocompromise given his known underlying lymphoma.  I requested wound care during this hospitalization.  The patient will be admitted to the ICU and I have made available Levophed should his blood pressures drop with goal of maintaining map greater than 60.

## 2019-05-09 NOTE — PROGRESS NOTES
· 2 RN skin check complete with MARIANNE Baez.  · Devices in place BP cuff, Tele leads, SPo2 probe, Subclavian triple lumen.  · Skin assessed under devices Yes.  · Confirmed pressure ulcers found on Generalized wounds on abdomen, trunk, back, arms, legs, groin buttocks. Open weeping wounds, see wound photos.  · New potential pressure ulcers noted on Generalized wounds related to underlying skin condition. Wound consult placed and wound reported.  · The following interventions in place q2hr turns, rotating medical device placement, low air loss mattress.

## 2019-05-09 NOTE — PROGRESS NOTES
"Critical Care Progress Note    Date of admission  5/8/2019    Chief Complaint  68 y.o. male admitted 5/8/2019 with septic shock    Hospital Course    \"68 y.o. male who presented 5/8/2019 with septic shock from presumed from cellulitis.  Patient was transferred from [facility] on a Levophed drip infused through an old port in his right chest.  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  Apparently last chemotherapy was was several months ago.  He has a history of recurrent skin infections with severe infection with with sepsis.  On this admission he has several days of weakness but no overt shaking chills that he can recall.  At the outside facility had elevated lactate level and hypotension in the 70s over 40s.  He was placed on a Levophed drip.  He arrives chronically ill-appearing hypotensive but mentating adequately.  I placed a central line and we discontinued his peripheral Levophed to central.  He received vancomycin and Zosyn at the outside hospital as well as 3700 cc of isotonic solution.\"      Interval Problem Update  Reviewed last 24 hour events:   - CVC placed, Port removal planned for ? infected   - Neuro: AOx4   - HR: 60s-70s   - SBP: 80s-120s   - GI: Dysphagia diet   - UOP: 375 mL since admit   - Woods: no   - Tm: 98.5   - Lines: CVC, port   - PPx: GI not indicated, DVT heparin   - 4L NC, 1L IS   - CXR (personally reviewed and compared to prior): CVC in place, no infiltrate    Review of Systems  Review of Systems   Constitutional: Positive for fever and malaise/fatigue. Negative for chills.   Respiratory: Negative for cough, sputum production, shortness of breath and stridor.    Cardiovascular: Negative for chest pain.   Gastrointestinal: Negative for abdominal pain, nausea and vomiting.   Genitourinary: Negative for dysuria.   Musculoskeletal: Negative for myalgias.   Skin: Positive for itching and rash.   Neurological: Positive for weakness. " Negative for dizziness, sensory change and focal weakness.        Vital Signs for last 24 hours   Temp:  [36.3 °C (97.3 °F)-37.1 °C (98.8 °F)] 36.9 °C (98.4 °F)  Pulse:  [] 71  Resp:  [15-36] 18  BP: ()/(68-72) 104/68  SpO2:  [78 %-100 %] 100 %    Hemodynamic parameters for last 24 hours       Respiratory Information for the last 24 hours       Physical Exam   Physical Exam   Constitutional: He is oriented to person, place, and time. He appears well-developed and well-nourished. He has a sickly appearance. He appears distressed. Nasal cannula in place.   HENT:   Right Ear: External ear normal.   Left Ear: External ear normal.   Mouth/Throat: Oropharynx is clear and moist.   Eyes: Pupils are equal, round, and reactive to light. Conjunctivae and EOM are normal. No scleral icterus.   Neck: Neck supple. No JVD present. No tracheal deviation present.   Cardiovascular: Normal rate, regular rhythm and intact distal pulses.    Pulmonary/Chest: Effort normal. No respiratory distress. He has decreased breath sounds in the right lower field and the left lower field.   Left SC CVC   Abdominal: Soft. Bowel sounds are normal. He exhibits no distension.   Musculoskeletal: Normal range of motion. He exhibits edema (pedal). He exhibits no tenderness.   Neurological: He is alert and oriented to person, place, and time. No cranial nerve deficit. He exhibits normal muscle tone. Coordination normal.   Skin: Rash noted. There is erythema.   Diffuse hypertrophic rash with patches of plaques and open areas to chest/abdomen/back, palmar/plantar keratoderma   Psychiatric: He has a normal mood and affect.   Nursing note and vitals reviewed.      Medications  Current Facility-Administered Medications   Medication Dose Route Frequency Provider Last Rate Last Dose   • vancomycin 1,400 mg in  mL IVPB  1,400 mg Intravenous Q12HR Ofelia Lemon M.D.       • fentaNYL (SUBLIMAZE) injection 25 mcg  25 mcg Intravenous Q2HRS PRN  Kulwinder Velazco M.D.   25 mcg at 05/09/19 0419   • omeprazole (PRILOSEC) capsule 20 mg  20 mg Oral DAILY Ofelia Lemon M.D.   Stopped at 05/09/19 0600   • budesonide-formoterol (SYMBICORT) 160-4.5 MCG/ACT inhaler 2 Puff  2 Puff Inhalation BID (RT) Ofelia Lemon M.D.   Stopped at 05/08/19 2215   • senna-docusate (PERICOLACE or SENOKOT S) 8.6-50 MG per tablet 2 Tab  2 Tab Oral BID Ofelia Lemon M.D.   Stopped at 05/09/19 0600    And   • polyethylene glycol/lytes (MIRALAX) PACKET 1 Packet  1 Packet Oral QDAY PRN Ofelia Lemon M.D.        And   • magnesium hydroxide (MILK OF MAGNESIA) suspension 30 mL  30 mL Oral QDAY PRN Ofelia Lemon M.D.        And   • bisacodyl (DULCOLAX) suppository 10 mg  10 mg Rectal QDAY PRN Ofelia Lemon M.D.       • Respiratory Care per Protocol   Nebulization Continuous RT Ofelia Lemon M.D.       • NS (BOLUS) infusion 500 mL  500 mL Intravenous Once PRN Ofelia Lemon M.D.       • heparin injection 5,000 Units  5,000 Units Subcutaneous Q8HRS Ofelia Lemon M.D.       • ampicillin/sulbactam (UNASYN) 3 g in  mL IVPB  3 g Intravenous Q6HRS Ofelia Lemon M.D.   Stopped at 05/09/19 0552   • MD Alert...Vancomycin per Pharmacy  1 Each Other PHARMACY TO DOSE Ofelia Lemon M.D.       • norepinephrine (LEVOPHED) 8 mg in  mL Infusion  0.5-30 mcg/min Intravenous Continuous Ofelia Lemon M.D.        And   • vasopressin (VASOSTRICT) 20 Units in  mL Infusion  0.03 Units/min Intravenous Continuous Ofelia Lemon M.D.       • albuterol inhaler 2 Puff  2 Puff Inhalation Q4HRS PRN Josef Stephenson M.D.           Fluids    Intake/Output Summary (Last 24 hours) at 05/09/19 0754  Last data filed at 05/09/19 0600   Gross per 24 hour   Intake          1708.33 ml   Output              375 ml   Net          1333.33 ml       Laboratory          Recent Labs      05/08/19 2034 05/09/19   0535   SODIUM  140  142   POTASSIUM  4.5  4.2   CHLORIDE  107  109   CO2  23  25   BUN  22   22   CREATININE  1.22  1.09   CALCIUM  7.0*  6.7*     Recent Labs      05/08/19 2034 05/09/19   0535   ALTSGPT  9   --    ASTSGOT  16   --    ALKPHOSPHAT  106*   --    TBILIRUBIN  0.4   --    GLUCOSE  95  121*     Recent Labs      05/08/19 2034 05/09/19   0535   WBC  10.3  5.4   NEUTSPOLYS  81.40*   --    LYMPHOCYTES  13.30*   --    MONOCYTES  0.00   --    EOSINOPHILS  2.60   --    BASOPHILS  0.00   --    ASTSGOT  16   --    ALTSGPT  9   --    ALKPHOSPHAT  106*   --    TBILIRUBIN  0.4   --      Recent Labs      05/08/19 2034 05/09/19   0535   RBC  4.14*  3.35*   HEMOGLOBIN  11.4*  9.3*   HEMATOCRIT  37.8*  30.6*   PLATELETCT  389  274       Imaging  X-Ray:  I have personally reviewed the images and compared with prior images.    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  Oncology consulted  Repeat CT planned  Diffuse painful skin lesions; will start dPCA, wound care consult     Severe sepsis (HCC)   Assessment & Plan    See above.  ID on board  Due to skin wounds  Abx transitioned to Unasyn     GERD (gastroesophageal reflux disease)- (present on admission)   Assessment & Plan    I will continue the patient's home prilosec          VTE:  Heparin  Ulcer: PPI  Lines: Central Line  Ongoing indication addressed    I have performed a physical exam and reviewed and updated ROS and Plan today (5/9/2019). In review of yesterday's  note (5/8/2019), there are no changes except as documented above.     Continuing to treat septic shock and severe skin wounds. This patient is critically ill, at high risk for decompensation leading to worsening vital organ dysfunction and death without critical care interventions.    Discussed patient condition and risk of morbidity and/or mortality with Hospitalist, RN, RT, Pharmacy, , Charge nurse / hot rounds and Patient     The patient remains critically ill.  Critical care time = 40 minutes in directly providing and coordinating critical care and extensive data review.  No time overlap and excludes procedures.

## 2019-05-09 NOTE — H&P
Hospital Medicine History & Physical Note    Date of Service  5/8/2019    Primary Care Physician  No primary care provider on file.    Consultants  None    Code Status  Full    Chief Complaint  Chief Complaint   Patient presents with   • Blood Infection     Transfer from [place] for sepsis related to recurrent skin infections    • Hypotension     Needing Levo to maintain a map >65       History of Presenting Illness  68 y.o. male who presented on 5/8/2019 in transfer from outside facility for septic shock.  This patient has a history of cutaneous T-cell lymphoma with recurrent skin infections.  He was recently discharged from our facility on April 13 for rash related to his T-cell lymphoma and had been initially placed on broad-spectrum antibiotic therapy.  This was quickly de-escalated to p.o. doxycycline and the patient was successfully discharged home to complete a full 10-day course.  Today, the patient presented to the outside facility with complaints of feeling poorly in general and weak with fevers and chills for the last 3 days.  Assessment at the outside facility was significant for the fact that the patient was noted to be septic with a lactic acid greater than 4, hypertension requiring vasopressor therapy despite appropriate fluid resuscitation.  The patient otherwise denies any chest pain, shortness of breath, headache, abdominal pain, diarrhea or dysuria.  Suspected source of infection with cellulitis and the patient was in transfer to our facility for higher level of care.    Additional work-up at the outside facility including chest x-ray which showed no evidence of pneumonia.  WBC 6.7 he will 112.3 hematocrit 39.9 platelet 432 sodium 136 potassium 4.8 chloride 1 08/02/2028 BUN 23 creatinine 1.1 and glucose 111.  Lactic acid 4.6 UA negative for nitrites positive for leukocyte esterase.    Review of Systems  Review of Systems   Constitutional: Positive for chills, fever and malaise/fatigue.   HENT:  Negative for congestion and sore throat.    Eyes: Negative for photophobia.   Respiratory: Negative for cough, shortness of breath and wheezing.    Cardiovascular: Negative for chest pain and palpitations.   Gastrointestinal: Negative for abdominal pain, diarrhea, nausea and vomiting.   Genitourinary: Negative for dysuria.   Musculoskeletal: Negative for myalgias.   Skin: Positive for itching and rash.        Moisture and weeping from his back and his right axillary area   Neurological: Positive for weakness. Negative for dizziness, tingling, focal weakness and headaches.   Psychiatric/Behavioral: Negative for depression and suicidal ideas.       Past Medical History  Past Medical History:   Diagnosis Date   • Asthma    • Cancer (HCC)    • Dental disorder    • Heart burn    • Hiatus hernia syndrome    • Indigestion    • Snoring        Surgical History  Past Surgical History:   Procedure Laterality Date   • OTHER ABDOMINAL SURGERY         Family History  No early MIs or CVAs per patient    Social History  Social History   Substance Use Topics   • Smoking status: Former Smoker   • Smokeless tobacco: Never Used   • Alcohol use No       Allergies  No Known Allergies    Medications  No current facility-administered medications on file prior to encounter.      Current Outpatient Prescriptions on File Prior to Encounter   Medication Sig Dispense Refill   • silver sulfADIAZINE (SILVADENE) 1 % Cream Apply 1 g to affected area(s) 2 Times a Day. 1 Each 1   • budesonide-formoterol (SYMBICORT) 160-4.5 MCG/ACT Aerosol Inhale 2 Puffs by mouth 2 Times a Day.     • omeprazole (PRILOSEC) 20 MG delayed-release capsule Take 20 mg by mouth every day.     • albuterol 108 (90 Base) MCG/ACT Aero Soln inhalation aerosol Inhale 1 Puff by mouth every four hours as needed for Shortness of Breath.     • Cyanocobalamin (VITAMIN B-12) 1000 MCG Tab Take 1,000 mcg by mouth every day.         Physical Exam  Hemodynamics  Temp (24hrs), Av.1 °C  (98.8 °F), Min:37.1 °C (98.8 °F), Max:37.1 °C (98.8 °F)   Temperature: 37.1 °C (98.8 °F)  Pulse  Av.2  Min: 49  Max: 100 Heart Rate (Monitored): 86  Blood Pressure : (!) 89/72, NIBP: 127/71      Respiratory      Respiration: 18, Pulse Oximetry: 97 %             Physical Exam   Constitutional: He is oriented to person, place, and time. No distress.   Chronically ill-appearing   HENT:   Head: Normocephalic and atraumatic.   Right Ear: External ear normal.   Left Ear: External ear normal.   Eyes: EOM are normal. Right eye exhibits no discharge. Left eye exhibits no discharge.   Neck: Neck supple. No JVD present.   Cardiovascular: Normal rate, regular rhythm and normal heart sounds.    Pulmonary/Chest: Effort normal and breath sounds normal. No respiratory distress. He exhibits no tenderness.   Abdominal: Soft. Bowel sounds are normal. He exhibits no distension. There is no tenderness.   Musculoskeletal: He exhibits no edema.   Neurological: He is alert and oriented to person, place, and time. No cranial nerve deficit.   Skin: He is not diaphoretic. No erythema.   Severe dry scaly rash throughout his entire body including his head and face, back and right axillary region has induration and erythema with open weeping wounds.   Psychiatric: He has a normal mood and affect. His behavior is normal.   Nursing note and vitals reviewed.    Capillary refill less than 3 seconds, distal pulses intact    Laboratory:          No results for input(s): ALTSGPT, ASTSGOT, ALKPHOSPHAT, TBILIRUBIN, DBILIRUBIN, GAMMAGT, AMYLASE, LIPASE, ALB, PREALBUMIN, GLUCOSE in the last 72 hours.              No results found for: TROPONINI    Imaging  Dx-chest-portable (1 View)    Result Date: 2019 8:34 PM HISTORY/REASON FOR EXAM:  Shortness of breath. Suspicion of sepsis. TECHNIQUE/EXAM DESCRIPTION AND NUMBER OF VIEWS: Single portable view of the chest. COMPARISON: 2019 FINDINGS: The heart, mediastinum, and anand are  unremarkable. The lungs are well expanded and show no evidence of infiltrate or other acute process. There is no obvious pleural effusion. The bony thorax is grossly normal. A right central venous line with an attached infusion port is unchanged in position.     No acute cardiopulmonary abnormality. No interval change.        Assessment/Plan:  Anticipate that patient will need greater than 2 midnights for management of the discussed medical issues.    * Severe sepsis (HCC)   Assessment & Plan    Patient presented from the outside facility with septic shock, he had hypotension requiring vasopressor therapy despite aggressive fluid hydration.  Since arrival in our emergency room, we have been able to wean him off of the vasopressor.  Nevertheless, given his lactic acid level greater than 4 and hypotension, he will be admitted to the ICU and I will start him on goal-directed therapy with broad-spectrum antibiotics with vancomycin and Unasyn for suspected cellulitis as the source of his sepsis.  I will continue gentle IV fluids, trend lactic acid levels, obtain a wound culture and monitor this along with his blood cultures for speciation and sensitivities.  The patient has no leukocytosis however he is immunocompromise given his known underlying lymphoma.  I requested wound care during this hospitalization.  The patient will be admitted to the ICU and I have made available Levophed should his blood pressures drop with goal of maintaining map greater than 60.     GERD (gastroesophageal reflux disease)- (present on admission)   Assessment & Plan    Continue home PPI.     Lymphoma ([facility])   Assessment & Plan    Outpatient follow-up once medically stable for discharge, patient is followed by Dr. Mc for his cutaneous T-cell lymphoma.         Prophylaxis: Heparin for DVT prophylaxis, home PPI indicated, bowel protocol as needed    I spent a total of 35 minutes of critical care time directly providing and coordinating  critical care and extensive data review for treatment of septic shock actively titrating multiple drips and infusions.  The assessment and plan reflect discussion of patient with consultants if needed family members when available, and additional research needed to obtain further information in formulating the plan of care of this patient.  This time includes no procedures or overlap with other providers.    I spent a total of 35 minutes of non face to face time performing additional research, reviewing medical records from transferring facility, discussing plan of care with other healthcare providers. Start time: 8:55PM. End time: 9:30PM.

## 2019-05-09 NOTE — OR SURGEON
Immediate Post- Operative Note        PostOp Diagnosis: T-CELL LYMPHOMA. SUSPECTED PORT INFECTION.       Procedure(s): RIGHT INTERNAL JUGULAR PORT REMOVAL, PORT RESERVOIR AND CATHETER REMOVED INTACT     PORT AND CATHETER SUBMITTED FOR C+S, FUNGAL CULTURE       Estimated Blood Loss: < 5CC        Complications: NONE            5/9/2019             11:25 AM                      Lm Zavala

## 2019-05-09 NOTE — PROGRESS NOTES
"Pharmacy Kinetics 68 y.o. male on vancomycin day # 1 2019    Vancomycin New Start    1500 mg IV dose administered  @ 1556 (prior to arrival from OSH)     1200 mg IV ordered to complete loading dose of 25 mg/kg     Indication for Treatment:   SSTI     Pertinent history per medical record:   Admitted on 2019 as a transfer from [facility] for sepsis. Patient presented to the OSH for general malaise and was found to have multiple wounds and moisture/weeping of the back. He was admitted to [facility] (formerly [facility] on  for rash thought to be related to pt's cutaneous T cell lymphoma. He was treated with antibiotics and discharged home on  with PO doxycycline to complete a 10 day course. Of note, patient does have a port in place. At the outlying facility, patient was hypotensive requiring vasopressors. He was transferred to [facility] (formerly [facility]) and will be continued on IV abx.     Other antibiotics:   Unasyn 3 gm IV q6h     Allergies:  Patient has no known allergies.     List concerns for renal function:   Hypotension requiring pressors, acutely elevated renal indices, hypermetabolic/sepsis, BMI 36, hypoalbuminemia    Pertinent cultures to date:    wound culture - in process    blood - in process     Recent Labs      19   WBC  10.3   NEUTSPOLYS  81.40*   BANDSSTABS  2.70     Recent Labs      19   BUN  22   CREATININE  1.22   ALBUMIN  2.0*     /68   Pulse 77   Temp 37.1 °C (98.8 °F) (Temporal)   Resp 18   Ht 1.727 m (5' 8\")   Wt 108 kg (238 lb 1.6 oz)   SpO2 98%  Temp (24hrs), Av.1 °C (98.8 °F), Min:37.1 °C (98.8 °F), Max:37.1 °C (98.8 °F)      A/P   1. Vancomycin dose change: start 1400 mg IV q12h ()   2. Next vancomycin level: 5/10 @ 1330 (ordered)   3. Goal trough: 12-16  4. Comments: Patient with multiple risk factors for drug accumulation and associated toxicity. Patient received partial loading dose at OSH; he will complete the 25 mg/kg loading dose tonight and will start a " scheduled regimen thereafter. Will obtain trough level prior to achieving steady state to ensure adequate drug clearance; would recommend obtaining a trough sooner if renal indices indicate impairment. Pharmacy will continue to monitor and adjust dosing or recommend de-escalation as appropriate.       Gia MarcanoD

## 2019-05-09 NOTE — ASSESSMENT & PLAN NOTE
MANJU/Kyree, last chemo in March  Sees Dr Mc  Did not responded well to Doxil the patient thinks it was about 3 months ago.  Oncology on board  Repeat CT reviewed  Diffuse painful skin lesions; continue PRN opiates for pain, wound care

## 2019-05-09 NOTE — CONSULTS
Consult Note: Hematology    Date of consultation: 5/9/2019 2:27 PM    Referring provider: Dr Agee  Reason for consultation: Progressive sezary syndrome/T-cell lymphoma with sepsis    History of presenting illness:     68 y.o. year old male with following oncological history diagnosed with psoriasis in the early 1990's and was treated for this with topical steroids, calcipotriene, acitretin, and methotrexate (does of up to 50 mg/week) with no improvement in his lesions being noted. On 06/23/16, a biopsy of his torso revealed an atypical lymphoid infiltrate. Gene rearrangement studies revealed no monoclonal peak in the TCR beta or TCR gamma regions. On 10/30/17, he was treated with a dose of guselkumab. The patient also developed an ulcerated lesion over his right medial forearm in NovemberDecember 2017. Repeat biopsies in November from his right back and right lateral arm demonstrated an epidermotropic infiltrate with spongiosis favoring a diagnosis of MF. Biopsy from the right medial arm demonstrated an ulcerated [id number] lymphoproliferative disorder favoring a diagnosis of ulcerated MF tumor or an ulcerated nodule of large LypP present concurrently with MF. On 12/11/17, he underwent a whole body CT scan which showed some uptake in his right axillary lymph node with an SUV of 3.8. The lymph node measured 1.7 cm in size. He was seen by Dr. Raina Brandt on 12/17/17 at [place] and it was noted that the patient had extensive hyperkeratotic patches and plaques distributed across the bilateral upper and lower extremities, chest, abdomen, and posterior trunk with an erythematous ulcerating lesion of the right medial arm. These findings were thought to be consistent with an aggressive type of CTCL. Differential diagnosis also included mycosis fungoides, CD8+ cytotoxic Tcell lymphoma, gamma/delta Tcell lymphoma and less likely  psoriasis or lichen planus. Additional biopsies were done. Peripheral blood for Sezary flow cytometry, blood TCR HTS studies, CBC, CMP, and LDH were drawn. HTLVI antibody to rule out smoldering ATLL was also drawn and it was decided not to biopsy the right axillary lymph node as this was thought to be reactive/dermatopathic in nature. However, the patient did not followup with [place] after that, and he saw Dr. Torres. From early 2018 to May 2018 he noted an increased in the size and number of his skin lesions, including drainage/infections at the skin lesions in his inguinal and perineal areas. Repeat PET scan on 04/30/18 showed increased activity within the normalsized left axillary lymph node with a maximum SUV of 3.03. Increased activity within the normalsized right axillary lymph node with a maximum SUV of 3.01 was also seen. No other adenopathy or any organomegaly was seen. Multiple foci of increased activity at the skin surface in the left axilla with associated tendinous thickening with a maximum SUV of 3.14 was seen. Curvilinear increased activity in the right flank with associated thickness was also seen. In addition, a right lower abdominal wall lesion with an SUV of 6.59 was seen. Also noted was a perineal lesion posteriorly with an SUV of 7.4. Chemotherapy with brentuximab vedotin was started as of 05/25/18. His dose of chemotherapy was reduced with his fourth and fifth doses because of neuropathy. Echocardiogram on 12/13/18 reported an LVEF of 60%. He had a port placed on 12/13/18. PET scan on 12/13/18 reported a mix response. His treatment was changed to Doxil and this was started on 12/17/18. Initially, the patient had a good response to this treatment, however, he was thought to have disease progression on 03/01/19 but his lesions then improved and a fourth dose of Doxil was given on 03/11/19. however, after this dose, the patient had  disease progression with worsening of his skin lesions and required hospitalization and antibiotics.   Dr. Mc has decided to change his treatment to Romidepsin (Istodax).     Unfortunately, he could never start treatment due to multiple hospitalization for cellulitis/sepsis over the past 2 months.  Currently admitted with sepsis needing pressors.  Currently off of pressors.  Port-A-Cath removal plan.  He is miserable.  He has also developed significant bipedal edema and generalized weakness      Past Medical History:    Past Medical History:   Diagnosis Date   • Asthma    • Cancer (HCC)    • Dental disorder    • Heart burn    • Hiatus hernia syndrome    • Indigestion    • Snoring        Past surgical history:    Past Surgical History:   Procedure Laterality Date   • OTHER ABDOMINAL SURGERY         Allergies:  Patient has no known allergies.    Medications:    Current Facility-Administered Medications   Medication Dose Route Frequency Provider Last Rate Last Dose   • vancomycin 1,400 mg in  mL IVPB  1,400 mg Intravenous Q12HR Ofelia Lemon M.D. 250 mL/hr at 05/09/19 1404 1,400 mg at 05/09/19 1404   • Pharmacy Consult Request ...Pain Management Review 1 Each  1 Each Other PHARMACY TO DOSE Abhi Hammonds Jr., D.O.       • HYDROmorphone (DILAUDID) 0.2 mg/mL in 50 mL NS (PCA)   Intravenous Continuous SANDIE Murray Jr..O.       • HYDROmorphone pf (DILAUDID) injection 0.5 mg  0.5 mg Intravenous Once PRN Abhi Hammonds Jr., D.O.       • acetaminophen (TYLENOL) tablet 1,000 mg  1,000 mg Oral Q6HRS Abhi Hammonds Jr. D.O.   1,000 mg at 05/09/19 1301   • omeprazole (PRILOSEC) capsule 20 mg  20 mg Oral DAILY Ofelia Lemon M.D.   Stopped at 05/09/19 0600   • budesonide-formoterol (SYMBICORT) 160-4.5 MCG/ACT inhaler 2 Puff  2 Puff Inhalation BID (RT) Ofelia Lemon M.D.   Stopped at 05/08/19 2215   • senna-docusate (PERICOLACE or SENOKOT S) 8.6-50 MG per tablet 2 Tab  2 Tab Oral BID Ofelia Lemon M.D.    Stopped at 05/09/19 0600    And   • polyethylene glycol/lytes (MIRALAX) PACKET 1 Packet  1 Packet Oral QDAY PRN Ofelia Lemon M.D.        And   • magnesium hydroxide (MILK OF MAGNESIA) suspension 30 mL  30 mL Oral QDAY PRN Ofelia Lemon M.D.        And   • bisacodyl (DULCOLAX) suppository 10 mg  10 mg Rectal QDAY PRN Ofelia Lemon M.D.       • Respiratory Care per Protocol   Nebulization Continuous RT Ofelia Lemon M.D.       • NS (BOLUS) infusion 500 mL  500 mL Intravenous Once PRN Ofelia Lemon M.D.       • heparin injection 5,000 Units  5,000 Units Subcutaneous Q8HRS Ofelia Lemon M.D.       • ampicillin/sulbactam (UNASYN) 3 g in  mL IVPB  3 g Intravenous Q6HRS Ofelia Lemon M.D.   Stopped at 05/09/19 1331   • MD Alert...Vancomycin per Pharmacy  1 Each Other PHARMACY TO DOSE Ofelia Lemon M.D.       • norepinephrine (LEVOPHED) 8 mg in  mL Infusion  0.5-30 mcg/min Intravenous Continuous Ofelia Lemon M.D.        And   • vasopressin (VASOSTRICT) 20 Units in  mL Infusion  0.03 Units/min Intravenous Continuous Ofelia Lemon M.D.       • albuterol inhaler 2 Puff  2 Puff Inhalation Q4HRS PRN Josef Stephenson M.D.           Social History:     Social History     Social History   • Marital status:      Spouse name: N/A   • Number of children: N/A   • Years of education: N/A     Occupational History   • Not on file.     Social History Main Topics   • Smoking status: Former Smoker   • Smokeless tobacco: Never Used   • Alcohol use No   • Drug use: No   • Sexual activity: Not on file     Other Topics Concern   • Not on file     Social History Narrative   • No narrative on file       Family History:   No family history on file.    Review of Systems:  All other review of systems are negative except what was mentioned above in the HPI.    Constitutional: No fever, chills, positive weight loss ,malaise/fatigue.    HEENT: No new auditory or visual complaints. No sore throat and neck pain.  "    Respiratory:No new cough, sputum production, shortness of breath and wheezing.    Cardiovascular: No new chest pain, palpitations, orthopnea and leg swelling.    Gastrointestinal: No heartburn, nausea, vomiting ,abdominal pain, hematochezia or melena     Genitourinary: Negative for dysuria, hematuria    Musculoskeletal: No new arthralgias or myalgias   Skin: As above neurological: Negative for focal weakness or headaches.    Endo/Heme/Allergies: No abnormal bleed/bruise.    Psychiatric/Behavioral: No new depression/anxiety.    Physical Exam:  Vitals:   BP (!) 90/50   Pulse 80   Temp 36.6 °C (97.8 °F) (Temporal)   Resp (!) 29   Ht 1.727 m (5' 8\")   Wt 108 kg (238 lb 1.6 oz)   SpO2 98%   BMI 36.20 kg/m²     General: Not in acute distress, alert and oriented x 3, currently in the bed comfortable  HEENT: No pallor, icterus. Oropharynx clear.   Neck: Supple, no palpable masses.  CVS: regular rate and rhythm, no rubs or gallops  RESP: Clear to auscultate bilaterally, no wheezing or crackles.   ABD: Soft, non tender, non distended, positive bowel sounds, no palpable organomegaly  EXT: 2+ bipedal edema  CNS: Alert and oriented x3, No focal deficits.  Skin-widespread rash from C3 syndrome with few open areas.    Labs:   Recent Labs      05/08/19 2034 05/09/19   0535  05/09/19   0855   RBC  4.14*  3.35*   --    HEMOGLOBIN  11.4*  9.3*   --    HEMATOCRIT  37.8*  30.6*   --    PLATELETCT  389  274   --    PROTHROMBTM   --    --   16.7*   INR   --    --   1.34*     Lab Results   Component Value Date/Time    SODIUM 142 05/09/2019 05:35 AM    POTASSIUM 4.2 05/09/2019 05:35 AM    CHLORIDE 109 05/09/2019 05:35 AM    CO2 25 05/09/2019 05:35 AM    GLUCOSE 121 (H) 05/09/2019 05:35 AM    BUN 22 05/09/2019 05:35 AM    CREATININE 1.09 05/09/2019 05:35 AM        Assessment and Plan:    Progressive sezary syndrome/T-cell lymphoma with sepsis-he has been through various regimens as above.  PET scan from 12/2018 showed mixed " treatment response.  He then developed significant worsening of his cutaneous lesions with plan to try romidepsin by Dr. Mc on 4/2018.  Unfortunately he could never start the treatment due to multiple hospitalization from recurrent infection/sepsis.  He has also developed worsening failure to thrive symptoms and generalized weakness.  Currently recovering from another episode of sepsis/cellulitis.  Port-A-Cath removal planned.  Unfortunately he continues to have severe worsening of his sensory syndrome with the multiple open wound areas.  We will obtain restaging CT of the chest abdomen pelvis to compare with the prior PET scan.  If there is significant visceral disease progression hospice discussion may be appropriate.  If his visceral disease is under control and if he is having significant skin only progression , can consider moxetumomab as outpatient which has been recently approved.  Dr. Mc can decide on this office outpatient.   Antibiotics per infectious disease.    He agreed and verbalized his agreement and understanding with the current plan.  I answered all questions and concerns he has at this time.              Thank you for allowing me to participate in his care.    Please note that this dictation was created using voice recognition software. I have made every reasonable attempt to correct obvious errors, but I expect that there are errors of grammar and possibly content that I did not discover before finalizing the note.      SIGNATURES:  Néstor Bradley    CC:  No primary care provider on file.  No ref. provider found

## 2019-05-09 NOTE — CONSULTS
Critical Care Consultation    Date of consult: 5/9/2019    Referring Physician  No att. providers found    Reason for Consultation  Shock    History of Presenting Illness  68 y.o. male who presented 5/8/2019 with septic shock from presumed from cellulitis.  Patient was transferred from [facility] on a Levophed drip infused through an old port in his right chest.  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  Apparently last chemotherapy was was several months ago.  He has a history of recurrent skin infections with severe infection with with sepsis.  On this admission he has several days of weakness but no overt shaking chills that he can recall.  At the outside facility had elevated lactate level and hypotension in the 70s over 40s.  He was placed on a Levophed drip.  He arrives chronically ill-appearing hypotensive but mentating adequately.  I placed a central line and we discontinued his peripheral Levophed to central.  He received vancomycin and Zosyn at the outside hospital as well as 3700 cc of isotonic solution.    Code Status  Full Code    Review of Systems  Review of Systems   Constitutional: Positive for chills and weight loss.        Patient has a chronic appearing rash on about 70 to 90% of his skin service.  He states this is chronic although there is some areas are inflamed and looked to be different size lesions.  This is been described as cutaneous lymphoma lymphoma.  However, near the site of his port on his right chest his right sub-subclavian vein area there is a large amount of drainage that appears purulent it is not coming direct from the catheter site but is associated with the catheter site.  In contrast there is very little evidence of active or old infection at the left ventricular line.   HENT: Negative.    Eyes: Negative.    Respiratory: Positive for shortness of breath.    Cardiovascular: Negative.    Gastrointestinal: Negative.     Genitourinary: Negative.    Musculoskeletal: Negative.    Skin: Positive for rash.   Neurological: Positive for weakness.   Psychiatric/Behavioral: Negative.        Past Medical History   has a past medical history of Asthma; Cancer (HCC); Dental disorder; Heart burn; Hiatus hernia syndrome; Indigestion; and Snoring.    Surgical History   has a past surgical history that includes other abdominal surgery.    Family History  family history is not on file.    Social History   reports that he has quit smoking. He has never used smokeless tobacco. He reports that he does not drink alcohol or use drugs.    Medications  Home Medications     Reviewed by Marialuisa Ornelas R.N. (Registered Nurse) on 05/08/19 at 1944  Med List Status: <None>   Medication Last Dose Status   albuterol 108 (90 Base) MCG/ACT Aero Soln inhalation aerosol  Active   budesonide-formoterol (SYMBICORT) 160-4.5 MCG/ACT Aerosol  Active   Cyanocobalamin (VITAMIN B-12) 1000 MCG Tab  Active   omeprazole (PRILOSEC) 20 MG delayed-release capsule  Active   silver sulfADIAZINE (SILVADENE) 1 % Cream  Active              Current Facility-Administered Medications   Medication Dose Route Frequency Provider Last Rate Last Dose   • vancomycin 1,400 mg in  mL IVPB  1,400 mg Intravenous Q12HR Ofelia Lemon M.D.       • fentaNYL (SUBLIMAZE) injection 25 mcg  25 mcg Intravenous Q2HRS PRN Kulwinder Velazco M.D.   25 mcg at 05/09/19 0419   • omeprazole (PRILOSEC) capsule 20 mg  20 mg Oral DAILY Ofelia Lemon M.D.   Stopped at 05/09/19 0600   • budesonide-formoterol (SYMBICORT) 160-4.5 MCG/ACT inhaler 2 Puff  2 Puff Inhalation BID (RT) Ofelia Lemon M.D.   Stopped at 05/08/19 2215   • senna-docusate (PERICOLACE or SENOKOT S) 8.6-50 MG per tablet 2 Tab  2 Tab Oral BID Ofelia Lemon M.D.   Stopped at 05/09/19 0600    And   • polyethylene glycol/lytes (MIRALAX) PACKET 1 Packet  1 Packet Oral QDAY PRN Ofelia Lemon M.D.        And   • magnesium hydroxide (MILK  OF MAGNESIA) suspension 30 mL  30 mL Oral QDAY PRN Ofelia Lemon M.D.        And   • bisacodyl (DULCOLAX) suppository 10 mg  10 mg Rectal QDAY PRN Ofelia Lemon M.D.       • Respiratory Care per Protocol   Nebulization Continuous RT Ofelia Lemon M.D.       • NS (BOLUS) infusion 500 mL  500 mL Intravenous Once PRN Ofelia Lemon M.D.       • heparin injection 5,000 Units  5,000 Units Subcutaneous Q8HRS Ofelia Lemon M.D.       • ampicillin/sulbactam (UNASYN) 3 g in  mL IVPB  3 g Intravenous Q6HRS Ofelia Lemon M.D. 200 mL/hr at 05/09/19 0522 3 g at 05/09/19 0522   • MD Alert...Vancomycin per Pharmacy  1 Each Other PHARMACY TO DOSE Ofelia Lemon M.D.       • norepinephrine (LEVOPHED) 8 mg in  mL Infusion  0.5-30 mcg/min Intravenous Continuous Ofelia Lemon M.D.        And   • vasopressin (VASOSTRICT) 20 Units in  mL Infusion  0.03 Units/min Intravenous Continuous Ofelia Lemon M.D.       • albuterol inhaler 2 Puff  2 Puff Inhalation Q4HRS PRN Josef Stephenson M.D.           Allergies  No Known Allergies    Vital Signs last 24 hours  Temp:  [36.3 °C (97.3 °F)-37.1 °C (98.8 °F)] 36.9 °C (98.4 °F)  Pulse:  [] 71  Resp:  [15-36] 18  BP: ()/(68-72) 104/68  SpO2:  [78 %-100 %] 100 %    Physical Exam  Physical Exam   Constitutional:   Patient has a disseminated rash over approximately 70 to 80% of his skin which he states is been largely chronic there is patchy with areas that are inflamed some areas of where his purulence in areas it is simply seem irritated.  There is no gross fluctuance       Fluids    Intake/Output Summary (Last 24 hours) at 05/09/19 0701  Last data filed at 05/09/19 0600   Gross per 24 hour   Intake          1708.33 ml   Output              375 ml   Net          1333.33 ml       Laboratory  Recent Results (from the past 48 hour(s))   EKG (NOW)    Collection Time: 05/08/19  8:06 PM   Result Value Ref Range    Report       [facility]  Emergency Dept.    Test Date:  2019  Pt Name:    DOMENICO MUNOZ                Department: ER  MRN:        [id number]                      Room:       BL 20  Gender:     Male                         Technician: [id number]  :        1950                   Requested By:ER TRIAGE PROTOCOL  Order #:    [id number]                    Reading MD:    Measurements  Intervals                                Axis  Rate:       91                           P:          38  SD:         144                          QRS:        -17  QRSD:       82                           T:          17  QT:         384  QTc:        473    Interpretive Statements  SINUS RHYTHM  VENTRICULAR TRIGEMINY  BORDERLINE LEFT AXIS DEVIATION  LOW VOLTAGE THROUGHOUT  No previous ECG available for comparison     Lactic acid (lactate)    Collection Time: 19  8:34 PM   Result Value Ref Range    Lactic Acid 2.9 (H) 0.5 - 2.0 mmol/L   CBC WITH DIFFERENTIAL    Collection Time: 19  8:34 PM   Result Value Ref Range    WBC 10.3 4.8 - 10.8 K/uL    RBC 4.14 (L) 4.70 - 6.10 M/uL    Hemoglobin 11.4 (L) 14.0 - 18.0 g/dL    Hematocrit 37.8 (L) 42.0 - 52.0 %    MCV 91.3 81.4 - 97.8 fL    MCH 27.5 27.0 - 33.0 pg    MCHC 30.2 (L) 33.7 - 35.3 g/dL    RDW 55.4 (H) 35.9 - 50.0 fL    Platelet Count 389 164 - 446 K/uL    MPV 9.0 9.0 - 12.9 fL    Neutrophils-Polys 81.40 (H) 44.00 - 72.00 %    Lymphocytes 13.30 (L) 22.00 - 41.00 %    Monocytes 0.00 0.00 - 13.40 %    Eosinophils 2.60 0.00 - 6.90 %    Basophils 0.00 0.00 - 1.80 %    Nucleated RBC 0.00 /100 WBC    Neutrophils (Absolute) 8.66 (H) 1.82 - 7.42 K/uL    Lymphs (Absolute) 1.37 1.00 - 4.80 K/uL    Monos (Absolute) 0.00 0.00 - 0.85 K/uL    Eos (Absolute) 0.27 0.00 - 0.51 K/uL    Baso (Absolute) 0.00 0.00 - 0.12 K/uL    NRBC (Absolute) 0.00 K/uL    Anisocytosis 1+     Microcytosis 1+    COMP METABOLIC PANEL    Collection Time: 19  8:34 PM   Result Value Ref Range    Sodium 140 135 - 145 mmol/L    Potassium  4.5 3.6 - 5.5 mmol/L    Chloride 107 96 - 112 mmol/L    Co2 23 20 - 33 mmol/L    Anion Gap 10.0 0.0 - 11.9    Glucose 95 65 - 99 mg/dL    Bun 22 8 - 22 mg/dL    Creatinine 1.22 0.50 - 1.40 mg/dL    Calcium 7.0 (L) 8.5 - 10.5 mg/dL    AST(SGOT) 16 12 - 45 U/L    ALT(SGPT) 9 2 - 50 U/L    Alkaline Phosphatase 106 (H) 30 - 99 U/L    Total Bilirubin 0.4 0.1 - 1.5 mg/dL    Albumin 2.0 (L) 3.2 - 4.9 g/dL    Total Protein 5.1 (L) 6.0 - 8.2 g/dL    Globulin 3.1 1.9 - 3.5 g/dL    A-G Ratio 0.6 g/dL   ESTIMATED GFR    Collection Time: 19  8:34 PM   Result Value Ref Range    GFR If African American >60 >60 mL/min/1.73 m 2    GFR If Non African American 59 (A) >60 mL/min/1.73 m 2   MORPHOLOGY    Collection Time: 19  8:34 PM   Result Value Ref Range    RBC Morphology Present     Poikilocytosis 2+     Tear Drop Cells 1+     Echinocytes 1+    PERIPHERAL SMEAR REVIEW    Collection Time: 19  8:34 PM   Result Value Ref Range    Peripheral Smear Review see below    DIFFERENTIAL MANUAL    Collection Time: 19  8:34 PM   Result Value Ref Range    Bands-Stabs 2.70 0.00 - 10.00 %    Manual Diff Status PERFORMED    PLATELET ESTIMATE    Collection Time: 19  8:34 PM   Result Value Ref Range    Plt Estimation Normal    EKG    Collection Time: 19 11:58 PM   Result Value Ref Range    Report       [facility]    Test Date:  2019  Pt Name:    DOMENICO MUNOZ                Department: [facility]  MRN:        [id number]                      Room:       R106  Gender:     Male                         Technician: PADMINI  :        1950                   Requested By:CHRIST PEREZ  Order #:    [id number]                    Reading MD:    Measurements  Intervals                                Axis  Rate:       87                           P:          49  NE:         156                          QRS:        -10  QRSD:       86                           T:          66  QT:         404  QTc:         486    Interpretive Statements  SINUS RHYTHM  PAIRED VENTRICULAR PREMATURE COMPLEXES  LOW VOLTAGE THROUGHOUT  BORDERLINE PROLONGED QT INTERVAL  ARTIFACT IN LEAD(S) II,III,aVL,aVF,V1  Compared to ECG 05/08/2019 20:06:25  No significant changes     CBC without Differential    Collection Time: 05/09/19  5:35 AM   Result Value Ref Range    WBC 5.4 4.8 - 10.8 K/uL    RBC 3.35 (L) 4.70 - 6.10 M/uL    Hemoglobin 9.3 (L) 14.0 - 18.0 g/dL    Hematocrit 30.6 (L) 42.0 - 52.0 %    MCV 91.3 81.4 - 97.8 fL    MCH 27.8 27.0 - 33.0 pg    MCHC 30.4 (L) 33.7 - 35.3 g/dL    RDW 54.2 (H) 35.9 - 50.0 fL    Platelet Count 274 164 - 446 K/uL    MPV 9.4 9.0 - 12.9 fL   Basic Metabolic Panel (BMP)    Collection Time: 05/09/19  5:35 AM   Result Value Ref Range    Sodium 142 135 - 145 mmol/L    Potassium 4.2 3.6 - 5.5 mmol/L    Chloride 109 96 - 112 mmol/L    Co2 25 20 - 33 mmol/L    Anion Gap 8.0 0.0 - 11.9       Imaging      Assessment/Plan  * Septic shock (HCC)- (present on admission)   Assessment & Plan    This patient came in with presumed sepsis from either cellulitis or possibly a port infection.  It is not yet clear.  Patient received 4 L of fluid and pressors so qualifies as having septic shock.  Pressor requirement is now diminished and there is no associated other severe organ failure at this point.  We will obtain an infectious disease consultation wound care consult Silvadene to the wounds broad-spectrum antibiotics pending culture results.     Severe sepsis (HCC)   Assessment & Plan    See above.  Patient is receiving vancomycin and Zosyn.  Which     Lymphoma (HCC)   Assessment & Plan    I do not have the details on his treatment for lymphoma.  The patient notes that he has been told by his oncologist that he has not responded well Oridol to the chemotherapy she is received today.  The patient thinks his last chemotherapy was about 3 months ago.  Given his apparent sepsis septic state with septic shock is not eligible for  chemotherapy right now.  We will obtain oncology consult pending clinical course.         Discussed patient condition and risk of morbidity and/or mortality with Hospitalist, RN, RT, Pharmacy and Patient.    The patient remains critically ill.  Critical care time = 130 minutes in directly providing and coordinating critical care and extensive data review.  No time overlap and excludes procedures.

## 2019-05-09 NOTE — ASSESSMENT & PLAN NOTE
Hold outpatient due to currently on broad-spectrum antibiotics by ID.  Avoid C. difficile inducing medications such as PPIs.

## 2019-05-09 NOTE — ED PROVIDER NOTES
ED Provider Note    Scribed for Josef Stephenson M.D. by Christina Gold. 5/8/2019  8:26 PM    Means of arrival: med flight  History obtained from: patient  History limited by: none    CHIEF COMPLAINT  Chief Complaint   Patient presents with   • Blood Infection     Transfer from [place] for sepsis related to recurrent skin infections    • Hypotension     Needing Levo to maintain a map >65       HPI  Marco Antonio Ortiz is a 68 y.o. male who presents to the Emergency Department as a transfer from [facility] on a levophed drip for evaluation of sepsis due to diffuse skin breakdown and cellulitis. 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 Patient presented to [facility] ED complaining of worsening generalized weakness and malaise that has been progressing over the last few days. Lab work completed at transferring facility showed elevated lactic acid, and patient was not maintaining his blood pressure at 79/46, required to be placed on a Levophed drip. Patient is also reporting mild difficulty breathing that has been intermittently present for several days. He does note a history of asthma, however, he does not smoke.  No complaints of vomiting, diarrhea, cough, chest pain, urinary symptoms, chest pain, abdominal pain.  Patient received 3700ml of IV fluids in total, receiving Vancomycin and Zosyn prior to arrival.    REVIEW OF SYSTEMS  Pertinent positives include: diffuse body rash with skin infection.  Pertinent negatives include: vomiting, diarrhea, cough, chest pain, urinary symptoms, chest pain, abdominal pain.  10+ systems reviewed and negative.      PAST MEDICAL HISTORY  Past Medical History:   Diagnosis Date   • Asthma    • Cancer (HCC)    • Dental disorder    • Heart burn    • Hiatus hernia syndrome    • Indigestion    • Snoring      SOCIAL HISTORY  Social History   Substance Use Topics   •  "Smoking status: Former Smoker   • Smokeless tobacco: Never Used   • Alcohol use No     History   Drug Use No       SURGICAL HISTORY  Past Surgical History:   Procedure Laterality Date   • OTHER ABDOMINAL SURGERY         CURRENT MEDICATIONS  Home Medications     Reviewed by Marialuisa Ornelas R.N. (Registered Nurse) on 05/08/19 at 1944  Med List Status: <None>   Medication Last Dose Status   albuterol 108 (90 Base) MCG/ACT Aero Soln inhalation aerosol  Active   budesonide-formoterol (SYMBICORT) 160-4.5 MCG/ACT Aerosol  Active   Cyanocobalamin (VITAMIN B-12) 1000 MCG Tab  Active   omeprazole (PRILOSEC) 20 MG delayed-release capsule  Active   silver sulfADIAZINE (SILVADENE) 1 % Cream  Active                ALLERGIES  No Known Allergies    PHYSICAL EXAM  VITAL SIGNS: BP (!) 89/72   Pulse 75   Temp 37.1 °C (98.8 °F) (Temporal)   Resp 18   Ht 1.727 m (5' 8\")   Wt 108 kg (238 lb 1.6 oz)   SpO2 97%   BMI 36.20 kg/m²   Reviewed and hypotensive    Constitutional: Well developed, Well nourished, ill appearing male.  HENT: Normocephalic, atraumatic, bilateral external ears normal, oropharynx dry, No exudates or erythema.   Eyes: PERRLA, conjunctiva pale, no scleral icterus.   Cardiovascular: Regular rate and rhythm. No murmurs, rubs or gallops.   Respiratory: Lungs clear to auscultation bilaterally. No wheezes, rales, or rhonchi.   Abdominal:  Abdomen soft, non-tender, non distended. No rebound, or guarding.    Skin: patches or erythema and scale with extensive erosions of the epidermis into the dermis involves entire torso, arms, legs and face, Whitened edematous skin on his back and buttock with several superficial erosion into the dermis, no pus or decubiti   Musculoskeletal: diffuse edema.   Neurologic: Alert & oriented x 3, cranial nerves 2-12 intact by passive exam.  No focal deficit noted.  Psychiatric: Affect normal, Judgment normal, Mood normal.     DIFFERENTIAL DIAGNOSIS:  Sepsis, cellulitis, septic shock, " cutaneous lymphoma, candidiasis.    EKG Interpretation:  Interpreted by me    Rhythm:  Normal sinus rhythm with PVCs  Rate: 91  Axis: normal  Ectopy: none  Conduction: normal  ST Segments: no acute change  T Waves: no acute change  Q Waves: none  Clinical Impression: Normal EKG without acute changes     RADIOLOGY/PROCEDURES  DX-CHEST-PORTABLE (1 VIEW)   Final Result      No acute cardiopulmonary abnormality. No interval change.        Radiologist interpretation have been reviewed by me.     LABORATORY:  Results for orders placed or performed during the hospital encounter of 05/08/19   Lactic acid (lactate)   Result Value Ref Range    Lactic Acid 2.9 (H) 0.5 - 2.0 mmol/L   CBC WITH DIFFERENTIAL   Result Value Ref Range    WBC 10.3 4.8 - 10.8 K/uL    RBC 4.14 (L) 4.70 - 6.10 M/uL    Hemoglobin 11.4 (L) 14.0 - 18.0 g/dL    Hematocrit 37.8 (L) 42.0 - 52.0 %    MCV 91.3 81.4 - 97.8 fL    MCH 27.5 27.0 - 33.0 pg    MCHC 30.2 (L) 33.7 - 35.3 g/dL    RDW 55.4 (H) 35.9 - 50.0 fL    Platelet Count 389 164 - 446 K/uL    MPV 9.0 9.0 - 12.9 fL    Neutrophils-Polys 81.40 (H) 44.00 - 72.00 %    Lymphocytes 13.30 (L) 22.00 - 41.00 %    Monocytes 0.00 0.00 - 13.40 %    Eosinophils 2.60 0.00 - 6.90 %    Basophils 0.00 0.00 - 1.80 %    Nucleated RBC 0.00 /100 WBC    Neutrophils (Absolute) 8.66 (H) 1.82 - 7.42 K/uL    Lymphs (Absolute) 1.37 1.00 - 4.80 K/uL    Monos (Absolute) 0.00 0.00 - 0.85 K/uL    Eos (Absolute) 0.27 0.00 - 0.51 K/uL    Baso (Absolute) 0.00 0.00 - 0.12 K/uL    NRBC (Absolute) 0.00 K/uL    Anisocytosis 1+     Microcytosis 1+    COMP METABOLIC PANEL   Result Value Ref Range    Sodium 140 135 - 145 mmol/L    Potassium 4.5 3.6 - 5.5 mmol/L    Chloride 107 96 - 112 mmol/L    Co2 23 20 - 33 mmol/L    Anion Gap 10.0 0.0 - 11.9    Glucose 95 65 - 99 mg/dL    Bun 22 8 - 22 mg/dL    Creatinine 1.22 0.50 - 1.40 mg/dL    Calcium 7.0 (L) 8.5 - 10.5 mg/dL    AST(SGOT) 16 12 - 45 U/L    ALT(SGPT) 9 2 - 50 U/L    Alkaline  Phosphatase 106 (H) 30 - 99 U/L    Total Bilirubin 0.4 0.1 - 1.5 mg/dL    Albumin 2.0 (L) 3.2 - 4.9 g/dL    Total Protein 5.1 (L) 6.0 - 8.2 g/dL    Globulin 3.1 1.9 - 3.5 g/dL    A-G Ratio 0.6 g/dL   Urine and blood cultures pending.  Lab results reviewed by me.     INTERVENTIONS:  Patient already received Zosyn vancomycin and 3.7 L of normal saline.  Levophed discontinued and blood pressure remained stable in the 1 teens.    ED COURSE:  Nursing notes, VS, PMSFHx reviewed in chart.     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 Patient will be treated with IV fluids to continue to maintain his blood pressures.. Ordered chest xray, lactic acid, CBC, CMP, UA, urine culture, blood culture x2 and EKG to evaluate. I informed the patient that I would repeat lab work completed at transferring facility and admit him for further evaluation. He understands and agrees with treatment plan.    8:45 PM Spoke with Dr. Lemon, Hospitalist, about the patient's condition. Agrees to admit the patient.    MEDICAL DECISION MAKING:  This patient presents with sepsis with shock due to cellulitis.  He has severe skin breakdown due to cutaneous T-cell lymphoma and cellulitis.  There is no evidence of abscess or necrotizing fasciitis    PLAN:  DISPOSITION:  Patient will be admitted to Dr. Lemon, Hospitalist in guarded condition to ICU for IV antibiotics, IV fluids, skin care, pressors as needed.    CONDITION: guarded.     FINAL IMPRESSION  1. Severe sepsis with septic shock (HCC)    2. Cellulitis, unspecified cellulitis site    3. 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      Medical care time 32 minutes     Christina CHARLES  (Scribe), am scribing for, and in the presence of, Josef Stephenson M.D..    Electronically signed by: Christina Gold (Scribe), 5/8/2019    IJosef M.D. personally performed the services described in this documentation, as scribed by Christina Gold in my presence, and it is both accurate and complete.    The note accurately reflects work and decisions made by me.  Josef Stephenson  5/9/2019  7:52 AM

## 2019-05-09 NOTE — PROCEDURES
Central Line Insertion  Date/Time: 5/9/2019 4:50 AM  Performed by: RAYMOND HAWTHORNE  Authorized by: RAYMOND HAWTHORNE     Consent:     Consent obtained:  Verbal    Consent given by:  Patient    Risks discussed:  Arterial puncture, incorrect placement, nerve damage, bleeding, infection and pneumothorax    Alternatives discussed:  Alternative treatment  Pre-procedure details:     Hand hygiene: Hand hygiene performed prior to insertion      Sterile barrier technique: All elements of maximal sterile technique followed      Skin preparation:  ChloraPrep  Sedation:     Sedation type:  None  Anesthesia:     Anesthesia method:  Local infiltration    Local anesthetic:  Lidocaine 1% w/o epi  Procedure details:     Location:  L subclavian    Patient position:  Flat    Procedural supplies:  Triple lumen    Catheter size:  7.5 Fr    Landmarks identified: yes      Ultrasound guidance: no      Number of attempts:  1  Post-procedure details:     Post-procedure:  Dressing applied and line sutured    Assessment:  Blood return through all ports, no pneumothorax on x-ray, placement verified by x-ray and free fluid flow    Patient tolerance of procedure:  Tolerated well, no immediate complications  Comments:      Indication: poor IV access, severe sepsis with possible need for pressors soon

## 2019-05-09 NOTE — CONSULTS
Infectious Disease Consultation    Date of consult: 5/9/2019    Referring Physician  Antelmo Agee M.D.    Reason for Consultation  Diffuse rash    History of Presenting Illness  68 y.o. Male with hx of cutaneous T Cell lymphoma (Mycosis fungoides) dx in 11/2017, had extensive w/u at [place] for his 20 year hx of widespread hyperkeratotic plaques who is now being followed by Dr. Mc, [facility] oncologist and pt is supposed to get chemotherapy but hasn't been able to due to transportation issues.  He had portacath that was placed in 12/2017 (per wife) and had last chemotherapy was 3 months ago. He had multiple hospital admissions for cellulitis from diffuse open widespread skin in 3/2019 and 4/2019. First admission was at [place] and 2nd admission was here at [facility]. Hospitalized from 4/8-4/13 and treated with vanco/unasyn and discharged with doxycyline x 10 days.     Pt was admitted last night for septic shock due to cellulitis. Per note, pt has been feeling weak with subjective fever 3 days prior to admission. Pt went to [facility] ED and had lactate >4 and was hypotensive and had 3.7L fluid resuscitation and was started on vanco and zosyn.     At [facility], pt was not hypotensive, afebrile, HR in 60-70s, on 3-4L NC saturating >95%. Left subclavian central line was placed. Blood cultures obtained. Started on vancomycin and unasyn. Lactate 2.9. On IV maintenance fluids at 100cc/hr. Portacath was removed today by IR. Good UOP    Both patient and wife are poor historian. I called wife at [phone] and having difficulty of communication due to poor cellular connection. All history obtained from her, patient and epic record.    Code Status  Full Code    Review of Systems  Review of Systems   Constitutional: Positive for fever and malaise/fatigue. Negative for chills.   Respiratory: Negative for cough, sputum production and shortness of breath.    Cardiovascular: Negative for chest pain, palpitations, orthopnea  and claudication.   Gastrointestinal: Negative for abdominal pain, nausea and vomiting.   Genitourinary: Negative for dysuria, frequency and urgency.   Musculoskeletal: Negative for joint pain.   Skin: Positive for rash.        Diffuse tenderness throughout from numerous open wounds   Neurological: Negative for weakness and headaches.   Psychiatric/Behavioral: The patient is not nervous/anxious.        Past Medical History   has a past medical history of Asthma; Cancer (HCC); Dental disorder; Heart burn; Hiatus hernia syndrome; Indigestion; and Snoring.    Surgical History   has a past surgical history that includes other abdominal surgery.    Family History  family history is not on file.    Social History   reports that he has quit smoking. He has never used smokeless tobacco. He reports that he does not drink alcohol or use drugs.    Medications  Home Medications     Reviewed by Marialuisa Ornelas R.N. (Registered Nurse) on 05/08/19 at 1944  Med List Status: <None>   Medication Last Dose Status   albuterol 108 (90 Base) MCG/ACT Aero Soln inhalation aerosol  Active   budesonide-formoterol (SYMBICORT) 160-4.5 MCG/ACT Aerosol  Active   Cyanocobalamin (VITAMIN B-12) 1000 MCG Tab  Active   omeprazole (PRILOSEC) 20 MG delayed-release capsule  Active   silver sulfADIAZINE (SILVADENE) 1 % Cream  Active              Current Facility-Administered Medications   Medication Dose Route Frequency Provider Last Rate Last Dose   • vancomycin 1,400 mg in  mL IVPB  1,400 mg Intravenous Q12HR Ofelia Lemon M.D.       • Pharmacy Consult Request ...Pain Management Review 1 Each  1 Each Other PHARMACY TO DOSE Abhi Hammonds Jr., SANDIE.O.       • HYDROmorphone (DILAUDID) 0.2 mg/mL in 50 mL NS (PCA)   Intravenous Continuous SANDIE Murray Jr..O.       • HYDROmorphone pf (DILAUDID) injection 0.5 mg  0.5 mg Intravenous Once PRN SANDIE Murray Jr..O.       • acetaminophen (TYLENOL) tablet 1,000 mg  1,000 mg Oral Q6HRS Abhi JOSUE  Nasra Kim D.O.   1,000 mg at 05/09/19 1301   • omeprazole (PRILOSEC) capsule 20 mg  20 mg Oral DAILY Ofelia Lemon M.D.   Stopped at 05/09/19 0600   • budesonide-formoterol (SYMBICORT) 160-4.5 MCG/ACT inhaler 2 Puff  2 Puff Inhalation BID (RT) Ofelia Lemon M.D.   Stopped at 05/08/19 2215   • senna-docusate (PERICOLACE or SENOKOT S) 8.6-50 MG per tablet 2 Tab  2 Tab Oral BID Ofelia Lemon M.D.   Stopped at 05/09/19 0600    And   • polyethylene glycol/lytes (MIRALAX) PACKET 1 Packet  1 Packet Oral QDAY PRN Ofelia Lemon M.D.        And   • magnesium hydroxide (MILK OF MAGNESIA) suspension 30 mL  30 mL Oral QDAY PRN Ofelia Lemon M.D.        And   • bisacodyl (DULCOLAX) suppository 10 mg  10 mg Rectal QDAY PRN Ofelia Lemon M.D.       • Respiratory Care per Protocol   Nebulization Continuous RT Ofelia Lemon M.D.       • NS (BOLUS) infusion 500 mL  500 mL Intravenous Once PRN Ofelia Lemon M.D.       • heparin injection 5,000 Units  5,000 Units Subcutaneous Q8HRS Ofelia Lemon M.D.       • ampicillin/sulbactam (UNASYN) 3 g in  mL IVPB  3 g Intravenous Q6HRS Ofelia Lemon M.D. 200 mL/hr at 05/09/19 1301 3 g at 05/09/19 1301   • MD Alert...Vancomycin per Pharmacy  1 Each Other PHARMACY TO DOSE Ofelia Lemon M.D.       • norepinephrine (LEVOPHED) 8 mg in  mL Infusion  0.5-30 mcg/min Intravenous Continuous fOelia Lemon M.D.        And   • vasopressin (VASOSTRICT) 20 Units in  mL Infusion  0.03 Units/min Intravenous Continuous Ofelia Lemon M.D.       • albuterol inhaler 2 Puff  2 Puff Inhalation Q4HRS PRN Josef Stephenson M.D.           Allergies  No Known Allergies    Vital Signs last 24 hours  Temp:  [36.2 °C (97.1 °F)-37.1 °C (98.8 °F)] 36.6 °C (97.8 °F)  Pulse:  [] 80  Resp:  [13-36] 29  BP: ()/(50-72) 90/50  SpO2:  [78 %-100 %] 98 %    Physical Exam  Physical Exam   Constitutional: He is oriented to person, place, and time. No distress.   HENT:   Head:  Normocephalic and atraumatic.   Mouth/Throat: Oropharynx is clear and moist.   No oral lesions   Eyes: Pupils are equal, round, and reactive to light. No scleral icterus.   Neck: Neck supple.   Cardiovascular: Normal rate, regular rhythm and normal heart sounds.    No murmur heard.  Pulmonary/Chest: Effort normal. No respiratory distress. He has no wheezes. He has no rales.   Diminished at bases   Abdominal: Soft. Bowel sounds are normal. He exhibits no distension. There is no tenderness. There is no rebound.   Musculoskeletal: He exhibits edema.   +2-3+ edema in lower ext   Neurological: He is alert and oriented to person, place, and time.   grumpy   Skin: Skin is warm. No rash noted. He is not diaphoretic. No erythema. No pallor.   Diffuse hyperkeratotic patches and plaques on the chest, abdomen, neck, and back.     Diffuse opened superficial wound in his entire back down to his buttock, lower abdomen, inguinal areas bilaterally, bilateral axilla.   No obvious purulent secretions noted anywhere. Some serous fluids weeping on dependent areas.   No vesicles, skin abscesses noted    Diffuse dry skin   Psychiatric: He has a normal mood and affect. His behavior is normal. Thought content normal.   Nursing note and vitals reviewed.      Fluids    Intake/Output Summary (Last 24 hours) at 19 1343  Last data filed at 19 1200   Gross per 24 hour   Intake             1885 ml   Output              575 ml   Net             1310 ml       Laboratory  Recent Results (from the past 48 hour(s))   EKG (NOW)    Collection Time: 19  8:06 PM   Result Value Ref Range    Report       [facility] Emergency Dept[facility]    Test Date:  2019  Pt Name:    DOMENICO MUNOZ                Department: ER  MRN:        [id number]                      Room:        20  Gender:     Male                         Technician: [id number]  :        1950                   Requested By:ER TRIAGE PROTOCOL  Order #:     [id number]                    Reading MD:    Measurements  Intervals                                Axis  Rate:       91                           P:          38  AK:         144                          QRS:        -17  QRSD:       82                           T:          17  QT:         384  QTc:        473    Interpretive Statements  SINUS RHYTHM  VENTRICULAR TRIGEMINY  BORDERLINE LEFT AXIS DEVIATION  LOW VOLTAGE THROUGHOUT  No previous ECG available for comparison     Lactic acid (lactate)    Collection Time: 05/08/19  8:34 PM   Result Value Ref Range    Lactic Acid 2.9 (H) 0.5 - 2.0 mmol/L   CBC WITH DIFFERENTIAL    Collection Time: 05/08/19  8:34 PM   Result Value Ref Range    WBC 10.3 4.8 - 10.8 K/uL    RBC 4.14 (L) 4.70 - 6.10 M/uL    Hemoglobin 11.4 (L) 14.0 - 18.0 g/dL    Hematocrit 37.8 (L) 42.0 - 52.0 %    MCV 91.3 81.4 - 97.8 fL    MCH 27.5 27.0 - 33.0 pg    MCHC 30.2 (L) 33.7 - 35.3 g/dL    RDW 55.4 (H) 35.9 - 50.0 fL    Platelet Count 389 164 - 446 K/uL    MPV 9.0 9.0 - 12.9 fL    Neutrophils-Polys 81.40 (H) 44.00 - 72.00 %    Lymphocytes 13.30 (L) 22.00 - 41.00 %    Monocytes 0.00 0.00 - 13.40 %    Eosinophils 2.60 0.00 - 6.90 %    Basophils 0.00 0.00 - 1.80 %    Nucleated RBC 0.00 /100 WBC    Neutrophils (Absolute) 8.66 (H) 1.82 - 7.42 K/uL    Lymphs (Absolute) 1.37 1.00 - 4.80 K/uL    Monos (Absolute) 0.00 0.00 - 0.85 K/uL    Eos (Absolute) 0.27 0.00 - 0.51 K/uL    Baso (Absolute) 0.00 0.00 - 0.12 K/uL    NRBC (Absolute) 0.00 K/uL    Anisocytosis 1+     Microcytosis 1+    COMP METABOLIC PANEL    Collection Time: 05/08/19  8:34 PM   Result Value Ref Range    Sodium 140 135 - 145 mmol/L    Potassium 4.5 3.6 - 5.5 mmol/L    Chloride 107 96 - 112 mmol/L    Co2 23 20 - 33 mmol/L    Anion Gap 10.0 0.0 - 11.9    Glucose 95 65 - 99 mg/dL    Bun 22 8 - 22 mg/dL    Creatinine 1.22 0.50 - 1.40 mg/dL    Calcium 7.0 (L) 8.5 - 10.5 mg/dL    AST(SGOT) 16 12 - 45 U/L    ALT(SGPT) 9 2 - 50 U/L    Alkaline  Phosphatase 106 (H) 30 - 99 U/L    Total Bilirubin 0.4 0.1 - 1.5 mg/dL    Albumin 2.0 (L) 3.2 - 4.9 g/dL    Total Protein 5.1 (L) 6.0 - 8.2 g/dL    Globulin 3.1 1.9 - 3.5 g/dL    A-G Ratio 0.6 g/dL   BLOOD CULTURE    Collection Time: 05/08/19  8:34 PM   Result Value Ref Range    Significant Indicator NEG     Source BLD     Site PERIPHERAL     Culture Result       No Growth  Note: Blood cultures are incubated for 5 days and  are monitored continuously.Positive blood cultures  are called to the RN and reported as soon as  they are identified.     BLOOD CULTURE    Collection Time: 05/08/19  8:34 PM   Result Value Ref Range    Significant Indicator NEG     Source BLD     Site PERIPHERAL     Culture Result       No Growth  Note: Blood cultures are incubated for 5 days and  are monitored continuously.Positive blood cultures  are called to the RN and reported as soon as  they are identified.     ESTIMATED GFR    Collection Time: 05/08/19  8:34 PM   Result Value Ref Range    GFR If African American >60 >60 mL/min/1.73 m 2    GFR If Non African American 59 (A) >60 mL/min/1.73 m 2   MORPHOLOGY    Collection Time: 05/08/19  8:34 PM   Result Value Ref Range    RBC Morphology Present     Poikilocytosis 2+     Tear Drop Cells 1+     Echinocytes 1+    PERIPHERAL SMEAR REVIEW    Collection Time: 05/08/19  8:34 PM   Result Value Ref Range    Peripheral Smear Review see below    DIFFERENTIAL MANUAL    Collection Time: 05/08/19  8:34 PM   Result Value Ref Range    Bands-Stabs 2.70 0.00 - 10.00 %    Manual Diff Status PERFORMED    PLATELET ESTIMATE    Collection Time: 05/08/19  8:34 PM   Result Value Ref Range    Plt Estimation Normal    EKG    Collection Time: 05/08/19 11:58 PM   Result Value Ref Range    Report       [facility]    Test Date:  2019-05-08  Pt Name:    DOMENICO MUNOZ                Department: [facility]  MRN:        [id number]                      Room:       R106  Gender:     Male                         Technician:  [id number]  :        1950                   Requested By:CHRIST PEREZ  Order #:    [id number]                    Reading MD:    Measurements  Intervals                                Axis  Rate:       87                           P:          49  SD:         156                          QRS:        -10  QRSD:       86                           T:          66  QT:         404  QTc:        486    Interpretive Statements  SINUS RHYTHM  PAIRED VENTRICULAR PREMATURE COMPLEXES  LOW VOLTAGE THROUGHOUT  BORDERLINE PROLONGED QT INTERVAL  ARTIFACT IN LEAD(S) II,III,aVL,aVF,V1  Compared to ECG 2019 20:06:25  No significant changes     CBC without Differential    Collection Time: 19  5:35 AM   Result Value Ref Range    WBC 5.4 4.8 - 10.8 K/uL    RBC 3.35 (L) 4.70 - 6.10 M/uL    Hemoglobin 9.3 (L) 14.0 - 18.0 g/dL    Hematocrit 30.6 (L) 42.0 - 52.0 %    MCV 91.3 81.4 - 97.8 fL    MCH 27.8 27.0 - 33.0 pg    MCHC 30.4 (L) 33.7 - 35.3 g/dL    RDW 54.2 (H) 35.9 - 50.0 fL    Platelet Count 274 164 - 446 K/uL    MPV 9.4 9.0 - 12.9 fL   Basic Metabolic Panel (BMP)    Collection Time: 19  5:35 AM   Result Value Ref Range    Sodium 142 135 - 145 mmol/L    Potassium 4.2 3.6 - 5.5 mmol/L    Chloride 109 96 - 112 mmol/L    Co2 25 20 - 33 mmol/L    Glucose 121 (H) 65 - 99 mg/dL    Bun 22 8 - 22 mg/dL    Creatinine 1.09 0.50 - 1.40 mg/dL    Calcium 6.7 (LL) 8.5 - 10.5 mg/dL    Anion Gap 8.0 0.0 - 11.9   ESTIMATED GFR    Collection Time: 19  5:35 AM   Result Value Ref Range    GFR If African American >60 >60 mL/min/1.73 m 2    GFR If Non African American >60 >60 mL/min/1.73 m 2   Prothrombin Time    Collection Time: 19  8:55 AM   Result Value Ref Range    PT 16.7 (H) 12.0 - 14.6 sec    INR 1.34 (H) 0.87 - 1.13       Pertinent Micro:  Results     Procedure Component Value Units Date/Time    MRSA BY PCR (AMP) [[id number]]     Order Status:  No result Specimen:  Respirate from Nares     Fungal Culture [[id number]]  "Collected:  05/09/19 1115    Order Status:  Completed Specimen:  Other Updated:  05/09/19 1240    CULTURE WOUND W/ GRAM STAIN [[id number]] Collected:  05/09/19 1115    Order Status:  Completed Specimen:  Other Updated:  05/09/19 1240    Fungal Culture [[id number]]     Order Status:  No result Specimen:  Respirate from Central Line     CULTURE TISSUE W/ GRM STAIN [[id number]]     Order Status:  No result Specimen:  Tissue from Other Tissue     BLOOD CULTURE [[id number]] Collected:  05/08/19 2034    Order Status:  Completed Specimen:  Blood from Peripheral Updated:  05/09/19 0929     Significant Indicator NEG     Source BLD     Site PERIPHERAL     Culture Result No Growth  Note: Blood cultures are incubated for 5 days and  are monitored continuously.Positive blood cultures  are called to the RN and reported as soon as  they are identified.      Narrative:       Per Hospital Policy: Only change Specimen Src: to \"Line\" if  specified by physician order.  Right Hand    BLOOD CULTURE [[id number]] Collected:  05/08/19 2034    Order Status:  Completed Specimen:  Blood from Peripheral Updated:  05/09/19 0929     Significant Indicator NEG     Source BLD     Site PERIPHERAL     Culture Result No Growth  Note: Blood cultures are incubated for 5 days and  are monitored continuously.Positive blood cultures  are called to the RN and reported as soon as  they are identified.      Narrative:       Per Hospital Policy: Only change Specimen Src: to \"Line\" if  specified by physician order.  Left Forearm/Arm    CULTURE WOUND W/O GRAM STAIN [[id number]] Collected:  05/08/19 2230    Order Status:  Completed Specimen:  Wound from Back Updated:  05/09/19 0042    Narrative:       Contact    URINALYSIS [[id number]]     Order Status:  Canceled Specimen:  Urine     URINE CULTURE(NEW) [[id number]]     Order Status:  Canceled Specimen:  Urine         Blood Culture   Date Value Ref Range Status   04/08/2019 No growth after 5 days of incubation.  Final    "     Imaging  IR-CVC TUNNEL WITH PORT REMOVAL   Final Result      1.  Removal of RIGHT  internal jugular PowerPort venous implant.   2.  No evidence of infection at the port pocket.   3.  The port reservoir and port catheter were removed intact.      DX-CHEST-LIMITED (1 VIEW)   Final Result      Left central venous line in place. No pneumothorax.               INTERPRETING LOCATION: [address] [place] [address] MARC RAMIREZ, [id number]      DX-CHEST-PORTABLE (1 VIEW)   Final Result      No acute cardiopulmonary abnormality. No interval change.          Assessment  68 y.o. Male with  1. Cutaneous T Cell lymphoma (Mycosis fungoides) dx in 11/2017, had extensive w/u at [place] for his 20 year hx of widespread hyperkeratotic plaques  2. Multiple hospital admissions for sepsis due to cellulitis in 3/19 and 4/19  3. Septic shock - resolving  - source is skin and line (portacath).     The widespread hyperkeratotic patches and plaques with diffuse open superficial wound in his back and intertriginous areas are likely due to his cutaneous lymphoma. The lymphoma seems to persist, probably getting worse due to his noncompliance for chemotherapy. He will always be at risk for cellulitis and skin/soft tissue infections.     No suspicion for brooks che syndrome, TEN, AGEP. Doubt DRESS syndrome. His eosinophils are normal  2.6%. No suspicion for necrotizing fasciitis.      Portacath has been removed.     Plan  Continue vancomycin and amp/sulbactam for now   Should be able to de-escalate soon  Wound care team is following.   Keep skin as dry as possible, defer wound treatment per wound team.   Follow up blood cultures  Nasal MRSA PCR screen. If negative, can discontinue vancomycin  No need contact/droplet isolation.   Social work consult regarding     Discussed patient condition and risk of morbidity and/or mortality with Hospitalist, Family, RN and Patient.    D/w Wound care team, Dr. Hammonds, and Oncologist

## 2019-05-09 NOTE — ASSESSMENT & PLAN NOTE
Likely from skin source  Port has been removed  F/U cultures  sepsis protocol  I will continue unasyn  Has been weaned off pressors  ID consulted

## 2019-05-09 NOTE — CONSULTS
Infectious Disease Consultation    Date of consult: 5/9/2019    Referring Physician  Antelmo Agee M.D.    Reason for Consultation  Diffuse rash    History of Presenting Illness  68 y.o. Male with hx of cutaneous T Cell lymphoma (Mycosis fungoides) dx in 11/2017, had extensive w/u at [place] for his 20 year hx of widespread hyperkeratotic plaques who is now being followed by Dr. Mc, [facility] oncologist and pt is supposed to get chemotherapy but hasn't been able to due to transportation issues.  He had portacath that was placed in 12/2017 (per wife) and had last chemotherapy was 3 months ago. He had multiple hospital admissions for cellulitis from diffuse open widespread skin in 3/2019 and 4/2019. First admission was at [facility] and 2nd admission was here at [facility]. Hospitalized from 4/8-4/13 and treated with vanco/unasyn and discharged with doxycyline x 10 days.     Pt was admitted last night for septic shock due to cellulitis. Per note, pt has been feeling weak with subjective fever 3 days prior to admission. Pt went to [facility] ED and had lactate >4 and was hypotensive and had 3.7L fluid resuscitation and was started on vanco and zosyn.     At [facility], pt was not hypotensive, afebrile, HR in 60-70s, on 3-4L NC saturating >95%. Left subclavian central line was placed. Blood cultures obtained. Started on vancomycin and unasyn. Lactate 2.9. On IV maintenance fluids at 100cc/hr. Portacath was removed today by IR. Good UOP    Both patient and wife are poor historian. I called wife at [phone] and having difficulty of communication due to poor cellular connection. All history obtained from her, patient and epic record.    Code Status  Full Code    Review of Systems  Review of Systems   Constitutional: Positive for fever and malaise/fatigue. Negative for chills.   Respiratory: Negative for cough, sputum production and shortness of breath.    Cardiovascular: Negative for chest pain, palpitations, orthopnea  and claudication.   Gastrointestinal: Negative for abdominal pain, nausea and vomiting.   Genitourinary: Negative for dysuria, frequency and urgency.   Musculoskeletal: Negative for joint pain.   Skin: Positive for rash.        Diffuse tenderness throughout from numerous open wounds   Neurological: Negative for weakness and headaches.   Psychiatric/Behavioral: The patient is not nervous/anxious.        Past Medical History   has a past medical history of Asthma; Cancer (HCC); Dental disorder; Heart burn; Hiatus hernia syndrome; Indigestion; and Snoring.    Surgical History   has a past surgical history that includes other abdominal surgery.    Family History  family history is not on file.    Social History   reports that he has quit smoking. He has never used smokeless tobacco. He reports that he does not drink alcohol or use drugs.    Medications  Home Medications     Reviewed by Marialuisa Ornelas R.N. (Registered Nurse) on 05/08/19 at 1944  Med List Status: <None>   Medication Last Dose Status   albuterol 108 (90 Base) MCG/ACT Aero Soln inhalation aerosol  Active   budesonide-formoterol (SYMBICORT) 160-4.5 MCG/ACT Aerosol  Active   Cyanocobalamin (VITAMIN B-12) 1000 MCG Tab  Active   omeprazole (PRILOSEC) 20 MG delayed-release capsule  Active   silver sulfADIAZINE (SILVADENE) 1 % Cream  Active              Current Facility-Administered Medications   Medication Dose Route Frequency Provider Last Rate Last Dose   • vancomycin 1,400 mg in  mL IVPB  1,400 mg Intravenous Q12HR Ofelia Lemon M.D.       • Pharmacy Consult Request ...Pain Management Review 1 Each  1 Each Other PHARMACY TO DOSE Abhi Hammonds Jr., SANDIE.O.       • HYDROmorphone (DILAUDID) 0.2 mg/mL in 50 mL NS (PCA)   Intravenous Continuous SANDIE Murray Jr..O.       • HYDROmorphone pf (DILAUDID) injection 0.5 mg  0.5 mg Intravenous Once PRN SANDIE Murray Jr..O.       • acetaminophen (TYLENOL) tablet 1,000 mg  1,000 mg Oral Q6HRS Abhi JOSUE  Nasra Kim D.O.   1,000 mg at 05/09/19 1301   • NS (BOLUS) infusion 500 mL  500 mL Intravenous Once PRN Lm Zavala M.D.       • fentaNYL (SUBLIMAZE) injection 12.5-50 mcg  12.5-50 mcg Intravenous PRN Lm Zavala M.D.   50 mcg at 05/09/19 1058   • midazolam (VERSED) injection 0.5-2 mg  0.5-2 mg Intravenous PRN Lm Zavala M.D.   1 mg at 05/09/19 1058   • ondansetron (ZOFRAN) syringe/vial injection 4 mg  4 mg Intravenous PRN Lm Zavala M.D.       • omeprazole (PRILOSEC) capsule 20 mg  20 mg Oral DAILY Ofelia Lemon M.D.   Stopped at 05/09/19 0600   • budesonide-formoterol (SYMBICORT) 160-4.5 MCG/ACT inhaler 2 Puff  2 Puff Inhalation BID (RT) Ofelia Lemon M.D.   Stopped at 05/08/19 2215   • senna-docusate (PERICOLACE or SENOKOT S) 8.6-50 MG per tablet 2 Tab  2 Tab Oral BID Ofelia Lemon M.D.   Stopped at 05/09/19 0600    And   • polyethylene glycol/lytes (MIRALAX) PACKET 1 Packet  1 Packet Oral QDAY PRN Ofelia Lemon M.D.        And   • magnesium hydroxide (MILK OF MAGNESIA) suspension 30 mL  30 mL Oral QDAY PRN Ofelia Lemon M.D.        And   • bisacodyl (DULCOLAX) suppository 10 mg  10 mg Rectal QDAY PRN Ofelia Lemon M.D.       • Respiratory Care per Protocol   Nebulization Continuous RT Ofelia Lemon M.D.       • NS (BOLUS) infusion 500 mL  500 mL Intravenous Once PRN Ofelia Lemon M.D.       • heparin injection 5,000 Units  5,000 Units Subcutaneous Q8HRS Ofelia Lemon M.D.       • ampicillin/sulbactam (UNASYN) 3 g in  mL IVPB  3 g Intravenous Q6HRS Ofelia Lemon M.D. 200 mL/hr at 05/09/19 1301 3 g at 05/09/19 1301   • MD Alert...Vancomycin per Pharmacy  1 Each Other PHARMACY TO DOSE Ofelia Lemon M.D.       • norepinephrine (LEVOPHED) 8 mg in  mL Infusion  0.5-30 mcg/min Intravenous Continuous Ofelia Lemon M.D.        And   • vasopressin (VASOSTRICT) 20 Units in  mL Infusion  0.03 Units/min Intravenous Continuous Ofelia Lemon M.D.       • albuterol  inhaler 2 Puff  2 Puff Inhalation Q4HRS PRN Josef Stephenson M.D.           Allergies  No Known Allergies    Vital Signs last 24 hours  Temp:  [36.2 °C (97.1 °F)-37.1 °C (98.8 °F)] 36.6 °C (97.8 °F)  Pulse:  [] 80  Resp:  [13-36] 29  BP: ()/(50-72) 90/50  SpO2:  [78 %-100 %] 98 %    Physical Exam  Physical Exam   Constitutional: He is oriented to person, place, and time. No distress.   HENT:   Head: Normocephalic and atraumatic.   Mouth/Throat: Oropharynx is clear and moist.   No oral lesions   Eyes: Pupils are equal, round, and reactive to light. No scleral icterus.   Neck: Neck supple.   Cardiovascular: Normal rate, regular rhythm and normal heart sounds.    No murmur heard.  Pulmonary/Chest: Effort normal. No respiratory distress. He has no wheezes. He has no rales.   Diminished at bases   Abdominal: Soft. Bowel sounds are normal. He exhibits no distension. There is no tenderness. There is no rebound.   Musculoskeletal: He exhibits edema.   +2-3+ edema in lower ext   Neurological: He is alert and oriented to person, place, and time.   grumpy   Skin: Skin is warm. No rash noted. He is not diaphoretic. No erythema. No pallor.   Diffuse hyperkeratotic patches and plaques on the chest, abdomen, neck, and back.     Diffuse opened superficial wound in his entire back down to his buttock, lower abdomen, inguinal areas bilaterally, bilateral axilla.   No obvious purulent secretions noted anywhere. Some serous fluids weeping on dependent areas.   No vesicles, skin abscesses noted    Diffuse dry skin   Psychiatric: He has a normal mood and affect. His behavior is normal. Thought content normal.   Nursing note and vitals reviewed.      Fluids    Intake/Output Summary (Last 24 hours) at 05/09/19 1307  Last data filed at 05/09/19 1200   Gross per 24 hour   Intake             1885 ml   Output              575 ml   Net             1310 ml       Laboratory  Recent Results (from the past 48 hour(s))   EKG (NOW)     Collection Time: 19  8:06 PM   Result Value Ref Range    Report       [facility] Emergency Dept.    Test Date:  2019  Pt Name:    DOMENICO MUNOZ                Department: ER  MRN:        [id number]                      Room:        20  Gender:     Male                         Technician: [id number]  :        1950                   Requested By:ER TRIAGE PROTOCOL  Order #:    [id number]                    Reading MD:    Measurements  Intervals                                Axis  Rate:       91                           P:          38  UT:         144                          QRS:        -17  QRSD:       82                           T:          17  QT:         384  QTc:        473    Interpretive Statements  SINUS RHYTHM  VENTRICULAR TRIGEMINY  BORDERLINE LEFT AXIS DEVIATION  LOW VOLTAGE THROUGHOUT  No previous ECG available for comparison     Lactic acid (lactate)    Collection Time: 19  8:34 PM   Result Value Ref Range    Lactic Acid 2.9 (H) 0.5 - 2.0 mmol/L   CBC WITH DIFFERENTIAL    Collection Time: 19  8:34 PM   Result Value Ref Range    WBC 10.3 4.8 - 10.8 K/uL    RBC 4.14 (L) 4.70 - 6.10 M/uL    Hemoglobin 11.4 (L) 14.0 - 18.0 g/dL    Hematocrit 37.8 (L) 42.0 - 52.0 %    MCV 91.3 81.4 - 97.8 fL    MCH 27.5 27.0 - 33.0 pg    MCHC 30.2 (L) 33.7 - 35.3 g/dL    RDW 55.4 (H) 35.9 - 50.0 fL    Platelet Count 389 164 - 446 K/uL    MPV 9.0 9.0 - 12.9 fL    Neutrophils-Polys 81.40 (H) 44.00 - 72.00 %    Lymphocytes 13.30 (L) 22.00 - 41.00 %    Monocytes 0.00 0.00 - 13.40 %    Eosinophils 2.60 0.00 - 6.90 %    Basophils 0.00 0.00 - 1.80 %    Nucleated RBC 0.00 /100 WBC    Neutrophils (Absolute) 8.66 (H) 1.82 - 7.42 K/uL    Lymphs (Absolute) 1.37 1.00 - 4.80 K/uL    Monos (Absolute) 0.00 0.00 - 0.85 K/uL    Eos (Absolute) 0.27 0.00 - 0.51 K/uL    Baso (Absolute) 0.00 0.00 - 0.12 K/uL    NRBC (Absolute) 0.00 K/uL    Anisocytosis 1+     Microcytosis 1+    COMP METABOLIC PANEL     Collection Time: 05/08/19  8:34 PM   Result Value Ref Range    Sodium 140 135 - 145 mmol/L    Potassium 4.5 3.6 - 5.5 mmol/L    Chloride 107 96 - 112 mmol/L    Co2 23 20 - 33 mmol/L    Anion Gap 10.0 0.0 - 11.9    Glucose 95 65 - 99 mg/dL    Bun 22 8 - 22 mg/dL    Creatinine 1.22 0.50 - 1.40 mg/dL    Calcium 7.0 (L) 8.5 - 10.5 mg/dL    AST(SGOT) 16 12 - 45 U/L    ALT(SGPT) 9 2 - 50 U/L    Alkaline Phosphatase 106 (H) 30 - 99 U/L    Total Bilirubin 0.4 0.1 - 1.5 mg/dL    Albumin 2.0 (L) 3.2 - 4.9 g/dL    Total Protein 5.1 (L) 6.0 - 8.2 g/dL    Globulin 3.1 1.9 - 3.5 g/dL    A-G Ratio 0.6 g/dL   BLOOD CULTURE    Collection Time: 05/08/19  8:34 PM   Result Value Ref Range    Significant Indicator NEG     Source BLD     Site PERIPHERAL     Culture Result       No Growth  Note: Blood cultures are incubated for 5 days and  are monitored continuously.Positive blood cultures  are called to the RN and reported as soon as  they are identified.     BLOOD CULTURE    Collection Time: 05/08/19  8:34 PM   Result Value Ref Range    Significant Indicator NEG     Source BLD     Site PERIPHERAL     Culture Result       No Growth  Note: Blood cultures are incubated for 5 days and  are monitored continuously.Positive blood cultures  are called to the RN and reported as soon as  they are identified.     ESTIMATED GFR    Collection Time: 05/08/19  8:34 PM   Result Value Ref Range    GFR If African American >60 >60 mL/min/1.73 m 2    GFR If Non African American 59 (A) >60 mL/min/1.73 m 2   MORPHOLOGY    Collection Time: 05/08/19  8:34 PM   Result Value Ref Range    RBC Morphology Present     Poikilocytosis 2+     Tear Drop Cells 1+     Echinocytes 1+    PERIPHERAL SMEAR REVIEW    Collection Time: 05/08/19  8:34 PM   Result Value Ref Range    Peripheral Smear Review see below    DIFFERENTIAL MANUAL    Collection Time: 05/08/19  8:34 PM   Result Value Ref Range    Bands-Stabs 2.70 0.00 - 10.00 %    Manual Diff Status PERFORMED    PLATELET  ESTIMATE    Collection Time: 19  8:34 PM   Result Value Ref Range    Plt Estimation Normal    EKG    Collection Time: 19 11:58 PM   Result Value Ref Range    Report       [facility]    Test Date:  2019  Pt Name:    DOMENICO MUNOZ                Department: [facility]  MRN:        [id number]                      Room:       [facility]  Gender:     Male                         Technician: [id number]  :        1950                   Requested By:CHRIST PEREZ  Order #:    [id number]                    Reading MD:    Measurements  Intervals                                Axis  Rate:       87                           P:          49  NH:         156                          QRS:        -10  QRSD:       86                           T:          66  QT:         404  QTc:        486    Interpretive Statements  SINUS RHYTHM  PAIRED VENTRICULAR PREMATURE COMPLEXES  LOW VOLTAGE THROUGHOUT  BORDERLINE PROLONGED QT INTERVAL  ARTIFACT IN LEAD(S) II,III,aVL,aVF,V1  Compared to ECG 2019 20:06:25  No significant changes     CBC without Differential    Collection Time: 19  5:35 AM   Result Value Ref Range    WBC 5.4 4.8 - 10.8 K/uL    RBC 3.35 (L) 4.70 - 6.10 M/uL    Hemoglobin 9.3 (L) 14.0 - 18.0 g/dL    Hematocrit 30.6 (L) 42.0 - 52.0 %    MCV 91.3 81.4 - 97.8 fL    MCH 27.8 27.0 - 33.0 pg    MCHC 30.4 (L) 33.7 - 35.3 g/dL    RDW 54.2 (H) 35.9 - 50.0 fL    Platelet Count 274 164 - 446 K/uL    MPV 9.4 9.0 - 12.9 fL   Basic Metabolic Panel (BMP)    Collection Time: 19  5:35 AM   Result Value Ref Range    Sodium 142 135 - 145 mmol/L    Potassium 4.2 3.6 - 5.5 mmol/L    Chloride 109 96 - 112 mmol/L    Co2 25 20 - 33 mmol/L    Glucose 121 (H) 65 - 99 mg/dL    Bun 22 8 - 22 mg/dL    Creatinine 1.09 0.50 - 1.40 mg/dL    Calcium 6.7 (LL) 8.5 - 10.5 mg/dL    Anion Gap 8.0 0.0 - 11.9   ESTIMATED GFR    Collection Time: 19  5:35 AM   Result Value Ref Range    GFR If African American >60 >60 mL/min/1.73 m 2     "GFR If Non African American >60 >60 mL/min/1.73 m 2   Prothrombin Time    Collection Time: 05/09/19  8:55 AM   Result Value Ref Range    PT 16.7 (H) 12.0 - 14.6 sec    INR 1.34 (H) 0.87 - 1.13       Pertinent Micro:  Results     Procedure Component Value Units Date/Time    MRSA BY PCR (AMP) [[id number]]     Order Status:  No result Specimen:  Respirate from Nares     Fungal Culture [[id number]] Collected:  05/09/19 1115    Order Status:  Completed Specimen:  Other Updated:  05/09/19 1240    CULTURE WOUND W/ GRAM STAIN [[id number]] Collected:  05/09/19 1115    Order Status:  Completed Specimen:  Other Updated:  05/09/19 1240    Fungal Culture [[id number]]     Order Status:  No result Specimen:  Respirate from Central Line     CULTURE TISSUE W/ GRM STAIN [[id number]]     Order Status:  No result Specimen:  Tissue from Other Tissue     BLOOD CULTURE [[id number]] Collected:  05/08/19 2034    Order Status:  Completed Specimen:  Blood from Peripheral Updated:  05/09/19 0929     Significant Indicator NEG     Source BLD     Site PERIPHERAL     Culture Result No Growth  Note: Blood cultures are incubated for 5 days and  are monitored continuously.Positive blood cultures  are called to the RN and reported as soon as  they are identified.      Narrative:       Per Hospital Policy: Only change Specimen Src: to \"Line\" if  specified by physician order.  Right Hand    BLOOD CULTURE [[id number]] Collected:  05/08/19 2034    Order Status:  Completed Specimen:  Blood from Peripheral Updated:  05/09/19 0929     Significant Indicator NEG     Source BLD     Site PERIPHERAL     Culture Result No Growth  Note: Blood cultures are incubated for 5 days and  are monitored continuously.Positive blood cultures  are called to the RN and reported as soon as  they are identified.      Narrative:       Per Hospital Policy: Only change Specimen Src: to \"Line\" if  specified by physician order.  Left Forearm/Arm    CULTURE WOUND W/O GRAM STAIN [[id number]] " Collected:  05/08/19 2230    Order Status:  Completed Specimen:  Wound from Back Updated:  05/09/19 0042    Narrative:       Contact    URINALYSIS [[id number]]     Order Status:  Canceled Specimen:  Urine     URINE CULTURE(NEW) [[id number]]     Order Status:  Canceled Specimen:  Urine         Blood Culture   Date Value Ref Range Status   04/08/2019 No growth after 5 days of incubation.  Final        Imaging  IR-CVC TUNNEL WITH PORT REMOVAL   Final Result      1.  Removal of RIGHT  internal jugular PowerPort venous implant.   2.  No evidence of infection at the port pocket.   3.  The port reservoir and port catheter were removed intact.      DX-CHEST-LIMITED (1 VIEW)   Final Result      Left central venous line in place. No pneumothorax.               INTERPRETING LOCATION: [facility], [facility], [address]      DX-CHEST-PORTABLE (1 VIEW)   Final Result      No acute cardiopulmonary abnormality. No interval change.          Assessment  68 y.o. Male with  1. Cutaneous T Cell lymphoma (Mycosis fungoides) dx in 11/2017, had extensive w/u at [place] for his 20 year hx of widespread hyperkeratotic plaques  2. Multiple hospital admissions for sepsis due to cellulitis in 3/19 and 4/19  3. Septic shock - resolving  - source is skin and line (portacath).     The widespread hyperkeratotic patches and plaques with diffuse open superficial wound in his back and intertriginous areas are likely due to his cutaneous lymphoma. The lymphoma seems to persist, probably getting worse due to his noncompliance for chemotherapy. He will always be at risk for cellulitis and skin/soft tissue infections.     No suspicion for brooks che syndrome, TEN, AGEP. Doubt DRESS syndrome. His eosinophils are normal  2.6%. No suspicion for necrotizing fasciitis.      Portacath has been removed.     Plan  Continue vancomycin and amp/sulbactam for now   Should be able to de-escalate soon  Wound care team is following.   Keep skin as dry as possible, defer  wound treatment per wound team.   Follow up blood cultures  Nasal MRSA PCR screen. If negative, can discontinue vancomycin  No need contact/droplet isolation.   Social work consult regarding     Discussed patient condition and risk of morbidity and/or mortality with Hospitalist, Family, RN and Patient.    D/w Wound care team, Dr. Hammonds, and Oncologist

## 2019-05-09 NOTE — ASSESSMENT & PLAN NOTE
Resolved  Septic shock present upon admission  Source consistent with cutaneous infection  IR removed the indwelling port  Infectious disease consult  He met the criteria for septic shock as evidenced by the need for intravenous pressor support  Organ system failure associated with this disease process is the cardiovascular as is noted by hypotension requiring pressors  Broad-spectrum IV antibiotics to cover for skin infection in the setting of immunocompromised host--IV Unasyn  Catheter tip positive for Candida.  ID following.  On antifungal.

## 2019-05-09 NOTE — CARE PLAN
Problem: Safety  Goal: Will remain free from falls    Intervention: Implement fall precautions  Call light and bedside table within reach, bed in lowest locked position.  Bed in front of nursing station with alarm activated        Problem: Pain Management  Goal: Pain level will decrease to patient's comfort goal    Intervention: Follow pain managment plan developed in collaboration with patient and Interdisciplinary Team  PCA established with education to manage patient's pain

## 2019-05-09 NOTE — PROGRESS NOTES
"Pharmacy Kinetics 68 y.o. male on vancomycin day # 2 2019    Vancomycin New Start               1500 mg IV dose administered  @ 1556 (prior to arrival from OSH)                1200 mg IV ordered to complete loading dose of 25 mg/kg      Indication for Treatment:   SSTI     Pertinent history per medical record:   Admitted on 2019 as a transfer from [facility] for sepsis. Patient presented to the OSH for general malaise and was found to have multiple wounds and moisture/weeping of the back. He was admitted to [facility] on  for rash thought to be related to pt's cutaneous T cell lymphoma. He was treated with antibiotics and discharged home on  with PO doxycycline to complete a 10 day course. Of note, patient does have a port in place. At the outlying facility, patient was hypotensive requiring vasopressors. He was transferred to [facility] and will be continued on IV abx.     Other antibiotics: Unasyn 3 gm IV q6h     Allergies: Patient has no known allergies.     List concerns for renal function:  Hypotension requiring pressors, acutely elevated renal indices, hypermetabolic/sepsis, BMI 36, hypoalbuminemia    Pertinent cultures to date:    wound culture - in process    blood - in process     Recent Labs      19   0535   WBC  10.3  5.4   NEUTSPOLYS  81.40*   --    BANDSSTABS  2.70   --      Recent Labs      19   0535   BUN  22  22   CREATININE  1.22  1.09   ALBUMIN  2.0*   --      No results for input(s): VANCOTROUGH, VANCOPEAK, VANCORANDOM in the last 72 hours.  Intake/Output Summary (Last 24 hours) at 19 1654  Last data filed at 19 1600   Gross per 24 hour   Intake          3118.33 ml   Output              775 ml   Net          2343.33 ml      BP (!) 90/50   Pulse 75   Temp 36.5 °C (97.7 °F) (Temporal)   Resp 15   Ht 1.727 m (5' 8\")   Wt 108 kg (238 lb 1.6 oz)   SpO2 98%  Temp (24hrs), Av.6 °C (97.9 °F), Min:36.2 °C (97.1 °F), " Max:37.1 °C (98.8 °F)      A/P   1. Vancomycin dose change: continue 1400 mg IV q12hr  2. Next vancomycin level:  5/10 @ 1330 (ordered)   3. Goal trough: 12-16 - target high side of goal for now  4. Comments: Patient with multiple risk factors for drug accumulation. Renal appears stable. ID consulted. Should be able to de-escalate per notes. Will obtain trough level prior to achieving steady state to ensure adequate drug clearance. Pharmacy will continue to follow.      Coy Greenberg, GiaD, BCPS

## 2019-05-10 PROBLEM — R60.9 EDEMA: Status: ACTIVE | Noted: 2019-01-01

## 2019-05-10 NOTE — DISCHARGE PLANNING
Anticipated Discharge Disposition: unknown    Action: Community Contact    Received tc msg from Lashae at [facility]. She states they provided services for the pt and wanted to dicuss dc supports.     Return tc to Lashae [phone]. Left msg requesting she call this LSW back.    Barriers to Discharge: Additional dc planning [place] be needed [place] This may limit access to follow-up resources. Additional planning will need to take place in the event the pt is dc'ed home.     Plan: assist as needed.

## 2019-05-10 NOTE — PROGRESS NOTES
Date of visit: 5/10/2019  11:58 AM      Chief Complaint- Progressive sezary syndrome/T-cell lymphoma with sepsis       Interim history  CT chest abd pelvis- mild axillary adenopathy 15 mm with slight progression, else not much systemic disease     Past Medical History:      Past Medical History:   Diagnosis Date   • Asthma    • Cancer (HCC)    • Dental disorder    • Heart burn    • Hiatus hernia syndrome    • Indigestion    • Snoring        Past surgical history:       Past Surgical History:   Procedure Laterality Date   • OTHER ABDOMINAL SURGERY         Allergies:       Patient has no known allergies.    Medications:         Current Facility-Administered Medications   Medication Dose Route Frequency Provider Last Rate Last Dose   • budesonide-formoterol (SYMBICORT) 160-4.5 MCG/ACT inhaler 2 Puff  2 Puff Inhalation BID Antelmo Agee M.D.   2 Puff at 05/10/19 0855   • HYDROmorphone (DILAUDID) 0.2 mg/mL in 50 mL NS (PCA)   Intravenous Continuous Abhi Hammonds Jr. D.O.       • furosemide (LASIX) injection 20 mg  20 mg Intravenous BID DIURETIC Abhi Hammonds Jr., D.O.   20 mg at 05/10/19 0953   • vancomycin 1,400 mg in  mL IVPB  1,400 mg Intravenous Q12HR Ofelia Lemon M.D.   Stopped at 05/10/19 0431   • Pharmacy Consult Request ...Pain Management Review 1 Each  1 Each Other PHARMACY TO DOSE Abhi Hammonds Jr., D.O.       • acetaminophen (TYLENOL) tablet 1,000 mg  1,000 mg Oral Q6HRS Abhi Hammonds Jr., D.O.   1,000 mg at 05/10/19 0535   • omeprazole (PRILOSEC) capsule 20 mg  20 mg Oral DAILY Ofelia Lemon M.D.   20 mg at 05/10/19 0533   • senna-docusate (PERICOLACE or SENOKOT S) 8.6-50 MG per tablet 2 Tab  2 Tab Oral BID Ofelia Lemon M.D.   Stopped at 05/09/19 0600    And   • polyethylene glycol/lytes (MIRALAX) PACKET 1 Packet  1 Packet Oral QDAY PRN Ofelia Lemon M.D.        And   • magnesium hydroxide (MILK OF MAGNESIA) suspension 30 mL  30 mL Oral QDAY PRN Ofelia Lemon M.D.        And   •  bisacodyl (DULCOLAX) suppository 10 mg  10 mg Rectal QDAY PRN Ofelia Lemon M.D.       • Respiratory Care per Protocol   Nebulization Continuous RT Ofelia Lemon M.D.       • NS (BOLUS) infusion 500 mL  500 mL Intravenous Once PRN Ofelia Lemon M.D.       • heparin injection 5,000 Units  5,000 Units Subcutaneous Q8HRS Ofelia Lemon M.D.   5,000 Units at 05/10/19 0533   • ampicillin/sulbactam (UNASYN) 3 g in  mL IVPB  3 g Intravenous Q6HRS Ofelia Lemon M.D. 200 mL/hr at 05/10/19 1130 3 g at 05/10/19 1130   • MD Alert...Vancomycin per Pharmacy  1 Each Other PHARMACY TO DOSE Ofelia Lemon M.D.       • albuterol inhaler 2 Puff  2 Puff Inhalation Q4HRS PRN Josef Stephenson M.D.             Social History:     Social History     Social History   • Marital status:      Spouse name: N/A   • Number of children: N/A   • Years of education: N/A     Occupational History   • Not on file.     Social History Main Topics   • Smoking status: Former Smoker   • Smokeless tobacco: Never Used   • Alcohol use No   • Drug use: No   • Sexual activity: Not on file     Other Topics Concern   • Not on file     Social History Narrative   • No narrative on file       Family History:    No family history on file.    Review of Systems:  All other review of systems are negative except what was mentioned above in the HPI.    Constitutional: Negative for fever, chills, weight loss and malaise/fatigue.    HEENT: No new auditory or visual complaints. No sore throat and neck pain.     Respiratory: Negative for cough, sputum production, shortness of breath and wheezing.    Cardiovascular: Negative for chest pain, palpitations, orthopnea and leg swelling.    Gastrointestinal: Negative for heartburn, nausea, vomiting and abdominal pain.    Genitourinary: Negative for dysuria, hematuria    Musculoskeletal: No new arthralgias or myalgias   Skin: Negative for rash and itching.    Neurological: Negative for focal weakness and headaches.  "   Endo/Heme/Allergies: No abnormal bleed/bruise.    Psychiatric/Behavioral: No new depression/anxiety.    Physical Exam:  Vitals: BP (!) 90/50   Pulse 71   Temp 36.5 °C (97.7 °F) (Temporal)   Resp (!) 22   Ht 1.727 m (5' 8\")   Wt 108 kg (238 lb 1.6 oz)   SpO2 100%   BMI 36.20 kg/m²     General: Not in acute distress, alert and oriented x 3  HEENT: No pallor, icterus. Oropharynx clear.   Neck: Supple, no palpable masses.  Lymph nodes: No palpable cervical, supraclavicular, axillary or inguinal lymphadenopathy.    CVS: regular rate and rhythm, no rubs or gallops  RESP: Clear to auscultate bilaterally, no wheezing or crackles.   ABD: Soft, non tender, non distended, positive bowel sounds, no palpable organomegaly  EXT: No edema or cyanosis  CNS: Alert and oriented x3, No focal deficits.  Skin- No rash      Labs:   • Sodium 05/10/2019 139  135 - 145 mmol/L Final   • Potassium 05/10/2019 3.9  3.6 - 5.5 mmol/L Final   • Chloride 05/10/2019 108  96 - 112 mmol/L Final   • Co2 05/10/2019 26  20 - 33 mmol/L Final   • Glucose 05/10/2019 100* 65 - 99 mg/dL Final   • Bun 05/10/2019 19  8 - 22 mg/dL Final   • Creatinine 05/10/2019 1.02  0.50 - 1.40 mg/dL Final   • Calcium 05/10/2019 7.3* 8.5 - 10.5 mg/dL Final   • Anion Gap 05/10/2019 5.0  0.0 - 11.9 Final   • WBC 05/10/2019 7.2  4.8 - 10.8 K/uL Final   • RBC 05/10/2019 3.79* 4.70 - 6.10 M/uL Final   • Hemoglobin 05/10/2019 10.5* 14.0 - 18.0 g/dL Final   • Hematocrit 05/10/2019 34.4* 42.0 - 52.0 % Final   • MCV 05/10/2019 90.8  81.4 - 97.8 fL Final   • MCH 05/10/2019 27.7  27.0 - 33.0 pg Final   • MCHC 05/10/2019 30.5* 33.7 - 35.3 g/dL Final   • RDW 05/10/2019 55.6* 35.9 - 50.0 fL Final   • Platelet Count 05/10/2019 339  164 - 446 K/uL Final   • MPV 05/10/2019 9.1  9.0 - 12.9 fL Final   • Neutrophils-Polys 05/10/2019 51.80  44.00 - 72.00 % Final   • Lymphocytes 05/10/2019 18.40* 22.00 - 41.00 % Final   • Monocytes 05/10/2019 3.50  0.00 - 13.40 % Final   • Eosinophils " 05/10/2019 10.50* 0.00 - 6.90 % Final   • Basophils 05/10/2019 0.00  0.00 - 1.80 % Final   • Nucleated RBC 05/10/2019 0.00  /100 WBC Final   • Neutrophils (Absolute) 05/10/2019 4.74  1.82 - 7.42 K/uL Final    Includes immature neutrophils, if present.   • Lymphs (Absolute) 05/10/2019 1.32  1.00 - 4.80 K/uL Final   • Monos (Absolute) 05/10/2019 0.25  0.00 - 0.85 K/uL Final   • Eos (Absolute) 05/10/2019 0.76* 0.00 - 0.51 K/uL Final   • Baso (Absolute) 05/10/2019 0.00  0.00 - 0.12 K/uL Final   • NRBC (Absolute) 05/10/2019 0.00  K/uL Final   • Anisocytosis 05/10/2019 1+   Final   • Macrocytosis 05/10/2019 1+   Final   • Significant Indicator 05/09/2019 .   Final   • Source 05/09/2019 WND   Final   • Site 05/09/2019 Port Catheter Tip   Final   • Gram Stain Result 05/09/2019 No organisms seen.   Final   • GFR If  05/10/2019 >60  >60 mL/min/1.73 m 2 Final   • GFR If Non  05/10/2019 >60  >60 mL/min/1.73 m 2 Final   • Bands-Stabs 05/10/2019 14.00* 0.00 - 10.00 % Final   • Myelocytes 05/10/2019 1.80  % Final   • Manual Diff Status 05/10/2019 PERFORMED   Final   • Peripheral Smear Review 05/10/2019 see below   Final    Comment: Due to instrument suspect flags, further review of peripheral smear is  indicated on this patient sample. This review may or may not result in  abnormal findings.     • Plt Estimation 05/10/2019 Normal   Final    Comment: PLT: Platelet clumping confirmed on smear review.  If a more accurate  platelet count is required, please request recollection.     • RBC Morphology 05/10/2019 Present   Final   • Smudge Cells 05/10/2019 Moderate   Final             Assessment and Plan:  Progressive sezary syndrome/T-cell lymphoma with sepsis   Progressive sezary syndrome/T-cell lymphoma with sepsis-he has been through various regimens as above.  PET scan from 12/2018 showed mixed treatment response.  He then developed significant worsening of his cutaneous lesions with plan to try  romidepsin by Dr. Mc on 4/2018.  Unfortunately he could never start the treatment due to multiple hospitalization from recurrent infection/sepsis.  He has also developed worsening failure to thrive symptoms and generalized weakness.  Currently recovering from another episode of sepsis/cellulitis.s/p port removal    Unfortunately he continues to have severe worsening of his sensory syndrome with the multiple open wound areas.Restaging CT C/A/P no significant disease burden/ progression.   Difficult situation due to his multiple hospital admission/ deconditioning .  Ideally needs systemic treatment as outpt. He does not want to enroll into hospice despite multiple conversations.  He will need intense rehab , if he is going to rehab facility, pls arrange f/u with Dr Mc to discuss systemic treatment options including  moxetumomab .  Continue Abx per ID .    He agreed and verbalized  agreement and understanding with the current plan.  I answered all questions and concerns at this time         Please note that this dictation was created using voice recognition software. I have made every reasonable attempt to correct obvious errors, but I expect that there are errors of grammar and possibly content that I did not discover before finalizing the note.      SIGNATURES:  Néstor Bradley    CC:  No primary care provider on file.  No ref. provider found

## 2019-05-10 NOTE — CARE PLAN
Problem: Pain Management  Goal: Pain level will decrease to patient's comfort goal  Outcome: PROGRESSING AS EXPECTED  Pt has PCA pump for pain management    Problem: Skin Integrity  Goal: Risk for impaired skin integrity will decrease  Outcome: PROGRESSING SLOWER THAN EXPECTED  Dressing changes done per order.

## 2019-05-10 NOTE — PROGRESS NOTES
Hospital Medicine Daily Progress Note    Date of Service  5/10/2019    Chief Complaint  68 y.o. male admitted 5/8/2019 with weakness and fevers.     Hospital Course    Mr. Ortiz has a past medical history of T-cell lymphoma followed by Dr. Mc the most recently admitted here for a skin infection was discharged on April 13 due to a rash from his T-cell lymphoma.  He was discharged home on oral doxycycline.  He presented to the emergency room in [facility] with weakness, feeling poorly, and fevers.  Is found to be hypotensive and in septic shock.  He was transferred here for higher level of care.  Despite IV fluids, is required intravenous pressors ongoing.      Interval Problem Update  5/9: Patient seen and evaluated in the intensive care unit this morning. A central line has been placed and his right-sided catheter has been removed.  Patient notes significant pain from his skin.  5/10: Mr. Ortiz was examined and evaluated in the intensive care unit this morning. Blood cultures are positive for gram positive cocci. 500 ml urine over night. He is on a dilaudid PCA due to pain. CT was done for staging purposes of his cancer.  I do long talk with patient about CODE STATUS and he states he does not want to make a decision until he can speak to his wife which will need to be over the phone as she does not have the resources to be able to come to [place] from [place].  Due to dry mouth, he has been drinking excessively and states that he cannot tolerate how dry his mouth this and thus feels he needs to drink very frequently.  Consultants/Specialty  Critical Care. I discussed with Dr. Hammonds on ICU Hot Rounds.   Infectious disease, I discussed with Dr. Barbour for consultation  Oncology  Code Status  full    Disposition  ICU    Review of Systems  Review of Systems   Constitutional: Negative for fever.   HENT:        Very dry mouth   Gastrointestinal: Negative for nausea and vomiting.        Tolerating a diet    Skin:        Extensive skin pain and redness   All other systems reviewed and are negative.       Physical Exam  Temp:  [36.1 °C (97 °F)-36.6 °C (97.8 °F)] 36.1 °C (97 °F)  Pulse:  [61-80] 75  Resp:  [13-29] 22  BP: ()/(50-60) 90/50  SpO2:  [92 %-100 %] 95 %    Physical Exam   Constitutional: He is oriented to person, place, and time. No distress.   HENT:   Dry mucous membranes  No facial droop  Chapped lips   Neck: Normal range of motion. Neck supple. No tracheal deviation present.   Left sided central line   Cardiovascular: Normal rate and regular rhythm.    No murmur heard.  Pulmonary/Chest:   Right chest cath site covered  Clear lung sounds bilaterally   Abdominal: Soft. He exhibits no distension. There is no tenderness.   Musculoskeletal: He exhibits tenderness.   3+ edema of the lower extremities   Neurological: He is alert and oriented to person, place, and time.   Skin: Skin is warm. He is not diaphoretic.   Extensive xerosis of the entire skin more so on his scalp and face  The skin on his chest and back is red and raised with weeping of clear fluid and is effectively confluent  Diffuse thickening of the skin   Psychiatric: Judgment and thought content normal.   Nursing note and vitals reviewed.      Fluids    Intake/Output Summary (Last 24 hours) at 05/10/19 0708  Last data filed at 05/10/19 0600   Gross per 24 hour   Intake          2646.55 ml   Output              885 ml   Net          1761.55 ml       Laboratory  Recent Labs      05/08/19 2034 05/09/19   0535   WBC  10.3  5.4   RBC  4.14*  3.35*   HEMOGLOBIN  11.4*  9.3*   HEMATOCRIT  37.8*  30.6*   MCV  91.3  91.3   MCH  27.5  27.8   MCHC  30.2*  30.4*   RDW  55.4*  54.2*   PLATELETCT  389  274   MPV  9.0  9.4     Recent Labs      05/08/19 2034 05/09/19   0535   SODIUM  140  142   POTASSIUM  4.5  4.2   CHLORIDE  107  109   CO2  23  25   GLUCOSE  95  121*   BUN  22  22   CREATININE  1.22  1.09   CALCIUM  7.0*  6.7*     Recent Labs       05/09/19   0855   INR  1.34*               Imaging  CT-CHEST,ABDOMEN,PELVIS WITH   Final Result      1.  Bilateral axillary adenopathy, more notable on the right. Some of these nodes are larger than on the prior PET/CT. There is no intrathoracic adenopathy.   2.  No abdominal adenopathy.   3.  Borderline enlarged inguinal lymph nodes, overall decreased in size compared with the prior PET/CT.      IR-CVC TUNNEL WITH PORT REMOVAL   Final Result      1.  Removal of RIGHT  internal jugular PowerPort venous implant.   2.  No evidence of infection at the port pocket.   3.  The port reservoir and port catheter were removed intact.      DX-CHEST-LIMITED (1 VIEW)   Final Result      Left central venous line in place. No pneumothorax.               INTERPRETING LOCATION: [address] [facility], [place], [address]      DX-CHEST-PORTABLE (1 VIEW)   Final Result      No acute cardiopulmonary abnormality. No interval change.           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  Dr. Bradley, oncology, has been consulted.    CT done for staging     Edema- (present on admission)   Assessment & Plan    Severe lower extremity swelling  He drinks excessive water as he always has a dry mouth  Consider lasix once his blood pressure has stabilized.      Anemia- (present on admission)    Assessment & Plan    Hemoglobin is 10.5       GERD (gastroesophageal reflux disease)- (present on admission)   Assessment & Plan    Continue home PPI.     Asthma   Assessment & Plan    Hemoglobin is 9.3 which is down from 11.4 upon admission  Given his IV fluids, this is likely delusional and a CBC will be checked in the morning          VTE prophylaxis: heparin

## 2019-05-10 NOTE — PROGRESS NOTES
Infectious Disease Progress Note    Date of admission  5/8/2019    Chief Complaint  68 y.o. Male with hx of cutaneous T Cell lymphoma (Mycosis fungoides) dx in 11/2017, had extensive w/u at [facility] for his 20 year hx of widespread hyperkeratotic plaques who is now being followed by Dr. Mc, [facility] oncologist and pt is supposed to get chemotherapy but hasn't been able to due to transportation issues.  He had portacath that was placed in 12/2017 (per wife) and had last chemotherapy was 3 months ago. He had multiple hospital admissions for cellulitis from diffuse open widespread skin in 3/2019 and 4/2019. First admission was at [facility] and 2nd admission was here at [facility]. Hospitalized from 4/8-4/13 and treated with vanco/unasyn and discharged with doxycyline x 10 days.      Pt was admitted last night for septic shock due to cellulitis. Per note, pt has been feeling weak with subjective fever 3 days prior to admission. Pt went to [facility] ED and had lactate >4 and was hypotensive and had 3.7L fluid resuscitation and was started on vanco and zosyn.      At [facility], pt was not hypotensive, afebrile, HR in 60-70s, on 3-4L NC saturating >95%. Left subclavian central line was placed. Blood cultures obtained. Started on vancomycin and unasyn. Lactate 2.9. On IV maintenance fluids at 100cc/hr. Portacath was removed today by IR. Good UOP     Both patient and wife are poor historian. I called wife at [phone] and having difficulty of communication due to poor cellular connection. All history obtained from her, patient and epic record.    Hospital Course    5/8 admitted to ICU      Interval Problem Update  Reviewed last 24 hour events:  No acute events overnight  Remains off pressor  Afebrile  WBC 7.2  On Vancomycin and unasyn  Blood cultures 1 out of 2 positive for CONS    Review of Systems  Review of Systems   Constitutional: Negative for chills, fever and malaise/fatigue.   Respiratory: Negative for cough and  shortness of breath.    Cardiovascular: Negative for chest pain and palpitations.   Gastrointestinal: Negative for abdominal pain, nausea and vomiting.   Genitourinary: Negative for dysuria and urgency.   Skin: Positive for rash.   Neurological: Negative for weakness and headaches.   Psychiatric/Behavioral: The patient is not nervous/anxious.    All other systems reviewed and are negative.       Vital Signs for last 24 hours   Temp:  [36.1 °C (97 °F)-36.7 °C (98.1 °F)] 36.7 °C (98 °F)  Pulse:  [61-79] (P) 65  Resp:  [15-22] 22  SpO2:  [93 %-100 %] (P) 98 %    Hemodynamic parameters for last 24 hours       Physical Exam   Physical Exam   Constitutional: He appears well-nourished. No distress.   HENT:   Head: Normocephalic and atraumatic.   Mouth/Throat: Oropharynx is clear and moist.   No oral lesions   Eyes: Conjunctivae are normal.   Cardiovascular: Normal rate and regular rhythm.    Pulmonary/Chest: Effort normal. No respiratory distress. He has no wheezes. He has no rales.   Abdominal: Soft. Bowel sounds are normal. He exhibits no distension. There is no tenderness. There is no rebound and no guarding.   Musculoskeletal: He exhibits edema.   2-3+ pitting edema   Skin: Skin is dry. Rash noted. He is not diaphoretic. There is erythema.   Diffuse hyperkeratotic patches and plaques on the chest, abdomen, neck, and back.     Diffuse opened superficial wound in his entire back down to his buttock, lower abdomen, inguinal areas bilaterally, bilateral axilla.   No obvious purulent secretions noted anywhere. Some serous fluids weeping on dependent areas.   No vesicles, skin abscesses noted   Psychiatric: He has a normal mood and affect. His behavior is normal.       Medications  Current Facility-Administered Medications   Medication Dose Route Frequency Provider Last Rate Last Dose   • budesonide-formoterol (SYMBICORT) 160-4.5 MCG/ACT inhaler 2 Puff  2 Puff Inhalation BID Antelmo Agee M.D.   2 Puff at 05/10/19 0855   •  HYDROmorphone (DILAUDID) 0.2 mg/mL in 50 mL NS (PCA)   Intravenous Continuous Abhi Hammonds Jr. D.O.       • furosemide (LASIX) injection 20 mg  20 mg Intravenous BID DIURETIC Abhi Hammonds Jr. D.O.   20 mg at 05/10/19 0953   • Pharmacy Consult Request ...Pain Management Review 1 Each  1 Each Other PHARMACY TO DOSE Abhi Hammonds Jr. D.O.       • acetaminophen (TYLENOL) tablet 1,000 mg  1,000 mg Oral Q6HRS Abhi Hammonds Jr. D.O.   1,000 mg at 05/10/19 1304   • omeprazole (PRILOSEC) capsule 20 mg  20 mg Oral DAILY Ofelia Lemon M.D.   20 mg at 05/10/19 0533   • senna-docusate (PERICOLACE or SENOKOT S) 8.6-50 MG per tablet 2 Tab  2 Tab Oral BID Ofelia Lemon M.D.   Stopped at 05/09/19 0600    And   • polyethylene glycol/lytes (MIRALAX) PACKET 1 Packet  1 Packet Oral QDAY PRN Ofelia Lemon M.D.        And   • magnesium hydroxide (MILK OF MAGNESIA) suspension 30 mL  30 mL Oral QDAY PRN Ofelia Lemon M.D.        And   • bisacodyl (DULCOLAX) suppository 10 mg  10 mg Rectal QDAY PRN Ofelia Lemon M.D.       • Respiratory Care per Protocol   Nebulization Continuous RT Ofelia Lemon M.D.       • NS (BOLUS) infusion 500 mL  500 mL Intravenous Once PRN Ofelia Lemon M.D.       • heparin injection 5,000 Units  5,000 Units Subcutaneous Q8HRS Ofelia Lemon M.D.   5,000 Units at 05/10/19 1304   • ampicillin/sulbactam (UNASYN) 3 g in  mL IVPB  3 g Intravenous Q6HRS Ofelia Lemon M.D.   Stopped at 05/10/19 1200   • MD Alert...Vancomycin per Pharmacy  1 Each Other PHARMACY TO DOSE Ofelia Lemon M.D.       • albuterol inhaler 2 Puff  2 Puff Inhalation Q4HRS PRN Josef Stephenson M.D.           Fluids    Intake/Output Summary (Last 24 hours) at 05/10/19 1543  Last data filed at 05/10/19 1400   Gross per 24 hour   Intake          2604.05 ml   Output             1210 ml   Net          1394.05 ml       Laboratory          Recent Labs      05/08/19 2034  05/09/19   0535  05/10/19   0800   SODIUM  140  142  " 139   POTASSIUM  4.5  4.2  3.9   CHLORIDE  107  109  108   CO2  23  25  26   BUN  22  22  19   CREATININE  1.22  1.09  1.02   CALCIUM  7.0*  6.7*  7.3*     Recent Labs      05/08/19   2034  05/09/19   0535  05/10/19   0800   ALTSGPT  9   --    --    ASTSGOT  16   --    --    ALKPHOSPHAT  106*   --    --    TBILIRUBIN  0.4   --    --    GLUCOSE  95  121*  100*     Recent Labs      05/08/19   2034  05/09/19   0535  05/10/19   0800   WBC  10.3  5.4  7.2   NEUTSPOLYS  81.40*   --   51.80   LYMPHOCYTES  13.30*   --   18.40*   MONOCYTES  0.00   --   3.50   EOSINOPHILS  2.60   --   10.50*   BASOPHILS  0.00   --   0.00   ASTSGOT  16   --    --    ALTSGPT  9   --    --    ALKPHOSPHAT  106*   --    --    TBILIRUBIN  0.4   --    --      Recent Labs      05/08/19   2034  05/09/19   0535  05/09/19   0855  05/10/19   0800   RBC  4.14*  3.35*   --   3.79*   HEMOGLOBIN  11.4*  9.3*   --   10.5*   HEMATOCRIT  37.8*  30.6*   --   34.4*   PLATELETCT  389  274   --   339   PROTHROMBTM   --    --   16.7*   --    INR   --    --   1.34*   --        Pertinent Micro:  Results     Procedure Component Value Units Date/Time    BLOOD CULTURE [[id number]] Collected:  05/10/19 1335    Order Status:  Completed Specimen:  Blood from Peripheral Updated:  05/10/19 1357    Narrative:       Collected By:[id number] FARIDA MONTE  Per Hospital Policy: Only change Specimen Src: to \"Line\" if  specified by physician order.    BLOOD CULTURE [[id number]] Collected:  05/10/19 1352    Order Status:  Completed Specimen:  Blood from Peripheral Updated:  05/10/19 1355    Narrative:       Collected By:[id number] FARIDA MONTE  Per Hospital Policy: Only change Specimen Src: to \"Line\" if  specified by physician order.    Fungal Culture [[id number]] Collected:  05/09/19 1115    Order Status:  Completed Specimen:  Wound Updated:  05/10/19 1329     Significant Indicator NEG     Source WND     Site Port Catheter Tip     Culture Result Culture in progress.    Narrative:   " "    Surgery Specimen    CULTURE WOUND W/ GRAM STAIN [[id number]] Collected:  05/09/19 1115    Order Status:  Completed Specimen:  Wound Updated:  05/10/19 1329     Significant Indicator NEG     Source WND     Site Port Catheter Tip     Culture Result Culture in progress.     Gram Stain Result No organisms seen.    Narrative:       Surgery Specimen    CULTURE WOUND W/O GRAM STAIN [[id number]] Collected:  05/08/19 2230    Order Status:  Completed Specimen:  Wound from Back Updated:  05/10/19 1125     Significant Indicator NEG     Source WND     Site Buttock     Culture Result Heavy growth mixed skin doreen.    Narrative:       Contact  Contact    BLOOD CULTURE [[id number]]  (Abnormal) Collected:  05/08/19 2034    Order Status:  Completed Specimen:  Blood from Peripheral Updated:  05/10/19 1049     Significant Indicator POS (POS)     Source BLD     Site PERIPHERAL     Culture Result Growth detected by Bactec instrument. 05/09/2019  1830  Negative for Staphylococcus aureus and MRSA by PCR. Correlate  ongoing need for antibiotics with clinical condition.   (A)      Coagulase-negative Staphylococcus species  Possible Contaminant  Isolated from one bottle only, correlate with clinical  condition.   (A)    Narrative:       CALL  [facility]  [facility] tel. [phone],  CALLED  [facility] tel. [phone] 05/09/2019, 20:44, RB PERF. RESULTS CALLED TO: RN  [id number]  Per Hospital Policy: Only change Specimen Src: to \"Line\" if  specified by physician order.  Left Forearm/Arm    GRAM STAIN [[id number]] Collected:  05/09/19 1115    Order Status:  Completed Specimen:  Wound Updated:  05/10/19 0818     Significant Indicator .     Source WND     Site Port Catheter Tip     Gram Stain Result No organisms seen.    Narrative:       Surgery Specimen    MRSA BY PCR (AMP) [[id number]] Collected:  05/09/19 1410    Order Status:  Completed Specimen:  Respirate from Nares Updated:  05/09/19 1831     Significant Indicator NEG     Source RESP     Site NARES     MRSA PCR " "Negative for MRSA by PCR.    Narrative:       [id number] JAKUBOS SAMIR R  [id number] MARCIALUBOS SAMIR R    Fungal Culture [[id number]]     Order Status:  No result Specimen:  Respirate from Central Line     CULTURE TISSUE W/ GRM STAIN [[id number]]     Order Status:  No result Specimen:  Tissue from Other Tissue     BLOOD CULTURE [[id number]] Collected:  05/08/19 2034    Order Status:  Completed Specimen:  Blood from Peripheral Updated:  05/09/19 0929     Significant Indicator NEG     Source BLD     Site PERIPHERAL     Culture Result No Growth  Note: Blood cultures are incubated for 5 days and  are monitored continuously.Positive blood cultures  are called to the RN and reported as soon as  they are identified.      Narrative:       Per Hospital Policy: Only change Specimen Src: to \"Line\" if  specified by physician order.  Right Hand    URINALYSIS [[id number]]     Order Status:  Canceled Specimen:  Urine     URINE CULTURE(NEW) [[id number]]     Order Status:  Canceled Specimen:  Urine         Blood Culture   Date Value Ref Range Status   04/08/2019 No growth after 5 days of incubation.  Final        Imaging  X-Ray:  I have personally reviewed the images and compared with prior images.    Assessment  68 y.o. Male with  1. Cutaneous T Cell lymphoma (Mycosis fungoides) dx in 11/2017, had extensive w/u at [place] for his 20 year hx of widespread hyperkeratotic plaques  2. Multiple hospital admissions for sepsis due to cellulitis in 3/19 and 4/19  3. Septic shock - resolved  - source is skin and line (portacath).   4. Blood cultures positive 1 out of 2 with CONS     Superinfected open skin lesions/wound (staph/strep) in the background of persistent cutaneous T cell lymphoma.      No suspicion for brooks che syndrome, TEN, AGEP. Doubt DRESS syndrome. His eosinophils are normal  2.6%. No suspicion for necrotizing fasciitis.       Regarding positive blood cultures, even though it certainly could be contaminant, " with his extensive open skin lesions, would repeat blood cultures and continue vancomycin before concluding contaminant blood culture.     Plan:   Continue vancomycin and amp/sulbactam  Repeat blood cultures.   Plan to continue with vancomycin until repeated blood cultures are negative at least 48 hours.   Wound team following    I have performed a physical exam and reviewed and updated ROS and Plan today (5/10/2019). In review of yesterday's note (5/9/2019), there are no changes except as documented above.     Discussed patient condition and risk of morbidity and/or mortality with Hospitalist, RN, RT and Patient    D/w Dr. Agee and Dr. Hammonds.     Ashu Barbour, DO

## 2019-05-10 NOTE — PROGRESS NOTES
· 2 RN skin check complete with MARINANE Baez.  · Devices in place Tele leads, SubClavian Central line, pulse oximeter, BP cuff.  · Skin assessed under devices Yes.  · Confirmed pressure ulcers found on Generalized integument, open weeping wounds across all extremities and posterior/anterior trunk.  · New potential pressure ulcers noted on N/A. Wound consult placed and wound reported.  · The following interventions in place q2 hour turns, rotating medical device placement, barrier wipes*

## 2019-05-10 NOTE — CARE PLAN
Problem: Pain Management  Goal: Pain level will decrease to patient's comfort goal  Outcome: PROGRESSING AS EXPECTED  Pt on PCA pump, pain well controlled    Intervention: Follow pain managment plan developed in collaboration with patient and Interdisciplinary Team  Done  Intervention: Educate and implement non-pharmacologic comfort measures. Examples: relaxation, distration, play therapy, activity therapy, massage, etc.  Done      Problem: Skin Integrity  Goal: Risk for impaired skin integrity will decrease  Outcome: PROGRESSING AS EXPECTED  Pt continues to have generalized open wounds, dressings in place and changed as needed    Intervention: Assess risk factors for impaired skin integrity and/or pressure ulcers  Done  Intervention: Implement precautions to protect skin integrity in collaboration with the interdisciplinary team  Dont  Intervention: Assess and monitor skin integrity, appearance and/or temperature  Done

## 2019-05-10 NOTE — PROGRESS NOTES
Microlab called with Blood Culture Results: Gram (+) Staphylococcus (Not MRSA, Not Staph Aureus) grown from cultures. Coag (-) staph.

## 2019-05-10 NOTE — PROGRESS NOTES
"Critical Care Progress Note    Date of admission  5/8/2019    Chief Complaint  68 y.o. male admitted 5/8/2019 with septic shock    Hospital Course    \"68 y.o. male who presented 5/8/2019 with septic shock from presumed from cellulitis.  Patient was transferred from [facility] on a Levophed drip infused through an old port in his right chest.  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  Apparently last chemotherapy was was several months ago.  He has a history of recurrent skin infections with severe infection with with sepsis.  On this admission he has several days of weakness but no overt shaking chills that he can recall.  At the outside facility had elevated lactate level and hypotension in the 70s over 40s.  He was placed on a Levophed drip.  He arrives chronically ill-appearing hypotensive but mentating adequately.  I placed a central line and we discontinued his peripheral Levophed to central.  He received vancomycin and Zosyn at the outside hospital as well as 3700 cc of isotonic solution.\"      Interval Problem Update  Reviewed last 24 hour events:   - NAEON   - Neuro: AOx4   - HR: 50s-70s   - SBP: 80s-120s   - GI: last night, tolerating diet   - UOP: adequate   - Woods: no   - Tm: 99.4   - Lines: CVC   - PPx: GI not indicated, DVT heparin   -4 L nasal cannula   - CXR (personally reviewed and compared to prior): No new    Yesterday   - CVC placed, Port removal planned for ? infected   - Neuro: AOx4   - HR: 60s-70s   - SBP: 80s-120s   - GI: Dysphagia diet   - UOP: 375 mL since admit   - Woods: no   - Tm: 98.5   - Lines: CVC, port   - PPx: GI not indicated, DVT heparin   - 4L NC, 1L IS   - CXR (personally reviewed and compared to prior): CVC in place, no infiltrate    Review of Systems  Review of Systems   Constitutional: Positive for fever and malaise/fatigue. Negative for chills.   Respiratory: Negative for cough, sputum production, shortness of breath " and stridor.    Cardiovascular: Negative for chest pain.   Gastrointestinal: Negative for abdominal pain, nausea and vomiting.   Genitourinary: Negative for dysuria.   Musculoskeletal: Negative for myalgias.   Skin: Positive for itching and rash.   Neurological: Positive for weakness. Negative for dizziness, sensory change and focal weakness.        Vital Signs for last 24 hours   Temp:  [36.1 °C (97 °F)-36.6 °C (97.8 °F)] 36.1 °C (97 °F)  Pulse:  [61-80] 75  Resp:  [13-29] 22  BP: ()/(50-60) 90/50  SpO2:  [92 %-100 %] 95 %    Hemodynamic parameters for last 24 hours       Respiratory Information for the last 24 hours       Physical Exam   Physical Exam   Constitutional: He is oriented to person, place, and time. He appears well-developed and well-nourished. He has a sickly appearance. He appears distressed. Nasal cannula in place.   HENT:   Right Ear: External ear normal.   Left Ear: External ear normal.   Mouth/Throat: Oropharynx is clear and moist.   Eyes: Pupils are equal, round, and reactive to light. Conjunctivae and EOM are normal. No scleral icterus.   Neck: Neck supple. No JVD present. No tracheal deviation present.   Cardiovascular: Normal rate, regular rhythm and intact distal pulses.    No murmur heard.  Pulmonary/Chest: Effort normal. No respiratory distress. He has decreased breath sounds in the right lower field and the left lower field.   Left SC CVC   Abdominal: Soft. Bowel sounds are normal. He exhibits no distension.   Musculoskeletal: Normal range of motion. He exhibits edema (pedal). He exhibits no tenderness.   Neurological: He is alert and oriented to person, place, and time. No cranial nerve deficit. He exhibits normal muscle tone. Coordination normal.   Skin: Rash noted. There is erythema.   Diffuse hypertrophic rash with patches of plaques and open areas to chest/abdomen/back, palmar/plantar keratoderma   Psychiatric: He has a normal mood and affect.   Nursing note and vitals  reviewed.      Medications  Current Facility-Administered Medications   Medication Dose Route Frequency Provider Last Rate Last Dose   • vancomycin 1,400 mg in  mL IVPB  1,400 mg Intravenous Q12HR Ofelia Lemon M.D.   Stopped at 05/10/19 0431   • Pharmacy Consult Request ...Pain Management Review 1 Each  1 Each Other PHARMACY TO DOSE Abhi Hammonds Jr., D.O.       • HYDROmorphone (DILAUDID) 0.2 mg/mL in 50 mL NS (PCA)   Intravenous Continuous Abhi Hammonds Jr. D.O.       • acetaminophen (TYLENOL) tablet 1,000 mg  1,000 mg Oral Q6HRS Abhi Hammonds Jr. D.O.   1,000 mg at 05/10/19 0535   • omeprazole (PRILOSEC) capsule 20 mg  20 mg Oral DAILY Ofelia Lemon M.D.   20 mg at 05/10/19 0533   • budesonide-formoterol (SYMBICORT) 160-4.5 MCG/ACT inhaler 2 Puff  2 Puff Inhalation BID (RT) Ofelia Lemon M.D.   Stopped at 05/08/19 2215   • senna-docusate (PERICOLACE or SENOKOT S) 8.6-50 MG per tablet 2 Tab  2 Tab Oral BID Ofelia Lemon M.D.   Stopped at 05/09/19 0600    And   • polyethylene glycol/lytes (MIRALAX) PACKET 1 Packet  1 Packet Oral QDAY PRN Ofelia Lemon M.D.        And   • magnesium hydroxide (MILK OF MAGNESIA) suspension 30 mL  30 mL Oral QDAY PRN Ofelia Lemon M.D.        And   • bisacodyl (DULCOLAX) suppository 10 mg  10 mg Rectal QDAY PRN Ofelia Lemon M.D.       • Respiratory Care per Protocol   Nebulization Continuous RT Ofelia Lemon M.D.       • NS (BOLUS) infusion 500 mL  500 mL Intravenous Once PRN Ofelia Lemon M.D.       • heparin injection 5,000 Units  5,000 Units Subcutaneous Q8HRS Ofelia Lemon M.D.   5,000 Units at 05/10/19 0533   • ampicillin/sulbactam (UNASYN) 3 g in  mL IVPB  3 g Intravenous Q6HRS Ofelia Lemon M.D.   Stopped at 05/10/19 0700   • MD Alert...Vancomycin per Pharmacy  1 Each Other PHARMACY TO DOSE Ofelia Lemon M.D.       • norepinephrine (LEVOPHED) 8 mg in  mL Infusion  0.5-30 mcg/min Intravenous Continuous Ofelia Lemon M.D.         And   • vasopressin (VASOSTRICT) 20 Units in  mL Infusion  0.03 Units/min Intravenous Continuous Ofelia Lemon M.D.       • albuterol inhaler 2 Puff  2 Puff Inhalation Q4HRS PRN Josef Stephenson M.D.           Fluids    Intake/Output Summary (Last 24 hours) at 05/10/19 0757  Last data filed at 05/10/19 0600   Gross per 24 hour   Intake          2646.55 ml   Output              885 ml   Net          1761.55 ml       Laboratory          Recent Labs      05/08/19 2034 05/09/19   0535   SODIUM  140  142   POTASSIUM  4.5  4.2   CHLORIDE  107  109   CO2  23  25   BUN  22  22   CREATININE  1.22  1.09   CALCIUM  7.0*  6.7*     Recent Labs      05/08/19 2034 05/09/19   0535   ALTSGPT  9   --    ASTSGOT  16   --    ALKPHOSPHAT  106*   --    TBILIRUBIN  0.4   --    GLUCOSE  95  121*     Recent Labs      05/08/19 2034 05/09/19   0535   WBC  10.3  5.4   NEUTSPOLYS  81.40*   --    LYMPHOCYTES  13.30*   --    MONOCYTES  0.00   --    EOSINOPHILS  2.60   --    BASOPHILS  0.00   --    ASTSGOT  16   --    ALTSGPT  9   --    ALKPHOSPHAT  106*   --    TBILIRUBIN  0.4   --      Recent Labs      05/08/19 2034 05/09/19   0535  05/09/19   0855   RBC  4.14*  3.35*   --    HEMOGLOBIN  11.4*  9.3*   --    HEMATOCRIT  37.8*  30.6*   --    PLATELETCT  389  274   --    PROTHROMBTM   --    --   16.7*   INR   --    --   1.34*       Imaging  X-Ray:  I have personally reviewed the images and compared with prior images.    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  Oncology on board  Repeat CT reviewed  Diffuse painful skin lesions; will start weaning dPCA, wound care on board     Severe sepsis (HCC)- (present on admission)   Assessment & Plan    See above.  ID on board  Due to skin wounds  I will continue Unasyn     GERD (gastroesophageal reflux disease)- (present on admission)   Assessment & Plan    I will continue the patient's home prilosec          VTE:  Heparin  Ulcer: PPI  Lines: Central Line  Ongoing indication addressed    I have performed a physical exam and reviewed and updated ROS and Plan today (5/10/2019). In review of yesterday's note (5/9/2019), there are no changes except as documented above.     Discussed patient condition and risk of morbidity and/or mortality with Hospitalist, RN, RT, Pharmacy, , Charge nurse / hot rounds and Patient

## 2019-05-10 NOTE — DIETARY
"Nutrition Services: Day 2 of admit.  Marco Antonio Ortiz is a 68 y.o. male with admitting DX of Sepsis (HCC)  Consult received for Poor PO & Wound on Nutrition Admit Screen     Pt states his appetite has been lousy for ~4 weeks and hasn't been able to eat much. Pt has been told he has lost wt however unsure of the amount and he doesn't know his UBW. Pt states the last time he remembers being weighed was over a month ago and his wt was 238 lbs (108.2 kg). Discussed snack and supplements options. Pt enjoys vanilla Ensure and is willing to try a Boost here. Pt also requested whole milk with meals. Pt declined snacks in-between meals at this time.     Assessment:  Ht: 172.7 cm, Wt 5/8: 108 kg via wheelchair scale, BMI: 36.2  Diet/Intake: Regular, Dysphagia 3 - Per chart pt PO < 25-75% 3 meals documented.      Evaluation:   1. Pt appears adequately nourished.   2. No wt loss note present however suspect true wt loss masked by edema.   3. Edema: Generalized edema, RUE & RLE 2+, and LUE & LLE 4+;pitting  4. Meds: unasyn, lasix, prilosec, pericolace, dilaudid  5. Skin: Per wound team note 5/9 - Full Thickness Wound Abdomen;Back;Axilla;Coccyx;Buttocks;Hip cutaneous T cell lymphoma (Active) and Abdomen;Back;Axilla;Buttocks;Flank;Leg;Arm;Chest Generalized Scabbing, Open weeping wounds (Active). Per evaluation \"Pt being worked up for disease process that may be contributing to lesions found in various stages t/o pt's body\"  6. Last BM: 5/10    Malnutrition Risk: Pt with severe malnutrition in the context of chronic disease related malnutrition related to lymphoma as evidenced by pt's report poor PO (< 75% of estimated energy needs >1 month) and severe edema (Generalized edema, RUE & RLE 2+, and LUE & LLE 4+;pitting), which would mask wt loss.     Recommendations/Plan:  1. Encourage intake of meals  2. Document intake of all meals as % taken in ADL's to provide interdisciplinary communication across all shifts.   3. Monitor " weight.  4. Nutrition rep will continue to see patient for ongoing meal and snack preferences.    RD following

## 2019-05-11 NOTE — CARE PLAN
Problem: Bowel/Gastric:  Goal: Normal bowel function is maintained or improved  Outcome: PROGRESSING AS EXPECTED  Pt has passed 2x loose BMs this shift. Stool softener will be held  Intervention: Educate patient and significant other/support system about diet, fluid intake, medications and activity to promote bowel function  Done  Intervention: Educate patient and significant other/support system about signs and symptoms of constipation and interventions to implement  Done  Intervention: Collaborate with Interdisciplinary Team for optimal positioning for bowel evacuation  Done       Problem: Pain Management  Goal: Pain level will decrease to patient's comfort goal  Outcome: PROGRESSING AS EXPECTED  Pt continues to be on Hydromorphone PCA pump. Pt states that pain is not well controlled with PCA at this time, no PRN pain meds available  Intervention: Follow pain managment plan developed in collaboration with patient and Interdisciplinary Team  Done  Intervention: Educate and implement non-pharmacologic comfort measures. Examples: relaxation, distration, play therapy, activity therapy, massage, etc.  Done

## 2019-05-11 NOTE — PROGRESS NOTES
[facility] Medicine Daily Progress Note    Date of Service  5/11/2019    Chief Complaint  68 y.o. male admitted 5/8/2019 with weakness and fevers.     Hospital Course    Mr. Ortiz has a past medical history of T-cell lymphoma followed by Dr. Mc the most recently admitted here for a skin infection was discharged on April 13 due to a rash from his T-cell lymphoma.  He was discharged home on oral doxycycline.  He presented to the emergency room in [facility] with weakness, feeling poorly, and fevers.  Is found to be hypotensive and in septic shock.  He was transferred here for higher level of care.  Despite IV fluids, is required intravenous pressors ongoing.      Interval Problem Update  5/9: Patient seen and evaluated in the intensive care unit this morning. A central line has been placed and his right-sided catheter has been removed.  Patient notes significant pain from his skin.  5/10: Mr. Ortiz was examined and evaluated in the intensive care unit this morning. Blood cultures are positive for gram positive cocci. 500 ml urine over night. He is on a dilaudid PCA due to pain. CT was done for staging purposes of his cancer.  I do long talk with patient about CODE STATUS and he states he does not want to make a decision until he can speak to his wife which will need to be over the phone as she does not have the resources to be able to come to [place] from [place].  Due to dry mouth, he has been drinking excessively and states that he cannot tolerate how dry his mouth this and thus feels he needs to drink very frequently.  5/11: Patient seen and evaluated in the intensive care unit this morning. He has been using the PCA due to pain. He had multiple episodes of diarrhea last night. I long talk with Mr. Ortiz today about possibly going to a burn center due to the degree of his skin involvement.  He states he wants to talk to his wife about this first and he would really use it [place] but he would  except [facility] if his wife is okay.  A Woods catheter is been placed due to urinary retention and skin breakdown as he is unable to control his urination.  Consultants/Specialty  Critical Care. I discussed with Dr. Hammonds on ICU Hot Rounds.   Infectious disease, I discussed with Dr. Barbour for consultation  Oncology  Code Status  full    Disposition  ICU    Review of Systems  Review of Systems   Constitutional: Negative for fever.   HENT:        Very dry mouth   Respiratory: Negative for shortness of breath.    Cardiovascular: Negative for chest pain.   Gastrointestinal: Positive for diarrhea. Negative for nausea and vomiting.        Tolerating a diet   Genitourinary:        He does not know when he is urinating   Skin:        Extensive skin pain and redness   All other systems reviewed and are negative.       Physical Exam  Temp:  [36.5 °C (97.7 °F)-37.4 °C (99.4 °F)] 36.8 °C (98.2 °F)  Pulse:  [] 77  SpO2:  [92 %-100 %] 94 %    Physical Exam   Constitutional: He is oriented to person, place, and time. No distress.   HENT:   Dry mucous membranes  No facial droop  Chapped lips   Eyes: Right eye exhibits no discharge. Left eye exhibits no discharge. No scleral icterus.   Pale conjunctiva   Neck: Normal range of motion. Neck supple. No tracheal deviation present.   Left sided central line   Cardiovascular: Normal rate and regular rhythm.    No murmur heard.  Pulmonary/Chest:   Right chest cath site covered  Clear lung sounds bilaterally   Abdominal: Soft. He exhibits no distension. There is no tenderness.   Genitourinary:   Genitourinary Comments: Woods catheter   Musculoskeletal: He exhibits tenderness.   3+ edema of the lower extremities   Neurological: He is alert and oriented to person, place, and time.   Skin: Skin is warm and dry. He is not diaphoretic.   Extensive xerosis of the entire skin more so on his scalp and face  The skin on his chest and back is red and raised with weeping of clear fluid  and is effectively confluent  Diffuse thickening of the skin  Deep breaks in the skin of the groin bilaterally   Psychiatric: He has a normal mood and affect. His behavior is normal.   Nursing note and vitals reviewed.      Fluids    Intake/Output Summary (Last 24 hours) at 05/11/19 0717  Last data filed at 05/11/19 0600   Gross per 24 hour   Intake           917.67 ml   Output             1735 ml   Net          -817.33 ml       Laboratory  Recent Labs      05/08/19 2034  05/09/19   0535  05/10/19   0800   WBC  10.3  5.4  7.2   RBC  4.14*  3.35*  3.79*   HEMOGLOBIN  11.4*  9.3*  10.5*   HEMATOCRIT  37.8*  30.6*  34.4*   MCV  91.3  91.3  90.8   MCH  27.5  27.8  27.7   MCHC  30.2*  30.4*  30.5*   RDW  55.4*  54.2*  55.6*   PLATELETCT  389  274  339   MPV  9.0  9.4  9.1     Recent Labs      05/09/19 0535  05/10/19   0800  05/11/19   0600   SODIUM  142  139  145   POTASSIUM  4.2  3.9  3.7   CHLORIDE  109  108  113*   CO2  25  26  26   GLUCOSE  121*  100*  85   BUN  22  19  20   CREATININE  1.09  1.02  1.07   CALCIUM  6.7*  7.3*  7.0*     Recent Labs      05/09/19   0855   INR  1.34*               Imaging  CT-CHEST,ABDOMEN,PELVIS WITH   Final Result      1.  Bilateral axillary adenopathy, more notable on the right. Some of these nodes are larger than on the prior PET/CT. There is no intrathoracic adenopathy.   2.  No abdominal adenopathy.   3.  Borderline enlarged inguinal lymph nodes, overall decreased in size compared with the prior PET/CT.      IR-CVC TUNNEL WITH PORT REMOVAL   Final Result      1.  Removal of RIGHT  internal jugular PowerPort venous implant.   2.  No evidence of infection at the port pocket.   3.  The port reservoir and port catheter were removed intact.      DX-CHEST-LIMITED (1 VIEW)   Final Result      Left central venous line in place. No pneumothorax.               INTERPRETING LOCATION: [address] [place] [address], [facility], [id number]      DX-CHEST-PORTABLE (1 VIEW)   Final Result      No acute cardiopulmonary  abnormality. No interval change.           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  Dr. Bradley, oncology, has been consulted.    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       Edema- (present on admission)   Assessment & Plan    Severe lower extremity swelling  He drinks excessive water as he always has a dry mouth  IV lasix and follow electrolytes in the morning.      Anemia- (present on admission)   Assessment & Plan    Hemoglobin is 10.5       GERD (gastroesophageal reflux disease)- (present on admission)   Assessment & Plan    Hold outpatient      Asthma   Assessment & Plan    Hemoglobin is 9.3 which is down from 11.4 upon admission  Given his IV fluids, this is likely delusional and a CBC will be checked in the morning          VTE prophylaxis: heparin

## 2019-05-11 NOTE — PROGRESS NOTES
"Pharmacy Kinetics 68 y.o. male on vancomycin day # 3 5/10/2019    Currently on Vancomycin 1400mg q12hr    Indication for Treatment: SSTI    Pertinent history per medical record:   Admitted on 2019 as a transfer from [facility] for sepsis. Patient presented to the OSH for general malaise and was found to have multiple wounds and moisture/weeping of the back. He was admitted to [facility] on  for rash thought to be related to pt's cutaneous T cell lymphoma. He was treated with antibiotics and discharged home on  with PO doxycycline to complete a 10 day course. Of note, patient does have a port in place. At the outlying facility, patient was hypotensive requiring vasopressors. He was transferred to [facility] and will be continued on IV abx.      Other antibiotics: Unasyn 3 gm IV q6h      Allergies: Patient has no known allergies.      List concerns for renal function:  Hypotension requiring pressors, acutely elevated renal indices, hypermetabolic/sepsis, BMI 36, hypoalbuminemia     Pertinent cultures to date:    wound culture - in process    blood - CONS       Recent Labs      1935  05/10/19   0800   WBC  10.3  5.4  7.2   NEUTSPOLYS  81.40*   --   51.80   BANDSSTABS  2.70   --   14.00*     Recent Labs      19   0535  05/10/19   0800   BUN  22  22  19   CREATININE  1.22  1.09  1.02   ALBUMIN  2.0*   --    --      Recent Labs      05/10/19   1310   VANCOTROUGH  31.2*     Intake/Output Summary (Last 24 hours) at 05/10/19 1704  Last data filed at 05/10/19 1400   Gross per 24 hour   Intake          1770.72 ml   Output             1360 ml   Net           410.72 ml      BP (!) 90/50   Pulse 73   Temp 36.7 °C (98 °F) (Temporal)   Resp (!) 22   Ht 1.727 m (5' 8\")   Wt 108 kg (238 lb 1.6 oz)   SpO2 97%  Temp (24hrs), Av.4 °C (97.6 °F), Min:36.1 °C (97 °F), Max:36.7 °C (98.1 °F)      A/P   1. Vancomycin dose change: hold dose, random 18 hour level and " assess.  2. Next vancomycin level: 5/11 @0700   3. Goal trough: 12-16   4. Comments: Level supratherapeutic. Dose already given. Collected appropriately. Renal appears stable. Will obtain an 18 hour level and assess clearance. Pharmacy will follow. Narrow therapy as able. Discussed with ID, continue vancomycin for now.    Coy Greenberg, PharmD, BCPS

## 2019-05-11 NOTE — PROGRESS NOTES
Infectious Disease Progress Note    Author: Angy Francis M.D. Date & Time of service: 2019  8:05 AM    Chief Complaint:  Septic shock, cellulitis     Interval History:   Interval 24 hours of Vitals/Labs/Micro,and imaging results reviewed as available. See assessment.       Review of Systems:  Review of Systems   Constitutional: Positive for malaise/fatigue. Negative for chills and fever.   Gastrointestinal: Positive for abdominal pain and diarrhea. Negative for nausea and vomiting.   Musculoskeletal: Positive for myalgias.   Skin: Positive for rash.   Neurological: Positive for weakness.       Hemodynamics:  Temp (24hrs), Av.8 °C (98.2 °F), Min:36.5 °C (97.7 °F), Max:37.4 °C (99.4 °F)  Temperature: 36.8 °C (98.2 °F)  Pulse  Av.9  Min: 49  Max: 103  NIBP: 110/71       Physical Exam:  Physical Exam   Constitutional: He is oriented to person, place, and time. He appears well-developed and well-nourished.   HENT:   Head: Normocephalic and atraumatic.   Eyes: Pupils are equal, round, and reactive to light. Conjunctivae and EOM are normal.   Cardiovascular: Normal rate, regular rhythm and normal heart sounds.    Pulmonary/Chest: Effort normal and breath sounds normal.   Abdominal: Soft. He exhibits distension. There is tenderness. There is no rebound and no guarding.   Musculoskeletal: He exhibits edema.   Neurological: He is alert and oriented to person, place, and time.   Skin: Rash noted. There is erythema.   Extensive skin lesions, plaques, erythema and tenderness   Psychiatric: He has a normal mood and affect. His behavior is normal.       Meds:    Current Facility-Administered Medications:   •  budesonide-formoterol  •  HYDROmorphone  •  furosemide  •  Pharmacy Consult Request  •  acetaminophen  •  omeprazole  •  senna-docusate **AND** polyethylene glycol/lytes **AND** magnesium hydroxide **AND** bisacodyl  •  Respiratory Care per Protocol  •  NS  •  heparin  •  ampicillin-sulbactam  (UNASYN) IV  •  MD Alert...Vancomycin per Pharmacy  •  albuterol    Labs:  Recent Labs      05/08/19   2034  05/09/19   0535  05/10/19   0800   WBC  10.3  5.4  7.2   RBC  4.14*  3.35*  3.79*   HEMOGLOBIN  11.4*  9.3*  10.5*   HEMATOCRIT  37.8*  30.6*  34.4*   MCV  91.3  91.3  90.8   MCH  27.5  27.8  27.7   RDW  55.4*  54.2*  55.6*   PLATELETCT  389  274  339   MPV  9.0  9.4  9.1   NEUTSPOLYS  81.40*   --   51.80   LYMPHOCYTES  13.30*   --   18.40*   MONOCYTES  0.00   --   3.50   EOSINOPHILS  2.60   --   10.50*   BASOPHILS  0.00   --   0.00   RBCMORPHOLO  Present   --   Present     Recent Labs      05/09/19   0535  05/10/19   0800  05/11/19   0600   SODIUM  142  139  145   POTASSIUM  4.2  3.9  3.7   CHLORIDE  109  108  113*   CO2  25  26  26   GLUCOSE  121*  100*  85   BUN  22  19  20     Recent Labs      05/08/19   2034  05/09/19   0535  05/10/19   0800 05/11/19   0600   ALBUMIN  2.0*   --    --    --    TBILIRUBIN  0.4   --    --    --    ALKPHOSPHAT  106*   --    --    --    TOTPROTEIN  5.1*   --    --    --    ALTSGPT  9   --    --    --    ASTSGOT  16   --    --    --    CREATININE  1.22  1.09  1.02  1.07       Imaging:  Ct-chest,abdomen,pelvis With    Result Date: 5/9/2019 5/9/2019 9:08 PM HISTORY/REASON FOR EXAM:  T cell lymphoma restaging,compare with prior PET scan TECHNIQUE/EXAM DESCRIPTION: CT scan of the chest, abdomen and pelvis with contrast. Thin-section helical scanning was obtained from the lung apices through the pubic symphysis to include the chest, abdomen and pelvis.  100 mL of nonionic contrast was administered intravenously without complication. Low dose optimization technique was utilized for this CT exam including automated exposure control and adjustment of the mA and/or kV according to patient size. COMPARISON: PET/CT 4/30/2018 is findings there are FINDINGS: CT CHEST:The lungs are fully expanded, with no parenchymal abnormality.  There is no pleural or pericardial effusion. There are no  enlarged mediastinal or hilar nodes. There are enlarged axillary nodes bilaterally however, more notably on the right. The largest right-sided node measures about 15 mm in greatest short axis dimension. The bony thorax is unremarkable. CT ABDOMEN:The liver, spleen, pancreas, and stomach are unremarkable.  Adrenal glands are normal.  No opaque gallstones.  The kidneys show symmetric opacification with no hydronephrosis. A 12 mm exophytic lesion is seen along the lateral margin of the right kidney, likely a cyst. This is unchanged. The bowel and mesentery are grossly normal. No mesenteric or retroperitoneal adenopathy. No free fluid or air. CT PELVIS:The bladder is intact. No free fluid in the pelvis.  The pelvic bowel and mesentery are grossly normal.  The bony pelvis and visualized proximal femora are intact. Borderline enlarged inguinal lymph nodes are present bilaterally which are somewhat smaller than before.     1.  Bilateral axillary adenopathy, more notable on the right. Some of these nodes are larger than on the prior PET/CT. There is no intrathoracic adenopathy. 2.  No abdominal adenopathy. 3.  Borderline enlarged inguinal lymph nodes, overall decreased in size compared with the prior PET/CT.    Dx-chest-limited (1 View)    Result Date: 5/9/2019  HISTORY/REASON FOR EXAM: central venous line placement. TECHNIQUE/EXAM DESCRIPTION:  Single view of the chest,  5/8/2019 11:47 PM COMPARISON:  Prior image today at 2043 hours FINDINGS:   A new left central venous line has been placed, the tip of which projects in the area of the mid SVC. There is no pneumothorax. Cardiopulmonary appearance is unchanged.     Left central venous line in place. No pneumothorax. INTERPRETING LOCATION: [address] [place] [address], [id number]    Dx-chest-portable (1 View)    Result Date: 5/8/2019 5/8/2019 8:34 PM HISTORY/REASON FOR EXAM:  Shortness of breath. Suspicion of sepsis. TECHNIQUE/EXAM DESCRIPTION AND NUMBER OF VIEWS: Single portable view  of the chest. COMPARISON: 4/8/2019 FINDINGS: The heart, mediastinum, and anand are unremarkable. The lungs are well expanded and show no evidence of infiltrate or other acute process. There is no obvious pleural effusion. The bony thorax is grossly normal. A right central venous line with an attached infusion port is unchanged in position.     No acute cardiopulmonary abnormality. No interval change.    Ir-cvc Tunnel With Port Removal    Result Date: 5/9/2019 5/9/2019 10:26 AM HISTORY/REASON FOR EXAM:  T-cell lymphoma. Diffuse skin lesions possibly exacerbated by blood stream infection. Port removal requested. TECHNIQUE/EXAM DESCRIPTION: Removal of RIGHT internal jugular PowerPort venous access implant. PROCEDURE:  Informed consent was obtained.  The RIGHT anterior chest wall was prepped and draped in a sterile manner with chlorhexidine. All elements of MAXIMAL STERILE BARRIER technique were followed. Moderate sedation with Fentanyl and Versed was administered during the procedure with appropriate continuous patient monitoring by the radiology nurse. SEDATION DURATION: 30 minutes FLUOROSCOPY TIME: 0.1 minutes NUMBER OF FILMS: 1 fluoroscopic images obtained. Following local anesthesia with 6 mL 1% lidocaine with epinephrine, a transverse incision was made over the site of the healed incision above the port reservoir.  Following blunt and sharp dissection, the port reservoir and catheter were removed intact.  The skin overlying the port and the port pocket were unremarkable with no evidence of infection.  There was no pus or exudate about the port reservoir.  The port catheter was removed intact. The port reservoir and catheter were submitted in a sterile container for cultures and Gram stain as well as fungal cultures. Manual compression was applied over the RIGHT  internal jugular catheter entry site and port pocket for about 5 minutes. The port pocket was closed with deep and subcuticular layers of interrupted 3 --  "0 Vicryl suture and the skin sealed with Dermabond.  A sterile dressing was applied. The patient tolerated the procedure well with no evidence of complication. COMPARISON:  1 view chest 5/9/2019 FINDINGS:  The port reservoir and catheter were removed intact.  There was no discharge or exudate at the port pocket.     1.  Removal of RIGHT  internal jugular PowerPort venous implant. 2.  No evidence of infection at the port pocket. 3.  The port reservoir and port catheter were removed intact.      Micro:  Results     Procedure Component Value Units Date/Time    BLOOD CULTURE [[id number]] Collected:  05/10/19 1335    Order Status:  Completed Specimen:  Blood from Peripheral Updated:  05/10/19 1357    Narrative:       Collected By:[id number] FARIDA TUTTLE.  Per Hospital Policy: Only change Specimen Src: to \"Line\" if  specified by physician order.    BLOOD CULTURE [[id number]] Collected:  05/10/19 1352    Order Status:  Completed Specimen:  Blood from Peripheral Updated:  05/10/19 1355    Narrative:       Collected By:[id number] [employer]IO AURORA J.  Per Hospital Policy: Only change Specimen Src: to \"Line\" if  specified by physician order.    Fungal Culture [[id number]] Collected:  05/09/19 1115    Order Status:  Completed Specimen:  Wound Updated:  05/10/19 1329     Significant Indicator NEG     Source WND     Site Port Catheter Tip     Culture Result Culture in progress.    Narrative:       Surgery Specimen    CULTURE WOUND W/ GRAM STAIN [[id number]] Collected:  05/09/19 1115    Order Status:  Completed Specimen:  Wound Updated:  05/10/19 1329     Significant Indicator NEG     Source WND     Site Port Catheter Tip     Culture Result Culture in progress.     Gram Stain Result No organisms seen.    Narrative:       Surgery Specimen    CULTURE WOUND W/O GRAM STAIN [[id number]] Collected:  05/08/19 2230    Order Status:  Completed Specimen:  Wound from Back Updated:  05/10/19 1125     Significant Indicator NEG     Source WND     Site " "Buttock     Culture Result Heavy growth mixed skin doreen.    Narrative:       Contact  Contact    BLOOD CULTURE [[id number]]  (Abnormal) Collected:  05/08/19 2034    Order Status:  Completed Specimen:  Blood from Peripheral Updated:  05/10/19 1049     Significant Indicator POS (POS)     Source BLD     Site PERIPHERAL     Culture Result Growth detected by Bactec instrument. 05/09/2019  1830  Negative for Staphylococcus aureus and MRSA by PCR. Correlate  ongoing need for antibiotics with clinical condition.   (A)      Coagulase-negative Staphylococcus species  Possible Contaminant  Isolated from one bottle only, correlate with clinical  condition.   (A)    Narrative:       CALL  [facility]  [facility] tel. [phone],  CALLED  [facility] tel. [phone] 05/09/2019, 20:44, RB PERF. RESULTS CALLED TO: RN  [phone]  Per Hospital Policy: Only change Specimen Src: to \"Line\" if  specified by physician order.  Left Forearm/Arm    GRAM STAIN [[id number]] Collected:  05/09/19 1115    Order Status:  Completed Specimen:  Wound Updated:  05/10/19 0818     Significant Indicator .     Source WND     Site Port Catheter Tip     Gram Stain Result No organisms seen.    Narrative:       Surgery Specimen    MRSA BY PCR (AMP) [[id number]] Collected:  05/09/19 1410    Order Status:  Completed Specimen:  Respirate from Nares Updated:  05/09/19 1831     Significant Indicator NEG     Source RESP     Site NARES     MRSA PCR Negative for MRSA by PCR.    Narrative:       [id number] JAKUBOS SAMIR R  [id number] JAKUBOS SAMIR R    Fungal Culture [[id number]]     Order Status:  No result Specimen:  Respirate from Central Line     CULTURE TISSUE W/ GRM STAIN [[id number]]     Order Status:  No result Specimen:  Tissue from Other Tissue     BLOOD CULTURE [[id number]] Collected:  05/08/19 2034    Order Status:  Completed Specimen:  Blood from Peripheral Updated:  05/09/19 0929     Significant Indicator NEG     Source BLD     Site PERIPHERAL     Culture Result No " "Growth  Note: Blood cultures are incubated for 5 days and  are monitored continuously.Positive blood cultures  are called to the RN and reported as soon as  they are identified.      Narrative:       Per Hospital Policy: Only change Specimen Src: to \"Line\" if  specified by physician order.  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 Male with hx of cutaneous T Cell lymphoma (Mycosis fungoides) dx in 11/2017, had extensive w/u at [place] for his 20 year hx of widespread hyperkeratotic plaques who is now being followed by Dr. Mc, [facility] oncologist and pt is supposed to get chemotherapy but hasn't been able to due to transportation issues.  He had portacath that was placed in 12/2017 (per wife) and had last chemotherapy was 3 months ago. He had multiple hospital admissions for cellulitis from diffuse open widespread skin in 3/2019 and 4/2019. First admission was at [place] and 2nd admission was here at [facility]. Hospitalized from 4/8-4/13 and treated with vanco/unasyn and discharged with doxycyline x 10 days.      Pt was admitted last night for septic shock due to cellulitis. Per note, pt has been feeling weak with subjective fever 3 days prior to admission. Pt went to [facility] ED and had lactate >4 and was hypotensive and had 3.7L fluid resuscitation and was started on vanco and zosyn.      At [facility] vitals WNL. Left subclavian central line was placed. Blood cultures obtained. Started on vancomycin and unasyn. Portacath was removed on 5/10 by IR.     Interval 24 hour assessment: "   AF, O2 RA,    Labs reviewed,  Vanco level 25.5  Imaging personally reviewed both images and report.   Studies reviewed  Micro reviewed    Pt continued on Unasyn and vancomycin per pharmacy (high trough today) will need to monitor renal function and wounds.  He is c/o of multiple BMs all night which is new and abdominal pain.  Reviewed images of back and extensive areas of erythema and very tender per nursing.     Cutaneous T Cell lymphoma (Mycosis fungoides) dx in 11/2017, had extensive w/u at [place] for his 20 year hx of widespread hyperkeratotic plaques  -CT chest abdomen pelvis on 5/9 with bilateral axillary adenopathy, no pleural or pericardial effusion.  No consolidations or evidence of pneumonia, abdomen pelvis with no significant findings.    Cellulitis   - Superinfected open skin lesions/wound (staph/strep) in the background of persistent cutaneous T cell lymphoma   - No suspicion for brooks che syndrome, TEN, AGEP. Doubt DRESS syndrome. His eosinophils are normal  2.6%. No suspicion for necrotizing fasciitis.    - Multiple hospital admissions for sepsis due to cellulitis in 3/19 and 4/19  -Buttock wound culture 5/8 with mixed normal skin doreen     Diarrhea- new onset     Septic shock - resolved  - source is skin and line (portacath).     Bacteremia - likely contaminant, peripheral culture, see below   - Blood cultures positive 5/8 1 out of 2 with CONS  - Blood cultures 5/10- pending   - Regarding positive blood cultures, even though it certainly could be contaminant, with his extensive open skin lesions, would repeat blood cultures and continue vancomycin before concluding contaminant blood culture.   -Port removed on 5/9, catheter tip culture in progress-no growth to date   -MRSA nares  Neg 5/9     Plan:   Continue vancomycin and amp/sulbactam  F/up repeat blood cultures.   Plan to continue with vancomycin until repeated blood cultures are negative at least 48 hours.   C diff testing   Wound team  following  Agree with encouraging bowel care regimen and possible rectal tube as frequent BMs are risk for further infecting open skin lesions      Discussed with nurse. ID will follow.       Angy Francis MD  Infectious Diseases

## 2019-05-11 NOTE — CARE PLAN
Problem: Bowel/Gastric:  Goal: Will not experience complications related to bowel motility  Outcome: PROGRESSING SLOWER THAN EXPECTED  Pt having frequent diarrhea bowl movements      Problem: Skin Integrity  Goal: Risk for impaired skin integrity will decrease  Outcome: PROGRESSING SLOWER THAN EXPECTED  Wound care completed per orders

## 2019-05-11 NOTE — PROGRESS NOTES
"Critical Care Progress Note    Date of admission  5/8/2019    Chief Complaint  68 y.o. male admitted 5/8/2019 with septic shock    Hospital Course    \"68 y.o. male who presented 5/8/2019 with septic shock from presumed from cellulitis.  Patient was transferred from [facility] on a Levophed drip infused through an old port in his right chest.  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  Apparently last chemotherapy was was several months ago.  He has a history of recurrent skin infections with severe infection with with sepsis.  On this admission he has several days of weakness but no overt shaking chills that he can recall.  At the outside facility had elevated lactate level and hypotension in the 70s over 40s.  He was placed on a Levophed drip.  He arrives chronically ill-appearing hypotensive but mentating adequately.  I placed a central line and we discontinued his peripheral Levophed to central.  He received vancomycin and Zosyn at the outside hospital as well as 3700 cc of isotonic solution.\"      Interval Problem Update  Reviewed last 24 hour events:   - NAEON   - Neuro: dPCA used a lot overnight   - HR: 50s-90s   - SBP: 90s-140s   - GI: Diarrhea x4, tolerating diet, poor appetite   - UOP: 1.8L/24 hr   - Woods: no   - Tm: afeb   - Lines: CVC   - PPx: GI PPI, DVT heparin   - RA   - CXR (personally reviewed and compared to prior): no new    Yesterday   - NAEON   - Neuro: AOx4   - HR: 50s-70s   - SBP: 80s-120s   - GI: last night, tolerating diet   - UOP: adequate   - Woods: no   - Tm: 99.4   - Lines: CVC   - PPx: GI not indicated, DVT heparin   - 4 L nasal cannula   - CXR (personally reviewed and compared to prior): No new    Review of Systems  Review of Systems   Constitutional: Positive for fever and malaise/fatigue. Negative for chills.   HENT:        Oral pain   Respiratory: Negative for cough, sputum production, shortness of breath and stridor.  "   Cardiovascular: Negative for chest pain.   Gastrointestinal: Negative for abdominal pain, nausea and vomiting.   Genitourinary: Negative for dysuria.   Musculoskeletal: Negative for myalgias.   Skin: Positive for itching and rash.   Neurological: Positive for weakness. Negative for dizziness, sensory change and focal weakness.        Vital Signs for last 24 hours   Temp:  [36.5 °C (97.7 °F)-37.4 °C (99.4 °F)] 36.8 °C (98.2 °F)  Pulse:  [] 73  SpO2:  [92 %-100 %] 92 %    Hemodynamic parameters for last 24 hours       Respiratory Information for the last 24 hours       Physical Exam   Physical Exam   Constitutional: He is oriented to person, place, and time. He appears well-developed and well-nourished. He has a sickly appearance.   HENT:   Right Ear: External ear normal.   Left Ear: External ear normal.   Mouth/Throat: Oropharynx is clear and moist.   Eyes: Pupils are equal, round, and reactive to light. Conjunctivae and EOM are normal. No scleral icterus.   Neck: Neck supple. No JVD present. No tracheal deviation present.   Cardiovascular: Normal rate, regular rhythm and intact distal pulses.    No murmur heard.  Pulmonary/Chest: Effort normal. No respiratory distress. He has decreased breath sounds in the right lower field and the left lower field.   Left SC CVC   Abdominal: Soft. Bowel sounds are normal. He exhibits no distension.   Musculoskeletal: Normal range of motion. He exhibits edema (pedal). He exhibits no tenderness.   Neurological: He is alert and oriented to person, place, and time. No cranial nerve deficit. He exhibits normal muscle tone. Coordination normal.   Skin: Rash noted. There is erythema.   Diffuse hypertrophic rash with patches of plaques and open areas to chest/abdomen/back, palmar/plantar keratoderma   Psychiatric: He has a normal mood and affect.   Nursing note and vitals reviewed.      Medications  Current Facility-Administered Medications   Medication Dose Route Frequency  Provider Last Rate Last Dose   • budesonide-formoterol (SYMBICORT) 160-4.5 MCG/ACT inhaler 2 Puff  2 Puff Inhalation BID Antelmo Agee M.D.   2 Puff at 05/11/19 0717   • HYDROmorphone (DILAUDID) 0.2 mg/mL in 50 mL NS (PCA)   Intravenous Continuous Abhi Hammonds Jr., D.O.       • furosemide (LASIX) injection 20 mg  20 mg Intravenous BID DIURETIC Abhi Hammonds Jr., D.O.   20 mg at 05/11/19 0536   • Pharmacy Consult Request ...Pain Management Review 1 Each  1 Each Other PHARMACY TO DOSE Abhi Hammonds Jr., D.O.       • acetaminophen (TYLENOL) tablet 1,000 mg  1,000 mg Oral Q6HRS Abhi Hammonds Jr., D.O.   1,000 mg at 05/11/19 0536   • omeprazole (PRILOSEC) capsule 20 mg  20 mg Oral DAILY Ofelia Lemon M.D.   20 mg at 05/11/19 0536   • senna-docusate (PERICOLACE or SENOKOT S) 8.6-50 MG per tablet 2 Tab  2 Tab Oral BID Ofelia Lemon M.D.   Stopped at 05/09/19 0600    And   • polyethylene glycol/lytes (MIRALAX) PACKET 1 Packet  1 Packet Oral QDAY PRN Ofelia Lemon M.D.        And   • magnesium hydroxide (MILK OF MAGNESIA) suspension 30 mL  30 mL Oral QDAY PRN Ofelia Lemon M.D.        And   • bisacodyl (DULCOLAX) suppository 10 mg  10 mg Rectal QDAY PRN Ofelia Lemon M.D.       • Respiratory Care per Protocol   Nebulization Continuous RT Ofelia Lemon M.D.       • NS (BOLUS) infusion 500 mL  500 mL Intravenous Once PRN Ofelia Lemon M.D.       • heparin injection 5,000 Units  5,000 Units Subcutaneous Q8HRS Ofelia Lemon M.D.   5,000 Units at 05/11/19 0027   • ampicillin/sulbactam (UNASYN) 3 g in  mL IVPB  3 g Intravenous Q6HRS Ofelia Lemon M.D.   Stopped at 05/11/19 0607   • MD Alert...Vancomycin per Pharmacy  1 Each Other PHARMACY TO DOSE Ofelia Lemon M.D.       • albuterol inhaler 2 Puff  2 Puff Inhalation Q4HRS PRN Josef Stephenson M.D.           Fluids    Intake/Output Summary (Last 24 hours) at 05/11/19 0801  Last data filed at 05/11/19 0700   Gross per 24 hour   Intake            687.67 ml   Output             1985 ml   Net         -1297.33 ml       Laboratory          Recent Labs      05/09/19   0535  05/10/19   0800  05/11/19   0600   SODIUM  142  139  145   POTASSIUM  4.2  3.9  3.7   CHLORIDE  109  108  113*   CO2  25  26  26   BUN  22  19  20   CREATININE  1.09  1.02  1.07   CALCIUM  6.7*  7.3*  7.0*     Recent Labs      05/08/19   2034  05/09/19   0535  05/10/19   0800  05/11/19   0600   ALTSGPT  9   --    --    --    ASTSGOT  16   --    --    --    ALKPHOSPHAT  106*   --    --    --    TBILIRUBIN  0.4   --    --    --    GLUCOSE  95  121*  100*  85     Recent Labs      05/08/19   2034  05/09/19   0535  05/10/19   0800   WBC  10.3  5.4  7.2   NEUTSPOLYS  81.40*   --   51.80   LYMPHOCYTES  13.30*   --   18.40*   MONOCYTES  0.00   --   3.50   EOSINOPHILS  2.60   --   10.50*   BASOPHILS  0.00   --   0.00   ASTSGOT  16   --    --    ALTSGPT  9   --    --    ALKPHOSPHAT  106*   --    --    TBILIRUBIN  0.4   --    --      Recent Labs      05/08/19   2034  05/09/19   0535  05/09/19   0855  05/10/19   0800   RBC  4.14*  3.35*   --   3.79*   HEMOGLOBIN  11.4*  9.3*   --   10.5*   HEMATOCRIT  37.8*  30.6*   --   34.4*   PLATELETCT  389  274   --   339   PROTHROMBTM   --    --   16.7*   --    INR   --    --   1.34*   --        Imaging  X-Ray:  I have personally reviewed the images and compared with prior images.    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  Oncology on board  Repeat CT reviewed  Diffuse painful skin lesions; continue PRN  opiates for pain, wound care     Severe sepsis (HCC)- (present on admission)   Assessment & Plan    See above.  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 In review of yesterday's note (5/10/2019), there are no changes except as documented above.     Discussed patient condition and risk of morbidity and/or mortality with Hospitalist, RN, RT, Pharmacy, , Charge nurse / hot rounds and Patient

## 2019-05-11 NOTE — ASSESSMENT & PLAN NOTE
Severe skin involvement  Wound care  He may be a candidate for transfer to a burn center thus Dr. Gongora, trauma surgery, has evaluated the skin and agrees that a referral to a burn center is appropriate.   Mr. Ortiz states that he would be amenable to going to [facility] though no other facility as it would be closer to his wife.   IV and oral narcotic pain meds.  Istodax being considered by oncology should patient show improvement from current status.

## 2019-05-11 NOTE — ASSESSMENT & PLAN NOTE
lower extremity swelling  Off IV lasix.  He is at risk of intravascular depletion given the significant insensible fluid losses from skin

## 2019-05-11 NOTE — DOCUMENTATION QUERY
"                                                                         [facility]                                                                       Query Response Note      PATIENT:               DOMENICO MUNOZ  ACCT #:                  [id number]  MRN:                     [id number]  :                      1950  ADMIT DATE:       2019 7:26 PM  DISCH DATE:          RESPONDING  PROVIDER #:        [id number]           QUERY TEXT:    Severe Malnutrition is documented in the Medical Record.      \"Pt with severe malnutrition in the context of chronic disease related malnutrition related to lymphoma.\"    Please specify if you:    NOTE:  If an appropriate response is not listed below, please respond with a new note.    The patient's Clinical Indicators include:  \"Poor PO <75% of estimated energy needs > 1 month with severe edema which would mask weight loss\" is documented in the RD Note.    Treatments include: RD Consult.    Risk factors include: hx T-Cell Lymphoma and dx Septic Shock 2/2 Suspected Cellulitis.    Query created by: Vikas Soto on 5/10/2019 1:08 PM    RESPONSE TEXT:    Agree with Registered Dietitian assessment          Electronically signed by:  HENRIQUE WEI MD 2019 7:16 AM              "

## 2019-05-11 NOTE — PROGRESS NOTES
Pharmacy Kinetics 68 y.o. male on vancomycin day #4 5/11/2019    Currently on Vancomycin 1400mg q12hr  High trough on 5/10, dose was already given  ~17 hour level - 25.5 mcg/ml  ~24 hour level - 23.4 mcg/ml    Indication for Treatment: SSTI, rule out port infection/bacteremia    Pertinent history per medical record:   Admitted on 5/8/2019 as a transfer from [facility] for sepsis. Patient presented to the OSH for general malaise and was found to have multiple wounds and moisture/weeping of the back. He was admitted to [facility] on 4/13 for rash thought to be related to pt's cutaneous T cell lymphoma. He was treated with antibiotics and discharged home on 4/13 with PO doxycycline to complete a 10 day course. Of note, patient does have a port in place. At the outlying facility, patient was hypotensive requiring vasopressors. He was transferred to [facility] and will be continued on IV abx.   ID consulted     Other antibiotics: Unasyn 3 gm IV q6h     Allergies: Patient has no known allergies.     List concerns for renal function: obesity (BMI 36), age, BUN uptrending on Lasix     Pertinent cultures to date:   5/8 wound culture - in process   5/8 PBCx2 - CONS x1  5/9 port catheter tip - CONS x2  5/10 PBCx2- NGTD    MRSA nares negative    Recent Labs      05/08/19 2034 05/09/19 0535  05/10/19   0800   WBC  10.3  5.4  7.2   NEUTSPOLYS  81.40*   --   51.80   BANDSSTABS  2.70   --   14.00*     Recent Labs      05/08/19 2034 05/09/19 0535  05/10/19   0800  05/11/19   0600   BUN  22  22  19  20   CREATININE  1.22  1.09  1.02  1.07   ALBUMIN  2.0*   --    --    --      Recent Labs      05/10/19   1310  05/11/19   0600  05/11/19   1345   VANCOTROUGH  31.2*   --    --    VANCORANDOM   --   25.5  23.4     Intake/Output Summary (Last 24 hours) at 05/11/19 1650  Last data filed at 05/11/19 1630   Gross per 24 hour   Intake             1101 ml   Output             2360 ml   Net            -1259 ml      BP (!) 90/50   Pulse 81    "Temp 36.7 °C (98 °F) (Temporal)   Resp (!) 22   Ht 1.727 m (5' 8\")   Wt 108 kg (238 lb 1.6 oz)   SpO2 97%  Temp (24hrs), Av.8 °C (98.2 °F), Min:36.5 °C (97.7 °F), Max:37.4 °C (99.4 °F)      A/P   1. Vancomycin dose change: start vancomycin pulse dosing  2. Next vancomycin level:  @0100 - 36 hour random level to assess clearance  3. Goal trough: 12-16 mcg/ml - target 16  4. Comments: ~17 hour random level supratherapeutic with minimal drug clearance after 8 hours later (~24 hour level). Per ID notes, plan to continue with vancomycin until repeated blood cultures are negative at least 48 hours. Woods placed today and patient with good UOP after placement. Random level as above. Night clinical pharmacist to assess. Pharmacy will follow.    Coy Greenberg, PharmD, BCPS    "

## 2019-05-11 NOTE — PROGRESS NOTES
· 2 RN skin check complete with MARIANNE Baez.  · Devices in place Tele leads, Subclavian triple lumen, pulse ox, BP cuff.  · Skin assessed under devices Yes .  · Confirmed pressure ulcers found on Generalized open wounds related to chemotherapy present on admission.  · New potential pressure ulcers noted on N/a. Wound consult placed and wound reported.  · The following interventions in place q2hr turns, xeroform dressing, abd pad rotating postion of medical devices, barrier wipes, dressing changes as needed. *

## 2019-05-12 NOTE — PROGRESS NOTES
Infectious Disease Progress Note    Author: Angy Francis M.D. Date & Time of service: 2019  8:17 AM       Chief Complaint:  Septic shock, cellulitis      Interval History:   Interval 24 hours of Vitals/Labs/Micro,and imaging results reviewed as available. See assessment.   Interval 24 hours of Vitals/Labs/Micro,and imaging results reviewed as available. See assessment.     Review of Systems:  Review of Systems   Constitutional: Positive for malaise/fatigue. Negative for chills and fever.   Gastrointestinal: Positive for diarrhea. Negative for abdominal pain, constipation, nausea and vomiting.   Musculoskeletal: Positive for myalgias.   Skin: Positive for rash.       Hemodynamics:  Temp (24hrs), Av.4 °C (99.3 °F), Min:36.7 °C (98 °F), Max:37.8 °C (100.1 °F)  Temperature: 37.1 °C (98.8 °F)  Pulse  Av.8  Min: 49  Max: 103  NIBP: 101/63       Physical Exam:  Physical Exam   Constitutional: He is oriented to person, place, and time. He appears well-developed and well-nourished.   HENT:   Head: Normocephalic and atraumatic.   Eyes: Pupils are equal, round, and reactive to light. Conjunctivae and EOM are normal.   Cardiovascular: Normal rate, regular rhythm and normal heart sounds.    Pulmonary/Chest: Effort normal and breath sounds normal.   Abdominal: Soft. Bowel sounds are normal. He exhibits no distension. There is no tenderness. There is no rebound and no guarding.   Musculoskeletal: He exhibits edema.   Neurological: He is alert and oriented to person, place, and time.   Skin: Skin is warm. Rash noted. There is erythema.   Multiple skin lesions, plaques, diffuse erythema, tenderness   Psychiatric: He has a normal mood and affect. His behavior is normal.       Meds:    Current Facility-Administered Medications:   •  [CANCELED] Special Contact Isolation **AND** C Diff by PCR rflx Toxin **AND** Pharmacy  •  oxyCODONE immediate-release  •  acetaminophen **OR** acetaminophen  •   budesonide-formoterol  •  furosemide  •  Pharmacy Consult Request  •  Respiratory Care per Protocol  •  NS  •  heparin  •  ampicillin-sulbactam (UNASYN) IV  •  MD Alert...Vancomycin per Pharmacy  •  albuterol    Labs:  Recent Labs      05/10/19   0800   WBC  7.2   RBC  3.79*   HEMOGLOBIN  10.5*   HEMATOCRIT  34.4*   MCV  90.8   MCH  27.7   RDW  55.6*   PLATELETCT  339   MPV  9.1   NEUTSPOLYS  51.80   LYMPHOCYTES  18.40*   MONOCYTES  3.50   EOSINOPHILS  10.50*   BASOPHILS  0.00   RBCMORPHOLO  Present     Recent Labs      05/10/19   0800 05/11/19   0600  05/12/19   0530   SODIUM  139  145  145   POTASSIUM  3.9  3.7  4.3   CHLORIDE  108  113*  115*   CO2  26  26  26   GLUCOSE  100*  85  98   BUN  19  20  20     Recent Labs      05/10/19   0800 05/11/19   0600 05/12/19   0530   CREATININE  1.02  1.07  1.41*       Imaging:  Ct-chest,abdomen,pelvis With    Result Date: 5/9/2019 5/9/2019 9:08 PM HISTORY/REASON FOR EXAM:  T cell lymphoma restaging,compare with prior PET scan TECHNIQUE/EXAM DESCRIPTION: CT scan of the chest, abdomen and pelvis with contrast. Thin-section helical scanning was obtained from the lung apices through the pubic symphysis to include the chest, abdomen and pelvis.  100 mL of nonionic contrast was administered intravenously without complication. Low dose optimization technique was utilized for this CT exam including automated exposure control and adjustment of the mA and/or kV according to patient size. COMPARISON: PET/CT 4/30/2018 is findings there are FINDINGS: CT CHEST:The lungs are fully expanded, with no parenchymal abnormality.  There is no pleural or pericardial effusion. There are no enlarged mediastinal or hilar nodes. There are enlarged axillary nodes bilaterally however, more notably on the right. The largest right-sided node measures about 15 mm in greatest short axis dimension. The bony thorax is unremarkable. CT ABDOMEN:The liver, spleen, pancreas, and stomach are unremarkable.   Adrenal glands are normal.  No opaque gallstones.  The kidneys show symmetric opacification with no hydronephrosis. A 12 mm exophytic lesion is seen along the lateral margin of the right kidney, likely a cyst. This is unchanged. The bowel and mesentery are grossly normal. No mesenteric or retroperitoneal adenopathy. No free fluid or air. CT PELVIS:The bladder is intact. No free fluid in the pelvis.  The pelvic bowel and mesentery are grossly normal.  The bony pelvis and visualized proximal femora are intact. Borderline enlarged inguinal lymph nodes are present bilaterally which are somewhat smaller than before.     1.  Bilateral axillary adenopathy, more notable on the right. Some of these nodes are larger than on the prior PET/CT. There is no intrathoracic adenopathy. 2.  No abdominal adenopathy. 3.  Borderline enlarged inguinal lymph nodes, overall decreased in size compared with the prior PET/CT.    Dx-chest-limited (1 View)    Result Date: 5/9/2019  HISTORY/REASON FOR EXAM: central venous line placement. TECHNIQUE/EXAM DESCRIPTION:  Single view of the chest,  5/8/2019 11:47 PM COMPARISON:  Prior image today at 2043 hours FINDINGS:   A new left central venous line has been placed, the tip of which projects in the area of the mid SVC. There is no pneumothorax. Cardiopulmonary appearance is unchanged.     Left central venous line in place. No pneumothorax. INTERPRETING LOCATION: [address] [place] [address], [id number]    Dx-chest-portable (1 View)    Result Date: 5/8/2019 5/8/2019 8:34 PM HISTORY/REASON FOR EXAM:  Shortness of breath. Suspicion of sepsis. TECHNIQUE/EXAM DESCRIPTION AND NUMBER OF VIEWS: Single portable view of the chest. COMPARISON: 4/8/2019 FINDINGS: The heart, mediastinum, and anand are unremarkable. The lungs are well expanded and show no evidence of infiltrate or other acute process. There is no obvious pleural effusion. The bony thorax is grossly normal. A right central venous line with an attached  infusion port is unchanged in position.     No acute cardiopulmonary abnormality. No interval change.    Ir-cvc Tunnel With Port Removal    Result Date: 5/9/2019 5/9/2019 10:26 AM HISTORY/REASON FOR EXAM:  T-cell lymphoma. Diffuse skin lesions possibly exacerbated by blood stream infection. Port removal requested. TECHNIQUE/EXAM DESCRIPTION: Removal of RIGHT internal jugular PowerPort venous access implant. PROCEDURE:  Informed consent was obtained.  The RIGHT anterior chest wall was prepped and draped in a sterile manner with chlorhexidine. All elements of MAXIMAL STERILE BARRIER technique were followed. Moderate sedation with Fentanyl and Versed was administered during the procedure with appropriate continuous patient monitoring by the radiology nurse. SEDATION DURATION: 30 minutes FLUOROSCOPY TIME: 0.1 minutes NUMBER OF FILMS: 1 fluoroscopic images obtained. Following local anesthesia with 6 mL 1% lidocaine with epinephrine, a transverse incision was made over the site of the healed incision above the port reservoir.  Following blunt and sharp dissection, the port reservoir and catheter were removed intact.  The skin overlying the port and the port pocket were unremarkable with no evidence of infection.  There was no pus or exudate about the port reservoir.  The port catheter was removed intact. The port reservoir and catheter were submitted in a sterile container for cultures and Gram stain as well as fungal cultures. Manual compression was applied over the RIGHT  internal jugular catheter entry site and port pocket for about 5 minutes. The port pocket was closed with deep and subcuticular layers of interrupted 3 -- 0 Vicryl suture and the skin sealed with Dermabond.  A sterile dressing was applied. The patient tolerated the procedure well with no evidence of complication. COMPARISON:  1 view chest 5/9/2019 FINDINGS:  The port reservoir and catheter were removed intact.  There was no discharge or exudate at the  port pocket.     1.  Removal of RIGHT  internal jugular PowerPort venous implant. 2.  No evidence of infection at the port pocket. 3.  The port reservoir and port catheter were removed intact.      Micro:  Results     Procedure Component Value Units Date/Time    C Diff by PCR rflx Toxin [[id number]] Collected:  05/11/19 1620    Order Status:  Completed Specimen:  Stool from Stool Updated:  05/11/19 2142     C Diff by PCR Negative     Comment: C. difficile NOT detected by PCR.  Treatment not indicated per guidelines.  Repeat testing not indicated within 7 days.          027-NAP1-BI Presumptive Negative     Comment: Presumptive 027/NAP1/BI target DNA sequences are NOT DETECTED.       Narrative:       Special Contact [id number] FARIDA MONTE  Does this patient have risk factors for C-diff?->Yes    C Diff by PCR rflx Toxin [[id number]]     Order Status:  Canceled Specimen:  Stool from Stool     Fungal Culture [[id number]] Collected:  05/09/19 1115    Order Status:  Completed Specimen:  Wound Updated:  05/11/19 0853     Significant Indicator NEG     Source WND     Site Port Catheter Tip     Culture Result Culture in progress.    Narrative:       Surgery Specimen    CULTURE WOUND W/ GRAM STAIN [[id number]]  (Abnormal) Collected:  05/09/19 1115    Order Status:  Completed Specimen:  Wound Updated:  05/11/19 0853     Significant Indicator POS (POS)     Source WND     Site Port Catheter Tip     Culture Result Growth noted after further incubation, see below for  organism identification.   (A)     Gram Stain Result No organisms seen.     Culture Result Coagulase-negative Staphylococcus species  Rare growth   (A)    Narrative:       Surgery Specimen    BLOOD CULTURE [[id number]] Collected:  05/10/19 1335    Order Status:  Completed Specimen:  Blood from Peripheral Updated:  05/11/19 0839     Significant Indicator NEG     Source BLD     Site PERIPHERAL     Culture Result No Growth  Note: Blood cultures are incubated for 5  "days and  are monitored continuously.Positive blood cultures  are called to the RN and reported as soon as  they are identified.      Narrative:       Collected By:[id number] FARIDA MONTE  Per Hospital Policy: Only change Specimen Src: to \"Line\" if  specified by physician order.  Right Forearm/Arm    BLOOD CULTURE [[id number]] Collected:  05/10/19 1352    Order Status:  Completed Specimen:  Blood from Peripheral Updated:  05/11/19 0839     Significant Indicator NEG     Source BLD     Site PERIPHERAL     Culture Result No Growth  Note: Blood cultures are incubated for 5 days and  are monitored continuously.Positive blood cultures  are called to the RN and reported as soon as  they are identified.      Narrative:       Collected By:[id number] FARIDA MONTE  Per Hospital Policy: Only change Specimen Src: to \"Line\" if  specified by physician order.  Left Forearm/Arm    CULTURE WOUND W/O GRAM STAIN [[id number]] Collected:  05/08/19 2230    Order Status:  Completed Specimen:  Wound from Back Updated:  05/11/19 0836     Significant Indicator NEG     Source WND     Site Buttock     Culture Result Heavy growth mixed skin doreen.    Narrative:       Contact  Contact    BLOOD CULTURE [[id number]]  (Abnormal) Collected:  05/08/19 2034    Order Status:  Completed Specimen:  Blood from Peripheral Updated:  05/10/19 1049     Significant Indicator POS (POS)     Source BLD     Site PERIPHERAL     Culture Result Growth detected by Bactec instrument. 05/09/2019  1830  Negative for Staphylococcus aureus and MRSA by PCR. Correlate  ongoing need for antibiotics with clinical condition.   (A)      Coagulase-negative Staphylococcus species  Possible Contaminant  Isolated from one bottle only, correlate with clinical  condition.   (A)    Narrative:       CALL  [facility]  [facility] tel. [phone],  CALLED  [facility] tel. [phone] 05/09/2019, 20:44, RB PERF. RESULTS CALLED TO: RN  [phone]  Per Hospital Policy: Only change Specimen Src: to \"Line\" if  specified " "by physician order.  Left Forearm/Arm    GRAM STAIN [[id number]] Collected:  05/09/19 1115    Order Status:  Completed Specimen:  Wound Updated:  05/10/19 0818     Significant Indicator .     Source WND     Site Port Catheter Tip     Gram Stain Result No organisms seen.    Narrative:       Surgery Specimen    MRSA BY PCR (AMP) [[id number]] Collected:  05/09/19 1410    Order Status:  Completed Specimen:  Respirate from Nares Updated:  05/09/19 1831     Significant Indicator NEG     Source RESP     Site NARES     MRSA PCR Negative for MRSA by PCR.    Narrative:       [id number] JAKUBOS SAMIR R  [id number] JAKUBOS SAMIR R    Fungal Culture [[id number]]     Order Status:  No result Specimen:  Respirate from Central Line     CULTURE TISSUE W/ GRM STAIN [[id number]]     Order Status:  No result Specimen:  Tissue from Other Tissue     BLOOD CULTURE [[id number]] Collected:  05/08/19 2034    Order Status:  Completed Specimen:  Blood from Peripheral Updated:  05/09/19 0929     Significant Indicator NEG     Source BLD     Site PERIPHERAL     Culture Result No Growth  Note: Blood cultures are incubated for 5 days and  are monitored continuously.Positive blood cultures  are called to the RN and reported as soon as  they are identified.      Narrative:       Per Hospital Policy: Only change Specimen Src: to \"Line\" if  specified by physician order.  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 Male with hx of cutaneous T Cell lymphoma (Mycosis fungoides) dx " in 11/2017, had extensive w/u at [place] for his 20 year hx of widespread hyperkeratotic plaques who is now being followed by Dr. Mc, [facility] oncologist and pt is supposed to get chemotherapy but hasn't been able to due to transportation issues.  He had portacath that was placed in 12/2017 (per wife) and had last chemotherapy was 3 months ago. He had multiple hospital admissions for cellulitis from diffuse open widespread skin in 3/2019 and 4/2019. First admission was at [facility] and 2nd admission was here at [facility]. Hospitalized from 4/8-4/13 and treated with vanco/unasyn and discharged with doxycyline x 10 days.      Pt was admitted last night for septic shock due to cellulitis. Per note, pt has been feeling weak with subjective fever 3 days prior to admission. Pt went to [facility] ED and had lactate >4 and was hypotensive and had 3.7L fluid resuscitation and was started on vanco and zosyn.      At [facility] vitals WNL. Left subclavian central line was placed. Blood cultures obtained. Started on vancomycin and unasyn. Portacath was removed on 5/10 by IR.      Interval 24 hour assessment:    100.1,  O2 RA,    Labs reviewed  Micro reviewed, discussed with lab    Pt continued on unasyn.  Vancomycin stopped, he did receive dose already this a.m. we will plan on initiating daptomycin tomorrow depending on culture results.  He reports some ongoing diffuse skin tenderness more pronounced on his back but no changes.     Cutaneous T Cell lymphoma (Mycosis fungoides) dx in 11/2017, had extensive w/u at [place] for his 20 year hx of widespread hyperkeratotic plaques  -CT chest abdomen pelvis on 5/9 with bilateral axillary adenopathy, no pleural or pericardial effusion.  No consolidations or evidence of pneumonia, abdomen pelvis with no significant findings.     Cellulitis   - Superinfected open skin lesions/wound (staph/strep) in the background of persistent cutaneous T cell lymphoma   - No suspicion for brooks  che syndrome, TEN, AGEP. Doubt DRESS syndrome. His eosinophils are normal  2.6%. No suspicion for necrotizing fasciitis.    - Multiple hospital admissions for sepsis due to cellulitis in 3/19 and 4/19  -Buttock wound culture 5/8 with mixed normal skin doreen     Fevers, acute, low grade     BETO, acute, new today    -May be due to previous septic shock, patient also on vancomycin     Diarrhea- new onset   -C. difficile negative on 5/11     Septic shock - resolved  - source is skin and line (portacath).      Bacteremia - CoNS 1/2 Blood cx and growth on cath tip -discussed with micro and requested work-up as also growing from cath tip  - Blood cultures positive 5/8 1 out of 2 with CoNS  - Port removed on 5/9, catheter tip with CoNS   - Blood cultures 5/10- NGTD  - MRSA nares  Neg 5/9      Plan:   - Patient was already given a dose of vancomycin today, will discontinue vancomycin in the setting of BETO and  plan to change to daptomycin tomorrow while awaiting culture results  - Continue amp/sulbactam  - F/up repeat blood cultures and micro will work-up blood culture from 5/8  - Wound team following  - Agree with encouraging bowel care regimen and possible rectal tube as frequent BMs are risk for further infecting open skin lesions      Discussed with nurse. ID will follow.         Angy Francis MD  Infectious Diseases

## 2019-05-12 NOTE — ASSESSMENT & PLAN NOTE
Severe skin wounds with SSTI  PRN opiates  Appreciate hematology/oncology recommendations  Will discuss with [facility] today for possible referral.

## 2019-05-12 NOTE — PROGRESS NOTES
"Critical Care Progress Note    Date of admission  5/8/2019    Chief Complaint  68 y.o. male admitted 5/8/2019 with septic shock    Hospital Course    \"68 y.o. male who presented 5/8/2019 with septic shock from presumed from cellulitis.  Patient was transferred from [facility] on a Levophed drip infused through an old port in his right chest.  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  Apparently last chemotherapy was was several months ago.  He has a history of recurrent skin infections with severe infection with with sepsis.  On this admission he has several days of weakness but no overt shaking chills that he can recall.  At the outside facility had elevated lactate level and hypotension in the 70s over 40s.  He was placed on a Levophed drip.  He arrives chronically ill-appearing hypotensive but mentating adequately.  I placed a central line and we discontinued his peripheral Levophed to central.  He received vancomycin and Zosyn at the outside hospital as well as 3700 cc of isotonic solution.\"      Interval Problem Update  Reviewed last 24 hour events:   - KETAN, ? Burn center referal   - Neuro: AOx4   - HR:SB-SR   - SBP: 90s-140s   - GI: diarrhea   - UOP: 1L this AM   - Woods: yes   - Tm: afeb   - Lines: CVC   - PPx: GI not indicated, DVT heparin   - RA   - CXR (personally reviewed and compared to prior): no new   - vanco off    Yesterday   - KETAN   - Neuro: dPCA used a lot overnight   - HR: 50s-90s   - SBP: 90s-140s   - GI: Diarrhea x4, tolerating diet, poor appetite   - UOP: 1.8L/24 hr   - Woods: no   - Tm: afeb   - Lines: CVC   - PPx: GI PPI, DVT heparin   - RA   - CXR (personally reviewed and compared to prior): no new    Review of Systems  Review of Systems   Constitutional: Positive for malaise/fatigue. Negative for chills and fever.   Respiratory: Negative for cough, sputum production, shortness of breath and stridor.    Cardiovascular: Negative for " chest pain.   Gastrointestinal: Negative for abdominal pain, nausea and vomiting.   Genitourinary: Negative for dysuria.   Musculoskeletal: Negative for myalgias.   Skin: Positive for itching and rash.   Neurological: Positive for weakness. Negative for dizziness, sensory change and focal weakness.        Vital Signs for last 24 hours   Temp:  [36.7 °C (98 °F)-37.8 °C (100.1 °F)] 37.1 °C (98.8 °F)  Pulse:  [79-93] 80  SpO2:  [92 %-99 %] 99 %    Hemodynamic parameters for last 24 hours       Respiratory Information for the last 24 hours       Physical Exam   Physical Exam   Constitutional: He is oriented to person, place, and time. He appears well-developed and well-nourished. He has a sickly appearance.   HENT:   Right Ear: External ear normal.   Left Ear: External ear normal.   Mouth/Throat: Oropharynx is clear and moist.   Eyes: Pupils are equal, round, and reactive to light. Conjunctivae and EOM are normal. No scleral icterus.   Neck: Neck supple. No JVD present. No tracheal deviation present.   Cardiovascular: Normal rate, regular rhythm and intact distal pulses.    No murmur heard.  Pulmonary/Chest: Effort normal. No respiratory distress. He has decreased breath sounds. He has rales.   Left SC CVC   Abdominal: Soft. Bowel sounds are normal. He exhibits no distension.   Musculoskeletal: Normal range of motion. He exhibits edema (pedal). He exhibits no tenderness.   Neurological: He is alert and oriented to person, place, and time. No cranial nerve deficit. He exhibits normal muscle tone. Coordination normal.   Skin: Rash noted. There is erythema.   Diffuse hypertrophic rash with patches of plaques and open areas to chest/abdomen/back, palmar/plantar keratoderma   Psychiatric: He has a normal mood and affect.   Nursing note and vitals reviewed.      Medications  Current Facility-Administered Medications   Medication Dose Route Frequency Provider Last Rate Last Dose   • Pharmacy Consult Request  1 Each Other  PHARMACY TO DOSE Angy Francis M.D.       • oxyCODONE immediate-release (ROXICODONE) tablet 5-10 mg  5-10 mg Oral Q4HRS PRN Antelmo Agee M.D.   10 mg at 05/12/19 0503   • acetaminophen (TYLENOL) tablet 650 mg  650 mg Oral Q4HRS PRN Abhi Hammonds Jr. D.O.        Or   • acetaminophen (TYLENOL) suppository 650 mg  650 mg Rectal Q4HRS PRN Abhi Hammonds Jr., D.O.       • budesonide-formoterol (SYMBICORT) 160-4.5 MCG/ACT inhaler 2 Puff  2 Puff Inhalation BID Antelmo Agee M.D.   2 Puff at 05/11/19 1735   • furosemide (LASIX) injection 20 mg  20 mg Intravenous BID DIURETIC Abhi Hammonds Jr. D.O.   20 mg at 05/12/19 0505   • Pharmacy Consult Request ...Pain Management Review 1 Each  1 Each Other PHARMACY TO DOSE Abhi Hammonds Jr. D.O.       • Respiratory Care per Protocol   Nebulization Continuous RT Ofelia Lemon M.D.       • NS (BOLUS) infusion 500 mL  500 mL Intravenous Once PRN Ofelia Lemon M.D.       • heparin injection 5,000 Units  5,000 Units Subcutaneous Q8HRS Ofelia Lemon M.D.   5,000 Units at 05/12/19 0501   • ampicillin/sulbactam (UNASYN) 3 g in  mL IVPB  3 g Intravenous Q6HRS Ofelia Lemon M.D.   Stopped at 05/12/19 0535   • MD Alert...Vancomycin per Pharmacy  1 Each Other PHARMACY TO DOSE Ofelia Lemon M.D.       • albuterol inhaler 2 Puff  2 Puff Inhalation Q4HRS PRN Josef Stephenson M.D.           Fluids    Intake/Output Summary (Last 24 hours) at 05/12/19 0750  Last data filed at 05/12/19 0600   Gross per 24 hour   Intake          2013.33 ml   Output             1400 ml   Net           613.33 ml       Laboratory          Recent Labs      05/10/19   0800  05/11/19   0600  05/12/19   0530   SODIUM  139  145  145   POTASSIUM  3.9  3.7  4.3   CHLORIDE  108  113*  115*   CO2  26  26  26   BUN  19  20  20   CREATININE  1.02  1.07  1.41*   CALCIUM  7.3*  7.0*  7.2*     Recent Labs      05/10/19   0800  05/11/19   0600  05/12/19   0530   GLUCOSE  100*  85  98     Recent Labs       05/10/19   0800   WBC  7.2   NEUTSPOLYS  51.80   LYMPHOCYTES  18.40*   MONOCYTES  3.50   EOSINOPHILS  10.50*   BASOPHILS  0.00     Recent Labs      05/09/19   0855  05/10/19   0800   RBC   --   3.79*   HEMOGLOBIN   --   10.5*   HEMATOCRIT   --   34.4*   PLATELETCT   --   339   PROTHROMBTM  16.7*   --    INR  1.34*   --        Imaging  X-Ray:  I have personally reviewed the images and compared with prior images.    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  Oncology on board  Repeat CT reviewed  Diffuse painful skin lesions; continue PRN opiates for pain, wound care     Severe sepsis (HCC)- (present on admission)   Assessment & Plan    See above.  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)   Assessment & Plan    Severe skin wounds with SSTI  May need transfer to a burn center for complex wound care  PRN opiates  Needs chemo     Edema- (present on admission)   Assessment & Plan    Hold lasix, Cr increasing (likely post-renal)     Anemia- (present on admission)   Assessment & Plan    Mild, continue to monitor     GERD (gastroesophageal reflux disease)- (present on admission)   Assessment & Plan    Monitor and treat if symptomatic          VTE:  Heparin  Ulcer: Not Indicated  Lines: Central Line  Ongoing indication addressed    I have performed a physical exam and reviewed and updated ROS and Plan today (5/12/2019). In review of yesterday's note (5/11/2019), there are no changes except as documented above.     Discussed patient  condition and risk of morbidity and/or mortality with Hospitalist, RN, RT, Pharmacy, , Charge nurse / hot rounds and Patient

## 2019-05-12 NOTE — PROGRESS NOTES
[facility] Medicine Daily Progress Note    Date of Service  5/12/2019    Chief Complaint  68 y.o. male admitted 5/8/2019 with weakness and fevers.     Hospital Course    Mr. Ortiz has a past medical history of T-cell lymphoma followed by Dr. Mc the most recently admitted here for a skin infection was discharged on April 13 due to a rash from his T-cell lymphoma.  He was discharged home on oral doxycycline.  He presented to the emergency room in [facility] with weakness, feeling poorly, and fevers.  Is found to be hypotensive and in septic shock.  He was transferred here for higher level of care.  Despite IV fluids, is required intravenous pressors ongoing.      Interval Problem Update  5/9: Patient seen and evaluated in the intensive care unit this morning. A central line has been placed and his right-sided catheter has been removed.  Patient notes significant pain from his skin.  5/10: Mr. Ortiz was examined and evaluated in the intensive care unit this morning. Blood cultures are positive for gram positive cocci. 500 ml urine over night. He is on a dilaudid PCA due to pain. CT was done for staging purposes of his cancer.  I do long talk with patient about CODE STATUS and he states he does not want to make a decision until he can speak to his wife which will need to be over the phone as she does not have the resources to be able to come to [place] from [place].  Due to dry mouth, he has been drinking excessively and states that he cannot tolerate how dry his mouth this and thus feels he needs to drink very frequently.  5/11: Patient seen and evaluated in the intensive care unit this morning. He has been using the PCA due to pain. He had multiple episodes of diarrhea last night. I long talk with Mr. Ortiz today about possibly going to a burn center due to the degree of his skin involvement.  He states he wants to talk to his wife about this first and he would really use it [place] but he would  [facility] burn [place] if his wife is okay.  A Reyes catheter is been placed due to urinary retention and skin breakdown as he is unable to control his urination.  5/12: Mr. Ortiz was seen and evaluated in the intensive care unit this morning. He has been requiring oxycodone for pain. He has had adequate urine output from the reyes. He is on room air. I examined his back with Dr. Gongora, trauma surgery, and he agrees that a referral to a burn center is appropriate.   Consultants/Specialty  Critical Care. I discussed with Dr. Hammonds on ICU Hot Rounds.   Infectious disease  Oncology  Code Status  full    Disposition  Oncology     Review of Systems  Review of Systems   Constitutional: Negative for chills and fever.   HENT:        Very dry mouth   Respiratory: Negative for shortness of breath.    Cardiovascular: Negative for chest pain.   Gastrointestinal: Positive for diarrhea. Negative for nausea.        Tolerating a diet   Genitourinary:        Reyes   Skin:        Extensive skin pain of the back   All other systems reviewed and are negative.       Physical Exam  Temp:  [36.7 °C (98 °F)-37.8 °C (100.1 °F)] 37.1 °C (98.8 °F)  Pulse:  [79-93] 80  SpO2:  [92 %-99 %] 99 %    Physical Exam   Constitutional: He is oriented to person, place, and time. No distress.   HENT:   Dry mucous membranes  No facial droop  Chapped lips   Eyes: Right eye exhibits no discharge. Left eye exhibits no discharge. No scleral icterus.   Pale conjunctiva   Neck: Normal range of motion. Neck supple. No tracheal deviation present.   Left sided central line   Cardiovascular: Normal rate and regular rhythm.    No murmur heard.  Pulmonary/Chest:   Right chest cath site covered  Clear lung sounds bilaterally   Abdominal: Soft. He exhibits no distension. There is no tenderness.   Genitourinary:   Genitourinary Comments: Reyes catheter   Musculoskeletal: He exhibits tenderness.   3+ edema of the lower extremities   Neurological: He is alert and  oriented to person, place, and time.   Skin: Skin is warm and dry. He is not diaphoretic.   Extensive xerosis of the anterior skin more so on his scalp and face  The skin on his chest anterior surfaces is red and raised the skin of the groin and back is wet and weeping of clear fluid. Large regions akin to jigsaw puzzle pieces with macerated beefy red tissue      Psychiatric: He has a normal mood and affect. His behavior is normal.   Nursing note and vitals reviewed.      Fluids    Intake/Output Summary (Last 24 hours) at 05/12/19 0739  Last data filed at 05/12/19 0600   Gross per 24 hour   Intake          2013.33 ml   Output             1400 ml   Net           613.33 ml       Laboratory  Recent Labs      05/10/19   0800   WBC  7.2   RBC  3.79*   HEMOGLOBIN  10.5*   HEMATOCRIT  34.4*   MCV  90.8   MCH  27.7   MCHC  30.5*   RDW  55.6*   PLATELETCT  339   MPV  9.1     Recent Labs      05/10/19   0800  05/11/19   0600  05/12/19   0530   SODIUM  139  145  145   POTASSIUM  3.9  3.7  4.3   CHLORIDE  108  113*  115*   CO2  26  26  26   GLUCOSE  100*  85  98   BUN  19  20  20   CREATININE  1.02  1.07  1.41*   CALCIUM  7.3*  7.0*  7.2*     Recent Labs      05/09/19   0855   INR  1.34*               Imaging  CT-CHEST,ABDOMEN,PELVIS WITH   Final Result      1.  Bilateral axillary adenopathy, more notable on the right. Some of these nodes are larger than on the prior PET/CT. There is no intrathoracic adenopathy.   2.  No abdominal adenopathy.   3.  Borderline enlarged inguinal lymph nodes, overall decreased in size compared with the prior PET/CT.      IR-CVC TUNNEL WITH PORT REMOVAL   Final Result      1.  Removal of RIGHT  internal jugular PowerPort venous implant.   2.  No evidence of infection at the port pocket.   3.  The port reservoir and port catheter were removed intact.      DX-CHEST-LIMITED (1 VIEW)   Final Result      Left central venous line in place. No pneumothorax.               INTERPRETING LOCATION: [address] [place] [address]  [place], [id number]      DX-CHEST-PORTABLE (1 VIEW)   Final Result      No acute cardiopulmonary abnormality. No interval change.           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  Dr. Bradley, oncology, has been consulted.    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 Gongora, trauma surgery, has evaluated the skin and agrees that a referral to a burn center is appropriate.   Mr. Ortiz states that he would be amenable to going to [place] though not [facility] as it would be closer to his wife.   IV and oral narcotic pain meds.       Edema- (present on admission)   Assessment & Plan    Severe lower extremity swelling  He drinks excessive water as he always has a dry mouth  IV lasix and follow electrolytes in the morning.   He is at risk of intravascular depletion given the significant insensible fluid losses from skin     Anemia- (present on admission)   Assessment & Plan    Hemoglobin is 10.5       GERD (gastroesophageal reflux disease)- (present on admission)   Assessment & Plan    Hold outpatient      Asthma   Assessment & Plan    Hemoglobin is 10.5          VTE prophylaxis: heparin

## 2019-05-12 NOTE — CARE PLAN
Problem: Safety  Goal: Will remain free from falls  Outcome: PROGRESSING AS EXPECTED  Fall interventions in place. Bed in lowest position, bed alarm on, appropriate sign placed, staff educated on mobility and risk for falls.     Problem: Venous Thromboembolism (VTW)/Deep Vein Thrombosis (DVT) Prevention:  Goal: Patient will participate in Venous Thrombosis (VTE)/Deep Vein Thrombosis (DVT)Prevention Measures  Outcome: PROGRESSING SLOWER THAN EXPECTED  Pt refusing SCD's, with education allowed subcut heparin

## 2019-05-12 NOTE — PROGRESS NOTES
Pharmacy Kinetics Addendum:    68 y.o. male on vancomycin day # 5 5/12/2019    Currently on Vancomycin Pulse Dosing  Dosing history:             vancomycin 1400 mg IV q12h x 3 doses, discontinued after supratherapeutic trough on 5/10, dose was already given    5/11 0600 VR (17 hour level) 25.5 mcg/mL    5/11 1345 VR (25 hour level) 23.4 mcg/mL    Indication for Treatment: SSTI, rule out port infection/bacteremia    Recent Labs      05/09/19   0535  05/10/19   0800  05/11/19   0600   BUN  22  19  20   CREATININE  1.09  1.02  1.07     Recent Labs      05/10/19   1310  05/11/19   0600  05/11/19   1345  05/12/19   0118   VANCOTROUGH  31.2*   --    --    --    VANCORANDOM   --   25.5  23.4  18.5       A/P   1. Vancomycin dose change: 1600 mg IV x 1 (15 mg/kg)  2. Next vancomycin level: none indicated unless vancomycin therapy continues  3. Goal trough: 12-16 mcg/mL (no Staph aureus in blood cultures)    4. Comments: Slightly supra-therapeutic level drawn 36 hours after last dose of vancomycin.  Confirmed with RN UOP adequate at 60 mL/hr for the past 2 hours.  Will schedule a 15 mg/kg pulse dose in a few hours to allow additional clearance time.  No repeat level necessary unless ID continues vancomycin beyond 48 hours after repeat BC were drawn.      Triny Diaz, PharmD, BCPS, BCCCP

## 2019-05-13 PROBLEM — N17.9 AKI (ACUTE KIDNEY INJURY) (HCC): Status: ACTIVE | Noted: 2019-01-01

## 2019-05-13 PROBLEM — E87.0 HYPERNATREMIA: Status: ACTIVE | Noted: 2019-01-01

## 2019-05-13 NOTE — DISCHARGE PLANNING
Regarding transfer to [facility], they have declined the transfer because they have no capacity. We can try again tomorrow.     I will explore the possibility of referring the case elsewhere.

## 2019-05-13 NOTE — PROGRESS NOTES
· 2 RN skin check complete with MARIANNE See.  · Devices in place Tele leads, Subclavian triple lumen, pulse ox, BP cuff, reyes catheter.  · Skin assessed under devices Yes .  · Confirmed pressure ulcers found on n/a  · Open wounds noted throughout body related to disease process.  Patient with excruciating pain when moved due to open lesions, declines full turn for skin check to back.  New dressings applied 5/13.  · New potential pressure ulcers noted on N/a. Wound consult placed and wound reported.  · The following interventions in place q2hr turns, xeroform dressing, abd pad rotating postion of medical devices, barrier wipes, dressing changes as needed.

## 2019-05-13 NOTE — CARE PLAN
Problem: Venous Thromboembolism (VTW)/Deep Vein Thrombosis (DVT) Prevention:  Goal: Patient will participate in Venous Thrombosis (VTE)/Deep Vein Thrombosis (DVT)Prevention Measures  Outcome: PROGRESSING AS EXPECTED  Patient refuses SCDs but pharmacological prophylaxis in use.    Problem: Skin Integrity  Goal: Risk for impaired skin integrity will decrease  Outcome: PROGRESSING AS EXPECTED  Turns q2h, dressing changes, 2 RN skin check, padding and rotating medical devices.

## 2019-05-13 NOTE — PROGRESS NOTES
"Critical Care Progress Note    Date of admission  5/8/2019    Chief Complaint  68 y.o. male admitted 5/8/2019 with septic shock    Hospital Course  \"68 y.o. male who presented 5/8/2019 with septic shock from presumed from cellulitis.  Patient was transferred from [facility] on a Levophed drip infused through an old port in his right chest.  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  Apparently last chemotherapy was was several months ago.  He has a history of recurrent skin infections with severe infection with with sepsis.  On this admission he has several days of weakness but no overt shaking chills that he can recall.  At the outside facility had elevated lactate level and hypotension in the 70s over 40s.  He was placed on a Levophed drip.  He arrives chronically ill-appearing hypotensive but mentating adequately.  I placed a central line and we discontinued his peripheral Levophed to central. He received vancomycin and Zosyn at the outside hospital as well as 3700 cc of isotonic solution.\" Taken from Dr Velazco note.     Interval Problem Update  Reviewed last 24 hour events:  Neuro: ao4 intact moves   HR: 70-80's  SBP: 's  Tmax: 99.6  GI: BM 5/12 dysphagia diet  UOP: reyes due to skin and edema, 2200ml  Lines: left subclavian central  Resp: room air  Vte: heparin sq  PPI/H2:n/a  Antibx: unasyn cath tip + staph epi, lasix yesterday, vanco d/c  Refusing mobilization due to pain      Review of Systems  Review of Systems   Constitutional: Positive for malaise/fatigue. Negative for chills and fever.   Respiratory: Negative for cough, sputum production, shortness of breath and stridor.    Cardiovascular: Positive for leg swelling. Negative for chest pain.   Gastrointestinal: Negative for abdominal pain, nausea and vomiting.        Thirsty wants milk   Genitourinary: Negative for dysuria.   Musculoskeletal: Negative for myalgias.   Skin: Positive for rash. " Negative for itching.   Neurological: Positive for weakness. Negative for dizziness, sensory change and focal weakness.        Vital Signs for last 24 hours   Temp:  [37.1 °C (98.8 °F)-37.7 °C (99.8 °F)] 37.6 °C (99.6 °F)  Pulse:  [72-91] 72  Resp:  [16] 16  SpO2:  [93 %-98 %] 93 %    Hemodynamic parameters for last 24 hours       Respiratory Information for the last 24 hours       Physical Exam   Physical Exam   Constitutional: He is oriented to person, place, and time. He appears well-developed and well-nourished. He has a sickly appearance. No distress.   HENT:   Right Ear: External ear normal.   Left Ear: External ear normal.   Mouth/Throat: Oropharynx is clear and moist. No oropharyngeal exudate.   Eyes: Pupils are equal, round, and reactive to light. Conjunctivae and EOM are normal. No scleral icterus.   Neck: Neck supple. No JVD present. No tracheal deviation present.   Cardiovascular: Normal rate, regular rhythm and intact distal pulses.    No murmur heard.  Pulmonary/Chest: Effort normal. No respiratory distress. He has decreased breath sounds. He has no wheezes. He has no rales.   Left subclavian line   Abdominal: Soft. Bowel sounds are normal. He exhibits no distension. There is tenderness. There is no rebound.   Mild abdominal tenderness and bloating   Musculoskeletal: Normal range of motion. He exhibits edema (pedal). He exhibits no tenderness.   Neurological: He is alert and oriented to person, place, and time. No cranial nerve deficit. He exhibits normal muscle tone. Coordination normal.   Answers appropriately moves all extremities.    Skin: Rash noted. He is not diaphoretic. There is erythema.   Diffuse hypertrophic rash with patches of plaques and open areas to chest/abdomen/back, palmar/plantar keratoderma   Psychiatric: He has a normal mood and affect.   Nursing note and vitals reviewed.      Medications  Current Facility-Administered Medications   Medication Dose Route Frequency Provider Last  Rate Last Dose   • oxyCODONE immediate-release (ROXICODONE) tablet 5-10 mg  5-10 mg Oral Q4HRS PRN Antelmo Agee M.D.   10 mg at 05/13/19 0359   • acetaminophen (TYLENOL) tablet 650 mg  650 mg Oral Q4HRS PRN Abhi Hammonds Jr., D.O.   650 mg at 05/12/19 1923    Or   • acetaminophen (TYLENOL) suppository 650 mg  650 mg Rectal Q4HRS PRN Abhi Hammonds Jr., D.O.       • budesonide-formoterol (SYMBICORT) 160-4.5 MCG/ACT inhaler 2 Puff  2 Puff Inhalation BID Antelmo Agee M.D.   2 Puff at 05/13/19 0600   • Pharmacy Consult Request ...Pain Management Review 1 Each  1 Each Other PHARMACY TO DOSE Abhi Hammonds Jr., D.O.       • Respiratory Care per Protocol   Nebulization Continuous RT Ofelia Lemon M.D.       • NS (BOLUS) infusion 500 mL  500 mL Intravenous Once PRN Ofelia Lemon M.D.       • heparin injection 5,000 Units  5,000 Units Subcutaneous Q8HRS Ofelia Lemon M.D.   5,000 Units at 05/13/19 0558   • ampicillin/sulbactam (UNASYN) 3 g in  mL IVPB  3 g Intravenous Q6HRS Ofelia Lemon M.D. 200 mL/hr at 05/13/19 1130 3 g at 05/13/19 1130   • albuterol inhaler 2 Puff  2 Puff Inhalation Q4HRS PRN Josef Stephenson M.D.           Fluids    Intake/Output Summary (Last 24 hours) at 05/13/19 1245  Last data filed at 05/13/19 1000   Gross per 24 hour   Intake             1840 ml   Output             1475 ml   Net              365 ml       Laboratory          Recent Labs      05/11/19   0600  05/12/19   0530  05/13/19   0401   SODIUM  145  145  150*   POTASSIUM  3.7  4.3  4.1   CHLORIDE  113*  115*  117*   CO2  26  26  28   BUN  20  20  23*   CREATININE  1.07  1.41*  1.51*   CALCIUM  7.0*  7.2*  7.2*     Recent Labs      05/11/19   0600  05/12/19   0530  05/13/19   0401   GLUCOSE  85  98  107*         No results for input(s): RBC, HEMOGLOBIN, HEMATOCRIT, PLATELETCT, PROTHROMBTM, APTT, INR, IRON, FERRITIN, TOTIRONBC in the last 72 hours.    Imaging  X-Ray:  I have personally reviewed the images and compared with  prior images.    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  Oncology on board  Repeat CT reviewed  Diffuse painful skin lesions; continue PRN opiates for pain, wound care     Severe sepsis (HCC)- (present on admission)   Assessment & Plan    See above.  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      Hypernatremia   Assessment & Plan    Likely related to free water losses through skin and lasix.   Hold lasix increase free water monitor closely  If need for diuresis down the road recommend thiazide for natruresis.      Edema- (present on admission)   Assessment & Plan    Patient edematous on exam with BETO now worsening, was given lasix yesterday with good response 2.2L.   Will avoid diuresis difficult to access true intra-vascular volume with likely high skin losses insensible.   Will watch patient closely off lasix and off fluids.      Anemia- (present on admission)   Assessment & Plan    Mild, continue to monitor     GERD (gastroesophageal reflux disease)- (present on admission)   Assessment & Plan    Monitor and treat if symptomatic          VTE:  Heparin  Ulcer: Not Indicated  Lines: Central Line  Ongoing indication addressed    I have performed a physical exam and reviewed and updated ROS and Plan today (5/13/2019). In review of yesterday's note (5/12/2019),  there are no changes except as documented above.     Discussed patient condition and risk of morbidity and/or mortality with Hospitalist, RN, RT, Pharmacy, , Charge nurse / hot rounds and Patient     Plan will be possible burn center transfer vs floor transfer

## 2019-05-13 NOTE — PROGRESS NOTES
[facility] Medicine Daily Progress Note    Date of Service  5/13/2019    Chief Complaint  68 y.o. male admitted 5/8/2019 with weakness and fevers.     Hospital Course    Mr. Ortiz has a past medical history of T-cell lymphoma followed by Dr. Mc the most recently admitted here for a skin infection was discharged on April 13 due to a rash from his T-cell lymphoma.  He was discharged home on oral doxycycline.  He presented to the emergency room in [facility] with weakness, feeling poorly, and fevers.  Is found to be hypotensive and in septic shock.  He was transferred here for higher level of care.  Despite IV fluids, is required intravenous pressors ongoing.      Interval Problem Update  5/9: Patient seen and evaluated in the intensive care unit this morning. A central line has been placed and his right-sided catheter has been removed.  Patient notes significant pain from his skin.  5/10: Mr. Ortiz was examined and evaluated in the intensive care unit this morning. Blood cultures are positive for gram positive cocci. 500 ml urine over night. He is on a dilaudid PCA due to pain. CT was done for staging purposes of his cancer.  I do long talk with patient about CODE STATUS and he states he does not want to make a decision until he can speak to his wife which will need to be over the phone as she does not have the resources to be able to come to [place] from [place].  Due to dry mouth, he has been drinking excessively and states that he cannot tolerate how dry his mouth this and thus feels he needs to drink very frequently.  5/11: Patient seen and evaluated in the intensive care unit this morning. He has been using the PCA due to pain. He had multiple episodes of diarrhea last night. I long talk with Mr. Ortiz today about possibly going to a burn center due to the degree of his skin involvement.  He states he wants to talk to his wife about this first and he would really use it [place] but he would  [facility] burn [place] if his wife is okay.  A Reyes catheter is been placed due to urinary retention and skin breakdown as he is unable to control his urination.  5/12: Mr. Ortiz was seen and evaluated in the intensive care unit this morning. He has been requiring oxycodone for pain. He has had adequate urine output from the reyes. He is on room air. I examined his back with Dr. Gongora, trauma surgery, and he agrees that a referral to a burn center is appropriate.   5/13: patient seen and evaluated in the ICU. He has been in a sinus rhythm. He is tolerating a dysphagia III diet. He has been refusing mobilization. She is on room air. I placed a referral to [facility] via our transfer center and they have refused him. Mr. Ortiz will only accept [place] as [facility] and [facility] are too far for his wife to travel.   Consultants/Specialty  Critical Care. I discussed with Dr. Ordaz on ICU Hot Rounds.   Infectious disease  Oncology  Code Status  full    Disposition  Oncology     Review of Systems  Review of Systems   Constitutional: Negative for chills and fever.   HENT:        Very dry mouth   Respiratory: Negative for shortness of breath.    Cardiovascular: Positive for leg swelling. Negative for chest pain.   Gastrointestinal: Negative for diarrhea and nausea.        Tolerating a diet  Excessively thirsty   Genitourinary:        Reyes   Skin:        Extensive skin pain of the back   Psychiatric/Behavioral:        Sleeping a lot   All other systems reviewed and are negative.       Physical Exam  Temp:  [36.7 °C (98 °F)-37.7 °C (99.8 °F)] 37.5 °C (99.5 °F)  Pulse:  [81-91] 85  SpO2:  [94 %-98 %] 98 %    Physical Exam   Constitutional: He is oriented to person, place, and time. He appears well-developed. No distress.   HENT:   Dry mucous membranes  No facial droop  Chapped lips   Eyes: Right eye exhibits no discharge. Left eye exhibits no discharge. No scleral icterus.   Pale conjunctiva   Neck: Normal range  of motion. Neck supple. No tracheal deviation present.   Left sided central line   Cardiovascular: Normal rate and regular rhythm.    No murmur heard.  Pulmonary/Chest:   Right chest cath site covered  Clear lung sounds bilaterally   Abdominal: Soft. He exhibits distension. There is no tenderness.   Genitourinary:   Genitourinary Comments: Woods catheter   Musculoskeletal: He exhibits edema and tenderness.   3+ edema of the lower extremities   Neurological: He is alert and oriented to person, place, and time.   Skin: Skin is warm and dry. He is not diaphoretic.   Extensive xerosis of the anterior skin more so on his scalp and face  The skin on his chest anterior surfaces is red and raised the skin of the groin and back is wet and weeping of clear fluid. Large regions akin to jigsaw puzzle pieces with macerated beefy red tissue      Psychiatric: Judgment and thought content normal.   Nursing note and vitals reviewed.      Fluids    Intake/Output Summary (Last 24 hours) at 05/13/19 0709  Last data filed at 05/13/19 0600   Gross per 24 hour   Intake             2340 ml   Output             2200 ml   Net              140 ml       Laboratory  Recent Labs      05/10/19   0800   WBC  7.2   RBC  3.79*   HEMOGLOBIN  10.5*   HEMATOCRIT  34.4*   MCV  90.8   MCH  27.7   MCHC  30.5*   RDW  55.6*   PLATELETCT  339   MPV  9.1     Recent Labs      05/11/19   0600  05/12/19   0530  05/13/19   0401   SODIUM  145  145  150*   POTASSIUM  3.7  4.3  4.1   CHLORIDE  113*  115*  117*   CO2  26  26  28   GLUCOSE  85  98  107*   BUN  20  20  23*   CREATININE  1.07  1.41*  1.51*   CALCIUM  7.0*  7.2*  7.2*                   Imaging  CT-CHEST,ABDOMEN,PELVIS WITH   Final Result      1.  Bilateral axillary adenopathy, more notable on the right. Some of these nodes are larger than on the prior PET/CT. There is no intrathoracic adenopathy.   2.  No abdominal adenopathy.   3.  Borderline enlarged inguinal lymph nodes, overall decreased in size  compared with the prior PET/CT.      IR-CVC TUNNEL WITH PORT REMOVAL   Final Result      1.  Removal of RIGHT  internal jugular PowerPort venous implant.   2.  No evidence of infection at the port pocket.   3.  The port reservoir and port catheter were removed intact.      DX-CHEST-LIMITED (1 VIEW)   Final Result      Left central venous line in place. No pneumothorax.               INTERPRETING LOCATION: [address] [place] [address], [place], [id number]      DX-CHEST-PORTABLE (1 VIEW)   Final Result      No acute cardiopulmonary abnormality. No interval change.           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  Dr. Bradley, oncology, has been consulted.    CT done for staging  Code status has been addressed and he continues to request that everything is done.      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 Gongora, trauma surgery, has evaluated the skin and agrees that a referral to a burn center is appropriate.   Mr. Ortiz states that he would be amenable to going to [facility] though no other facility as it would be closer to his wife.   IV and oral narcotic pain meds.  [facility] has refused him on 5/13 and may need to be  re-addressed if his status decompensated.        Edema- (present on admission)   Assessment & Plan    Severe lower extremity swelling  He drinks excessive water as he always has a dry mouth  IV lasix and follow electrolytes in the morning.   He is at risk of intravascular depletion given the significant insensible fluid losses from skin     Anemia- (present on admission)   Assessment & Plan    Hemoglobin is 10.5       GERD (gastroesophageal reflux disease)- (present on admission)   Assessment & Plan    Hold outpatient      Asthma   Assessment & Plan    Hemoglobin is 10.5          VTE prophylaxis: heparin

## 2019-05-13 NOTE — CARE PLAN
Problem: Safety  Goal: Will remain free from injury  Outcome: PROGRESSING AS EXPECTED  Bed alarm on,  A&O x4, slipper socks, bed locked and in low position, call light and personal belongings with reach, report given to CNA, appropriate signs outside door, hourly rounding in place.     Problem: Infection  Goal: Will remain free from infection  Outcome: PROGRESSING AS EXPECTED  Pt afebrile. IV abx. Standard precautions in place.     Problem: Pain Management  Goal: Pain level will decrease to patient's comfort goal  Outcome: PROGRESSING AS EXPECTED  Pt complains about pain, denies med.

## 2019-05-13 NOTE — ASSESSMENT & PLAN NOTE
Maintain euvolemia and monitor fluid responsiveness (avoid NaCL and renal congestion)  MAP > 65 uses pressors or inotropic trial  Monitor urine output and I&O's  Avoid and review nephrotoxin medication  Rule out post obstruction  Consider renal U/S  U/a and CPK  FENA no longer recommend in ICU    Did receive vancomycin will hold.

## 2019-05-13 NOTE — CARE PLAN
Problem: Nutritional:  Goal: Achieve adequate nutritional intake  Patient will consume >50% of meals   Outcome: PROGRESSING AS EXPECTED  Per chart pt PO is variable < %. Please document intake of all meals as % taken in ADL's to provide interdisciplinary communication across all shifts. RD will continue to follow.

## 2019-05-13 NOTE — ASSESSMENT & PLAN NOTE
Likely related to free water losses through skin and lasix.   Hold lasix increase free water monitor closely  If need for diuresis down the road recommend thiazide for natruresis.

## 2019-05-13 NOTE — PROGRESS NOTES
Pt AOX 4. Pt rates pain 2/10, declines pain med.. Pt denies nausea and SOB. Central Line patent with positive blood return running IV abx. Reyes in place draining to gravity. Last Bm 05/12. Open wounds noted throughout body. Dressing reinforced. POC discussed with pt, all questions answered at this time. Pt makes needs known, call light within reach, hourly rounding in place.     · 2 RN skin check complete with MARIANNE Brooks.  · Devices in place  Central line, reyes catheter.  · Skin assessed under devices Yes .  · Confirmed pressure ulcers found on n/a  · Open wounds noted throughout body. Dressing reinforced  · New potential pressure ulcers noted on N/a. Wound consult placed and wound reported.  · The following interventions in place q2hr turns, xeroform dressing, barrier wipes, dressing changes as needed.

## 2019-05-13 NOTE — CARE PLAN
Problem: Knowledge Deficit  Goal: Knowledge of disease process/condition, treatment plan, diagnostic tests, and medications will improve  Outcome: PROGRESSING AS EXPECTED  Plan of care discussed with patient    Problem: Mobility  Goal: Risk for activity intolerance will decrease  Outcome: PROGRESSING SLOWER THAN EXPECTED  Mobility encouraged, patient refusing at this time. Education provided.

## 2019-05-13 NOTE — PROGRESS NOTES
Infectious Disease Progress Note    Author: Angy Francis M.D. Date & Time of service: 2019  12:39 PM       Chief Complaint:  Septic shock, cellulitis      Interval History:   Interval 24 hours of Vitals/Labs/Micro,and imaging results reviewed as available. See assessment.   Interval 24 hours of Vitals/Labs/Micro,and imaging results reviewed as available. See assessment.    Interval 24 hours of Vitals/Labs/Micro,and imaging results reviewed as available. See assessment.       Review of Systems:  Review of Systems   Constitutional: Positive for chills and malaise/fatigue. Negative for fever.   Gastrointestinal: Positive for diarrhea. Negative for abdominal pain, nausea and vomiting.   Musculoskeletal: Positive for back pain and myalgias.   Skin: Positive for rash.       Hemodynamics:  Temp (24hrs), Av.4 °C (99.3 °F), Min:37.1 °C (98.8 °F), Max:37.7 °C (99.8 °F)  Temperature: 37.6 °C (99.6 °F)  Pulse  Av.9  Min: 49  Max: 104  NIBP: 104/55       Physical Exam:  Physical Exam   Constitutional: He is oriented to person, place, and time. He appears well-developed and well-nourished.   HENT:   Head: Normocephalic and atraumatic.   Eyes: Pupils are equal, round, and reactive to light. Conjunctivae and EOM are normal.   Cardiovascular: Normal rate, regular rhythm and normal heart sounds.    Pulmonary/Chest: Effort normal and breath sounds normal.   Abdominal: Soft. Bowel sounds are normal. He exhibits distension. There is no tenderness. There is no rebound and no guarding.   Musculoskeletal: He exhibits edema.   Neurological: He is alert and oriented to person, place, and time.   Skin: Skin is warm. Rash noted. There is erythema.   Multiple diffuse plaque-like skin lesions, erythema, tenderness   Psychiatric: He has a normal mood and affect. His behavior is normal.       Meds:    Current Facility-Administered Medications:   •  oxyCODONE immediate-release  •  acetaminophen **OR**  acetaminophen  •  budesonide-formoterol  •  Pharmacy Consult Request  •  Respiratory Care per Protocol  •  NS  •  heparin  •  ampicillin-sulbactam (UNASYN) IV  •  albuterol    Labs:  No results for input(s): WBC, RBC, HEMOGLOBIN, HEMATOCRIT, MCV, MCH, RDW, PLATELETCT, MPV, NEUTSPOLYS, LYMPHOCYTES, MONOCYTES, EOSINOPHILS, BASOPHILS, RBCMORPHOLO in the last 72 hours.  Recent Labs      05/11/19   0600 05/12/19   0530  05/13/19   0401   SODIUM  145  145  150*   POTASSIUM  3.7  4.3  4.1   CHLORIDE  113*  115*  117*   CO2  26  26  28   GLUCOSE  85  98  107*   BUN  20  20  23*     Recent Labs      05/11/19   0600 05/12/19   0530  05/13/19   0401   CREATININE  1.07  1.41*  1.51*       Imaging:  Ct-chest,abdomen,pelvis With    Result Date: 5/9/2019 5/9/2019 9:08 PM HISTORY/REASON FOR EXAM:  T cell lymphoma restaging,compare with prior PET scan TECHNIQUE/EXAM DESCRIPTION: CT scan of the chest, abdomen and pelvis with contrast. Thin-section helical scanning was obtained from the lung apices through the pubic symphysis to include the chest, abdomen and pelvis.  100 mL of nonionic contrast was administered intravenously without complication. Low dose optimization technique was utilized for this CT exam including automated exposure control and adjustment of the mA and/or kV according to patient size. COMPARISON: PET/CT 4/30/2018 is findings there are FINDINGS: CT CHEST:The lungs are fully expanded, with no parenchymal abnormality.  There is no pleural or pericardial effusion. There are no enlarged mediastinal or hilar nodes. There are enlarged axillary nodes bilaterally however, more notably on the right. The largest right-sided node measures about 15 mm in greatest short axis dimension. The bony thorax is unremarkable. CT ABDOMEN:The liver, spleen, pancreas, and stomach are unremarkable.  Adrenal glands are normal.  No opaque gallstones.  The kidneys show symmetric opacification with no hydronephrosis. A 12 mm exophytic  lesion is seen along the lateral margin of the right kidney, likely a cyst. This is unchanged. The bowel and mesentery are grossly normal. No mesenteric or retroperitoneal adenopathy. No free fluid or air. CT PELVIS:The bladder is intact. No free fluid in the pelvis.  The pelvic bowel and mesentery are grossly normal.  The bony pelvis and visualized proximal femora are intact. Borderline enlarged inguinal lymph nodes are present bilaterally which are somewhat smaller than before.     1.  Bilateral axillary adenopathy, more notable on the right. Some of these nodes are larger than on the prior PET/CT. There is no intrathoracic adenopathy. 2.  No abdominal adenopathy. 3.  Borderline enlarged inguinal lymph nodes, overall decreased in size compared with the prior PET/CT.    Dx-chest-limited (1 View)    Result Date: 5/9/2019  HISTORY/REASON FOR EXAM: central venous line placement. TECHNIQUE/EXAM DESCRIPTION:  Single view of the chest,  5/8/2019 11:47 PM COMPARISON:  Prior image today at 2043 hours FINDINGS:   A new left central venous line has been placed, the tip of which projects in the area of the mid SVC. There is no pneumothorax. Cardiopulmonary appearance is unchanged.     Left central venous line in place. No pneumothorax. INTERPRETING LOCATION: [address] [place] [address], [id number]    Dx-chest-portable (1 View)    Result Date: 5/8/2019 5/8/2019 8:34 PM HISTORY/REASON FOR EXAM:  Shortness of breath. Suspicion of sepsis. TECHNIQUE/EXAM DESCRIPTION AND NUMBER OF VIEWS: Single portable view of the chest. COMPARISON: 4/8/2019 FINDINGS: The heart, mediastinum, and anand are unremarkable. The lungs are well expanded and show no evidence of infiltrate or other acute process. There is no obvious pleural effusion. The bony thorax is grossly normal. A right central venous line with an attached infusion port is unchanged in position.     No acute cardiopulmonary abnormality. No interval change.    Ir-cvc Tunnel With Port  Removal    Result Date: 5/9/2019 5/9/2019 10:26 AM HISTORY/REASON FOR EXAM:  T-cell lymphoma. Diffuse skin lesions possibly exacerbated by blood stream infection. Port removal requested. TECHNIQUE/EXAM DESCRIPTION: Removal of RIGHT internal jugular PowerPort venous access implant. PROCEDURE:  Informed consent was obtained.  The RIGHT anterior chest wall was prepped and draped in a sterile manner with chlorhexidine. All elements of MAXIMAL STERILE BARRIER technique were followed. Moderate sedation with Fentanyl and Versed was administered during the procedure with appropriate continuous patient monitoring by the radiology nurse. SEDATION DURATION: 30 minutes FLUOROSCOPY TIME: 0.1 minutes NUMBER OF FILMS: 1 fluoroscopic images obtained. Following local anesthesia with 6 mL 1% lidocaine with epinephrine, a transverse incision was made over the site of the healed incision above the port reservoir.  Following blunt and sharp dissection, the port reservoir and catheter were removed intact.  The skin overlying the port and the port pocket were unremarkable with no evidence of infection.  There was no pus or exudate about the port reservoir.  The port catheter was removed intact. The port reservoir and catheter were submitted in a sterile container for cultures and Gram stain as well as fungal cultures. Manual compression was applied over the RIGHT  internal jugular catheter entry site and port pocket for about 5 minutes. The port pocket was closed with deep and subcuticular layers of interrupted 3 -- 0 Vicryl suture and the skin sealed with Dermabond.  A sterile dressing was applied. The patient tolerated the procedure well with no evidence of complication. COMPARISON:  1 view chest 5/9/2019 FINDINGS:  The port reservoir and catheter were removed intact.  There was no discharge or exudate at the port pocket.     1.  Removal of RIGHT  internal jugular PowerPort venous implant. 2.  No evidence of infection at the port  "pocket. 3.  The port reservoir and port catheter were removed intact.      Micro:  Results     Procedure Component Value Units Date/Time    BLOOD CULTURE [[id number]]  (Abnormal) Collected:  05/08/19 2034    Order Status:  Completed Specimen:  Blood from Peripheral Updated:  05/13/19 1138     Significant Indicator POS (POS)     Source BLD     Site PERIPHERAL     Culture Result Growth detected by Bactec instrument. 05/09/2019  1830  Gram Stain: Gram positive cocci: Possible Staphylococcus sp.  Negative for Staphylococcus aureus and MRSA by PCR. Correlate  ongoing need for antibiotics with clinical condition.  Further report to follow.   (A)      Coagulase-negative Staphylococcus species  Susceptibilities in progress   (A)    Narrative:       CALL  [facility]  [facility] tel. [phone],  CALLED  ICC tel. [phone] 05/09/2019, 20:44, RB PERF. RESULTS CALLED TO: RN  [id number]  Per Hospital Policy: Only change Specimen Src: to \"Line\" if  specified by physician order.  Left Forearm/Arm    Fungal Culture [[id number]] Collected:  05/09/19 1115    Order Status:  Completed Specimen:  Wound Updated:  05/12/19 1312     Significant Indicator NEG     Source WND     Site Port Catheter Tip     Culture Result Culture in progress.    Narrative:       Surgery Specimen    CULTURE WOUND W/ GRAM STAIN [[id number]]  (Abnormal)  (Susceptibility) Collected:  05/09/19 1115    Order Status:  Completed Specimen:  Wound Updated:  05/12/19 1312     Significant Indicator POS (POS)     Source WND     Site Port Catheter Tip     Culture Result Growth noted after further incubation, see below for  organism identification.   (A)     Gram Stain Result No organisms seen.     Culture Result Staphylococcus epidermidis  Rare growth   (A)      Candida albicans  Rare growth   (A)    Narrative:       Surgery Specimen    Culture & Susceptibility     STAPHYLOCOCCUS EPIDERMIDIS     Antibiotic Sensitivity Microscan Unit Status    Ampicillin/sulbactam Sensitive <=8/4 mcg/mL Final " "   Method: GREGORY    Clindamycin Sensitive <=0.5 mcg/mL Final    Method: GREGORY    Daptomycin Sensitive <=0.5 mcg/mL Final    Method: GREGORY    Erythromycin Sensitive <=0.5 mcg/mL Final    Method: GREGORY    Moxifloxacin Sensitive <=0.5 mcg/mL Final    Method: GREGORY    Oxacillin Sensitive <=0.25 mcg/mL Final    Method: GREGORY    Penicillin Resistant 2 mcg/mL Final    Method: GREGORY    Tetracycline Sensitive <=4 mcg/mL Final    Method: GREGORY    Trimeth/Sulfa Sensitive <=0.5/9.5 mcg/mL Final    Method: GREGORY    Vancomycin Sensitive 1 mcg/mL Final    Method: GREGORY                       C Diff by PCR rflx Toxin [[id number]] Collected:  05/11/19 1620    Order Status:  Completed Specimen:  Stool from Stool Updated:  05/11/19 2142     C Diff by PCR Negative     Comment: C. difficile NOT detected by PCR.  Treatment not indicated per guidelines.  Repeat testing not indicated within 7 days.          027-NAP1-BI Presumptive Negative     Comment: Presumptive 027/NAP1/BI target DNA sequences are NOT DETECTED.       Narrative:       Special Contact [id number] FARIDA MONTE  Does this patient have risk factors for C-diff?->Yes    C Diff by PCR rflx Toxin [[id number]]     Order Status:  Canceled Specimen:  Stool from Stool     BLOOD CULTURE [[id number]] Collected:  05/10/19 1335    Order Status:  Completed Specimen:  Blood from Peripheral Updated:  05/11/19 0839     Significant Indicator NEG     Source BLD     Site PERIPHERAL     Culture Result No Growth  Note: Blood cultures are incubated for 5 days and  are monitored continuously.Positive blood cultures  are called to the RN and reported as soon as  they are identified.      Narrative:       Collected By:[id number] FARIDA MONTE  Per Hospital Policy: Only change Specimen Src: to \"Line\" if  specified by physician order.  Right Forearm/Arm    BLOOD CULTURE [[id number]] Collected:  05/10/19 1352    Order Status:  Completed Specimen:  Blood from Peripheral Updated:  05/11/19 0839     Significant " "Indicator NEG     Source BLD     Site PERIPHERAL     Culture Result No Growth  Note: Blood cultures are incubated for 5 days and  are monitored continuously.Positive blood cultures  are called to the RN and reported as soon as  they are identified.      Narrative:       Collected By:[id number] FARIDA MONTE  Per Hospital Policy: Only change Specimen Src: to \"Line\" if  specified by physician order.  Left Forearm/Arm    CULTURE WOUND W/O GRAM STAIN [[id number]] Collected:  05/08/19 2230    Order Status:  Completed Specimen:  Wound from Back Updated:  05/11/19 0836     Significant Indicator NEG     Source WND     Site Buttock     Culture Result Heavy growth mixed skin doreen.    Narrative:       Contact  Contact    GRAM STAIN [[id number]] Collected:  05/09/19 1115    Order Status:  Completed Specimen:  Wound Updated:  05/10/19 0818     Significant Indicator .     Source WND     Site Port Catheter Tip     Gram Stain Result No organisms seen.    Narrative:       Surgery Specimen    MRSA BY PCR (AMP) [[id number]] Collected:  05/09/19 1410    Order Status:  Completed Specimen:  Respirate from Nares Updated:  05/09/19 1831     Significant Indicator NEG     Source RESP     Site NARES     MRSA PCR Negative for MRSA by PCR.    Narrative:       [id number] JAKUBOS SAMIR R  [id number] JAKUBOS SAMIR R    Fungal Culture [[id number]]     Order Status:  No result Specimen:  Respirate from Central Line     CULTURE TISSUE W/ GRM STAIN [[id number]]     Order Status:  No result Specimen:  Tissue from Other Tissue     BLOOD CULTURE [[id number]] Collected:  05/08/19 2034    Order Status:  Completed Specimen:  Blood from Peripheral Updated:  05/09/19 0929     Significant Indicator NEG     Source BLD     Site PERIPHERAL     Culture Result No Growth  Note: Blood cultures are incubated for 5 days and  are monitored continuously.Positive blood cultures  are called to the RN and reported as soon as  they are identified.      Narrative:   " "    Per Hospital Policy: Only change Specimen Src: to \"Line\" if  specified by physician order.  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 Male with hx of cutaneous T Cell lymphoma (Mycosis fungoides) dx in 11/2017, had extensive w/u at [place] for his 20 year hx of widespread hyperkeratotic plaques who is now being followed by Dr. Mc, [facility] oncologist and pt is supposed to get chemotherapy but hasn't been able to due to transportation issues.  He had portacath that was placed in 12/2017 (per wife) and had last chemotherapy was 3 months ago. He had multiple hospital admissions for cellulitis from diffuse open widespread skin in 3/2019 and 4/2019. First admission was at [place] and 2nd admission was here at [facility]. Hospitalized from 4/8-4/13 and treated with vanco/unasyn and discharged with doxycyline x 10 days.      Pt was admitted last night for septic shock due to cellulitis. Per note, pt has been feeling weak with subjective fever 3 days prior to admission. Pt went to [facility] ED and had lactate >4 and was hypotensive and had 3.7L fluid resuscitation and was started on vanco and zosyn.      At [facility] vitals WNL. Left subclavian central line was placed. Blood cultures obtained. Started on vancomycin and unasyn. Portacath was removed on 5/10 by IR.      Interval 24 hour assessment:    Events, transferred out of ICU to floor   AF, O2 RA,    Labs reviewed  Micro reviewed    Pt continued on Unasyn and will start daptomycin and fluconazole " today.  He has rigors during exam, pain related to his numerous skin lesions.    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  No consolidations or evidence of pneumonia, abdomen pelvis with no significant findings.     Cellulitis   - Superinfected open skin lesions/wound (staph/strep) in the background of persistent cutaneous T cell lymphoma   - No suspicion for brooks che syndrome, TEN, AGEP. Doubt DRESS syndrome. His eosinophils are normal  2.6%. No suspicion for necrotizing fasciitis.    - Multiple hospital admissions for sepsis due to cellulitis in 3/19 and 4/19  -Buttock wound culture 5/8 with mixed normal skin doreen      Chills and rigors today, new  Fevers, acute, - improved      BETO, acute, ongoing   -May be due to previous septic shock, patient also on vancomycin     Diarrhea- new onset   -C. difficile negative on 5/11     Septic shock - resolved  - source is skin and line (portacath).      Bacteremia - CoNS 1/2 Blood cx and growth on cath tip -discussed with micro and requested work-up as also growing from cath tip  - Blood cultures positive 5/8 1 out of 2 with CoNS  - Port removed on 5/9, catheter tip with CoNS and candida albicans   - Blood cultures 5/10- NGTD  - MRSA nares  Neg 5/9      Plan:   - Starting daptomycin at 6 mg/kg for CoNS in blood and on cath tip- will give a 7 day abx course from cath tip removal (end 5/16/19)  - Candida albicans on cath tip is concering- no candida in blood cultures so may be a skin contaminant - however will start fluconazole today and repeat blood cultures - if all blood cultures remain negative then can give a shorter fluconazole course   -Repeat blood cultures today  - Continue amp/sulbactam for empiric cellulitis coverage   - Wound team following  - Agree  with encouraging bowel care regimen and possible rectal tube as frequent BMs are risk for further infecting open skin lesions     Potential transfer to [facility] soon.      Discussed with nurse. ID will follow.         Angy Francis MD  Infectious Diseases

## 2019-05-14 NOTE — PROGRESS NOTES
· 2 RN skin check complete with MARIANNE Garrett.  · Devices in place Woods cath, central line.  · Skin assessed under devices seen.  · Confirmed pressure ulcers found on N/A.  · New potential pressure ulcers noted on N/A. Wound consult placed and wound reported.  · The following interventions in place Q2 turns, 2 RN skin check, barrier wipes, xeroform dressing, PRN dressing changes, call light in reach, frequent rounding.  · Will continue to monitor.

## 2019-05-14 NOTE — CARE PLAN
Problem: Venous Thromboembolism (VTW)/Deep Vein Thrombosis (DVT) Prevention:  Goal: Patient will participate in Venous Thrombosis (VTE)/Deep Vein Thrombosis (DVT)Prevention Measures  Outcome: PROGRESSING AS EXPECTED  Heparin injection given.     Problem: Pain Management  Goal: Pain level will decrease to patient's comfort goal  Outcome: PROGRESSING AS EXPECTED  Pt complains about pain. Roxicodone 10 mg given.     Problem: Skin Integrity  Goal: Risk for impaired skin integrity will decrease  Outcome: PROGRESSING AS EXPECTED  Pt refuses some q 2 h turns and dressing change to back despite education. MD notified.     Problem: Urinary Elimination:  Goal: Ability to reestablish a normal urinary elimination pattern will improve  Outcome: PROGRESSING AS EXPECTED  Woods draining to gravity.

## 2019-05-14 NOTE — PROGRESS NOTES
Oncology/Hematology Progress Note               Author: Femi SRAVANTHISandra Barraza Date & Time created: 5/14/2019  11:19 AM     CC: T-cell lymphoma    Interval History:  The patient was moved from the ICU back to the floor now.  He seems to be in reasonable spirits but can get confused at times.  The nurses are tending to his skin.    Review of Systems:  Review of Systems   Constitutional: Positive for malaise/fatigue.   HENT: Negative for hearing loss and tinnitus.    Eyes: Negative for blurred vision and double vision.   Respiratory: Negative for cough.    Cardiovascular: Negative for chest pain.   Gastrointestinal: Negative for heartburn.   Skin: Positive for itching and rash.   Neurological: Positive for weakness.   Endo/Heme/Allergies: Negative for environmental allergies. Does not bruise/bleed easily.       Physical Exam:  Physical Exam   HENT:   Head: Normocephalic.   Skin: Rash noted. There is erythema.   Essentially his whole body is skin is damaged by his T-cell lymphoma.       Labs:      Recent Labs      05/14/19   0030   CPKTOTAL  39     Recent Labs      05/12/19   0530 05/13/19   0401  05/14/19   0839   SODIUM  145  150*  151*   POTASSIUM  4.3  4.1  4.3   CHLORIDE  115*  117*  118*   CO2  26  28  26   BUN  20  23*  23*   CREATININE  1.41*  1.51*  1.49*   CALCIUM  7.2*  7.2*  7.1*     Recent Labs      05/12/19   0530 05/13/19   0401  05/14/19   0839   GLUCOSE  98  107*  100*     Recent Labs      05/13/19   1326   RBC  3.84*   HEMOGLOBIN  10.7*   HEMATOCRIT  35.6*   PLATELETCT  365     Recent Labs      05/13/19   1326   WBC  9.3   NEUTSPOLYS  58.20   LYMPHOCYTES  19.10*   MONOCYTES  0.90   EOSINOPHILS  12.20*   BASOPHILS  0.00     Recent Labs      05/12/19   0530 05/13/19   0401  05/14/19   0839   SODIUM  145  150*  151*   POTASSIUM  4.3  4.1  4.3   CHLORIDE  115*  117*  118*   CO2  26  28  26   GLUCOSE  98  107*  100*   BUN  20  23*  23*   CREATININE  1.41*  1.51*  1.49*   CALCIUM  7.2*  7.2*  7.1*      Hemodynamics:  Temp (24hrs), Av °C (98.6 °F), Min:36.6 °C (97.9 °F), Max:37.2 °C (98.9 °F)  Temperature: 37.1 °C (98.7 °F)  Pulse  Av.5  Min: 49  Max: 104  Blood Pressure : 103/68     Respiratory:    Respiration: 18, Pulse Oximetry: 97 %        RUL Breath Sounds: Clear, RML Breath Sounds: Diminished, RLL Breath Sounds: Diminished, YAJAIRA Breath Sounds: Clear, LLL Breath Sounds: Diminished  Fluids:    Intake/Output Summary (Last 24 hours) at 19 1119  Last data filed at 19 0900   Gross per 24 hour   Intake              600 ml   Output             1150 ml   Net             -550 ml        GI/Nutrition:  Orders Placed This Encounter   Procedures   • Diet Order Regular (please include whole milk)     Standing Status:   Standing     Number of Occurrences:   1     Order Specific Question:   Diet:     Answer:   Regular [1]     Comments:   please include whole milk     Order Specific Question:   Texture/Fiber modifications:     Answer:   Dysphagia 3(Mechanical Soft)specify fluid consistency(question 6) [3]     Comments:   pt has no teeth     Medical Decision Making, by Problem:  Active Hospital Problems    Diagnosis   • *Septic shock (HCC) [A41.9, R65.21]   • Severe sepsis (HCC) [A41.9, R65.20]   • 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  Oncology: Progressive surgery syndrome/T-cell lymphoma is had various treatment regimens by Dr. Mc.  The plan was in the future if he stabilizes for more treatment.  Hospice has been discussed with this patient on several occasions which he has declined.  I think for the rest of his hospital stay he will be more about just management of infection and  skin.  I think if he can leave as an outpatient and Dr. Mc can discuss treatment at that time after rehab.  No other active issues from an oncology perspective.    Infectious disease following the patient and directing antibiotic care.  Please call if there is any further questions.  We will not see the patient daily.    Please note that this dictation was created using voice recognition software. I have made every reasonable attempt to correct obvious errors, but I expect that there are errors of grammar and possibly context that I did not discover before finalizing the note    Quality-Core Measures

## 2019-05-14 NOTE — CONSULTS
"Date & Time note created:    5/14/2019   4:27 PM       Date of Consult: 5/14/2019    Requesting Provider: Nadya Erickson M.D.  Consulting Provider: Makayla Power  Reason for Consult: pain/symptom management    Chief Complaint: Sepsis  HPI:   Marco Antonio Ortiz is a 68 y.o. male who presented to [facility] on 5/8/19 with general malaise, weakness, and fever/chills and worsening rash X 3 days.  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  OSH found patient to be septic with lactic acid >4, and hypotension requiring vasopressor therapy despite fluid resuscitation.  Suspected source of infection with cellulitis and patient was transferred to [facility] for higher level of care.      Patient was easily awakened and oriented X4.  However, frequently throughout interview, he would nod off or stare off and had to be frequently re-directed and prompted for answers.  He stated, \"I feel confused right now.  It's hard for me to concentrate.\"    When queried about pain, patient said, \"Right now I don't have any pain.\"  He reports the onset of his pain was \"a long time ago\" but was unable to provide an better timeline.  When he does have pain, \"it's in my skin.\"  He describes the pain as an intermittent burning, that is not alleviated by anything.  He reports it \"gets worse when air hits it or when they change my bedding.\"    Patient denies nausea, vomiting, insomnia, anxiety, or constipation.  He reports his most distressing symptom right now is itching/pruritus.  He also endorses recent depression for the past 2 weeks.  He could not remember if he has been on medication for depression.    Past Medical History:   Diagnosis Date   • Asthma    • Cancer (HCC)    • Dental disorder    • Heart burn    • Hiatus hernia syndrome    • Indigestion    • Snoring      Past Surgical History:   Procedure Laterality Date   • OTHER ABDOMINAL SURGERY       Current " "Medications:  No current facility-administered medications on file prior to encounter.      Current Outpatient Prescriptions on File Prior to Encounter   Medication Sig Dispense Refill   • silver sulfADIAZINE (SILVADENE) 1 % Cream Apply 1 g to affected area(s) 2 Times a Day. 1 Each 1   • budesonide-formoterol (SYMBICORT) 160-4.5 MCG/ACT Aerosol Inhale 2 Puffs by mouth 2 Times a Day.     • omeprazole (PRILOSEC) 20 MG delayed-release capsule Take 20 mg by mouth every day.     • albuterol 108 (90 Base) MCG/ACT Aero Soln inhalation aerosol Inhale 1 Puff by mouth every four hours as needed for Shortness of Breath.     • Cyanocobalamin (VITAMIN B-12) 1000 MCG Tab Take 1,000 mcg by mouth every day.       Medication Allergy/Sensitivities:  No Known Allergies    Family History:  Reviewed and non-contributory    Social History: Patient denies smoking, drinking or recreational drug use.  He does report occasional marijuana use.    Living situation: Patient lives with his wife in [place].  They have 3 children and several grandchildren \"I can't count them all.\"    Palliative Performance Scale: 40%    Spiritual History: Not discussed today    ROS:    Review of Systems   HENT:        Dry mouth; thrush   Gastrointestinal: Positive for heartburn.   Skin: Positive for itching and rash.   Neurological:        Intermittent confusion   Psychiatric/Behavioral: Positive for depression.   All other systems reviewed and are negative.      PE:   Recent vital signs  Weight/BMI: Body mass index is 36.2 kg/m².  /55   Pulse 95   Temp 37.3 °C (99.1 °F) (Oral)   Resp 20   Ht 1.727 m (5' 8\")   Wt 108 kg (238 lb 1.6 oz)   SpO2 96%   BMI 36.20 kg/m²   Vitals:    05/13/19 2035 05/14/19 0456 05/14/19 0844 05/14/19 1623   BP: 121/80 118/71 103/68 116/55   Pulse: 96 89 94 95   Resp: 20 20 18 20   Temp: 37.2 °C (98.9 °F) 37.1 °C (98.8 °F) 37.1 °C (98.7 °F) 37.3 °C (99.1 °F)   TempSrc: Temporal Temporal Temporal Oral   SpO2: 99% 92% 97% " 96%   Weight:       Height:         Oxygen Therapy:  Pulse Oximetry: 96 %, O2 (LPM): 0, O2 Delivery: None (Room Air)  Physical Exam   Constitutional: He is oriented to person, place, and time. He has a sickly appearance.   Cardiovascular: Normal rate and normal heart sounds.    Distant   Pulmonary/Chest: Effort normal. He has decreased breath sounds in the right upper field, the right lower field, the left upper field and the left lower field. He has no wheezes. He has no rales.   Abdominal: Soft. Bowel sounds are normal. He exhibits no distension. There is no tenderness.   Intermittent fecal incontinence per bedside RN report   Genitourinary: Penis exhibits lesions.   Genitourinary Comments: Indwelling reyes catheter   Musculoskeletal: He exhibits edema (4+ BUE/BLE).   Neurological: He is oriented to person, place, and time.   Forgetful, intermittent confusion, delayed responses   Skin: Rash noted. Rash is maculopapular. There is erythema.   Diffuse raised rash with scabs/lesions across entirety of skin including BUE/BLE, face/neck, trunk, back.  Open, oozing/bleeding wounds on back.   Psychiatric: Affect normal. He exhibits a depressed mood.     Lab Data Review:  Recent Results (from the past 24 hour(s))   CREATINE KINASE    Collection Time: 05/14/19 12:30 AM   Result Value Ref Range    CPK Total 39 0 - 154 U/L   BASIC METABOLIC PANEL    Collection Time: 05/14/19  8:39 AM   Result Value Ref Range    Sodium 151 (H) 135 - 145 mmol/L    Potassium 4.3 3.6 - 5.5 mmol/L    Chloride 118 (H) 96 - 112 mmol/L    Co2 26 20 - 33 mmol/L    Glucose 100 (H) 65 - 99 mg/dL    Bun 23 (H) 8 - 22 mg/dL    Creatinine 1.49 (H) 0.50 - 1.40 mg/dL    Calcium 7.1 (L) 8.5 - 10.5 mg/dL    Anion Gap 7.0 0.0 - 11.9   ESTIMATED GFR    Collection Time: 05/14/19  8:39 AM   Result Value Ref Range    GFR If  56 (A) >60 mL/min/1.73 m 2    GFR If Non  47 (A) >60 mL/min/1.73 m 2     Imaging/Procedures Review:   CT  chest/abd/pelvis 5/9/19  IR-CVC tunnel 5/9/19  Chest limited 5/9/19  Chest portable 5/8/19  CT-CHEST,ABDOMEN,PELVIS WITH   Final Result      1.  Bilateral axillary adenopathy, more notable on the right. Some of these nodes are larger than on the prior PET/CT. There is no intrathoracic adenopathy.   2.  No abdominal adenopathy.   3.  Borderline enlarged inguinal lymph nodes, overall decreased in size compared with the prior PET/CT.      IR-CVC TUNNEL WITH PORT REMOVAL   Final Result      1.  Removal of RIGHT  internal jugular PowerPort venous implant.   2.  No evidence of infection at the port pocket.   3.  The port reservoir and port catheter were removed intact.      DX-CHEST-LIMITED (1 VIEW)   Final Result      Left central venous line in place. No pneumothorax.               INTERPRETING LOCATION: [address] [facility], [place], [address]      DX-CHEST-PORTABLE (1 VIEW)   Final Result      No acute cardiopulmonary abnormality. No interval change.         Problem List:  1.  Cancer-related pain (active)  2.  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  Generalized pruritus (active)  5.  Dry mouth/thrush (active)  6.  Acute depression (active)  7.  BETO (active)  8.  GERD (stable)  9.  Anemia (active)  10.  Edema (active)    DISCUSSION/RECOMMENDATIONS:     #Cancer-related pain  Prefer not to use morphine d/t patient's BETO -- GFR = 47 (5/14/19); Cr 1.49, BUN 23  Discontinue morphine 2-5 mg IV Q 4 hrs PRN pain  Start hydromorphone 0.2 - 0.4 mg Q3 hrs PRN severe pain NRS 7-10 and/or dressing changes  Change oxycodone from 5-10 mg PO Q4 hrs to 5-10 mg PO Q3 hrs prn moderate pain    #Cutaneous T-cell lymphoma with recurrent skin infections  Management of infection/skin per primary team  Patient currently on ampicillin/sulbactam 3 g IV Q6 hours  Daptomycin 700 mg IV Q24 hrs  Followed OP by Dr. Mc and has been on various treatment regimens in  past  Dr. 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   Provided soft bristle toothbrush. Extensive oral care education/counseling provided.   Encouraged keeping lips moisturized with hospital approved brand.     #Acute depression  Start duloxetine 20 mg PO QD; prefer not to up-titrate @ this time d/t BETO    #Anemia  Hgb 10.7 (5/13/19)  Defend Hgb @ 7.0 per primary team    #GERD  Start omeprazole 20 mg PO QD    Code Status: Full    Advance Care Planning/Current Goals of Care: Not discussed today d/t patient inability to focus.   Patient currently full code with no AD or POLST on file.     40 minutes spent with greater than 50% spent counseling and coordinating the patient's care regarding acute pain & symptom management.   Please refer to HPI and assessment/plan for details.     Thank you for allowing the palliative care team to participate in Marco Antonio's care. Please call with any questions/needs.     HERO Harrison.  Palliative Care Nurse Practitioner  [phone]

## 2019-05-14 NOTE — DISCHARGE PLANNING
Transfer Center:    Phone call placed to [facility] regarding possible patient transfer.  Per Cody they still have no bed availability plus patient is not appropriate to be under burn service, therefore they are declining patient. Xochilt QIU on unit aware.

## 2019-05-14 NOTE — RESPIRATORY CARE
COPD EDUCATION by COPD CLINICAL EDUCATOR  5/14/2019 at 7:23 AM by María Dale     Patient reviewed by COPD education team. Patient does not qualify for the COPD program.

## 2019-05-14 NOTE — PROGRESS NOTES
Pt AOX 4. Pt rates pain 2/10, declines pain med.. Pt denies nausea and SOB. Central Line patent with positive blood return running IV abx. Reyes in place draining to gravity. Last Bm 05/13. Open wounds noted throughout body. Dressing changed. Na 151 this morning. MD notified. POC discussed with pt, all questions answered at this time. Pt makes needs known, call light within reach, hourly rounding in place.      · 2 RN skin check complete with MARIANNE Brooks.  · Devices in place  Central line, reyes catheter.  · Skin assessed under devices Yes .  · Confirmed pressure ulcers found on n/a  · Open wounds noted throughout body. Dressing changed.  · New potential pressure ulcers noted on N/a. Wound consult placed and wound reported.  · The following interventions in place q2hr turns, xeroform dressing, barrier wipes, dressing changes as needed, waffle overlay.

## 2019-05-15 PROBLEM — B95.7 BACTEREMIA DUE TO COAGULASE-NEGATIVE STAPHYLOCOCCUS: Status: ACTIVE | Noted: 2019-01-01

## 2019-05-15 PROBLEM — C84.99: Status: ACTIVE | Noted: 2019-01-01

## 2019-05-15 PROBLEM — R78.81 BACTEREMIA DUE TO COAGULASE-NEGATIVE STAPHYLOCOCCUS: Status: ACTIVE | Noted: 2019-01-01

## 2019-05-15 NOTE — DISCHARGE PLANNING
PT readmitted for infection and pain control. PT has multiple lesions on his body from T-cell Lymphoma. Attempt made for possible transfer to burn unit at [facility], they declined PT stating he is not appropriate for their burn unit. PT lives in [place] with his spouse in a 1 level house. This RNCM met with PT this am at bedside, informed him that [facility] declined transfer. Discussed again his code status, explained what it means to be a full code vs DNR. PT nods at times but during conversation he closes his eyes and really does not want to engage. Still encouraged him to think about changing his status and to let us know if he has any questions. Oncology looking at new chemotherapy possibility. PT was being followed by [facility]     Care Transition Team Assessment    Information Source  Orientation : Oriented x 4  Information Given By: Patient    Readmission Evaluation  Is this a readmission?: Yes - unplanned readmission  Why do you think you were readmitted?:  (exaserbation of condition )  Did you understand your discharge instructions?: Yes  Did you have enough support after your last discharge?: Yes    Elopement Risk  Legal Hold: No  Ambulatory or Self Mobile in Wheelchair: No-Not an Elopement Risk  Disoriented: No  Psychiatric Symptoms: None  History of Wandering: No  Elopement this Admit: No  Vocalizing Wanting to Leave: No  Displays Behaviors, Body Language Wanting to Leave: No-Not at Risk for Elopement  Elopement Risk: Not at Risk for Elopement    Interdisciplinary Discharge Planning  Does Admitting Nurse Feel This Could be a Complex Discharge?: Yes  Lives with - Patient's Self Care Capacity: Spouse  Patient or legal guardian wants to designate a caregiver (see row info): No  Support Systems: Spouse / Significant Other  Housing / Facility: 1 Story House  Mobility Issues: Yes  Prior Services: Skilled Home Health Services  Patient Expects to be Discharged to::  (home)  Assistance Needed:  Yes    Discharge Preparedness  What is your plan after discharge?: Home with help  What are your discharge supports?: Spouse  Prior Functional Level: Needs Assist with Activities of Daily Living, Needs Assist with Medication Management    Functional Assesment  Prior Functional Level: Needs Assist with Activities of Daily Living, Needs Assist with Medication Management    Finances  Financial Barriers to Discharge: No  Prescription Coverage: Yes    Vision / Hearing Impairment  Vision Impairment : Yes  Right Eye Vision: (P) Impaired, Wears Glasses  Left Eye Vision: (P) Impaired, Wears Glasses  Hearing Impairment : No    Advance Directive  Advance Directive?: None  Advance Directive offered?: AD Booklet refused    Domestic Abuse  Have you ever been the victim of abuse or violence?: No  Physical Abuse or Sexual Abuse: No  Verbal Abuse or Emotional Abuse: No      Discharge Risks or Barriers  Discharge risks or barriers?: Complex medical needs  Patient risk factors: Complex medical needs    Anticipated Discharge Information  Anticipated discharge disposition: Discharge needs currently unknown, [facility] (outpatient), SNF, Home, [facility]

## 2019-05-15 NOTE — PROGRESS NOTES
A&Ox4. 2x assist, refuses to ambulate d/t pain. PICC lumens flushed with positive blood return, except no blood return noted in blue lumen. Pt c/o 6/10 pain, generalized all over body, medicated per MAR. Open weeping skin lesions throughout abdomen, back, groin, BULE. No c/o N/V, numbness or tingling. Last night pt was hypotensive, 1L bolus administered. /55, will continue to monitor, Q4hr VS.  POC discussed with pt. Refused to turn d/t pain, ROBERTO skin on back & bottom. Educated the need to see, will retry when pain is under control. PT/OT consult ordered to work with him today. Repositioning as pt tolerates. All belongings & call light within reach. Pt educated to call for assistance.

## 2019-05-15 NOTE — PROGRESS NOTES
[facility] Medicine Daily Progress Note    Date of Service  5/14/2019    Chief Complaint  68 y.o. male admitted 5/8/2019 with weakness and fevers.     Hospital Course    Mr. Ortiz has a past medical history of T-cell lymphoma followed by Dr. Mc the most recently admitted here for a skin infection was discharged on April 13 due to a rash from his T-cell lymphoma.  He was discharged home on oral doxycycline.  He presented to the emergency room in [facility] with weakness, feeling poorly, and fevers.  Is found to be hypotensive and in septic shock.  He was transferred here for higher level of care.  Despite IV fluids, is required intravenous pressors ongoing.      Interval Problem Update  5/9: Patient seen and evaluated in the intensive care unit this morning. A central line has been placed and his right-sided catheter has been removed.  Patient notes significant pain from his skin.  5/10: Mr. Ortiz was examined and evaluated in the intensive care unit this morning. Blood cultures are positive for gram positive cocci. 500 ml urine over night. He is on a dilaudid PCA due to pain. CT was done for staging purposes of his cancer.  I do long talk with patient about CODE STATUS and he states he does not want to make a decision until he can speak to his wife which will need to be over the phone as she does not have the resources to be able to come to [place] from [place].  Due to dry mouth, he has been drinking excessively and states that he cannot tolerate how dry his mouth this and thus feels he needs to drink very frequently.  5/11: Patient seen and evaluated in the intensive care unit this morning. He has been using the PCA due to pain. He had multiple episodes of diarrhea last night. I long talk with Mr. Ortiz today about possibly going to a burn center due to the degree of his skin involvement.  He states he wants to talk to his wife about this first and he would really use it [place] but he would  [facility] burn [place] if his wife is okay.  A Reyes catheter is been placed due to urinary retention and skin breakdown as he is unable to control his urination.  5/12: Mr. Ortiz was seen and evaluated in the intensive care unit this morning. He has been requiring oxycodone for pain. He has had adequate urine output from the reyes. He is on room air. I examined his back with Dr. Gongora, trauma surgery, and he agrees that a referral to a burn center is appropriate.   5/13: patient seen and evaluated in the ICU. He has been in a sinus rhythm. He is tolerating a dysphagia III diet. He has been refusing mobilization. She is on room air. I placed a referral to [facility] via our transfer center and they have refused him. Mr. Ortiz will only accept [place] as [facility] and [facility] are too far for his wife to travel.   5/14: Status post transfer from ICU to oncology unit day #1.  Patient appears to be in pain with any movement of his body due to his open sores lesions to his entire back as well as right shoulder from his cutaneous T-cell lymphoma.  He has refused dressing changes due to pain.  I have counseled palliative care to address pain management as well as to address his full CODE STATUS.  I did have conversation with patient but he was unable to focus on any clear discussion of his CODE STATUS.  He did state that we could call his wife for further discussion.  Discussed with ID.  His central line catheter tip was positive for Candida.  He is currently on broad-spectrum antibiotics and antifungal.  He repeatedly is incontinent of stool which does travel up his back and into his open wound lesions.  He is incontinent of urine he does have a Reyes catheter in place.  Palliative care did increase his IV morphine to IV Dilaudid with breakthrough pain continue with oxycodone.  He does have edema of his lower extremities and is off IV fluids.  Thrush noted of mouth started on nystatin swish and swallow.   Started on antidepressant SSRI, DC'd PPI due to C. difficile inducing.  On broad-spectrum antibiotics currently.  Diphenhydramine added for itching.    Consultants/Specialty  Critical Care.  Infectious disease  Oncology  Palliative care.    Code Status  full    Disposition  TBD.  Lives in [place] with his wife.  Apparently patient takes care of the wife.  [facility] has declined transfer of patient.    Review of Systems  Review of Systems   Constitutional: Negative for chills and fever.   HENT:        Very dry mouth   Respiratory: Negative for shortness of breath.    Cardiovascular: Positive for leg swelling. Negative for chest pain.   Gastrointestinal: Negative for diarrhea and nausea.        Tolerating a diet  Excessively thirsty   Genitourinary:        Woods   Skin:        Extensive skin pain of the back   Psychiatric/Behavioral:        Sleeping a lot   All other systems reviewed and are negative.       Physical Exam  Temp:  [37.1 °C (98.7 °F)-37.3 °C (99.1 °F)] 37.3 °C (99.1 °F)  Pulse:  [89-96] 95  Resp:  [18-20] 20  BP: (103-121)/(55-80) 116/55  SpO2:  [92 %-99 %] 96 %    Physical Exam   Constitutional: He is oriented to person, place, and time. He appears well-developed. No distress.   HENT:   Dry mucous membranes  No facial droop  Chapped lips   Eyes: Right eye exhibits no discharge. Left eye exhibits no discharge. No scleral icterus.   Pale conjunctiva   Neck: Normal range of motion. Neck supple. No tracheal deviation present.   Left sided central line   Cardiovascular: Normal rate and regular rhythm.    No murmur heard.  Pulmonary/Chest:   Right chest cath site covered  Clear lung sounds bilaterally   Abdominal: Soft. He exhibits distension. There is no tenderness.   Genitourinary:   Genitourinary Comments: Woods catheter   Musculoskeletal: He exhibits edema and tenderness.   3+ edema of the lower extremities   Neurological: He is alert and oriented to person, place, and time.   Skin: Skin is warm and  dry. He is not diaphoretic.   Extensive xerosis of the anterior skin more so on his scalp and face  The skin on his chest anterior surfaces is red and raised the skin of the groin and back is wet and weeping of clear fluid. Large regions akin to jigsaw puzzle pieces with macerated beefy red tissue      Psychiatric: Judgment and thought content normal.   Nursing note and vitals reviewed.      Fluids    Intake/Output Summary (Last 24 hours) at 05/14/19 1900  Last data filed at 05/14/19 1749   Gross per 24 hour   Intake              580 ml   Output             1800 ml   Net            -1220 ml       Laboratory  Recent Labs      05/13/19   1326   WBC  9.3   RBC  3.84*   HEMOGLOBIN  10.7*   HEMATOCRIT  35.6*   MCV  92.7   MCH  27.9   MCHC  30.1*   RDW  58.4*   PLATELETCT  365   MPV  9.5     Recent Labs      05/12/19   0530  05/13/19   0401  05/14/19   0839   SODIUM  145  150*  151*   POTASSIUM  4.3  4.1  4.3   CHLORIDE  115*  117*  118*   CO2  26  28  26   GLUCOSE  98  107*  100*   BUN  20  23*  23*   CREATININE  1.41*  1.51*  1.49*   CALCIUM  7.2*  7.2*  7.1*                   Imaging  CT-CHEST,ABDOMEN,PELVIS WITH   Final Result      1.  Bilateral axillary adenopathy, more notable on the right. Some of these nodes are larger than on the prior PET/CT. There is no intrathoracic adenopathy.   2.  No abdominal adenopathy.   3.  Borderline enlarged inguinal lymph nodes, overall decreased in size compared with the prior PET/CT.      IR-CVC TUNNEL WITH PORT REMOVAL   Final Result      1.  Removal of RIGHT  internal jugular PowerPort venous implant.   2.  No evidence of infection at the port pocket.   3.  The port reservoir and port catheter were removed intact.      DX-CHEST-LIMITED (1 VIEW)   Final Result      Left central venous line in place. No pneumothorax.               INTERPRETING LOCATION: [address] [place] [address], [id number]      DX-CHEST-PORTABLE (1 VIEW)   Final Result      No acute cardiopulmonary abnormality. No interval  change.           Assessment/Plan  * Septic shock (HCC)- (present on admission)   Assessment & Plan    Septic shock present upon admission  Source consistent with cutaneous infection  IR removed the indwelling port  Infectious disease consult  He met the criteria for septic shock as evidenced by the need for intravenous pressor support  Organ system failure associated with this disease process is the cardiovascular as is noted by hypotension requiring pressors  Broad-spectrum IV antibiotics to cover for skin infection in the setting of immunocompromised host--IV Unasyn  Catheter tip positive for Candida.  ID following.  On antifungal.     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  Dr. Bradley, oncology, has been consulted.    CT done for staging  Code status has been addressed and he continues to request that everything is done.   Palliative care consult placed.  Patient appears confused unable to discuss CODE STATUS at this time on my exam.  The patient however did allow for his wife to be involved in the conversation.     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 Gongora, trauma surgery, has evaluated the skin and agrees that a referral to a burn center is appropriate.   Mr. Ortiz states that he would be amenable to going to [facility] though no other facility as it would be closer to his wife.   IV and oral narcotic pain meds.  [facility] has refused him on 5/13 and may need to be re-addressed if his status decompensated.   Continue on antifungal medication.       BETO (acute kidney injury) ([facility])   Assessment & Plan    Resolved acute kidney injury.  Continue with oral hydration.  Patient's legs are edematous.  Avoid IV fluids if possible.     Hypernatremia    Assessment & Plan    Remains mildly hypernatremic at 150.  Continue to encourage p.o. Intake.  Appears to be due to skin fluid losses.     Edema- (present on admission)   Assessment & Plan    Severe lower extremity swelling  He drinks excessive water as he always has a dry mouth  IV lasix and follow electrolytes in the morning.   He is at risk of intravascular depletion given the significant insensible fluid losses from skin     Anemia- (present on admission)   Assessment & Plan    Hemoglobin is 10.5       GERD (gastroesophageal reflux disease)- (present on admission)   Assessment & Plan    Hold outpatient due to currently on broad-spectrum antibiotics by ID.  Avoid C. difficile inducing medications set up.     Asthma   Assessment & Plan    Hemoglobin is 10.5          VTE prophylaxis: heparin

## 2019-05-15 NOTE — CARE PLAN
Problem: Infection  Goal: Will remain free from infection    Intervention: Assess signs and symptoms of infection  PT with many open wounds r/t mycosis fungoides; is at risk for infection. ABX administered per MAR, pt afebrile at this time, labs monitored.       Problem: Mobility  Goal: Risk for activity intolerance will decrease    Intervention: Encourage patient to increase activity level in collaboration with Interdisciplinary Team  With assistance of 3, pt to stand with FWW. PT/OT has been ordered.       Problem: Skin Integrity  Goal: Risk for impaired skin integrity will decrease    Intervention: Assess and monitor skin integrity, appearance and/or temperature  Pt incontinent of bowel. Pt cleaned and skin kept clean and dry. Wound care give per wound orders. Pt repositioned as tolerated.

## 2019-05-15 NOTE — PROGRESS NOTES
Infectious Disease Progress Note    Author: Angy Francis M.D. Date & Time of service: 5/15/2019  11:28 AM    Chief Complaint:  Septic shock, cellulitis      Interval History:   Interval 24 hours of Vitals/Labs/Micro,and imaging results reviewed as available. See assessment.   Interval 24 hours of Vitals/Labs/Micro,and imaging results reviewed as available. See assessment.    Interval 24 hours of Vitals/Labs/Micro,and imaging results reviewed as available. See assessment.   5/15 Interval 24 hours of Vitals/Labs/Micro,and imaging results reviewed as available. See assessment.     Review of Systems:  Review of Systems   Constitutional: Negative for chills and fever.   Gastrointestinal: Negative for abdominal pain, diarrhea, nausea and vomiting.   Musculoskeletal: Positive for myalgias.       Hemodynamics:  Temp (24hrs), Av.8 °C (98.3 °F), Min:36.4 °C (97.6 °F), Max:37.3 °C (99.1 °F)  Temperature: 36.6 °C (97.9 °F)  Pulse  Av.9  Min: 49  Max: 104  Blood Pressure : 106/55       Physical Exam:  Physical Exam   Constitutional: He appears well-developed and well-nourished.   HENT:   Head: Normocephalic and atraumatic.   Eyes: Pupils are equal, round, and reactive to light. Conjunctivae and EOM are normal.   Cardiovascular: Normal rate, regular rhythm and normal heart sounds.    Pulmonary/Chest: Effort normal and breath sounds normal.   Abdominal: Soft. Bowel sounds are normal. He exhibits distension. There is no tenderness. There is no rebound and no guarding.   Musculoskeletal: He exhibits edema and tenderness.   Neurological:   Somnolent    Skin: Rash noted.   Diffuse erythema, weeping and plaques       Meds:    Current Facility-Administered Medications:   •  nystatin  •  oxyCODONE immediate-release  •  DULoxetine  •  HYDROmorphone  •  fluconazole  •  DAPTOmycin  •  acetaminophen **OR** acetaminophen  •  budesonide-formoterol  •  Pharmacy Consult Request  •  Respiratory Care per Protocol  •   NS  •  heparin  •  ampicillin-sulbactam (UNASYN) IV  •  albuterol    Labs:  Recent Labs      05/13/19   1326   WBC  9.3   RBC  3.84*   HEMOGLOBIN  10.7*   HEMATOCRIT  35.6*   MCV  92.7   MCH  27.9   RDW  58.4*   PLATELETCT  365   MPV  9.5   NEUTSPOLYS  58.20   LYMPHOCYTES  19.10*   MONOCYTES  0.90   EOSINOPHILS  12.20*   BASOPHILS  0.00   RBCMORPHOLO  Normal     Recent Labs      05/13/19   0401  05/14/19   0030  05/14/19   0839  05/15/19   0015   SODIUM  150*   --   151*  149*   POTASSIUM  4.1   --   4.3  4.1   CHLORIDE  117*   --   118*  115*   CO2  28   --   26  28   GLUCOSE  107*   --   100*  104*   BUN  23*   --   23*  22   CPKTOTAL   --   39   --    --      Recent Labs      05/13/19   0401  05/14/19   0839  05/15/19   0015   CREATININE  1.51*  1.49*  1.55*       Imaging:  Ct-chest,abdomen,pelvis With    Result Date: 5/9/2019 5/9/2019 9:08 PM HISTORY/REASON FOR EXAM:  T cell lymphoma restaging,compare with prior PET scan TECHNIQUE/EXAM DESCRIPTION: CT scan of the chest, abdomen and pelvis with contrast. Thin-section helical scanning was obtained from the lung apices through the pubic symphysis to include the chest, abdomen and pelvis.  100 mL of nonionic contrast was administered intravenously without complication. Low dose optimization technique was utilized for this CT exam including automated exposure control and adjustment of the mA and/or kV according to patient size. COMPARISON: PET/CT 4/30/2018 is findings there are FINDINGS: CT CHEST:The lungs are fully expanded, with no parenchymal abnormality.  There is no pleural or pericardial effusion. There are no enlarged mediastinal or hilar nodes. There are enlarged axillary nodes bilaterally however, more notably on the right. The largest right-sided node measures about 15 mm in greatest short axis dimension. The bony thorax is unremarkable. CT ABDOMEN:The liver, spleen, pancreas, and stomach are unremarkable.  Adrenal glands are normal.  No opaque gallstones.   The kidneys show symmetric opacification with no hydronephrosis. A 12 mm exophytic lesion is seen along the lateral margin of the right kidney, likely a cyst. This is unchanged. The bowel and mesentery are grossly normal. No mesenteric or retroperitoneal adenopathy. No free fluid or air. CT PELVIS:The bladder is intact. No free fluid in the pelvis.  The pelvic bowel and mesentery are grossly normal.  The bony pelvis and visualized proximal femora are intact. Borderline enlarged inguinal lymph nodes are present bilaterally which are somewhat smaller than before.     1.  Bilateral axillary adenopathy, more notable on the right. Some of these nodes are larger than on the prior PET/CT. There is no intrathoracic adenopathy. 2.  No abdominal adenopathy. 3.  Borderline enlarged inguinal lymph nodes, overall decreased in size compared with the prior PET/CT.    Dx-chest-limited (1 View)    Result Date: 5/9/2019  HISTORY/REASON FOR EXAM: central venous line placement. TECHNIQUE/EXAM DESCRIPTION:  Single view of the chest,  5/8/2019 11:47 PM COMPARISON:  Prior image today at 2043 hours FINDINGS:   A new left central venous line has been placed, the tip of which projects in the area of the mid SVC. There is no pneumothorax. Cardiopulmonary appearance is unchanged.     Left central venous line in place. No pneumothorax. INTERPRETING LOCATION: [address] [place] [address], [id number]    Dx-chest-portable (1 View)    Result Date: 5/8/2019 5/8/2019 8:34 PM HISTORY/REASON FOR EXAM:  Shortness of breath. Suspicion of sepsis. TECHNIQUE/EXAM DESCRIPTION AND NUMBER OF VIEWS: Single portable view of the chest. COMPARISON: 4/8/2019 FINDINGS: The heart, mediastinum, and anand are unremarkable. The lungs are well expanded and show no evidence of infiltrate or other acute process. There is no obvious pleural effusion. The bony thorax is grossly normal. A right central venous line with an attached infusion port is unchanged in position.     No acute  cardiopulmonary abnormality. No interval change.    Ir-cvc Tunnel With Port Removal    Result Date: 5/9/2019 5/9/2019 10:26 AM HISTORY/REASON FOR EXAM:  T-cell lymphoma. Diffuse skin lesions possibly exacerbated by blood stream infection. Port removal requested. TECHNIQUE/EXAM DESCRIPTION: Removal of RIGHT internal jugular PowerPort venous access implant. PROCEDURE:  Informed consent was obtained.  The RIGHT anterior chest wall was prepped and draped in a sterile manner with chlorhexidine. All elements of MAXIMAL STERILE BARRIER technique were followed. Moderate sedation with Fentanyl and Versed was administered during the procedure with appropriate continuous patient monitoring by the radiology nurse. SEDATION DURATION: 30 minutes FLUOROSCOPY TIME: 0.1 minutes NUMBER OF FILMS: 1 fluoroscopic images obtained. Following local anesthesia with 6 mL 1% lidocaine with epinephrine, a transverse incision was made over the site of the healed incision above the port reservoir.  Following blunt and sharp dissection, the port reservoir and catheter were removed intact.  The skin overlying the port and the port pocket were unremarkable with no evidence of infection.  There was no pus or exudate about the port reservoir.  The port catheter was removed intact. The port reservoir and catheter were submitted in a sterile container for cultures and Gram stain as well as fungal cultures. Manual compression was applied over the RIGHT  internal jugular catheter entry site and port pocket for about 5 minutes. The port pocket was closed with deep and subcuticular layers of interrupted 3 -- 0 Vicryl suture and the skin sealed with Dermabond.  A sterile dressing was applied. The patient tolerated the procedure well with no evidence of complication. COMPARISON:  1 view chest 5/9/2019 FINDINGS:  The port reservoir and catheter were removed intact.  There was no discharge or exudate at the port pocket.     1.  Removal of RIGHT  internal  "jugular PowerPort venous implant. 2.  No evidence of infection at the port pocket. 3.  The port reservoir and port catheter were removed intact.      Micro:  Results     Procedure Component Value Units Date/Time    BLOOD CULTURE [[id number]]  (Abnormal)  (Susceptibility) Collected:  05/08/19 2034    Order Status:  Completed Specimen:  Blood from Peripheral Updated:  05/15/19 0931     Significant Indicator POS (POS)     Source BLD     Site PERIPHERAL     Culture Result Growth detected by Bactec instrument. 05/09/2019  1830  Negative for Staphylococcus aureus and MRSA by PCR. Correlate  ongoing need for antibiotics with clinical condition.   (A)      Staphylococcus haemolyticus (A)    Narrative:       CALL  [place]man  [facility] tel. [phone],  CALLED  [facility] tel. [phone] 05/09/2019, 20:44, RB PERF. RESULTS CALLED TO: RN  [phone]  Per Hospital Policy: Only change Specimen Src: to \"Line\" if  specified by physician order.  Left Forearm/Arm    Culture & Susceptibility     STAPHYLOCOCCUS HAEMOLYTICUS     Antibiotic Sensitivity Microscan Unit Status    Ampicillin/sulbactam Resistant >16/8 mcg/mL Final    Method: GREGORY    Clindamycin Resistant >4 mcg/mL Final    Method: GREGORY    Daptomycin Sensitive <=0.5 mcg/mL Final    Method: GREGORY    Erythromycin Resistant >4 mcg/mL Final    Method: GREGORY    Moxifloxacin Resistant >4 mcg/mL Final    Method: GREGORY    Oxacillin Resistant >2 mcg/mL Final    Method: GREGORY    Penicillin Resistant >8 mcg/mL Final    Method: GREGORY    Tetracycline Sensitive <=4 mcg/mL Final    Method: GREGORY    Trimeth/Sulfa Resistant >2/38 mcg/mL Final    Method: GREGORY    Vancomycin Sensitive 4 mcg/mL Final    Method: GREGORY                       BLOOD CULTURE [[id number]] Collected:  05/13/19 1403    Order Status:  Completed Specimen:  Blood from Peripheral Updated:  05/14/19 1327     Significant Indicator NEG     Source BLD     Site PERIPHERAL     Culture Result No Growth  Note: Blood cultures are incubated for 5 days and  are monitored " "continuously.Positive blood cultures  are called to the RN and reported as soon as  they are identified.      Narrative:       Per Hospital Policy: Only change Specimen Src: to \"Line\" if  specified by physician order.  Right Hand    BLOOD CULTURE [[id number]] Collected:  05/13/19 1435    Order Status:  Completed Specimen:  Blood from Peripheral Updated:  05/14/19 1327     Significant Indicator NEG     Source BLD     Site PERIPHERAL     Culture Result No Growth  Note: Blood cultures are incubated for 5 days and  are monitored continuously.Positive blood cultures  are called to the RN and reported as soon as  they are identified.      Narrative:       Per Hospital Policy: Only change Specimen Src: to \"Line\" if  specified by physician order.  Left Hand    BLOOD CULTURE [[id number]] Collected:  05/08/19 2034    Order Status:  Completed Specimen:  Blood from Peripheral Updated:  05/13/19 2300     Significant Indicator NEG     Source BLD     Site PERIPHERAL     Culture Result No growth after 5 days of incubation.    Narrative:       Per Hospital Policy: Only change Specimen Src: to \"Line\" if  specified by physician order.  Right Hand    Fungal Culture [[id number]] Collected:  05/09/19 1115    Order Status:  Completed Specimen:  Wound Updated:  05/12/19 1312     Significant Indicator NEG     Source WND     Site Port Catheter Tip     Culture Result Culture in progress.    Narrative:       Surgery Specimen    CULTURE WOUND W/ GRAM STAIN [[id number]]  (Abnormal)  (Susceptibility) Collected:  05/09/19 1115    Order Status:  Completed Specimen:  Wound Updated:  05/12/19 1312     Significant Indicator POS (POS)     Source WND     Site Port Catheter Tip     Culture Result Growth noted after further incubation, see below for  organism identification.   (A)     Gram Stain Result No organisms seen.     Culture Result Staphylococcus epidermidis  Rare growth   (A)      Candida albicans  Rare growth   (A)    Narrative:       Surgery " "Specimen    Culture & Susceptibility     STAPHYLOCOCCUS EPIDERMIDIS     Antibiotic Sensitivity Microscan Unit Status    Ampicillin/sulbactam Sensitive <=8/4 mcg/mL Final    Method: GREGORY    Clindamycin Sensitive <=0.5 mcg/mL Final    Method: GREGORY    Daptomycin Sensitive <=0.5 mcg/mL Final    Method: GREGORY    Erythromycin Sensitive <=0.5 mcg/mL Final    Method: GREGORY    Moxifloxacin Sensitive <=0.5 mcg/mL Final    Method: GREGORY    Oxacillin Sensitive <=0.25 mcg/mL Final    Method: GREGORY    Penicillin Resistant 2 mcg/mL Final    Method: GREGORY    Tetracycline Sensitive <=4 mcg/mL Final    Method: GREGORY    Trimeth/Sulfa Sensitive <=0.5/9.5 mcg/mL Final    Method: GREGORY    Vancomycin Sensitive 1 mcg/mL Final    Method: GREGORY                       C Diff by PCR rflx Toxin [[id number]] Collected:  05/11/19 1620    Order Status:  Completed Specimen:  Stool from Stool Updated:  05/11/19 2142     C Diff by PCR Negative     Comment: C. difficile NOT detected by PCR.  Treatment not indicated per guidelines.  Repeat testing not indicated within 7 days.          027-NAP1-BI Presumptive Negative     Comment: Presumptive 027/NAP1/BI target DNA sequences are NOT DETECTED.       Narrative:       Special Contact [id number] FARIDA MONTE  Does this patient have risk factors for C-diff?->Yes    C Diff by PCR rflx Toxin [[id number]]     Order Status:  Canceled Specimen:  Stool from Stool     BLOOD CULTURE [[id number]] Collected:  05/10/19 1335    Order Status:  Completed Specimen:  Blood from Peripheral Updated:  05/11/19 0839     Significant Indicator NEG     Source BLD     Site PERIPHERAL     Culture Result No Growth  Note: Blood cultures are incubated for 5 days and  are monitored continuously.Positive blood cultures  are called to the RN and reported as soon as  they are identified.      Narrative:       Collected By:[id number] FARIDA MONTE  Per Hospital Policy: Only change Specimen Src: to \"Line\" if  specified by physician order.  Right " "Forearm/Arm    BLOOD CULTURE [[id number]] Collected:  05/10/19 1352    Order Status:  Completed Specimen:  Blood from Peripheral Updated:  05/11/19 0839     Significant Indicator NEG     Source BLD     Site PERIPHERAL     Culture Result No Growth  Note: Blood cultures are incubated for 5 days and  are monitored continuously.Positive blood cultures  are called to the RN and reported as soon as  they are identified.      Narrative:       Collected By:[id number] FARIDA MONTE  Per Hospital Policy: Only change Specimen Src: to \"Line\" if  specified by physician order.  Left Forearm/Arm    CULTURE WOUND W/O GRAM STAIN [[id number]] Collected:  05/08/19 2230    Order Status:  Completed Specimen:  Wound from Back Updated:  05/11/19 0836     Significant Indicator NEG     Source WND     Site Buttock     Culture Result Heavy growth mixed skin doreen.    Narrative:       Contact  Contact    GRAM STAIN [[id number]] Collected:  05/09/19 1115    Order Status:  Completed Specimen:  Wound Updated:  05/10/19 0818     Significant Indicator .     Source WND     Site Port Catheter Tip     Gram Stain Result No organisms seen.    Narrative:       Surgery Specimen    MRSA BY PCR (AMP) [[id number]] Collected:  05/09/19 1410    Order Status:  Completed Specimen:  Respirate from Nares Updated:  05/09/19 1831     Significant Indicator NEG     Source RESP     Site NARES     MRSA PCR Negative for MRSA by PCR.    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 Male with hx of cutaneous T Cell lymphoma (Mycosis fungoides) dx in 11/2017, had extensive w/u at [facility] for his 20 year hx of widespread hyperkeratotic plaques who is now being followed by Dr. Mc, [facility] oncologist and pt is supposed to get chemotherapy but hasn't been able to due to transportation issues.  He had portacath that was placed in 12/2017 (per wife) and had last chemotherapy was 3 months ago. He had multiple hospital admissions for cellulitis from diffuse open widespread skin in 3/2019 and 4/2019. First admission was at [facility] and 2nd admission was here at [facility]. Hospitalized from 4/8-4/13 and treated with vanco/unasyn and discharged with doxycyline x 10 days.      Pt was admitted last night for septic shock due to cellulitis. Per note, pt has been feeling weak with subjective fever 3 days prior to admission. Pt went to [facility] ED and had lactate >4 and was hypotensive and had 3.7L fluid resuscitation and was started on vanco and zosyn.      At [facility] vitals WNL. Left subclavian central line was placed. Blood cultures obtained. Started on vancomycin and unasyn. Portacath was removed on 5/10 by IR.      Interval 24 hour assessment:    AF, O2 RA,    Labs reviewed  Micro reviewed    Pt continued on dapto, unasyn and fluconazole. Pt c/o skin pain and still with loose stools.        Cutaneous T Cell lymphoma (Mycosis fungoides) dx in 11/2017, had extensive w/u at [facility] for his 20 year hx of widespread hyperkeratotic plaques  -CT chest abdomen pelvis on 5/9 with bilateral axillary adenopathy, no pleural or pericardial effusion.  No consolidations or evidence of pneumonia, abdomen pelvis with no significant  findings.     Cellulitis   - Superinfected open skin lesions/wound (staph/strep) in the background of persistent cutaneous T cell lymphoma   - No suspicion for brooks che syndrome, TEN, AGEP. Doubt DRESS syndrome. His eosinophils are normal  2.6%. No suspicion for necrotizing fasciitis.    - Multiple hospital admissions for sepsis due to cellulitis in 3/19 and 4/19  -Buttock wound culture 5/8 with mixed normal skin doreen      Chills and rigors today, new  Fevers, acute, - improved      BETO, acute, ongoing   -May be due to previous septic shock, patient also on vancomycin     Diarrhea- new onset   -C. difficile negative on 5/11     Septic shock - resolved  - source is skin and line (portacath).      Bacteremia - CoNS 1/2 Blood cx and growth on cath tip -discussed with micro and requested work-up as also growing from cath tip  - Blood cultures positive 5/8 1 out of 2 with CoNS- Staph haemolyticus   - Port removed on 5/9, catheter tip with CoNS and candida albicans   - Blood cultures 5/10- NGTD  - MRSA nares  Neg 5/9      Plan:   - Starting daptomycin at 6 mg/kg for CoNS in blood and on cath tip- will give a 7 day abx course from cath tip removal (end 5/16/19)  - Candida albicans on cath tip is concering- no candida in blood cultures so may be a skin contaminant - however will start fluconazole today and repeat blood cultures - if all blood cultures remain negative then can give a shorter fluconazole course   - Continue amp/sulbactam for empiric cellulitis coverage (end 5/16/19)   - Wound team following  - Agree with encouraging bowel care regimen and possible rectal tube as frequent BMs are risk for further infecting open skin lesions        Discussed with nurse. ID will follow.         Angy Francis MD  Infectious Diseases

## 2019-05-15 NOTE — CARE PLAN
Problem: Nutritional:  Goal: Achieve adequate nutritional intake  Patient will consume >50% of meals   Outcome: PROGRESSING SLOWER THAN EXPECTED  See dietary note - nutritional intake poor at this time.     RD following

## 2019-05-15 NOTE — ASSESSMENT & PLAN NOTE
139 today, continue 1/2NS so Na does not drop too low.  Continue to encourage p.o. Intake.  Appears to be due to skin fluid losses. - IVF rate increase 5/22

## 2019-05-15 NOTE — PROGRESS NOTES
· 2 RN skin check complete with MARIANNE Pablo.  · Devices in place Woods cath and central line.  · Skin assessed under devices seen.  · Confirmed pressure ulcers found on N/A.  · New potential pressure ulcers noted on N/A. Wound consult placed and wound reported.  · Pt has open wounds across body. Refused dressing change.  · The following interventions in place Q2 turns, 2 RN skin check, barrier wipes, waffle overlay, xeroform dressing, PRN dressing changes.  · Will continue to monitor.

## 2019-05-15 NOTE — PROGRESS NOTES
"Palliative Progress Note               Author: Makayla Power Date & Time created: 5/15/2019  1:36 PM     Reason for subsequent visit: cancer-related pain; symptom management    Interval History:  No acute events overnight.  Patient resting in bed, appears more alert and interactive than yesterday.  Patient reports extreme fatigue today stating, \"I'm exhausted and I don't know why.\"  Marco Antonio denies pain at this time, but does report spike in pain with dressing and/or linen changes.  He reports his itching is \"better than yesterday.\"  Denies insomnia, nausea, vomiting.  Patient reports BM today and states that \"sometimes I have a BM and I don't even know it.  The nurses have to tell me.\"  He reports decreased sensation of pending BM.    Review of Systems:  Negative for agitation. Negative for delirium. Negative for dyspnea. Positive for pain. Negative for anorexia. Negative for dementia. Negative for fatigue. Negative for sedation. Negative for anxiety. Positive for depression. Negative for insomnia. Negative for coma. Positive for diarrhea (fecal incontinence). Negative for nausea and vomiting. Negative for constipation. Negative for dysphagia. Negative for odynophagia.      Physical Exam:    Recent vital signs  Temp (24hrs), Av.7 °C (98.1 °F), Min:36.2 °C (97.1 °F), Max:37.3 °C (99.1 °F)  Temperature: 36.2 °C (97.1 °F)  Pulse  Av.9  Min: 49  Max: 104  Blood Pressure : 114/59       Physical Exam   Constitutional: He is oriented to person, place, and time. He is cooperative. He appears ill.   Cardiovascular: Normal rate and normal heart sounds.    distant   Pulmonary/Chest: Effort normal. He has decreased breath sounds in the right upper field, the right lower field, the left upper field and the left lower field. He has no wheezes. He has no rales.   Abdominal: Soft. Bowel sounds are normal. He exhibits no distension. There is no tenderness.   Musculoskeletal: He exhibits edema (4+ pitting BLE, 3+ bilateral " forearms) and tenderness.   Neurological: He is alert and oriented to person, place, and time.   Skin: Lesion and rash noted. There is erythema. There is pallor.   Psychiatric: His behavior is normal. Judgment and thought content normal. His speech is delayed. He exhibits a depressed mood.   Dry, flaky skin; diffuse raised rash with scabs/lesions across entirety of skin including BUE/BLE, face/neck, trunk, back.  Open, oozing/bleeding wounds on back.      Recent Labs      05/13/19   0401  05/14/19   0839  05/15/19   0015   SODIUM  150*  151*  149*   POTASSIUM  4.1  4.3  4.1   CHLORIDE  117*  118*  115*   CO2  28  26  28   GLUCOSE  107*  100*  104*   BUN  23*  23*  22   CREATININE  1.51*  1.49*  1.55*   CALCIUM  7.2*  7.1*  7.1*     Recent Labs      05/13/19   1326   WBC  9.3   RBC  3.84*   HEMOGLOBIN  10.7*   HEMATOCRIT  35.6*   MCV  92.7   MCH  27.9   MCHC  30.1*   RDW  58.4*   PLATELETCT  365   MPV  9.5     Medications reviewed. Labs Reviewed.     Problem List:  1.  Cancer-related pain (active)  2.  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  Generalized pruritus (active)  5.  Dry mouth/thrush (active)  6.  Acute depression (active)  7.  BETO (active)  8.  GERD (stable)  9.  Anemia (STABLE)  10.  Edema (active)  11.  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 Mc and has been on  "various treatment regimens in past  Dr. 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  However, duloxetine seems to have had an energizing effect on patient, so will hold the course with duloxetine for now.     #Anemia  Hgb 10.7 (5/13/19)  Defend Hgb @ 7.0 per primary team     #GERD  Omeprazole discontinued per primary team d/t concern for inducing C-diff     Code Status: Full    Advance Care Planning/Current Goals of Care: Patient expressed that right now he is more concerned about his situation at home than his pain here in the hospital.  He has financial worries and said, \"I'm concerned about what's going to happen to my wife.\"  He shared that they currently don't have enough money in the bank to make the car payment or pay for gas.  He states he has explored community resources in " "[place] for help, \"but there's nothing in [facility].\"  I discussed patient's concerns w/floor SW Xochilt.  She will reach out to cancer nurse navigator to explore possible resources/assistance.    Dr. Saunders educated patient about need for advance directive, considering his recurrent infections and frequent recent readmissions.  She informed him that if he opts not to complete an AD, his wife will be his default healthcare decision maker.  Marco Antonio expressed his concern that making medical decisions may be \"too much of a burden for her.\"  He also stated he has 2 brothers \"but I wouldn't trust them to do it.\"  Encouraged patient to ponder AD and whom he would trust to be his DPOA-HC.  Advised that our team can assist him in filling out AD while he is in the hospital.  17 minutes spend discussing advance care planning.    15 minutes spent with greater than 50% spent counseling and coordinating the patient's care regarding acute pain & symptom management.   Please refer to HPI and assessment/plan for details.     Thank you for allowing the palliative care team to participate in Marco Antonio's care. Please call with any questions/needs.     HERO Harrison.  Palliative Care Nurse Practitioner  [phone]      "

## 2019-05-15 NOTE — ASSESSMENT & PLAN NOTE
Resolved acute kidney injury.  Continue with oral hydration.  Patient's legs are edematous.  Avoid IV fluids if possible.

## 2019-05-15 NOTE — DIETARY
"Nutrition Services Update: following for adequate nutritional intake    Day 7 of admit. Regular/dysphagia 3 diet. ADL documentation shows intake has been extremely poor, pt refusing majority of meals - or consuming < 25%.    Visited pt at bedside. Evident that pt is in pain, he was not opening mouth to communicate - only nodding yes or no to questions. Pt nodded \"yes\" that nystatin has helped with mouth pain. Asked if pt is drinking all whole milk being sent (x 6 cartons/day) and pt nodded yes. Pt had asked Nutrition Representative to remove Boost supplements this morning, but was agreeable to trying high protein milkshakes. Milkshake on lunch tray only had a few sips taken out of it - pt unsure if he will drink these yet or not. Pt did not respond when asked if eating any solid foods, however lunch tray at bedside completely untouched.     Estimated nutritional needs based on admit wt = 108 kg with BMI = 36.2:   Calculations/Equations: MSJ x 1 = 1821 kcal/day (x 1.1 = 2004 kcal/day)  Calories: 1800 - 2100 kcal/day (17 - 19 kcal/kg)  Protein: 108 - 162 gm/day (1 - 1.5 gm/kg)    Majority of nutrition at this time coming from:  6 cartons whole milk per day = 831 kcal and 42 gm protein (~45% of energy needs)  2 high protein milkshakes per day = 938 kcal and 32 gm protein (~50% of energy needs)  Milk + shakes = 1769 kcal and 74 gm protein (~95% of energy needs)    Even if pt consumes 100% of dairy products, nutritional needs still not met 100%. Also concerns at this time regarding micronutrient intake given pt appears to be declining most other foods other than whole milk - and will not drink Boost.     Plan/Recommend:   1. Continue with 6 cartons of whole milk and 2 high protein milkshakes  2. Pt to benefit from a multivitamin with minerals at this time to prevent development in micronutrient deficiencies  3. If intake continues to be poor for an extended period of time, may need to consider nutrition support if within " pt's plan of care  4. Monitor weight - please obtain an updated weight as feasible  5. Encourage PO intake of meals   6. Nutrition Representative to see daily for meal selections  7. Please record intake as % meals consumed  8. RD to continue monitoring for improved intake vs need for further nutrition intervention     RD to continue following

## 2019-05-16 NOTE — PROGRESS NOTES
"Palliative Progress Note               Author: Makayla Power Date & Time created: 2019  11:11 AM     Reason for subsequent visit: cancer-related pain; symptom management    Interval History:  Overnight patient had an episode of hypotension (90/55) requiring a 1L bolus of NS.  Patient resting in bed, appears comfortable.  Patient reports that his pain is well-controlled and that his itching \"is not too bad.\"  He reports he slept well, but continues to report sleepiness during the day and nodded off a couple of times during interview.  He said, \"I don't know why I'm so sleepy.\"    Review of Systems:  Negative for agitation. Negative for delirium. Negative for dyspnea. Positive for pain. Negative for anorexia. Negative for dementia. Positive for fatigue. Negative for sedation. Negative for anxiety. Positive for depression. Negative for insomnia. Negative for coma. Negative for diarrhea. Negative for nausea and vomiting. Positive for constipation (fecal incontinence; patient reports he does not have sensation of pending BM). Negative for dysphagia. Negative for odynophagia.      Physical Exam:    Recent vital signs  Temp (24hrs), Av.4 °C (97.5 °F), Min:36.2 °C (97.1 °F), Max:36.6 °C (97.9 °F)  Temperature: 36.2 °C (97.1 °F)  Pulse  Av.9  Min: 49  Max: 104  Blood Pressure : 131/80       Physical Exam   Constitutional: He is oriented to person, place, and time. He is cooperative.   Cardiovascular: Normal rate and normal heart sounds.    Distant     Pulmonary/Chest: Effort normal. No respiratory distress. He has decreased breath sounds in the right upper field, the right lower field, the left upper field and the left lower field. He has no wheezes. He has no rales.   Abdominal: Soft. Bowel sounds are normal. He exhibits no distension. There is no tenderness.   Intermittent fecal incontinence   Musculoskeletal: He exhibits edema (4+ pitting BLE; 2+ bilateral forearms).   Neurological: He is alert and " oriented to person, place, and time.   Skin: Lesion noted.   Dry, flaky skin; diffuse maceration of skin with scabs/lesions across entirety of skin including BUE/BLE, face/neck, trunk, back.  Open, oozing/bleeding wounds on back.   Psychiatric: His behavior is normal. Judgment and thought content normal. He exhibits a depressed mood.     Recent Labs      05/14/19   0839  05/15/19   0015   SODIUM  151*  149*   POTASSIUM  4.3  4.1   CHLORIDE  118*  115*   CO2  26  28   GLUCOSE  100*  104*   BUN  23*  22   CREATININE  1.49*  1.55*   CALCIUM  7.1*  7.1*     Recent Labs      05/13/19   1326  05/16/19   0420   WBC  9.3  7.6   RBC  3.84*  3.25*   HEMOGLOBIN  10.7*  9.1*   HEMATOCRIT  35.6*  30.5*   MCV  92.7  93.8   MCH  27.9  28.0   MCHC  30.1*  29.8*   RDW  58.4*  60.9*   PLATELETCT  365  300   MPV  9.5  9.3     Medications reviewed. Labs Reviewed.     Problem List:  1.  Cancer-related pain (active)  2.  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  Generalized pruritus (active)  5.  Dry mouth/thrush (improving)  6.  Acute depression (active)  7.  BETO (active/stable)  8.  GERD (stable)  9.  Anemia (stable)  10.  Edema (improving in BUE; active in BLE)  11.  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 Mc and has been on various treatment regimens in past  Dr. 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  Last seen by wound team 5/9/19 -- orders placed with wound care recommendations:  - Nursing performing dressing changes with xeroform  - Trauma pad to be placed under patient's back  - Change dressing every 48 hrs or prn with soilage or dislodgement  Patient now more compliant with dressing and linen changes  Ordered trapeze bar (per nursing communication order) to assist patient in pulling himself up during dressing changes     #Dry mouth/thrush  Continue nystatin 100,000 units/mL swish & swallow, 5 mL suspension QID     #Acute depression  Discontinue duloxetine 20 mg PO QD d/t concern that it may be contributing to patient sedation/sleepiness  Start mirtazapine 7.5 mg PO Q BEDTIME, first dose 5/18/19 @ 21:00;feel mirtazapine may help more with patient's itching.       #Anemia  Hgb 9.1 (5/16/19) < 10.7 (5/13/19)  Defend Hgb @ 7.0 per primary team      Code Status: Full    Advance Care Planning/Current Goals of Care:   Not discussed today.     25 minutes spent with greater than 50% spent counseling and coordinating the patient's care regarding acute pain & symptom management.   Please refer to HPI and assessment/plan for details.     Thank you for allowing the palliative care team to participate in Marco Antonio's care. Please call with any questions/needs.     HERO Harrison.  Palliative Care Nurse Practitioner  [phone]

## 2019-05-16 NOTE — PROGRESS NOTES
Assumed care of patient at 1900. Pt is A&Ox4, max assist. L CVC with positive blood return, infusing. Reports 6/10 pain, medicated per MAR. Denies nausea. Incontinent of stool and urine. Woods draining to gravity. Open wounds observed throughout entire body. Dressings in place. No further needs at this time. Call light within reach, will continue to round hourly.

## 2019-05-16 NOTE — PROGRESS NOTES
· 2 RN skin check complete with Leonor PATEL RN.  · Devices in place reyes catheter.  · Skin assessed under devices, open wounds covering patients entire body r/t cutaneous t-cell lymphoma.  · Confirmed pressure ulcers found n/a.  · New potential pressure ulcers noted n/a. Wound has already been consulted for this patient   · The following interventions in place q2hr turns, xeroform dressings, barrier wipes, waffle overlay, keep skin clean and dry

## 2019-05-16 NOTE — ASSESSMENT & PLAN NOTE
Followed by Dr. Mc for years.  5/15:  Plan for treatment while in hospital further chemotherapy after discussion with Dr. Mc, Dr. Barraza, pharmacy and patient.  Continue pain control  Continue rigorous wound care since patient unable to discern when he has BMs.  Stool tracks up his back and into his open skin wounds.  No diarrhea per bedside nursing.  Patient has successful placement of rectal tube and Woods catheter to prevent further contamination of open wounds.

## 2019-05-16 NOTE — PROGRESS NOTES
Infectious Disease Progress Note    Author: Angy Francis M.D. Date & Time of service: 2019  4:14 PM    Chief Complaint:  Septic shock, cellulitis      Interval History:   Interval 24 hours of Vitals/Labs/Micro,and imaging results reviewed as available. See assessment.   Interval 24 hours of Vitals/Labs/Micro,and imaging results reviewed as available. See assessment.    Interval 24 hours of Vitals/Labs/Micro,and imaging results reviewed as available. See assessment.   5/15 Interval 24 hours of Vitals/Labs/Micro,and imaging results reviewed as available. See assessment.    patient doing well today with no complaints, vital state    Review of Systems:  Review of Systems   Constitutional: Negative for chills and fever.   Respiratory: Negative for cough and shortness of breath.    Gastrointestinal: Negative for abdominal pain, diarrhea, nausea and vomiting.   Musculoskeletal: Positive for myalgias.       Hemodynamics:  Temp (24hrs), Av.4 °C (97.5 °F), Min:36.2 °C (97.1 °F), Max:36.6 °C (97.9 °F)  Temperature: 36.2 °C (97.1 °F)  Pulse  Av.9  Min: 49  Max: 104  Blood Pressure : 131/80       Physical Exam:  Physical Exam   Constitutional: He is oriented to person, place, and time. He appears well-developed and well-nourished.   HENT:   Head: Normocephalic and atraumatic.   Eyes: Pupils are equal, round, and reactive to light. Conjunctivae and EOM are normal.   Cardiovascular: Normal rate, regular rhythm and normal heart sounds.    Pulmonary/Chest: Effort normal and breath sounds normal.   Abdominal: Soft. Bowel sounds are normal.   Musculoskeletal: He exhibits edema.   Neurological: He is alert and oriented to person, place, and time.   Skin: Skin is warm and dry.   Extensive diffuse plaques, with erythema   Psychiatric: He has a normal mood and affect. His behavior is normal.       Meds:    Current Facility-Administered Medications:   •  ferrous sulfate  •  ascorbic acid  •  [START ON  5/17/2019] mirtazapine  •  HYDROmorphone  •  midodrine  •  nystatin  •  oxyCODONE immediate-release  •  HYDROmorphone  •  fluconazole  •  DAPTOmycin  •  acetaminophen **OR** acetaminophen  •  budesonide-formoterol  •  Pharmacy Consult Request  •  Respiratory Care per Protocol  •  NS  •  heparin  •  ampicillin-sulbactam (UNASYN) IV  •  albuterol    Labs:  Recent Labs      05/16/19   0420   WBC  7.6   RBC  3.25*   HEMOGLOBIN  9.1*   HEMATOCRIT  30.5*   MCV  93.8   MCH  28.0   RDW  60.9*   PLATELETCT  300   MPV  9.3   NEUTSPOLYS  66.10   LYMPHOCYTES  20.00*   MONOCYTES  4.30   EOSINOPHILS  8.70*   BASOPHILS  0.00   RBCMORPHOLO  Normal     Recent Labs      05/14/19   0030  05/14/19   0839  05/15/19   0015   SODIUM   --   151*  149*   POTASSIUM   --   4.3  4.1   CHLORIDE   --   118*  115*   CO2   --   26  28   GLUCOSE   --   100*  104*   BUN   --   23*  22   CPKTOTAL  39   --    --      Recent Labs      05/14/19   0839  05/15/19   0015   CREATININE  1.49*  1.55*       Imaging:  Ct-chest,abdomen,pelvis With    Result Date: 5/9/2019 5/9/2019 9:08 PM HISTORY/REASON FOR EXAM:  T cell lymphoma restaging,compare with prior PET scan TECHNIQUE/EXAM DESCRIPTION: CT scan of the chest, abdomen and pelvis with contrast. Thin-section helical scanning was obtained from the lung apices through the pubic symphysis to include the chest, abdomen and pelvis.  100 mL of nonionic contrast was administered intravenously without complication. Low dose optimization technique was utilized for this CT exam including automated exposure control and adjustment of the mA and/or kV according to patient size. COMPARISON: PET/CT 4/30/2018 is findings there are FINDINGS: CT CHEST:The lungs are fully expanded, with no parenchymal abnormality.  There is no pleural or pericardial effusion. There are no enlarged mediastinal or hilar nodes. There are enlarged axillary nodes bilaterally however, more notably on the right. The largest right-sided node measures  about 15 mm in greatest short axis dimension. The bony thorax is unremarkable. CT ABDOMEN:The liver, spleen, pancreas, and stomach are unremarkable.  Adrenal glands are normal.  No opaque gallstones.  The kidneys show symmetric opacification with no hydronephrosis. A 12 mm exophytic lesion is seen along the lateral margin of the right kidney, likely a cyst. This is unchanged. The bowel and mesentery are grossly normal. No mesenteric or retroperitoneal adenopathy. No free fluid or air. CT PELVIS:The bladder is intact. No free fluid in the pelvis.  The pelvic bowel and mesentery are grossly normal.  The bony pelvis and visualized proximal femora are intact. Borderline enlarged inguinal lymph nodes are present bilaterally which are somewhat smaller than before.     1.  Bilateral axillary adenopathy, more notable on the right. Some of these nodes are larger than on the prior PET/CT. There is no intrathoracic adenopathy. 2.  No abdominal adenopathy. 3.  Borderline enlarged inguinal lymph nodes, overall decreased in size compared with the prior PET/CT.    Dx-chest-limited (1 View)    Result Date: 5/9/2019  HISTORY/REASON FOR EXAM: central venous line placement. TECHNIQUE/EXAM DESCRIPTION:  Single view of the chest,  5/8/2019 11:47 PM COMPARISON:  Prior image today at 2043 hours FINDINGS:   A new left central venous line has been placed, the tip of which projects in the area of the mid SVC. There is no pneumothorax. Cardiopulmonary appearance is unchanged.     Left central venous line in place. No pneumothorax. INTERPRETING LOCATION: [address] [place] [address], [id number]    Dx-chest-portable (1 View)    Result Date: 5/8/2019 5/8/2019 8:34 PM HISTORY/REASON FOR EXAM:  Shortness of breath. Suspicion of sepsis. TECHNIQUE/EXAM DESCRIPTION AND NUMBER OF VIEWS: Single portable view of the chest. COMPARISON: 4/8/2019 FINDINGS: The heart, mediastinum, and anand are unremarkable. The lungs are well expanded and show no evidence of  infiltrate or other acute process. There is no obvious pleural effusion. The bony thorax is grossly normal. A right central venous line with an attached infusion port is unchanged in position.     No acute cardiopulmonary abnormality. No interval change.    Ir-cvc Tunnel With Port Removal    Result Date: 5/9/2019 5/9/2019 10:26 AM HISTORY/REASON FOR EXAM:  T-cell lymphoma. Diffuse skin lesions possibly exacerbated by blood stream infection. Port removal requested. TECHNIQUE/EXAM DESCRIPTION: Removal of RIGHT internal jugular PowerPort venous access implant. PROCEDURE:  Informed consent was obtained.  The RIGHT anterior chest wall was prepped and draped in a sterile manner with chlorhexidine. All elements of MAXIMAL STERILE BARRIER technique were followed. Moderate sedation with Fentanyl and Versed was administered during the procedure with appropriate continuous patient monitoring by the radiology nurse. SEDATION DURATION: 30 minutes FLUOROSCOPY TIME: 0.1 minutes NUMBER OF FILMS: 1 fluoroscopic images obtained. Following local anesthesia with 6 mL 1% lidocaine with epinephrine, a transverse incision was made over the site of the healed incision above the port reservoir.  Following blunt and sharp dissection, the port reservoir and catheter were removed intact.  The skin overlying the port and the port pocket were unremarkable with no evidence of infection.  There was no pus or exudate about the port reservoir.  The port catheter was removed intact. The port reservoir and catheter were submitted in a sterile container for cultures and Gram stain as well as fungal cultures. Manual compression was applied over the RIGHT  internal jugular catheter entry site and port pocket for about 5 minutes. The port pocket was closed with deep and subcuticular layers of interrupted 3 -- 0 Vicryl suture and the skin sealed with Dermabond.  A sterile dressing was applied. The patient tolerated the procedure well with no evidence of  "complication. COMPARISON:  1 view chest 5/9/2019 FINDINGS:  The port reservoir and catheter were removed intact.  There was no discharge or exudate at the port pocket.     1.  Removal of RIGHT  internal jugular PowerPort venous implant. 2.  No evidence of infection at the port pocket. 3.  The port reservoir and port catheter were removed intact.      Micro:  Results     Procedure Component Value Units Date/Time    BLOOD CULTURE [[id number]] Collected:  05/10/19 1335    Order Status:  Completed Specimen:  Blood from Peripheral Updated:  05/15/19 1500     Significant Indicator NEG     Source BLD     Site PERIPHERAL     Culture Result No growth after 5 days of incubation.    Narrative:       Collected By:[id number] [employer]  Per Hospital Policy: Only change Specimen Src: to \"Line\" if  specified by physician order.  Right Forearm/Arm    BLOOD CULTURE [[id number]] Collected:  05/10/19 1352    Order Status:  Completed Specimen:  Blood from Peripheral Updated:  05/15/19 1500     Significant Indicator NEG     Source BLD     Site PERIPHERAL     Culture Result No growth after 5 days of incubation.    Narrative:       Collected By:[id number] [employer] MARRY.  Per Hospital Policy: Only change Specimen Src: to \"Line\" if  specified by physician order.  Left Forearm/Arm    BLOOD CULTURE [[id number]]  (Abnormal)  (Susceptibility) Collected:  05/08/19 2034    Order Status:  Completed Specimen:  Blood from Peripheral Updated:  05/15/19 0931     Significant Indicator POS (POS)     Source BLD     Site PERIPHERAL     Culture Result Growth detected by Bactec instrument. 05/09/2019  1830  Negative for Staphylococcus aureus and MRSA by PCR. Correlate  ongoing need for antibiotics with clinical condition.   (A)      Staphylococcus haemolyticus (A)    Narrative:       CALL  [facility] tel. [phone],  CALLED  [facility] tel. [phone] 05/09/2019, 20:44, RB PERF. RESULTS CALLED TO: RN  [phone]  Per Hospital Policy: Only change Specimen Src: to " "\"Line\" if  specified by physician order.  Left Forearm/Arm    Culture & Susceptibility     STAPHYLOCOCCUS HAEMOLYTICUS     Antibiotic Sensitivity Microscan Unit Status    Ampicillin/sulbactam Resistant >16/8 mcg/mL Final    Method: GREGORY    Clindamycin Resistant >4 mcg/mL Final    Method: GREGORY    Daptomycin Sensitive <=0.5 mcg/mL Final    Method: GREGORY    Erythromycin Resistant >4 mcg/mL Final    Method: GREGORY    Moxifloxacin Resistant >4 mcg/mL Final    Method: GREGORY    Oxacillin Resistant >2 mcg/mL Final    Method: GREGORY    Penicillin Resistant >8 mcg/mL Final    Method: GREGORY    Tetracycline Sensitive <=4 mcg/mL Final    Method: GREGORY    Trimeth/Sulfa Resistant >2/38 mcg/mL Final    Method: GREGORY    Vancomycin Sensitive 4 mcg/mL Final    Method: GREGORY                       BLOOD CULTURE [[id number]] Collected:  05/13/19 1403    Order Status:  Completed Specimen:  Blood from Peripheral Updated:  05/14/19 1327     Significant Indicator NEG     Source BLD     Site PERIPHERAL     Culture Result No Growth  Note: Blood cultures are incubated for 5 days and  are monitored continuously.Positive blood cultures  are called to the RN and reported as soon as  they are identified.      Narrative:       Per Hospital Policy: Only change Specimen Src: to \"Line\" if  specified by physician order.  Right Hand    BLOOD CULTURE [[id number]] Collected:  05/13/19 1435    Order Status:  Completed Specimen:  Blood from Peripheral Updated:  05/14/19 1327     Significant Indicator NEG     Source BLD     Site PERIPHERAL     Culture Result No Growth  Note: Blood cultures are incubated for 5 days and  are monitored continuously.Positive blood cultures  are called to the RN and reported as soon as  they are identified.      Narrative:       Per Hospital Policy: Only change Specimen Src: to \"Line\" if  specified by physician order.  Left Hand    BLOOD CULTURE [[id number]] Collected:  05/08/19 2034    Order Status:  Completed Specimen:  Blood from Peripheral Updated:  " "05/13/19 2300     Significant Indicator NEG     Source BLD     Site PERIPHERAL     Culture Result No growth after 5 days of incubation.    Narrative:       Per Hospital Policy: Only change Specimen Src: to \"Line\" if  specified by physician order.  Right Hand    Fungal Culture [[id number]] Collected:  05/09/19 1115    Order Status:  Completed Specimen:  Wound Updated:  05/12/19 1312     Significant Indicator NEG     Source WND     Site Port Catheter Tip     Culture Result Culture in progress.    Narrative:       Surgery Specimen    CULTURE WOUND W/ GRAM STAIN [[id number]]  (Abnormal)  (Susceptibility) Collected:  05/09/19 1115    Order Status:  Completed Specimen:  Wound Updated:  05/12/19 1312     Significant Indicator POS (POS)     Source WND     Site Port Catheter Tip     Culture Result Growth noted after further incubation, see below for  organism identification.   (A)     Gram Stain Result No organisms seen.     Culture Result Staphylococcus epidermidis  Rare growth   (A)      Candida albicans  Rare growth   (A)    Narrative:       Surgery Specimen    Culture & Susceptibility     STAPHYLOCOCCUS EPIDERMIDIS     Antibiotic Sensitivity Microscan Unit Status    Ampicillin/sulbactam Sensitive <=8/4 mcg/mL Final    Method: GREGORY    Clindamycin Sensitive <=0.5 mcg/mL Final    Method: GREGORY    Daptomycin Sensitive <=0.5 mcg/mL Final    Method: GREGORY    Erythromycin Sensitive <=0.5 mcg/mL Final    Method: GREGORY    Moxifloxacin Sensitive <=0.5 mcg/mL Final    Method: GREGORY    Oxacillin Sensitive <=0.25 mcg/mL Final    Method: GREGORY    Penicillin Resistant 2 mcg/mL Final    Method: GREGORY    Tetracycline Sensitive <=4 mcg/mL Final    Method: GREGORY    Trimeth/Sulfa Sensitive <=0.5/9.5 mcg/mL Final    Method: GREGORY    Vancomycin Sensitive 1 mcg/mL Final    Method: GREGORY                       C Diff by PCR rflx Toxin [[id number]] Collected:  05/11/19 1620    Order Status:  Completed Specimen:  Stool from Stool Updated:  05/11/19 2142     C Diff by " PCR Negative     Comment: C. difficile NOT detected by PCR.  Treatment not indicated per guidelines.  Repeat testing not indicated within 7 days.          027-NAP1-BI Presumptive Negative     Comment: Presumptive 027/NAP1/BI target DNA sequences are NOT DETECTED.       Narrative:       Special Contact [id number] FARIDA MONTE  Does this patient have risk factors for C-diff?->Yes    C Diff by PCR rflx Toxin [[id number]]     Order Status:  Canceled Specimen:  Stool from Stool     CULTURE WOUND W/O GRAM STAIN [[id number]] Collected:  05/08/19 2230    Order Status:  Completed Specimen:  Wound from Back Updated:  05/11/19 0836     Significant Indicator NEG     Source WND     Site Buttock     Culture Result Heavy growth mixed skin doreen.    Narrative:       Contact  Contact    GRAM STAIN [[id number]] Collected:  05/09/19 1115    Order Status:  Completed Specimen:  Wound Updated:  05/10/19 0818     Significant Indicator .     Source WND     Site Port Catheter Tip     Gram Stain Result No organisms seen.    Narrative:       Surgery Specimen    MRSA BY PCR (AMP) [[id number]] Collected:  05/09/19 1410    Order Status:  Completed Specimen:  Respirate from Nares Updated:  05/09/19 1831     Significant Indicator NEG     Source RESP     Site NARES     MRSA PCR Negative for MRSA by PCR.    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 Male with hx of cutaneous T Cell  lymphoma (Mycosis fungoides) dx in 11/2017, had extensive w/u at [facility] for his 20 year hx of widespread hyperkeratotic plaques who is now being followed by Dr. Mc, [facility] oncologist and pt is supposed to get chemotherapy but hasn't been able to due to transportation issues.  He had portacath that was placed in 12/2017 (per wife) and had last chemotherapy was 3 months ago. He had multiple hospital admissions for cellulitis from diffuse open widespread skin in 3/2019 and 4/2019. First admission was at [facility] and 2nd admission was here at [facility]. Hospitalized from 4/8-4/13 and treated with vanco/unasyn and discharged with doxycyline x 10 days.      Pt was admitted last night for septic shock due to cellulitis. Per note, pt has been feeling weak with subjective fever 3 days prior to admission. Pt went to [facility] ED and had lactate >4 and was hypotensive and had 3.7L fluid resuscitation and was started on vanco and zosyn.      At [facility] vitals WNL. Left subclavian central line was placed. Blood cultures obtained. Started on vancomycin and unasyn. Portacath was removed on 5/10 by IR.      Interval 24 hour assessment:    AF, O2 RA,    Labs reviewed  Micro reviewed    Pt continued on fluconazole, will stop daptomycin and Unasyn.  He is he is stable, no specific complaints.      Cutaneous T Cell lymphoma (Mycosis fungoides) dx in 11/2017, had extensive w/u at [facility] for his 20 year hx of widespread hyperkeratotic plaques  -CT chest abdomen pelvis on 5/9 with bilateral axillary adenopathy, no pleural or pericardial effusion.  No consolidations or evidence of pneumonia, abdomen pelvis with no significant findings.     Cellulitis   - Superinfected open skin lesions/wound (staph/strep) in the background of persistent cutaneous T cell lymphoma   - No suspicion for brooks che syndrome, TEN, AGEP. Doubt DRESS syndrome. His eosinophils are normal  2.6%. No suspicion for necrotizing fasciitis.    - Multiple  hospital admissions for sepsis due to cellulitis in 3/19 and 4/19  -Buttock wound culture 5/8 with mixed normal skin doreen      Chills and rigors today, new  Fevers, acute, - improved      BETO, acute, ongoing   -May be due to previous septic shock, patient also on vancomycin     Diarrhea- new onset   -C. difficile negative on 5/11     Septic shock - resolved  - source is skin and line (portacath).      Bacteremia - CoNS 1/2 Blood cx and growth on cath tip -discussed with micro and requested work-up as also growing from cath tip  - Blood cultures positive 5/8 1 out of 2 with CoNS- Staph haemolyticus   - Port removed on 5/9, catheter tip with CoNS and candida albicans   - Blood cultures 5/10- NGTD  - MRSA nares  Neg 5/9      Plan:   -Stop daptomycin as he has completed course for CoNS in blood and on cath tip, no growth of MRSA  -Due to Candida albicans on cath tip will presume candidemia and treat accordingly despite negative blood cultures - continue fluconazole for 2 weeks (End 5/27/19)   -He will need the PICC line exchanged again, if he is not going to need the PICC line would remove  -Will need TTE to ensure no endocarditis, will not pursue KATH  -Stop Unasyn and transition to Augmentin for prophylactic coverage due to his diffuse and multiple skin lesions   - Wound team following  - Agree with encouraging bowel care regimen and possible rectal tube as frequent BMs are risk for further infecting open skin lesions   -We will discuss plans for potential future chemotherapy with primary team     Discussed with ID pharmacy. ID will follow.         Angy Francis MD  Infectious Diseases

## 2019-05-16 NOTE — THERAPY
"Physical Therapy Evaluation completed.   Bed Mobility:  Supine to Sit: Maximal Assist  Transfers: Sit to Stand: Maximal Assist  Gait: Level Of Assist: Unable to Participate with Will Continue to Assess for Equipment Needs       Plan of Care: Will benefit from Physical Therapy 2 times per week  Discharge Recommendations: Equipment: Will Continue to Assess for Equipment Needs. Recommend inpatient transitional care services for continued physical therapy services.      See \"Rehab Therapy-Acute\" Patient Summary Report for complete documentation.     Pt was recenlty admitted for medical managment of septic shock and presents with a hx of cutaneous T-cell lymphoma and recurrent skin infections. Pt presented to PT with impaired balance, generalized weakness, poor skin integrity, pain, and dec activity tolerance. Pt is primarily limited by poor skin intergrity and concerns with shearing with functional mobility, along with pain, and weakness. Pt was able to demonstrate Min A for sitting EOB, Max A x2 for standing, and Mod A for returning back to supine position. Pt was only able to tolerate a few mins for standing secondary to weakness and fatigue. Pt will continue to benefit from skilled PT while in house, however, will be limited in mobility due to concers of shearing on skin with functional mobility. Will recommend post acute therapy prior to d/c home given current objective findings, age, IPLOF, and enviornmental barriers.   "

## 2019-05-16 NOTE — THERAPY
"Occupational Therapy Evaluation completed.   Functional Status:  Max A w/bed mobility, able to sit EOB after facilitation w/SBA, max A w/toileting, and LB dressing, max A w/sit>Stand x2 for safety. Pt has good UB strength, however LE strength is significantly diminished and overall skin issues/wounds limit pts ability to participate in ADL's. RN and skin team present during session d/t pts low tolerance for activity. Discussed w/nsg pt may benefit from a trapeze to assist w/his mobility until skin integrity improves. Max A w/sit>supine BTB RN remained at bedside   Plan of Care: Will benefit from Occupational Therapy 2 times per week- at a low frequency at this time d/t pts overall medical complexity and limited tolerance  Discharge Recommendations:  Equipment: Will Continue to Assess for Equipment Needs. Post-acute therapy Recommend inpatient transitional care services for continued occupational therapy services.       See \"Rehab Therapy-Acute\" Patient Summary Report for complete documentation.    68 yr old male admitted for weakness and fevers, PMHX cutaneous T cell lymphoma, recurrent skin infections, asthma, indigestion and hiatus hernia syndrome. This admission pt is dx w/septic shock, Sézary disease involving lymph notes of multiple regions, mycosis fungoides, BETO, edema, and anemia. Pt is demonstrating significant weakness, limited ROM and strength in BUE, and limited activity tolerance given sever skin issues and pain. Pt may benefit from acute OT will follow at a low frequency d/t current medical complexity and will increase frequency as pt is able to tolerate activity.   "

## 2019-05-16 NOTE — ASSESSMENT & PLAN NOTE
5/8 blood culture and cath tip + coag negative staph  Finished daptomycin IV per ID, augmentin po, antifungal for cath tip candida + culture.   right port removed.  Has new left chest port placed:    Order for IR removal left chest port per ID did not wait 48 hours for culture negative.  Order for PICC line placed.  5/10 and 5/13 Repeat BCs x2 negative.  TTE negative for endocarditis.  Patient has Woods catheter as well as rectal tube in place to prevent further contamination of his open skin wounds to back.

## 2019-05-16 NOTE — PROGRESS NOTES
[facility] Medicine Daily Progress Note    Date of Service  5/15/2019    Chief Complaint  68 y.o. male admitted 5/8/2019 with weakness and fevers.     Hospital Course    Mr. Ortiz has a past medical history of T-cell lymphoma followed by Dr. Mc the most recently admitted here for a skin infection was discharged on April 13 due to a rash from his T-cell lymphoma.  He was discharged home on oral doxycycline.  He presented to the emergency room in [facility] with weakness, feeling poorly, and fevers.  Is found to be hypotensive and in septic shock.  He was transferred here for higher level of care.  Despite IV fluids, is required intravenous pressors ongoing.      Interval Problem Update  5/9: Patient seen and evaluated in the intensive care unit this morning. A central line has been placed and his right-sided catheter has been removed.  Patient notes significant pain from his skin.  5/10: Mr. rOtiz was examined and evaluated in the intensive care unit this morning. Blood cultures are positive for gram positive cocci. 500 ml urine over night. He is on a dilaudid PCA due to pain. CT was done for staging purposes of his cancer.  I do long talk with patient about CODE STATUS and he states he does not want to make a decision until he can speak to his wife which will need to be over the phone as she does not have the resources to be able to come to [place] from [place].  Due to dry mouth, he has been drinking excessively and states that he cannot tolerate how dry his mouth this and thus feels he needs to drink very frequently.  5/11: Patient seen and evaluated in the intensive care unit this morning. He has been using the PCA due to pain. He had multiple episodes of diarrhea last night. I long talk with Mr. Ortiz today about possibly going to a burn center due to the degree of his skin involvement.  He states he wants to talk to his wife about this first and he would really use it [place] but he would  [facility] burn [place] if his wife is okay.  A Reyes catheter is been placed due to urinary retention and skin breakdown as he is unable to control his urination.  5/12: Mr. Ortiz was seen and evaluated in the intensive care unit this morning. He has been requiring oxycodone for pain. He has had adequate urine output from the reyes. He is on room air. I examined his back with Dr. Gongora, trauma surgery, and he agrees that a referral to a burn center is appropriate.   5/13: patient seen and evaluated in the ICU. He has been in a sinus rhythm. He is tolerating a dysphagia III diet. He has been refusing mobilization. She is on room air. I placed a referral to [facility] via our transfer center and they have refused him. Mr. Ortiz will only accept [place] as [facility] and [facility] are too far for his wife to travel.   5/14: Status post transfer from ICU to oncology unit day #1.  Patient appears to be in pain with any movement of his body due to his open sores lesions to his entire back as well as right shoulder from his cutaneous T-cell lymphoma.  He has refused dressing changes due to pain.  I have counseled palliative care to address pain management as well as to address his full CODE STATUS.  I did have conversation with patient but he was unable to focus on any clear discussion of his CODE STATUS.  He did state that we could call his wife for further discussion.  Discussed with ID.  His central line catheter tip was positive for Candida.  He is currently on broad-spectrum antibiotics and antifungal.  He repeatedly is incontinent of stool which does travel up his back and into his open wound lesions.  He is incontinent of urine he does have a Reyes catheter in place.  Palliative care did increase his IV morphine to IV Dilaudid with breakthrough pain continue with oxycodone.  He does have edema of his lower extremities and is off IV fluids.  Thrush noted of mouth started on nystatin swish and swallow.   Started on antidepressant SSRI, DC'd PPI due to C. difficile inducing.  On broad-spectrum antibiotics currently.  Diphenhydramine added for itching.  5/15: doing better today, appears to be in less pain with movement.  Answering questions appropriately.  Started daptomycin per ID for coag negative staph in blood culture and cath tip.  S/p catheter removal and port replacement. Itching decreased today.  Discussed treatment options with Dr. Mc and pharmacy is ordering a chemotherapy medication.  He is very concerned about his wife's well being if he should pass and thus is doing everything he can to continue to support her.  Palliative care is attempting to get an AD.  BP remains low, did receive 1 L ivfs overnight for low bp, but leg edema increased.  He is not on any BP meds.  Ordered midodrine 5mg tid to avoid ivf/edema overload.  He is taking in po fluids well.  Ordered to attempt rectal tube since loose stool today, patient unable to feel sensation of BM.  Again, bedside RN had to clean his back wounds from stool contamination.  He is not taking any stool softeners.    Consultants/Specialty  Critical Care.  Infectious disease  Oncology  Palliative care.    Code Status  full    Disposition  TBD.  Lives in [place] with his wife.  Apparently patient takes care of the wife.  [facility] declined transfer of patient stating does not meet burn criteria.    Review of Systems  Review of Systems   Constitutional: Positive for malaise/fatigue. Negative for chills, diaphoresis and fever.   HENT: Negative for congestion and sore throat.    Eyes: Negative for pain and discharge.   Respiratory: Negative for cough, hemoptysis, sputum production, shortness of breath and wheezing.    Cardiovascular: Positive for leg swelling. Negative for chest pain, palpitations and claudication.   Gastrointestinal: Negative for abdominal pain, constipation, diarrhea, melena, nausea and vomiting.   Genitourinary: Negative for dysuria,  frequency and urgency.   Musculoskeletal: Positive for back pain and myalgias. Negative for joint pain and neck pain.   Skin: Positive for itching and rash.   Neurological: Negative for dizziness, sensory change, speech change, focal weakness, loss of consciousness, weakness and headaches.   Endo/Heme/Allergies: Does not bruise/bleed easily.   Psychiatric/Behavioral: Negative for depression, substance abuse and suicidal ideas.        Physical Exam  Temp:  [36.2 °C (97.1 °F)-37.2 °C (98.9 °F)] 36.3 °C (97.3 °F)  Pulse:  [72-88] 87  Resp:  [18-20] 19  BP: ()/(52-59) 90/55  SpO2:  [90 %-93 %] 91 %    Physical Exam   Constitutional: He is oriented to person, place, and time. He appears well-developed. No distress.   HENT:   Dry mucous membranes  No facial droop  Chapped lips   Eyes: Right eye exhibits no discharge. Left eye exhibits no discharge. No scleral icterus.   Pale conjunctiva   Neck: Normal range of motion. Neck supple. No tracheal deviation present.   Cardiovascular: Normal rate and regular rhythm.    No murmur heard.  Pulmonary/Chest:   left chest cath site CD&I  Clear lung sounds bilaterally   Abdominal: Soft. He exhibits no distension. There is no tenderness.   Genitourinary:   Genitourinary Comments: Woods catheter   Musculoskeletal: He exhibits edema and tenderness.   3+ edema of the lower extremities   Neurological: He is alert and oriented to person, place, and time.   Skin: Skin is warm and dry. He is not diaphoretic.   Extensive xerosis of the anterior skin more so on his scalp and face  The skin on his chest anterior surfaces is red and raised the skin of the groin and back is wet and weeping of clear fluid. Large regions akin to jigsaw puzzle pieces with macerated beefy red tissue      Psychiatric: Judgment and thought content normal.   Nursing note and vitals reviewed.      Fluids    Intake/Output Summary (Last 24 hours) at 05/15/19 8997  Last data filed at 05/15/19 0440   Gross per 24 hour    Intake              100 ml   Output             1320 ml   Net            -1220 ml       Laboratory  Recent Labs      05/13/19   1326   WBC  9.3   RBC  3.84*   HEMOGLOBIN  10.7*   HEMATOCRIT  35.6*   MCV  92.7   MCH  27.9   MCHC  30.1*   RDW  58.4*   PLATELETCT  365   MPV  9.5     Recent Labs      05/13/19   0401  05/14/19   0839  05/15/19   0015   SODIUM  150*  151*  149*   POTASSIUM  4.1  4.3  4.1   CHLORIDE  117*  118*  115*   CO2  28  26  28   GLUCOSE  107*  100*  104*   BUN  23*  23*  22   CREATININE  1.51*  1.49*  1.55*   CALCIUM  7.2*  7.1*  7.1*                   Imaging  CT-CHEST,ABDOMEN,PELVIS WITH   Final Result      1.  Bilateral axillary adenopathy, more notable on the right. Some of these nodes are larger than on the prior PET/CT. There is no intrathoracic adenopathy.   2.  No abdominal adenopathy.   3.  Borderline enlarged inguinal lymph nodes, overall decreased in size compared with the prior PET/CT.      IR-CVC TUNNEL WITH PORT REMOVAL   Final Result      1.  Removal of RIGHT  internal jugular PowerPort venous implant.   2.  No evidence of infection at the port pocket.   3.  The port reservoir and port catheter were removed intact.      DX-CHEST-LIMITED (1 VIEW)   Final Result      Left central venous line in place. No pneumothorax.               INTERPRETING LOCATION: [facility], [id number]      DX-CHEST-PORTABLE (1 VIEW)   Final Result      No acute cardiopulmonary abnormality. No interval change.           Assessment/Plan  * Septic shock (HCC)- (present on admission)   Assessment & Plan    Septic shock present upon admission  Source consistent with cutaneous infection  IR removed the indwelling port  Infectious disease consult  He met the criteria for septic shock as evidenced by the need for intravenous pressor support  Organ system failure associated with this disease process is the cardiovascular as is noted by hypotension requiring pressors  Broad-spectrum IV antibiotics to cover for  skin infection in the setting of immunocompromised host--IV Unasyn  Catheter tip positive for Candida.  ID following.  On antifungal.     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  Dr. Bradley, oncology, has been consulted.    CT done for staging  Code status has been addressed and he continues to request that everything is done.   Palliative care consult placed.  Palliative care did discuss CODE STATUS with patient who is hesitant to place stress on his wife by naming her for AD.  He wants to continue to care for his wife, whom he is worried about her wellbeing if he should pass.    Pain overall is improved with increased pain medications.     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 Gongora, trauma surgery, has evaluated the skin and agrees that a referral to a burn center is appropriate.   Mr. Ortiz states that he would be amenable to going to [facility] though no other facility as it would be closer to his wife.   IV and oral narcotic pain meds.  [facility] has refused him on 5/13 and may need to be re-addressed if his status decompensated.   Continue on antifungal medication.       Bacteremia due to coagulase-negative Staphylococcus   Assessment & Plan    5/8 blood culture and cath tip + coag negative staph  daptomycin IV added per ID, unasyn IV, antifungal for cath tip candida + culture.   right port removed.  Has new left chest port placed.  5/10 and 5/13 Repeat BCs x2 negative.     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 Mc for years.  5/15:  Plan for treatment while in hospital further chemotherapy after discussion with Dr. Mc, Dr. Barraza, pharmacy and  patient.  Continue pain control  Continue rigorous wound care since patient unable to discern when he has BMs.  Stool tracks up his back and into his open skin wounds.  No diarrhea per bedside nursing.     BETO (acute kidney injury) ([facility])- (present on admission)   Assessment & Plan    Resolved acute kidney injury.  Continue with oral hydration.  Patient's legs are edematous.  Avoid IV fluids if possible.     Hypernatremia   Assessment & Plan    Remains mildly hypernatremic at 150.  Continue to encourage p.o. Intake.  Appears to be due to skin fluid losses.     Edema- (present on admission)   Assessment & Plan     lower extremity swelling  Off IV lasix.  He is at risk of intravascular depletion given the significant insensible fluid losses from skin     Anemia- (present on admission)   Assessment & Plan    Hemoglobin is stable.  Iron, b12, folate ordered.     GERD (gastroesophageal reflux disease)- (present on admission)   Assessment & Plan    Hold outpatient due to currently on broad-spectrum antibiotics by ID.  Avoid C. difficile inducing medications such as PPIs.     Asthma   Assessment & Plan    Hemoglobin is 10.5          VTE prophylaxis: heparin

## 2019-05-16 NOTE — PROGRESS NOTES
Received report from [facility], resumed care of pt 0700.  Pt is A&Ox4. Pt medicated per MAR; premedicated prior to dressing change. Pt had had a bm, pt able to stand at edge of bed with PT/OT and RN and CNA in room. Wound cleanser and pat dry with gauze, yellow petroleum gauze placed entire backside and groin area/thighs.  Pt states pain is managed, only hurts when cleaned up and dressing changes.   Skin prevalance team present at same time; able to observe and see no pressure ulcers.   Reviewed POC with pt, he is in agreement.

## 2019-05-17 PROBLEM — D50.9 IRON DEFICIENCY ANEMIA: Status: ACTIVE | Noted: 2019-01-01

## 2019-05-17 NOTE — PROGRESS NOTES
Assumed care of patient at 1900. Pt is A&Ox4, max assist. Reports 6/10 pain, medicated per MAR. Denies nausea. L CVC with positive blood return, infusing. Woods draining to gravity. Open wounds throughout entire body, yellow Xeroform dressings in place per wound communication. No further needs at this time. Call light within reach, will continue to round hourly.

## 2019-05-17 NOTE — PROGRESS NOTES
[facility] Medicine Daily Progress Note    Date of Service  5/17/2019    Chief Complaint  68 y.o. male admitted 5/8/2019 with weakness and fevers.     Hospital Course    Mr. Ortiz has a past medical history of T-cell lymphoma followed by Dr. Mc the most recently admitted here for a skin infection was discharged on April 13 due to a rash from his T-cell lymphoma.  He was discharged home on oral doxycycline.  He presented to the emergency room in [facility] with weakness, feeling poorly, and fevers.  Is found to be hypotensive and in septic shock.  He was transferred here for higher level of care.  Despite IV fluids, is required intravenous pressors ongoing.      Interval Problem Update  5/9: Patient seen and evaluated in the intensive care unit this morning. A central line has been placed and his right-sided catheter has been removed.  Patient notes significant pain from his skin.  5/10: Mr. Ortiz was examined and evaluated in the intensive care unit this morning. Blood cultures are positive for gram positive cocci. 500 ml urine over night. He is on a dilaudid PCA due to pain. CT was done for staging purposes of his cancer.  I do long talk with patient about CODE STATUS and he states he does not want to make a decision until he can speak to his wife which will need to be over the phone as she does not have the resources to be able to come to [place] from [place].  Due to dry mouth, he has been drinking excessively and states that he cannot tolerate how dry his mouth this and thus feels he needs to drink very frequently.  5/11: Patient seen and evaluated in the intensive care unit this morning. He has been using the PCA due to pain. He had multiple episodes of diarrhea last night. I long talk with Mr. Ortiz today about possibly going to a burn center due to the degree of his skin involvement.  He states he wants to talk to his wife about this first and he would really use it [place] but he would  [facility] burn [place] if his wife is okay.  A Reyes catheter is been placed due to urinary retention and skin breakdown as he is unable to control his urination.  5/12: Mr. Ortiz was seen and evaluated in the intensive care unit this morning. He has been requiring oxycodone for pain. He has had adequate urine output from the reyes. He is on room air. I examined his back with Dr. Gongora, trauma surgery, and he agrees that a referral to a burn center is appropriate.   5/13: patient seen and evaluated in the ICU. He has been in a sinus rhythm. He is tolerating a dysphagia III diet. He has been refusing mobilization. She is on room air. I placed a referral to [facility] via our transfer center and they have refused him. Mr. Ortiz will only accept [place] as [facility] and [facility] are too far for his wife to travel.   5/14: Status post transfer from ICU to oncology unit day #1.  Patient appears to be in pain with any movement of his body due to his open sores lesions to his entire back as well as right shoulder from his cutaneous T-cell lymphoma.  He has refused dressing changes due to pain.  I have counseled palliative care to address pain management as well as to address his full CODE STATUS.  I did have conversation with patient but he was unable to focus on any clear discussion of his CODE STATUS.  He did state that we could call his wife for further discussion.  Discussed with ID.  His central line catheter tip was positive for Candida.  He is currently on broad-spectrum antibiotics and antifungal.  He repeatedly is incontinent of stool which does travel up his back and into his open wound lesions.  He is incontinent of urine he does have a Reyes catheter in place.  Palliative care did increase his IV morphine to IV Dilaudid with breakthrough pain continue with oxycodone.  He does have edema of his lower extremities and is off IV fluids.  Thrush noted of mouth started on nystatin swish and swallow.   Started on antidepressant SSRI, DC'd PPI due to C. difficile inducing.  On broad-spectrum antibiotics currently.  Diphenhydramine added for itching.  5/15: doing better today, appears to be in less pain with movement.  Answering questions appropriately.  Started daptomycin per ID for coag negative staph in blood culture and cath tip.  S/p catheter removal and port replacement. Itching decreased today.  Discussed treatment options with Dr. Mc and pharmacy is ordering a chemotherapy medication.  He is very concerned about his wife's well being if he should pass and thus is doing everything he can to continue to support her.  Palliative care is attempting to get an AD.  BP remains low, did receive 1 L ivfs overnight for low bp, but leg edema increased.  He is not on any BP meds.  Ordered midodrine 5mg tid to avoid ivf/edema overload.  He is taking in po fluids well.  Ordered to attempt rectal tube since loose stool today, patient unable to feel sensation of BM.  Again, bedside RN had to clean his back wounds from stool contamination.  He is not taking any stool softeners.  5/16: Patient's skin appears to be improving with antibiotics and wound care.  He is also in less pain with wound changes.  Iron levels were low added oral iron twice daily with vitamin C.  Blood pressure stabilized on Midrin 5 mg 3 times daily multivitamin added for skin healing.  I have also ordered ROBERTO hoses bilateral for the lower extremities since edema remains and to avoid skin breakdown of the lower extremities.  I have ordered PT OT evaluations.  5/17:  Continues to have severe pain with wound care cleaning.  Seen today while getting cleaned, his back wounds are healing, less inflamed and weeping, however he continues to stool himself thus contaminating his wounds.  Ordered rectal tube and lactulose tid in order to keep rectal tube in place.  He has soft, but formed stool.  Dilaudid IV increased for pain control.  ID asked to TX central  line left chest for PICC line instead.   Remains on unasyn and diflucan.  Finished daptomycin treatment.    Consultants/Specialty  Critical Care.  Infectious disease  Oncology  Palliative care.    Code Status  full    Disposition    Lives in [place] with his wife.  Apparently patient takes care of the wife.  [facility] declined transfer of patient stating does not meet burn criteria.  PT OT rec transitional care.  LTACH ordered.    Review of Systems  Review of Systems   Constitutional: Positive for malaise/fatigue. Negative for chills, diaphoresis and fever.   HENT: Negative for congestion and sore throat.    Eyes: Negative for pain and discharge.   Respiratory: Negative for cough, hemoptysis, sputum production, shortness of breath and wheezing.    Cardiovascular: Positive for leg swelling. Negative for chest pain, palpitations and claudication.   Gastrointestinal: Negative for abdominal pain, constipation, diarrhea, melena, nausea and vomiting.   Genitourinary: Negative for dysuria, frequency and urgency.   Musculoskeletal: Positive for back pain and myalgias. Negative for joint pain and neck pain.   Skin: Positive for itching and rash.   Neurological: Negative for dizziness, sensory change, speech change, focal weakness, loss of consciousness, weakness and headaches.   Endo/Heme/Allergies: Does not bruise/bleed easily.   Psychiatric/Behavioral: Negative for depression, substance abuse and suicidal ideas.        Physical Exam  Temp:  [36.6 °C (97.9 °F)-37.2 °C (99 °F)] 37.2 °C (99 °F)  Pulse:  [72-88] 88  Resp:  [17-18] 18  BP: ()/(56-61) 103/61  SpO2:  [90 %-95 %] 95 %    Physical Exam   Constitutional: He is oriented to person, place, and time. He appears well-developed. No distress.   HENT:   Dry mucous membranes  No facial droop  Chapped lips   Eyes: Right eye exhibits no discharge. Left eye exhibits no discharge. No scleral icterus.   Pale conjunctiva   Neck: Normal range of motion. Neck supple. No  tracheal deviation present.   Cardiovascular: Normal rate and regular rhythm.    No murmur heard.  Pulmonary/Chest:   left chest cath site CD&I  Clear lung sounds bilaterally   Abdominal: Soft. He exhibits no distension. There is no tenderness.   Genitourinary:   Genitourinary Comments: Woods catheter   Musculoskeletal: He exhibits edema and tenderness.   3+ edema of the lower extremities   Neurological: He is alert and oriented to person, place, and time.   Skin: Skin is warm and dry. He is not diaphoretic.   Extensive xerosis of the anterior skin more so on his scalp and face  The skin on his chest anterior surfaces is red and raised the skin of the groin and back is wet and weeping of clear fluid. Large regions akin to jigsaw puzzle pieces with macerated beefy red tissue      Psychiatric: Judgment and thought content normal.   Nursing note and vitals reviewed.      Fluids    Intake/Output Summary (Last 24 hours) at 05/17/19 1657  Last data filed at 05/17/19 1610   Gross per 24 hour   Intake             2800 ml   Output             2125 ml   Net              675 ml       Laboratory  Recent Labs      05/16/19   0420  05/17/19   0000   WBC  7.6  8.6   RBC  3.25*  3.42*   HEMOGLOBIN  9.1*  9.4*   HEMATOCRIT  30.5*  31.9*   MCV  93.8  93.3   MCH  28.0  27.5   MCHC  29.8*  29.5*   RDW  60.9*  60.0*   PLATELETCT  300  323   MPV  9.3  9.4     Recent Labs      05/15/19   0015  05/17/19   0000   SODIUM  149*  154*   POTASSIUM  4.1  3.9   CHLORIDE  115*  119*   CO2  28  27   GLUCOSE  104*  95   BUN  22  19   CREATININE  1.55*  1.59*   CALCIUM  7.1*  7.1*     Recent Labs      05/17/19   1425   APTT  35.9   INR  1.22*               Imaging  CT-CHEST,ABDOMEN,PELVIS WITH   Final Result      1.  Bilateral axillary adenopathy, more notable on the right. Some of these nodes are larger than on the prior PET/CT. There is no intrathoracic adenopathy.   2.  No abdominal adenopathy.   3.  Borderline enlarged inguinal lymph nodes,  overall decreased in size compared with the prior PET/CT.      IR-CVC TUNNEL WITH PORT REMOVAL   Final Result      1.  Removal of RIGHT  internal jugular PowerPort venous implant.   2.  No evidence of infection at the port pocket.   3.  The port reservoir and port catheter were removed intact.      DX-CHEST-LIMITED (1 VIEW)   Final Result      Left central venous line in place. No pneumothorax.               INTERPRETING LOCATION: [address] [facility], [place], [address]      DX-CHEST-PORTABLE (1 VIEW)   Final Result      No acute cardiopulmonary abnormality. No interval change.      IR-PICC LINE PLACEMENT W/ GUIDANCE > AGE 5    (Results Pending)   EC-ECHOCARDIOGRAM COMPLETE W/O CONT    (Results Pending)        Assessment/Plan  * Septic shock (HCC)- (present on admission)   Assessment & Plan    Septic shock present upon admission  Source consistent with cutaneous infection  IR removed the indwelling port  Infectious disease consult  He met the criteria for septic shock as evidenced by the need for intravenous pressor support  Organ system failure associated with this disease process is the cardiovascular as is noted by hypotension requiring pressors  Broad-spectrum IV antibiotics to cover for skin infection in the setting of immunocompromised host--IV Unasyn  Catheter tip positive for Candida.  ID following.  On antifungal.     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  Dr. Bradley, oncology, has been consulted.    CT done for staging  Code status has been addressed and he continues to request that everything is done.   Palliative care consult placed.  Palliative care did discuss CODE STATUS with patient who is hesitant to place stress on his wife by naming her for AD.  He wants to continue to care for his wife, whom he is worried about her wellbeing if he should pass.    Pain overall is improved  with increased pain medications.     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 Gongora, trauma surgery, has evaluated the skin and agrees that a referral to a burn center is appropriate.   Mr. Ortiz states that he would be amenable to going to [facility] though no other facility as it would be closer to his wife.   IV and oral narcotic pain meds.  [facility] has refused him on 5/13 and may need to be re-addressed if his status decompensated.   Continue on antifungal medication.       Bacteremia due to coagulase-negative Staphylococcus   Assessment & Plan    5/8 blood culture and cath tip + coag negative staph  Finished daptomycin IV per ID, unasyn IV, antifungal for cath tip candida + culture.   right port removed.  Has new left chest port placed:    Order to remove left chest port per ID did not wait 48 hours for culture negative.  Order for PICC line placed.  5/10 and 5/13 Repeat BCs x2 negative.  Ordered TTE to rule out endocarditis.     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 Mc for years.  5/15:  Plan for treatment while in hospital further chemotherapy after discussion with Dr. Mc, Dr. Barraza, pharmacy and patient.  Continue pain control  Continue rigorous wound care since patient unable to discern when he has BMs.  Stool tracks up his back and into his open skin wounds.  No diarrhea per bedside nursing.     BETO (acute kidney injury) (HCC)- (present on admission)   Assessment & Plan    Resolved acute kidney injury.  Continue with oral hydration.  Patient's legs are edematous.  Avoid IV fluids if possible.     Hypernatremia   Assessment & Plan    Remains mildly hypernatremic.  Continue to encourage p.o. Intake.  Appears to be due to skin fluid losses.  Unable to give IVFs due to severe LE edema  bilaterally.     Edema- (present on admission)   Assessment & Plan     lower extremity swelling  Off IV lasix.  He is at risk of intravascular depletion given the significant insensible fluid losses from skin     Iron deficiency anemia- (present on admission)   Assessment & Plan    Hemoglobin is stable.  Iron levels low, started iron bid with vit c.   b12, folate normal.     GERD (gastroesophageal reflux disease)- (present on admission)   Assessment & Plan    Hold outpatient due to currently on broad-spectrum antibiotics by ID.  Avoid C. difficile inducing medications such as PPIs.     Asthma   Assessment & Plan    Hemoglobin is 10.5          VTE prophylaxis: heparin

## 2019-05-17 NOTE — CARE PLAN
Problem: Nutritional:  Goal: Achieve adequate nutritional intake  Patient will consume >50% of meals   Outcome: PROGRESSING SLOWER THAN EXPECTED  Patient reported he is not eating well due to pain with eating, but is trying different things. He is drinking milk and milkshakes and stated he would try Boost.  He also has a multivitamin in place.  Nutrition Representative will continue to see him for ongoing meal and snack preferences.Need current weight.

## 2019-05-17 NOTE — PROGRESS NOTES
2 RN skin check complete.   Devices in place reyes catheter.  Skin assessed under devices: open wounds to back, groin, back of mower extremities (r/t cutaneous t-cell lymphoma).  Confirmed pressure ulcers found on: NONE  New potential pressure ulcers noted on: NONE.  The following interventions in place q2 turns as tolerated, waffle overlay, wound dressing, heels floated, reyes, rectal tube to be placed by wound team.

## 2019-05-17 NOTE — PROGRESS NOTES
2 RN skin check complete with MARIANNE Katz.   Devices in place Woods catheter.  Skin assessed under devices n/a.  Confirmed pressure ulcers found on n/a.  New potential pressure ulcers noted on n/a. Wound has been consulted for wounds covering body.  The following interventions in place Q-2 turns, ensuring pt not lying on Woods tubing or central line tubing.

## 2019-05-17 NOTE — PROGRESS NOTES
· 2 RN skin check complete with Destini LOPES.  · Devices in place reyes catheter.  · Skin assessed under devices, open wounds covering patients entire body r/t cutaneous t-cell lymphoma.  · Confirmed pressure ulcers found n/a.  · New potential pressure ulcers noted n/a. Wound has already been consulted for this patient   · The following interventions in place q2hr turns, xeroform dressings in place, barrier wipes, waffle overlay, pillows in use for support and positioning, keep skin clean and dry

## 2019-05-17 NOTE — PROGRESS NOTES
A&Ox4. Max assist, not compliant with ambulating or sitting at edge of bed. Education provided. Central line flushed with positive blood return, except in blue lumen. Afebrile. Woods in place. Incontinent of bowels. Reports pain, medicated per MAR. Denies N/V, numbness or tingling. Q2hr turns. POC discussed with pt. Educated & answered questions about placing a rectal tube, pt agreed. Wound dressing changed. All other needs met at this time. Call light & belongings w/in reach. Pt educated to call for assistance.

## 2019-05-17 NOTE — PROGRESS NOTES
[facility] Medicine Daily Progress Note    Date of Service  5/16/2019    Chief Complaint  68 y.o. male admitted 5/8/2019 with weakness and fevers.     Hospital Course    Mr. Ortiz has a past medical history of T-cell lymphoma followed by Dr. Mc the most recently admitted here for a skin infection was discharged on April 13 due to a rash from his T-cell lymphoma.  He was discharged home on oral doxycycline.  He presented to the emergency room in [facility] with weakness, feeling poorly, and fevers.  Is found to be hypotensive and in septic shock.  He was transferred here for higher level of care.  Despite IV fluids, is required intravenous pressors ongoing.      Interval Problem Update  5/9: Patient seen and evaluated in the intensive care unit this morning. A central line has been placed and his right-sided catheter has been removed.  Patient notes significant pain from his skin.  5/10: Mr. Ortiz was examined and evaluated in the intensive care unit this morning. Blood cultures are positive for gram positive cocci. 500 ml urine over night. He is on a dilaudid PCA due to pain. CT was done for staging purposes of his cancer.  I do long talk with patient about CODE STATUS and he states he does not want to make a decision until he can speak to his wife which will need to be over the phone as she does not have the resources to be able to come to [place] from [place].  Due to dry mouth, he has been drinking excessively and states that he cannot tolerate how dry his mouth this and thus feels he needs to drink very frequently.  5/11: Patient seen and evaluated in the intensive care unit this morning. He has been using the PCA due to pain. He had multiple episodes of diarrhea last night. I long talk with Mr. Ortiz today about possibly going to a burn center due to the degree of his skin involvement.  He states he wants to talk to his wife about this first and he would really use it [place] but he would  [facility] burn [place] if his wife is okay.  A Reyes catheter is been placed due to urinary retention and skin breakdown as he is unable to control his urination.  5/12: Mr. Ortiz was seen and evaluated in the intensive care unit this morning. He has been requiring oxycodone for pain. He has had adequate urine output from the reyes. He is on room air. I examined his back with Dr. Gongora, trauma surgery, and he agrees that a referral to a burn center is appropriate.   5/13: patient seen and evaluated in the ICU. He has been in a sinus rhythm. He is tolerating a dysphagia III diet. He has been refusing mobilization. She is on room air. I placed a referral to [facility] via our transfer center and they have refused him. Mr. Ortiz will only accept [place] as [facility] and [facility] are too far for his wife to travel.   5/14: Status post transfer from ICU to oncology unit day #1.  Patient appears to be in pain with any movement of his body due to his open sores lesions to his entire back as well as right shoulder from his cutaneous T-cell lymphoma.  He has refused dressing changes due to pain.  I have counseled palliative care to address pain management as well as to address his full CODE STATUS.  I did have conversation with patient but he was unable to focus on any clear discussion of his CODE STATUS.  He did state that we could call his wife for further discussion.  Discussed with ID.  His central line catheter tip was positive for Candida.  He is currently on broad-spectrum antibiotics and antifungal.  He repeatedly is incontinent of stool which does travel up his back and into his open wound lesions.  He is incontinent of urine he does have a Reyes catheter in place.  Palliative care did increase his IV morphine to IV Dilaudid with breakthrough pain continue with oxycodone.  He does have edema of his lower extremities and is off IV fluids.  Thrush noted of mouth started on nystatin swish and swallow.   Started on antidepressant SSRI, DC'd PPI due to C. difficile inducing.  On broad-spectrum antibiotics currently.  Diphenhydramine added for itching.  5/15: doing better today, appears to be in less pain with movement.  Answering questions appropriately.  Started daptomycin per ID for coag negative staph in blood culture and cath tip.  S/p catheter removal and port replacement. Itching decreased today.  Discussed treatment options with Dr. Mc and pharmacy is ordering a chemotherapy medication.  He is very concerned about his wife's well being if he should pass and thus is doing everything he can to continue to support her.  Palliative care is attempting to get an AD.  BP remains low, did receive 1 L ivfs overnight for low bp, but leg edema increased.  He is not on any BP meds.  Ordered midodrine 5mg tid to avoid ivf/edema overload.  He is taking in po fluids well.  Ordered to attempt rectal tube since loose stool today, patient unable to feel sensation of BM.  Again, bedside RN had to clean his back wounds from stool contamination.  He is not taking any stool softeners.  5/16: Patient's skin appears to be improving with antibiotics and wound care.  He is also in less pain with wound changes.  Iron levels were low added oral iron twice daily with vitamin C.  Blood pressure stabilized on Midrin 5 mg 3 times daily multivitamin added for skin healing.  I have also ordered ROBERTO hoses bilateral for the lower extremities since edema remains and to avoid skin breakdown of the lower extremities.  I have ordered PT OT evaluations.    Consultants/Specialty  Critical Care.  Infectious disease  Oncology  Palliative care.    Code Status  full    Disposition  TBD.  Lives in [place] with his wife.  Apparently patient takes care of the wife.  [facility] declined transfer of patient stating does not meet burn criteria.  PT OT ordered    Review of Systems  Review of Systems   Constitutional: Positive for malaise/fatigue. Negative  for chills, diaphoresis and fever.   HENT: Negative for congestion and sore throat.    Eyes: Negative for pain and discharge.   Respiratory: Negative for cough, hemoptysis, sputum production, shortness of breath and wheezing.    Cardiovascular: Positive for leg swelling. Negative for chest pain, palpitations and claudication.   Gastrointestinal: Negative for abdominal pain, constipation, diarrhea, melena, nausea and vomiting.   Genitourinary: Negative for dysuria, frequency and urgency.   Musculoskeletal: Positive for back pain and myalgias. Negative for joint pain and neck pain.   Skin: Positive for itching and rash.   Neurological: Negative for dizziness, sensory change, speech change, focal weakness, loss of consciousness, weakness and headaches.   Endo/Heme/Allergies: Does not bruise/bleed easily.   Psychiatric/Behavioral: Negative for depression, substance abuse and suicidal ideas.        Physical Exam  Temp:  [36.2 °C (97.1 °F)-36.7 °C (98 °F)] 36.7 °C (98 °F)  Pulse:  [64-86] 84  Resp:  [18-20] 18  BP: ()/(50-80) 107/56  SpO2:  [90 %-95 %] 90 %    Physical Exam   Constitutional: He is oriented to person, place, and time. He appears well-developed. No distress.   HENT:   Dry mucous membranes  No facial droop  Chapped lips   Eyes: Right eye exhibits no discharge. Left eye exhibits no discharge. No scleral icterus.   Pale conjunctiva   Neck: Normal range of motion. Neck supple. No tracheal deviation present.   Cardiovascular: Normal rate and regular rhythm.    No murmur heard.  Pulmonary/Chest:   left chest cath site CD&I  Clear lung sounds bilaterally   Abdominal: Soft. He exhibits no distension. There is no tenderness.   Genitourinary:   Genitourinary Comments: Woods catheter   Musculoskeletal: He exhibits edema and tenderness.   3+ edema of the lower extremities   Neurological: He is alert and oriented to person, place, and time.   Skin: Skin is warm and dry. He is not diaphoretic.   Extensive xerosis  of the anterior skin more so on his scalp and face  The skin on his chest anterior surfaces is red and raised the skin of the groin and back is wet and weeping of clear fluid. Large regions akin to jigsaw puzzle pieces with macerated beefy red tissue      Psychiatric: Judgment and thought content normal.   Nursing note and vitals reviewed.      Fluids    Intake/Output Summary (Last 24 hours) at 05/16/19 1923  Last data filed at 05/16/19 1832   Gross per 24 hour   Intake             3400 ml   Output                0 ml   Net             3400 ml       Laboratory  Recent Labs      05/16/19   0420   WBC  7.6   RBC  3.25*   HEMOGLOBIN  9.1*   HEMATOCRIT  30.5*   MCV  93.8   MCH  28.0   MCHC  29.8*   RDW  60.9*   PLATELETCT  300   MPV  9.3     Recent Labs      05/14/19   0839  05/15/19   0015   SODIUM  151*  149*   POTASSIUM  4.3  4.1   CHLORIDE  118*  115*   CO2  26  28   GLUCOSE  100*  104*   BUN  23*  22   CREATININE  1.49*  1.55*   CALCIUM  7.1*  7.1*                   Imaging  CT-CHEST,ABDOMEN,PELVIS WITH   Final Result      1.  Bilateral axillary adenopathy, more notable on the right. Some of these nodes are larger than on the prior PET/CT. There is no intrathoracic adenopathy.   2.  No abdominal adenopathy.   3.  Borderline enlarged inguinal lymph nodes, overall decreased in size compared with the prior PET/CT.      IR-CVC TUNNEL WITH PORT REMOVAL   Final Result      1.  Removal of RIGHT  internal jugular PowerPort venous implant.   2.  No evidence of infection at the port pocket.   3.  The port reservoir and port catheter were removed intact.      DX-CHEST-LIMITED (1 VIEW)   Final Result      Left central venous line in place. No pneumothorax.               INTERPRETING LOCATION: [address] [place] [address], [id number]      DX-CHEST-PORTABLE (1 VIEW)   Final Result      No acute cardiopulmonary abnormality. No interval change.           Assessment/Plan  * Septic shock (HCC)- (present on admission)   Assessment & Plan     Septic shock present upon admission  Source consistent with cutaneous infection  IR removed the indwelling port  Infectious disease consult  He met the criteria for septic shock as evidenced by the need for intravenous pressor support  Organ system failure associated with this disease process is the cardiovascular as is noted by hypotension requiring pressors  Broad-spectrum IV antibiotics to cover for skin infection in the setting of immunocompromised host--IV Unasyn  Catheter tip positive for Candida.  ID following.  On antifungal.     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  Dr. Bradley, oncology, has been consulted.    CT done for staging  Code status has been addressed and he continues to request that everything is done.   Palliative care consult placed.  Palliative care did discuss CODE STATUS with patient who is hesitant to place stress on his wife by naming her for AD.  He wants to continue to care for his wife, whom he is worried about her wellbeing if he should pass.    Pain overall is improved with increased pain medications.     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 Gongora, trauma surgery, has evaluated the skin and agrees that a referral to a burn center is appropriate.   Mr. Ortiz states that he would be amenable to going to [facility] though no other facility as it would be closer to his wife.   IV and oral narcotic pain meds.  [facility] has refused him on 5/13 and may need to be re-addressed if his status decompensated.   Continue on antifungal medication.       Bacteremia due to coagulase-negative Staphylococcus   Assessment & Plan    5/8 blood culture and cath tip + coag negative staph  daptomycin IV added per ID, unasyn IV, antifungal for  cath tip candida + culture.   right port removed.  Has new left chest port placed.  5/10 and 5/13 Repeat BCs x2 negative.     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 Mc for years.  5/15:  Plan for treatment while in hospital further chemotherapy after discussion with Dr. Mc, Dr. Barraza, pharmacy and patient.  Continue pain control  Continue rigorous wound care since patient unable to discern when he has BMs.  Stool tracks up his back and into his open skin wounds.  No diarrhea per bedside nursing.     BETO (acute kidney injury) (HCC)- (present on admission)   Assessment & Plan    Resolved acute kidney injury.  Continue with oral hydration.  Patient's legs are edematous.  Avoid IV fluids if possible.     Hypernatremia   Assessment & Plan    Remains mildly hypernatremic at 150.  Continue to encourage p.o. Intake.  Appears to be due to skin fluid losses.     Edema- (present on admission)   Assessment & Plan     lower extremity swelling  Off IV lasix.  He is at risk of intravascular depletion given the significant insensible fluid losses from skin     Anemia- (present on admission)   Assessment & Plan    Hemoglobin is stable.  Iron, b12, folate ordered.     GERD (gastroesophageal reflux disease)- (present on admission)   Assessment & Plan    Hold outpatient due to currently on broad-spectrum antibiotics by ID.  Avoid C. difficile inducing medications such as PPIs.     Asthma   Assessment & Plan    Hemoglobin is 10.5          VTE prophylaxis: heparin

## 2019-05-17 NOTE — CARE PLAN
Problem: Infection  Goal: Will remain free from infection    Intervention: Assess signs and symptoms of infection  Pt afebrile. Pt is at infection risk as he scratches at times, declined Benadryl. This RN clipped his nails and educated about not scratching skin. Hand  wipes provided at bedside.       Problem: Pain Management  Goal: Pain level will decrease to patient's comfort goal    Intervention: Follow pain managment plan developed in collaboration with patient and Interdisciplinary Team  Pt medicated prior to dressing changes/turning when needs cleaning after having BM. Pt states he is comfortable with the pain medication ordered and that he is receiving. States he does not have pain when not moving.

## 2019-05-18 NOTE — PROCEDURES
Vascular Access Team     Date of Insertion: 5/18/19  Arm Circumference: 30  Internal length: 39  External Length: 1  Vein Occupancy %: 40  Reason for PICC: Antibiotic therapy/Chemotherapy  Labs: WBC 8.9, , BUN 18, Cr 1.60, GFR 43, INR 1.22    Consents confirmed, vessel patency confirmed with ultrasound. Risks and benefits of procedure explained to patient and education regarding central line associated bloodstream infections provided. Questions answered.     PICC placed in RUE per MD order with ultrasound guidance, initial arm circumference 30cm. 5 Fr, double lumen PICC placed in basilic vein after 1 attempt(s). 2 cc's of 1% lidocaine injected intradermally, 21 gauge microintroducer needle and modified Seldinger technique used. 39 cm catheter inserted with good blood return. Secured at 1 cm marker. Each lumen flushed without resistance with 10 mL 0.9% normal saline. PICC line secured with Biopatch and Tegaderm.     PICC placement is confirmed by nurse using 3CG technology. PICC line is appropriate for use at this time. Patient experienced some pain, resolved post procedure, without complications.  Patient condition relayed to unit RN or ordering physician via this post procedure note in the EMR.     Ultrasound images uploaded to PACS and viewable in the EMR - yes  Ultrasound imaged printed and placed in paper chart - no     [employer] Power PICC ref # [id number], Lot # [id number]

## 2019-05-18 NOTE — PROGRESS NOTES
"Palliative Progress Note               Author: Ann Marie SARAH Zacharyhomar Date & Time created: 2019  2:28 PM     Reason for subsequent visit: cancer-related pain generalized pain    Interval History:  Upon entering room patient seemed to be having vivid dreams and was moving around slightly.  He did awake to light touch at my voice.  He was able to respond that he did not currently have pain but did have itching.  He was unable to continue conversation as he appeared to fall back asleep with his eyes rolling back into his head.  We did this several times.  One time when I attempted to wake him up he grabbed something from his gown as if it was a long string and put it on his table though there was nothing he was holding onto.  I asked him if he was seeing things that were not there and he stated \"yes.\"      Discussed with patient's nurse and CNA who arrived to room and reports that patient was somewhat more conversive earlier in the day.  CNA has worked with the patient multiple times over the last 2 weeks and reports she saw similar behavior last week.  Towards the end of our visit the patient was slightly more conversive, asking for milk and to be repositioned in bed.    Review of Systems:  Positive for pruritis . Negative for pain. Positive for sedation.      Physical Exam:    Recent vital signs  Temp (24hrs), Av.9 °C (98.4 °F), Min:36.4 °C (97.6 °F), Max:37.4 °C (99.3 °F)  Temperature: 36.4 °C (97.6 °F)  Pulse  Av.1  Min: 49  Max: 104  Blood Pressure : (!) 94/51       Physical Exam   Constitutional: He appears lethargic. He is sleeping and cooperative. No distress.   Unable to answer orientation questions but was aware it was mid afternoon   HENT:   Mouth/Throat: No oropharyngeal exudate (tongue dry).   Eyes: Pupils are equal, round, and reactive to light.   Cardiovascular: Normal rate and normal heart sounds.    Distant     Pulmonary/Chest: Effort normal. No respiratory distress. He has decreased breath " sounds in the right lower field and the left lower field.   Abdominal: He exhibits distension. Fluid wave: Rectal tube in place. Bowel sounds are decreased. There is no tenderness.   Rectal tube in place   Musculoskeletal: He exhibits edema (3+ BLE, 1+ BUE).   Neurological: He appears lethargic.   Skin: Lesion (generalized) noted.   Generalized dry, flaky skin with diffuse maceration/scabs/lesions including face/neck, trunk, back, arms, groin.  Open oozing/bleeding wounds on back - improving.   Psychiatric: His speech is not slurred (mumbling ). He is slowed. He exhibits a depressed mood.     Recent Labs      05/17/19   0000  05/18/19   0120   SODIUM  154*  157*   POTASSIUM  3.9  3.7   CHLORIDE  119*  123*   CO2  27  27   GLUCOSE  95  102*   BUN  19  18   CREATININE  1.59*  1.60*   CALCIUM  7.1*  7.5*     Recent Labs      05/16/19   0420  05/17/19   0000  05/18/19   0120   WBC  7.6  8.6  8.9   RBC  3.25*  3.42*  3.56*   HEMOGLOBIN  9.1*  9.4*  9.6*   HEMATOCRIT  30.5*  31.9*  33.3*   MCV  93.8  93.3  93.5   MCH  28.0  27.5  27.0   MCHC  29.8*  29.5*  28.8*   RDW  60.9*  60.0*  61.5*   PLATELETCT  300  323  333   MPV  9.3  9.4  9.4     Medications reviewed. Labs Reviewed.     Problem List:  1.  Cancer-related generalized cutaneous pain (active)  2.  Peripheral neuropathy (active)  3.  Generalized pruritus (active)  4.  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  Dry mouth/thrush (active/improving)  7.  Acute depression (active)  8.  BETO (active/stable)  9.  GERD (stable)  10.  Anemia (stable)  11.  Edema (improving in BUE; active in BLE)  12.  Fecal incontinence (active)  13.  Altered mental status (active)    Assessment/Plan:  Cancer-related cutaneous pain and peripheral neuropathy  Prefer not to use morphine d/t patient's BETO -- GFR = 43; Cr 1.60; BUN 18 (5/18/19)  -Estimated creatinine clearance per Cockcroft-Gault  52-67 mg/min    MEDD (morphine equivalent daily dose) is approximately 60 mg:  - hydromorphone 0.5 mg x 3 doses = 1.5 mg = 30 mg MEDD  - oxycodone 10 mg X 2 doses = 20 mg = 30 mg MEDD    5/17/19 MEDD ~ 46.5 mg    Continue hydromorphone 0.5 mg IV Q3 hrs PRN severe pain NRS 7-10 (increased yesterday by hospitalist due to painful dressing changes - last dose at 20:00 5/17/19)  Continue oxycodone from 5-10 mg PO Q3 hrs prn moderate pain    Generalized pruritus  Our team considers patient's pruritus a neuropathic pain syndrome, since it is caused by peripheral nerve damage -- see Fast Fact #37  Continue mirtazapine 7.5 mg PO Q bedtime; consider changing back to duloxetine 20 mg p.o. 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 Mc; patient has had multiple treatment regimens  Plan was for further treatment if he stabilizes  Per oncology hospice has been discussed with the patient on several occasions but he has declined; goal for this hospitalization is to manage infection and skin-pending hospital course further treatment will be discussed outpatient.    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  Orders per wound care team:  - Nursing performing  dressing changes with xeroform  - Trauma pad to be placed under patient's back  - Change dressing every 48 hrs or prn with soilage or dislodgement  Patient now more compliant with dressing and linen changes  Had BMS system placed 5/17/2019 for incontinence management with lactulose 3 times daily per primary team to keep stool soft for rectal tube  Trapeze bar in place    Dry mouth/thrush  Continue nystatin 100,000 units/mL swish & swallow, 5 mL suspension QID     Acute depression  Continue mirtazapine 7.5 mg PO HS   Consider switching back to duloxetine if mentation does not improve      Code Status: Full    Advance Care Planning/Current Goals of Care:   Not discussed today due to patient's altered mental status; if no improvement will plan to call patient's wife in the coming days.     25 minutes spent with greater than 50% spent counseling and coordinating the patient's care regarding acute symptom management.   Please refer to HPI and assessment/plan for details.     Thank you for allowing the palliative care team to participate in Mr. Ortiz's care. Please call with any questions/needs.     Ann Marie Nelson A.P.R.N.  Palliative Care Nurse Practitioner  [phone]

## 2019-05-18 NOTE — WOUND TEAM
Wound team note:   Pt seen for placement of BMS. MD order verified. Pt assessed, noted to be incontinent of loose brown stool. Cleansed area w/ dimethicone wipes. Patient noted to have wounds throughout sacrococcygeal and back area. Sphincter tone determined to be adequate. BMS placed without difficulty,  40 mL of tap water placed in retention balloon, irrigated with 60 mL warm tap water. Confirmed stool output in rectal tube. Nursing to irrigate q shift with 60 mL-120 mL warm tap water or until patent.

## 2019-05-18 NOTE — PROGRESS NOTES
· 2 RN skin check complete with Tatum LOPES.  · Devices in place reyes catheter and rectal tube   · Skin assessed under devices, open wounds covering patients entire body r/t cutaneous t-cell lymphoma.  · Confirmed pressure ulcers found n/a.  · New potential pressure ulcers noted n/a. Wound has already been consulted for this patient   · The following interventions in place q2hr turns, xeroform dressings in place, barrier wipes, waffle overlay, pillows in use for support and positioning, keep skin clean and dry

## 2019-05-18 NOTE — PROGRESS NOTES
"A&Ox4. Assitance x 2 or more. Afebrile. Pt denies pain. Reports numbness & tingling in bilat hands & ft. States \"he always has it.\" pt. Rectal tube & reyes in place. POC discussed with pt.New PICC placed in R arm, + blood return x2 lumens. CVC to be removed by IR sometime today. All other needs met at this time. Call light within reach. Pt educated to call for assistance.   "

## 2019-05-19 PROBLEM — A41.1: Status: ACTIVE | Noted: 2019-01-01

## 2019-05-19 PROBLEM — G93.41: Status: ACTIVE | Noted: 2019-01-01

## 2019-05-19 PROBLEM — R65.20: Status: ACTIVE | Noted: 2019-01-01

## 2019-05-19 NOTE — PROGRESS NOTES
Seen by LATOYA Lew while doing linen change and wound care, orders for low airloss mattress, seen by IR, not tunneled ok to dc centtral by this RN, MD made aware, diet resumed as well.

## 2019-05-19 NOTE — PROGRESS NOTES
2 RN skin check completed with Rolando RN.   Devices in place: reyes, PICC, rectal tube.   Skin assessed under devices: open lesions covering patient's body  Confirmed pressure ulcers found on: none.  New potential pressure ulcers noted on: none.   The following interventions in place: waffle mattress, repositioning patient in bed as pt tolerates, replaced xeroform dressings, cleansed skin with approved wound cleanser.

## 2019-05-19 NOTE — PROGRESS NOTES
Infectious Disease Progress Note    Author: Angy Francis M.D. Date & Time of service: 2019  10:52 AM    Chief Complaint:  Septic shock, cellulitis      Interval History:   Interval 24 hours of Vitals/Labs/Micro,and imaging results reviewed as available. See assessment.   Interval 24 hours of Vitals/Labs/Micro,and imaging results reviewed as available. See assessment.    Interval 24 hours of Vitals/Labs/Micro,and imaging results reviewed as available.   5/15 Interval 24 hours of Vitals/Labs/Micro,and imaging results reviewed as available.    patient doing well today with no complaints, vitals stable.    Interval 24 hours of Vitals/Labs/Micro,and imaging results reviewed as available. See assessment.     Review of Systems:  Review of Systems   Constitutional: Positive for chills and malaise/fatigue. Negative for fever.   Musculoskeletal: Positive for myalgias.       Hemodynamics:  Temp (24hrs), Av.9 °C (98.4 °F), Min:36.6 °C (97.9 °F), Max:37.2 °C (98.9 °F)  Temperature: 36.7 °C (98.1 °F)  Pulse  Av.9  Min: 49  Max: 104  Blood Pressure : (!) 91/60       Physical Exam:  Physical Exam   Constitutional: He is oriented to person, place, and time. He appears well-developed and well-nourished.   HENT:   Head: Normocephalic and atraumatic.   Eyes: Pupils are equal, round, and reactive to light. Conjunctivae and EOM are normal.   Cardiovascular: Normal rate, regular rhythm and normal heart sounds.    Pulmonary/Chest: Effort normal and breath sounds normal.   Abdominal: Soft. Bowel sounds are normal. He exhibits no distension. There is no tenderness. There is no rebound and no guarding.   Musculoskeletal: Normal range of motion. He exhibits edema.   Neurological: He is alert and oriented to person, place, and time.   Skin: Skin is warm. There is erythema.   Multiple, diffuse plaques and skin lesons, some underlying erythema   Psychiatric: He has a normal mood and affect. His behavior  is normal.       Meds:    Current Facility-Administered Medications:   •  D5W  •  HYDROmorphone  •  lactulose  •  HYDROmorphone  •  ferrous sulfate  •  ascorbic acid  •  mirtazapine  •  multivitamin  •  amoxicillin-clavulanate  •  midodrine  •  nystatin  •  oxyCODONE immediate-release  •  fluconazole  •  acetaminophen **OR** acetaminophen  •  budesonide-formoterol  •  Pharmacy Consult Request  •  Respiratory Care per Protocol  •  NS  •  heparin  •  albuterol    Labs:  Recent Labs      05/17/19   0000  05/18/19   0120  05/19/19   0033   WBC  8.6  8.9  6.8   RBC  3.42*  3.56*  3.28*   HEMOGLOBIN  9.4*  9.6*  9.1*   HEMATOCRIT  31.9*  33.3*  30.8*   MCV  93.3  93.5  93.9   MCH  27.5  27.0  27.7   RDW  60.0*  61.5*  62.4*   PLATELETCT  323  333  273   MPV  9.4  9.4  9.5   NEUTSPOLYS  67.00  61.90  50.40   LYMPHOCYTES  21.70*  35.40  33.90   MONOCYTES  3.50  0.90  3.50   EOSINOPHILS  5.20  1.80  7.80*   BASOPHILS  0.00  0.00  0.90   RBCMORPHOLO  Normal  Normal  Present     Recent Labs      05/17/19   0000  05/18/19   0120  05/19/19   0830   SODIUM  154*  157*  156*   POTASSIUM  3.9  3.7  3.4*   CHLORIDE  119*  123*  123*   CO2  27  27  27   GLUCOSE  95  102*  105*   BUN  19 18  17     Recent Labs      05/17/19   0000  05/18/19   0120  05/19/19   0830   ALBUMIN   --   1.9*   --    TBILIRUBIN   --   0.3   --    ALKPHOSPHAT   --   86   --    TOTPROTEIN   --   4.8*   --    ALTSGPT   --   11   --    ASTSGOT   --   17   --    CREATININE  1.59*  1.60*  1.59*       Imaging:  Ct-chest,abdomen,pelvis With    Result Date: 5/9/2019 5/9/2019 9:08 PM HISTORY/REASON FOR EXAM:  T cell lymphoma restaging,compare with prior PET scan TECHNIQUE/EXAM DESCRIPTION: CT scan of the chest, abdomen and pelvis with contrast. Thin-section helical scanning was obtained from the lung apices through the pubic symphysis to include the chest, abdomen and pelvis.  100 mL of nonionic contrast was administered intravenously without complication. Low dose  optimization technique was utilized for this CT exam including automated exposure control and adjustment of the mA and/or kV according to patient size. COMPARISON: PET/CT 4/30/2018 is findings there are FINDINGS: CT CHEST:The lungs are fully expanded, with no parenchymal abnormality.  There is no pleural or pericardial effusion. There are no enlarged mediastinal or hilar nodes. There are enlarged axillary nodes bilaterally however, more notably on the right. The largest right-sided node measures about 15 mm in greatest short axis dimension. The bony thorax is unremarkable. CT ABDOMEN:The liver, spleen, pancreas, and stomach are unremarkable.  Adrenal glands are normal.  No opaque gallstones.  The kidneys show symmetric opacification with no hydronephrosis. A 12 mm exophytic lesion is seen along the lateral margin of the right kidney, likely a cyst. This is unchanged. The bowel and mesentery are grossly normal. No mesenteric or retroperitoneal adenopathy. No free fluid or air. CT PELVIS:The bladder is intact. No free fluid in the pelvis.  The pelvic bowel and mesentery are grossly normal.  The bony pelvis and visualized proximal femora are intact. Borderline enlarged inguinal lymph nodes are present bilaterally which are somewhat smaller than before.     1.  Bilateral axillary adenopathy, more notable on the right. Some of these nodes are larger than on the prior PET/CT. There is no intrathoracic adenopathy. 2.  No abdominal adenopathy. 3.  Borderline enlarged inguinal lymph nodes, overall decreased in size compared with the prior PET/CT.    Dx-chest-limited (1 View)    Result Date: 5/9/2019  HISTORY/REASON FOR EXAM: central venous line placement. TECHNIQUE/EXAM DESCRIPTION:  Single view of the chest,  5/8/2019 11:47 PM COMPARISON:  Prior image today at 2043 hours FINDINGS:   A new left central venous line has been placed, the tip of which projects in the area of the mid SVC. There is no pneumothorax. Cardiopulmonary  appearance is unchanged.     Left central venous line in place. No pneumothorax. INTERPRETING LOCATION: [address] [facility], [place], [address]    Dx-chest-portable (1 View)    Result Date: 5/8/2019 5/8/2019 8:34 PM HISTORY/REASON FOR EXAM:  Shortness of breath. Suspicion of sepsis. TECHNIQUE/EXAM DESCRIPTION AND NUMBER OF VIEWS: Single portable view of the chest. COMPARISON: 4/8/2019 FINDINGS: The heart, mediastinum, and anand are unremarkable. The lungs are well expanded and show no evidence of infiltrate or other acute process. There is no obvious pleural effusion. The bony thorax is grossly normal. A right central venous line with an attached infusion port is unchanged in position.     No acute cardiopulmonary abnormality. No interval change.    Ir-cvc Tunnel With Port Removal    Result Date: 5/9/2019 5/9/2019 10:26 AM HISTORY/REASON FOR EXAM:  T-cell lymphoma. Diffuse skin lesions possibly exacerbated by blood stream infection. Port removal requested. TECHNIQUE/EXAM DESCRIPTION: Removal of RIGHT internal jugular PowerPort venous access implant. PROCEDURE:  Informed consent was obtained.  The RIGHT anterior chest wall was prepped and draped in a sterile manner with chlorhexidine. All elements of MAXIMAL STERILE BARRIER technique were followed. Moderate sedation with Fentanyl and Versed was administered during the procedure with appropriate continuous patient monitoring by the radiology nurse. SEDATION DURATION: 30 minutes FLUOROSCOPY TIME: 0.1 minutes NUMBER OF FILMS: 1 fluoroscopic images obtained. Following local anesthesia with 6 mL 1% lidocaine with epinephrine, a transverse incision was made over the site of the healed incision above the port reservoir.  Following blunt and sharp dissection, the port reservoir and catheter were removed intact.  The skin overlying the port and the port pocket were unremarkable with no evidence of infection.  There was no pus or exudate about the port reservoir.  The port catheter was  removed intact. The port reservoir and catheter were submitted in a sterile container for cultures and Gram stain as well as fungal cultures. Manual compression was applied over the RIGHT  internal jugular catheter entry site and port pocket for about 5 minutes. The port pocket was closed with deep and subcuticular layers of interrupted 3 -- 0 Vicryl suture and the skin sealed with Dermabond.  A sterile dressing was applied. The patient tolerated the procedure well with no evidence of complication. COMPARISON:  1 view chest 2019 FINDINGS:  The port reservoir and catheter were removed intact.  There was no discharge or exudate at the port pocket.     1.  Removal of RIGHT  internal jugular PowerPort venous implant. 2.  No evidence of infection at the port pocket. 3.  The port reservoir and port catheter were removed intact.    Ec-echocardiogram Ltd W/o Cont    Result Date: 2019  Transthoracic Echo Report Echocardiography Laboratory CONCLUSIONS Prior echo done 18, this study is much more limited in quality however no obvious change. Technically difficult study incomplete information is obtained. Left ventricular ejection fraction is visually estimated to be 65%. Right ventricle not well visualized, may be mildly dilated, grossly normal function. Valves not well visualized. Unable to estimate pulmonary artery pressure due to an inadequate tricuspid regurgitant jet. DOMENICO MUNOZ Exam Date:         2019                    16:19 Exam Location:     Inpatient Priority:          Routine Ordering Physician:        JAMIA SALAS Referring Physician: Sonographer:               James Rodríguez RDCS Age:    68     Gender:    M MRN:    [id number] :    1950 BSA:    2.2    Ht (in):    68     Wt (lb):    238 Exam Type:     Limited Indications:     Endocarditis ICD Codes:       421 CPT Codes:       98952 BP:   103    /   61     HR: Technical Quality:       Technically difficult study                           incomplete information is                          obtained MEASUREMENTS  (Male / Female) Normal Values 2D ECHO LV Diastolic Diameter PLAX        4.5 cm                4.2 - 5.9 / 3.9 - 5.3 cm LV Systolic Diameter PLAX         2.9 cm                2.1 - 4.0 cm IVS Diastolic Thickness           0.91 cm               LVPW Diastolic Thickness          1.1 cm                Estimated LV Ejection Fraction    65 %                  * Indicates values subject to auto-interpretation LV EF:  65    % FINDINGS Left Ventricle Left ventricular systolic function appears to be normal. Wall motion is difficult to assess with the limitations of image quality, appears grossly normal. Left ventricular ejection fraction is visually estimated to be 65%. Indeterminate diastolic function. Normal left ventricular wall thickness. Right Ventricle Right ventricle not well visualized, may be mildly dilated, grossly normal function. Right Atrium Right atrium not well visualized. Left Atrium Normal left atrial size. Mitral Valve Structurally normal mitral valve without significant stenosis or regurgitation. Aortic Valve The aortic valve is not well visualized. No stenosis or regurgitation seen. Tricuspid Valve The tricuspid valve is not well visualized. No stenosis or regurgitation seen. Unable to estimate pulmonary artery pressure due to an inadequate tricuspid regurgitant jet. Pulmonic Valve The pulmonic valve is not well visualized. Pericardium Pericardium not well visualized. Aorta Ascending aorta not well visualized. Deanna Chand MD (Electronically Signed) Final Date:     18 May 2019 17:34    Ir-picc Line Placement W/ Guidance > Age 5    Result Date: 5/18/2019  HISTORY/REASON FOR EXAM:   PICC placement. TECHNIQUE/EXAM DESCRIPTION AND NUMBER OF VIEWS:   PICC line insertion with ultrasound guidance.  The procedure was performed using maximal sterile barrier technique including sterile gown, mask, cap, and donning of sterile gloves  "following appropriate hand hygiene and/or sterile scrub. Patient skin site was prepped with 2% Chlorhexidine solution. FINDINGS:  PICC line insertion with Ultrasound Guidance was performed by qualified nursing staff without the assistance of a Radiologist. PICC positioning appropriateness confirmed by 3CG technology; chest xray only needed in the instance 3CG unable to confirm placement.              Ultrasound-guided PICC placement performed by qualified nursing staff as above.       Micro:  Results     Procedure Component Value Units Date/Time    BLOOD CULTURE [[id number]] Collected:  05/13/19 1435    Order Status:  Completed Specimen:  Blood from Peripheral Updated:  05/18/19 2100     Significant Indicator NEG     Source BLD     Site PERIPHERAL     Culture Result No growth after 5 days of incubation.    Narrative:       Per Hospital Policy: Only change Specimen Src: to \"Line\" if  specified by physician order.  Left Hand    BLOOD CULTURE [[id number]] Collected:  05/13/19 1403    Order Status:  Completed Specimen:  Blood from Peripheral Updated:  05/18/19 1700     Significant Indicator NEG     Source BLD     Site PERIPHERAL     Culture Result No growth after 5 days of incubation.    Narrative:       Per Hospital Policy: Only change Specimen Src: to \"Line\" if  specified by physician order.  Right Hand    BLOOD CULTURE [[id number]] Collected:  05/10/19 1335    Order Status:  Completed Specimen:  Blood from Peripheral Updated:  05/15/19 1500     Significant Indicator NEG     Source BLD     Site PERIPHERAL     Culture Result No growth after 5 days of incubation.    Narrative:       Collected By:[id number] FARIDA MONTE  Per Hospital Policy: Only change Specimen Src: to \"Line\" if  specified by physician order.  Right Forearm/Arm    BLOOD CULTURE [[id number]] Collected:  05/10/19 1352    Order Status:  Completed Specimen:  Blood from Peripheral Updated:  05/15/19 1500     Significant Indicator NEG     Source BLD     Site " "PERIPHERAL     Culture Result No growth after 5 days of incubation.    Narrative:       Collected By:[id number] FARIDA MONTE  Per Hospital Policy: Only change Specimen Src: to \"Line\" if  specified by physician order.  Left Forearm/Arm    BLOOD CULTURE [[id number]]  (Abnormal)  (Susceptibility) Collected:  05/08/19 2034    Order Status:  Completed Specimen:  Blood from Peripheral Updated:  05/15/19 0931     Significant Indicator POS (POS)     Source BLD     Site PERIPHERAL     Culture Result Growth detected by Bactec instrument. 05/09/2019  1830  Negative for Staphylococcus aureus and MRSA by PCR. Correlate  ongoing need for antibiotics with clinical condition.   (A)      Staphylococcus haemolyticus (A)    Narrative:       CALL  [facility] tel. [phone],  CALLED  [facility] tel. [phone] 05/09/2019, 20:44, RB PERF. RESULTS CALLED TO: RN  [phone]  Per Hospital Policy: Only change Specimen Src: to \"Line\" if  specified by physician order.  Left Forearm/Arm    Culture & Susceptibility     STAPHYLOCOCCUS HAEMOLYTICUS     Antibiotic Sensitivity Microscan Unit Status    Ampicillin/sulbactam Resistant >16/8 mcg/mL Final    Method: GREGORY    Clindamycin Resistant >4 mcg/mL Final    Method: GREGORY    Daptomycin Sensitive <=0.5 mcg/mL Final    Method: GREGORY    Erythromycin Resistant >4 mcg/mL Final    Method: GREGORY    Moxifloxacin Resistant >4 mcg/mL Final    Method: GREGORY    Oxacillin Resistant >2 mcg/mL Final    Method: GREGORY    Penicillin Resistant >8 mcg/mL Final    Method: GREGORY    Tetracycline Sensitive <=4 mcg/mL Final    Method: GREGORY    Trimeth/Sulfa Resistant >2/38 mcg/mL Final    Method: GREGORY    Vancomycin Sensitive 4 mcg/mL Final    Method: GREGORY                       BLOOD CULTURE [[id number]] Collected:  05/08/19 2034    Order Status:  Completed Specimen:  Blood from Peripheral Updated:  05/13/19 2300     Significant Indicator NEG     Source BLD     Site PERIPHERAL     Culture Result No growth after 5 days of incubation.    Narrative:       " "Per Hospital Policy: Only change Specimen Src: to \"Line\" if  specified by physician order.  Right Hand    Fungal Culture [[id number]] Collected:  05/09/19 1115    Order Status:  Completed Specimen:  Wound Updated:  05/12/19 1312     Significant Indicator NEG     Source WND     Site Port Catheter Tip     Culture Result Culture in progress.    Narrative:       Surgery Specimen    CULTURE WOUND W/ GRAM STAIN [[id number]]  (Abnormal)  (Susceptibility) Collected:  05/09/19 1115    Order Status:  Completed Specimen:  Wound Updated:  05/12/19 1312     Significant Indicator POS (POS)     Source WND     Site Port Catheter Tip     Culture Result Growth noted after further incubation, see below for  organism identification.   (A)     Gram Stain Result No organisms seen.     Culture Result Staphylococcus epidermidis  Rare growth   (A)      Candida albicans  Rare growth   (A)    Narrative:       Surgery Specimen    Culture & Susceptibility     STAPHYLOCOCCUS EPIDERMIDIS     Antibiotic Sensitivity Microscan Unit Status    Ampicillin/sulbactam Sensitive <=8/4 mcg/mL Final    Method: GREGORY    Clindamycin Sensitive <=0.5 mcg/mL Final    Method: GREGORY    Daptomycin Sensitive <=0.5 mcg/mL Final    Method: GREGORY    Erythromycin Sensitive <=0.5 mcg/mL Final    Method: GREGORY    Moxifloxacin Sensitive <=0.5 mcg/mL Final    Method: GREGORY    Oxacillin Sensitive <=0.25 mcg/mL Final    Method: GREGORY    Penicillin Resistant 2 mcg/mL Final    Method: GREGORY    Tetracycline Sensitive <=4 mcg/mL Final    Method: GREGROY    Trimeth/Sulfa Sensitive <=0.5/9.5 mcg/mL Final    Method: GREGORY    Vancomycin Sensitive 1 mcg/mL Final    Method: GREGORY                             Assessment:  Active Hospital Problems    Diagnosis   • *Septic shock ([facility]) [A41.9, R65.21]   • 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 Male with hx of cutaneous T Cell lymphoma (Mycosis fungoides) dx in 11/2017, had extensive w/u at [place] for his 20 year hx of widespread hyperkeratotic plaques who is now being followed by Dr. Mc. He had portacath that was placed in 12/2017 (per wife) and had last chemotherapy was ~3 months ago. He had multiple hospital admissions for cellulitis from diffuse open widespread skin in 3/2019 and 4/2019. First admission was at [facility] and 2nd admission was here at [facility]. Hospitalized from 4/8-4/13 and treated with vanco/unasyn and discharged with doxycyline x 10 days.      Pt was admitted for septic shock due to cellulitis. Per note, pt has been feeling weak with subjective fever 3 days prior to admission. Pt went to [facility] ED and had lactate >4 and was hypotensive and had 3.7L fluid resuscitation and was started on vanco and zosyn. He is now transferred out of the ICU and antibiotics have since been de-escalated.      Interval 24 hour assessment:    AF, O2 RA  Labs reviewed  Studies reviewed  Micro reviewed    Pt continued on fluconazole and augmentin. He is still having shaking chills.  No fevers or increase in WBC.  All prior blood culture negative.      Cutaneous T Cell lymphoma (Mycosis fungoides) dx in 11/2017, had extensive w/u at [place] for his 20 year hx of widespread hyperkeratotic plaques  -CT chest abdomen pelvis on 5/9 with bilateral axillary adenopathy, no pleural or pericardial effusion.  No consolidations or evidence of pneumonia, abdomen pelvis with no significant findings.     Cellulitis   - Superinfected open skin lesions/wound (staph/strep) in the background of persistent cutaneous T cell lymphoma   - No suspicion for brooks che syndrome, TEN, AGEP.  Doubt DRESS syndrome. His eosinophils are normal  2.6%. No suspicion for necrotizing fasciitis.    - Multiple hospital admissions for sepsis due to cellulitis in 3/19 and 4/19  -Buttock wound culture 5/8 with mixed normal skin doreen      Chills and rigors- ongoing   Fevers, acute, - improved      BETO, acute, ongoing   -May be due to previous septic shock, patient also on vancomycin     Diarrhea- new onset   -C. difficile negative on 5/11     Septic shock - resolved  - source is skin and line (portacath).      Bacteremia    - Blood cultures positive 5/8 1 out of 2 with CoNS- Staph haemolyticus   - Port removed on 5/9, catheter tip with CoNS and candida albicans   - Blood cultures 5/10- NGTD  - Blood cultures 5/13 NGTD   - MRSA nares  Neg 5/9   - Completed daptomycin course for CoNS   - PICC line exchange on 5/18   - TTE negative      Plan:   -Due to Candida albicans on cath tip will presume candidemia and treat accordingly despite negative blood cultures as mortality is high if missed - extending fluconazole for 2 weeks from when all lines removed  (End 6/2/19)   - Obtain EKG to assess QTc on fluconazole as borderline high   - Obtain LFTs periodically on fluconazole   - Port to be removed today - culture cath tip   - Repeat blood cultures due to ongoing rigors   - Continue Augmentin for prophylactic coverage due to his diffuse and multiple skin lesions   - Wound team following  - Agree with encouraging bowel care regimen and possible rectal tube as frequent BMs are risk for further infecting open skin lesions        Discussed with Dr. Erickson.   ID will follow.         Angy Francis MD  Infectious Diseases

## 2019-05-19 NOTE — PROGRESS NOTES
IR Note:  Central line assessed by RT Luis at bedside; central line is not tunneled.  This does not require IR interventionalist to remove.  Bedside RN notified.

## 2019-05-19 NOTE — PROGRESS NOTES
Oncology/Hematology Progress Note               Author: Femi SRAVANTHISandra Madi Date & Time created: 5/19/2019  9:53 AM     CC: T-cell lymphoma    Interval History:    No major changes with the patient.  He falls asleep very quickly.  He has not really significantly improved clinically.    Review of Systems:  Review of Systems   Unable to perform ROS: Acuity of condition       Physical Exam:  Physical Exam   HENT:   Head: Normocephalic.   Eyes: Conjunctivae are normal.   Pulmonary/Chest: Effort normal and breath sounds normal.   Abdominal: Soft. Bowel sounds are normal.   Musculoskeletal: He exhibits edema and tenderness.   Lymphadenopathy:     He has no cervical adenopathy.   Neurological: He is alert.   Skin: Rash noted. There is erythema.   Essentially his whole body is skin is damaged by his T-cell lymphoma.       Labs:          Recent Labs      05/17/19   0000  05/18/19   0120  05/19/19   0830   SODIUM  154*  157*  156*   POTASSIUM  3.9  3.7  3.4*   CHLORIDE  119*  123*  123*   CO2  27  27  27   BUN  19 18 17   CREATININE  1.59*  1.60*  1.59*   CALCIUM  7.1*  7.5*  7.0*     Recent Labs      05/17/19   0000  05/18/19   0120  05/19/19   0830   ALTSGPT   --   11   --    ASTSGOT   --   17   --    ALKPHOSPHAT   --   86   --    TBILIRUBIN   --   0.3   --    GLUCOSE  95  102*  105*     Recent Labs      05/17/19   0000  05/17/19   1425  05/18/19   0120  05/19/19   0033   RBC  3.42*   --   3.56*  3.28*   HEMOGLOBIN  9.4*   --   9.6*  9.1*   HEMATOCRIT  31.9*   --   33.3*  30.8*   PLATELETCT  323   --   333  273   PROTHROMBTM   --   15.5*   --    --    APTT   --   35.9   --    --    INR   --   1.22*   --    --      Recent Labs      05/17/19   0000  05/18/19   0120  05/19/19   0033   WBC  8.6  8.9  6.8   NEUTSPOLYS  67.00  61.90  50.40   LYMPHOCYTES  21.70*  35.40  33.90   MONOCYTES  3.50  0.90  3.50   EOSINOPHILS  5.20  1.80  7.80*   BASOPHILS  0.00  0.00  0.90   ASTSGOT   --   17   --    ALTSGPT   --   11   --    ALKPHOSPHAT    --   86   --    TBILIRUBIN   --   0.3   --      Recent Labs      19   0000  19   0120  19   0830   SODIUM  154*  157*  156*   POTASSIUM  3.9  3.7  3.4*   CHLORIDE  119*  123*  123*   CO2  27  27  27   GLUCOSE  95  102*  105*   BUN     CREATININE  1.59*  1.60*  1.59*   CALCIUM  7.1*  7.5*  7.0*     Hemodynamics:  Temp (24hrs), Av.9 °C (98.4 °F), Min:36.6 °C (97.9 °F), Max:37.2 °C (98.9 °F)  Temperature: 36.7 °C (98.1 °F)  Pulse  Av.9  Min: 49  Max: 104  Blood Pressure : (!) 91/60     Respiratory:    Respiration: 18, Pulse Oximetry: 91 %        RUL Breath Sounds: Diminished, RML Breath Sounds: Diminished, RLL Breath Sounds: Diminished, YAJAIRA Breath Sounds: Diminished, LLL Breath Sounds: Diminished  Fluids:    Intake/Output Summary (Last 24 hours) at 19 1119  Last data filed at 19 0900   Gross per 24 hour   Intake              600 ml   Output             1150 ml   Net             -550 ml        GI/Nutrition:  Orders Placed This Encounter   Procedures   • Diet NPO     Standing Status:   Standing     Number of Occurrences:   1     Order Specific Question:   Restrict to:     Answer:   Sips with Medications [3]     Medical Decision Making, by Problem:  Active Hospital Problems    Diagnosis   • *Septic shock (HCC) [A41.9, R65.21]   • Severe sepsis (HCC) [A41.9, R65.20]   • 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  Oncology: Progressive surgery syndrome/T-cell lymphoma is had various treatment regimens by Dr. Mc.  The plan was in the future if he stabilizes for more treatment.  Hospice has been discussed with this patient on several occasions which he has  declined.     5/19/19    The patient clinically is not doing well.  He has had a conversation with Dr. Mc recently and wanted to continue with treatment.  I am not sure that he is can be a good candidate unless he improves clinically from an infection perspective.  He is still being treated for his skin and infection.  The medication that Dr. Mc was considering is being looked at by pharmacy.  We will see if the patient improves over this next week and make a reassessment on treatment.  Patient's definitely can get response.  The patient continues to have extreme pain and infection from his skin.    Pharmacy is looking into Istodax.  This drug has had response in patients even refractory.  Some patients that do get response can have a medium duration can be several months but the range can be broad.  We do get concerned with decrease in neutrophil count with this regimen so his infection risk will be high.    Moderate complexity/discussed with primary team  Please note that this dictation was created using voice recognition software. I have made every reasonable attempt to correct obvious errors, but I expect that there are errors of grammar and possibly context that I did not discover before finalizing the note    Quality-Core Measures

## 2019-05-19 NOTE — PROGRESS NOTES
Palliative Progress Note               Author: Ann Marie Nelson Date & Time created: 2019  9:23 AM     Reason for subsequent visit: cancer-related pain generalized pain    Interval History:  No acute events overnight.  Hospitalist team did decrease patient's Dilaudid yesterday due to increasing sedation.  Patient currently getting his dressing changes by RN with assistance of CNA.  Patient is more alert and reporting 5-6/10 severity of generalized pain.  He has increased pain due to dressing changes and just received a dose of Dilaudid prior.  He feels that the oxycodone is working for pain control with Dilaudid for severe breakthrough pain.  He continues to have itching but reports it is improving.  He remains somewhat drowsy with intermittent tense body language during dressing changes.    Review of Systems:  Positive for pain (generalized cutaneous). Positive for anorexia (only eating small amounts). Positive for fatigue. Positive for sedation (feels excessively drowsy). Negative for insomnia.      Physical Exam:    Recent vital signs  Temp (24hrs), Av.9 °C (98.4 °F), Min:36.6 °C (97.9 °F), Max:37.2 °C (98.9 °F)  Temperature: 36.7 °C (98.1 °F)  Pulse  Av.9  Min: 49  Max: 104  Blood Pressure : (!) 91/60       Physical Exam   Constitutional: He appears lethargic. He is cooperative. He appears distressed (related to recieving dressing changes).   HENT:   Mouth/Throat: No oropharyngeal exudate (tongue dry).   Cardiovascular: Normal rate and normal heart sounds.    Distant     Pulmonary/Chest: Effort normal. No respiratory distress.   Abdominal: Soft. He exhibits distension. Fluid wave: Rectal tube in place. There is no tenderness.   Rectal tube in place   Musculoskeletal: He exhibits edema (3+ BLE; 1+ BUE).   Neurological: He appears lethargic.   Improved alertness today; assessed during dressing change   Skin: Lesion (generalized) noted.   Generalized dry, flaky skin with diffuse  maceration/scabs/lesions including face/neck, trunk, back, arms, groin.  Open oozing/bleeding wounds on back.   Psychiatric: He has a normal mood and affect. His behavior is normal. His speech is not slurred (mumbling but more clear than yesterday). Cognition and memory are impaired. He exhibits abnormal recent memory.     Recent Labs      05/17/19   0000  05/18/19   0120  05/19/19   0033   WBC  8.6  8.9  6.8   RBC  3.42*  3.56*  3.28*   HEMOGLOBIN  9.4*  9.6*  9.1*   HEMATOCRIT  31.9*  33.3*  30.8*   MCV  93.3  93.5  93.9   MCH  27.5  27.0  27.7   MCHC  29.5*  28.8*  29.5*   RDW  60.0*  61.5*  62.4*   PLATELETCT  323  333  273   MPV  9.4  9.4  9.5     Medications reviewed. Labs Reviewed.     Problem List:  1.  Cancer-related generalized cutaneous pain (active)  2.  Peripheral neuropathy (active)  3.  Generalized pruritus (active)  4.  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  Dry mouth/thrush (active/improving)  7.  Acute depression (active)  8.  BETO (active/stable)  9.  GERD (stable)  10.  Anemia (stable)  11.  Edema (improving in BUE; active in BLE)  12.  Fecal incontinence (active)  13.  Altered mental status (active)  14.  Intermittent mild hypotension (active)    Assessment/Plan:  Cancer-related cutaneous pain and peripheral neuropathy  Prefer not to use morphine d/t patient's BETO -- GFR = 43; Cr 1.59; BUN 17 (5/19/19)  -Estimated creatinine clearance per Cockcroft-Gault 52-67 mg/min    MEDD (morphine equivalent daily dose) is approximately 50 mg:  - hydromorphone 0.25 mg x 1 doses = 5 mg MEDD  - oxycodone 10 mg X 3 doses = 30 mg = 45 mg MEDD    5/18/19 MEDD ~ 60 mg  5/17/19 MEDD ~ 46.5 mg    Continue hydromorphone 0.5 mg IV Q3 hrs PRN before dressing changes if needed  Continue hydromorphone 0.25 mg IV Q3 hrs PRN severe pain   Continue oxycodone from 5-10 mg PO Q3 hrs prn moderate pain    Generalized  pruritus  Our team considers patient's pruritus a neuropathic pain syndrome, since it is caused by peripheral nerve damage -- see Fast Fact #37  Discontinue mirtazapine; will hold off restarting duloxetine at this time due to sedation/AMS  Keep nails trimmed  Apply unsecured restraint mitts while patient sleeping to avoid him scratching inadvertently and causing more trauma    Altered mental status  Only new medication is mirtazapine which can cause sedation - will d/c all antidepressants to see if mentation clears; discussed with Dr. 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 Mc; patient has had multiple treatment regimens  Plan was for further treatment if he stabilizes  Per oncology hospice has been discussed with the patient on several occasions but he has declined; goal for this hospitalization is to manage infection and skin-pending hospital course further treatment will be discussed outpatient.    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  Orders per wound care team:  - Nursing performing dressing changes with xeroform  - Trauma pad to be placed under patient's back  - Change dressing every 48 hrs or prn with soilage or dislodgement  Patient now more compliant with dressing and linen changes  Had BMS system placed 5/17/2019 for incontinence management with lactulose 3 times daily per primary team to keep stool soft for rectal tube  [facility]    Dry mouth/thrush  Continue nystatin 100,000 units/mL swish & swallow, 5 mL suspension QID     Acute depression  Consider retrial of duloxetine when patient's mentation improves      Code Status: Full    Advance Care Planning/Current Goals of Care:   Not discussed today due to patient's altered mental status; if no improvement will plan to call patient's wife in the coming days.  Discussed with Dr. Erickson who reports there was some family present yesterday.  She discussed prognosis and encouraged family to discuss goals of care with patient.     25 minutes spent with greater than 50% spent counseling and coordinating the patient's care regarding pain/symptom management.   Please refer to HPI and assessment/plan for details.     Thank you for allowing the palliative care team to participate in Mr. Ortiz's care. Please call with any questions/needs.     HERO Loo.  Palliative Care Nurse Practitioner  [phone]

## 2019-05-19 NOTE — PROGRESS NOTES
Aaox3, prn meds given for pain, NPO for IR procedure, q2 turn observed, rectal tube intact and secured, POC discussed, needs attended.

## 2019-05-19 NOTE — PROGRESS NOTES
Pt A&O. Medicated for pain per MAR. Skin interventions in place. Rectal tube in place, flushing throughout shift, leaks at times. Q2 hour turns. PICC patent with fluids per MAR. NPO for IR procedure today, not on schedule yet. All needs met.

## 2019-05-19 NOTE — CARE PLAN
Problem: Knowledge Deficit  Goal: Knowledge of disease process/condition, treatment plan, diagnostic tests, and medications will improve    Intervention: Explain information regarding disease process/condition, treatment plan, diagnostic tests, and medications and document in education  IR removal of CVC, wound care      Problem: Pain Management  Goal: Pain level will decrease to patient's comfort goal    Intervention: Follow pain managment plan developed in collaboration with patient and Interdisciplinary Team  Prn meds per MAR

## 2019-05-20 NOTE — PROGRESS NOTES
Received report & assumed care of patient at 1900. Assessment completed. Patient is A&Ox3, disoriented to situation. R PICC patent, positive blood return, running D5W at 75 mL/hr. Patient reports pain of 2/10, no interventions requested at this time. Q2 turns in place. Woods to gravity. Rectal tube in place. POC discussed & questions answered. Bed locked and in lowest position, bed alarm is on, call light within reach, non-skid socks in place, hourly rounding. Patient reports no further needs at this time.

## 2019-05-20 NOTE — PROGRESS NOTES
Patient's blood pressure 85/48 at 0028, 85/57 at 0408. Updated Dr. Mccracken, received orders to increase D5W from 75 mL/hr to 100 mL/hr.

## 2019-05-20 NOTE — PROGRESS NOTES
2 RN skin check complete with Susanna LOPES.   Devices in place: rectal tube, reyes catheter.  Skin assessed under devices: open wounds over entire body r/t cutaneous T cell lymphoma.  Confirmed pressure ulcers found: none.  No new potential pressure ulcers noted. Wound has already been consulted.  The following interventions in place: Q2 turns, waffle overlay, xeroform dressings, barrier wipes, pillows in use for support and positioning.

## 2019-05-20 NOTE — PROGRESS NOTES
Elma from Lab called with critical result of sodium 157 at 1158. Critical lab result read back to Elma.   Dr. Erickson notified of critical lab result at 1158, and was already aware from physical lab slip results that were sent to floor early this morning.  Critical lab result read back by Dr. Erickson.

## 2019-05-20 NOTE — PROGRESS NOTES
[facility] Medicine Daily Progress Note    Date of Service  5/20/2019    Chief Complaint  68 y.o. male admitted 5/8/2019 with weakness and fevers.     Hospital Course    Mr. Ortiz has a past medical history of T-cell lymphoma followed by Dr. Mc the most recently admitted here for a skin infection was discharged on April 13 due to a rash from his T-cell lymphoma.  He was discharged home on oral doxycycline.  He presented to the emergency room in [facility] with weakness, feeling poorly, and fevers.  Is found to be hypotensive and in septic shock.  He was transferred here for higher level of care.  Despite IV fluids, is required intravenous pressors ongoing.      Interval Problem Update  5/9: Patient seen and evaluated in the intensive care unit this morning. A central line has been placed and his right-sided catheter has been removed.  Patient notes significant pain from his skin.  5/10: Mr. Ortiz was examined and evaluated in the intensive care unit this morning. Blood cultures are positive for gram positive cocci. 500 ml urine over night. He is on a dilaudid PCA due to pain. CT was done for staging purposes of his cancer.  I do long talk with patient about CODE STATUS and he states he does not want to make a decision until he can speak to his wife which will need to be over the phone as she does not have the resources to be able to come to [place] from [place].  Due to dry mouth, he has been drinking excessively and states that he cannot tolerate how dry his mouth this and thus feels he needs to drink very frequently.  5/11: Patient seen and evaluated in the intensive care unit this morning. He has been using the PCA due to pain. He had multiple episodes of diarrhea last night. I long talk with Mr. Ortiz today about possibly going to a burn center due to the degree of his skin involvement.  He states he wants to talk to his wife about this first and he would really use it [place] but he would  [facility] burn [place] if his wife is okay.  A Reyes catheter is been placed due to urinary retention and skin breakdown as he is unable to control his urination.  5/12: Mr. Ortiz was seen and evaluated in the intensive care unit this morning. He has been requiring oxycodone for pain. He has had adequate urine output from the reyes. He is on room air. I examined his back with Dr. Gongora, trauma surgery, and he agrees that a referral to a burn center is appropriate.   5/13: patient seen and evaluated in the ICU. He has been in a sinus rhythm. He is tolerating a dysphagia III diet. He has been refusing mobilization. She is on room air. I placed a referral to [facility] via our transfer center and they have refused him. Mr. Ortiz will only accept [place] as [facility] and [facility] are too far for his wife to travel.   5/14: Status post transfer from ICU to oncology unit day #1.  Patient appears to be in pain with any movement of his body due to his open sores lesions to his entire back as well as right shoulder from his cutaneous T-cell lymphoma.  He has refused dressing changes due to pain.  I have counseled palliative care to address pain management as well as to address his full CODE STATUS.  I did have conversation with patient but he was unable to focus on any clear discussion of his CODE STATUS.  He did state that we could call his wife for further discussion.  Discussed with ID.  His central line catheter tip was positive for Candida.  He is currently on broad-spectrum antibiotics and antifungal.  He repeatedly is incontinent of stool which does travel up his back and into his open wound lesions.  He is incontinent of urine he does have a Reyes catheter in place.  Palliative care did increase his IV morphine to IV Dilaudid with breakthrough pain continue with oxycodone.  He does have edema of his lower extremities and is off IV fluids.  Thrush noted of mouth started on nystatin swish and swallow.   Started on antidepressant SSRI, DC'd PPI due to C. difficile inducing.  On broad-spectrum antibiotics currently.  Diphenhydramine added for itching.  5/15: doing better today, appears to be in less pain with movement.  Answering questions appropriately.  Started daptomycin per ID for coag negative staph in blood culture and cath tip.  S/p catheter removal and port replacement. Itching decreased today.  Discussed treatment options with Dr. Mc and pharmacy is ordering a chemotherapy medication.  He is very concerned about his wife's well being if he should pass and thus is doing everything he can to continue to support her.  Palliative care is attempting to get an AD.  BP remains low, did receive 1 L ivfs overnight for low bp, but leg edema increased.  He is not on any BP meds.  Ordered midodrine 5mg tid to avoid ivf/edema overload.  He is taking in po fluids well.  Ordered to attempt rectal tube since loose stool today, patient unable to feel sensation of BM.  Again, bedside RN had to clean his back wounds from stool contamination.  He is not taking any stool softeners.  5/16: Patient's skin appears to be improving with antibiotics and wound care.  He is also in less pain with wound changes.  Iron levels were low added oral iron twice daily with vitamin C.  Blood pressure stabilized on Midrin 5 mg 3 times daily multivitamin added for skin healing.  I have also ordered ROBERTO hoses bilateral for the lower extremities since edema remains and to avoid skin breakdown of the lower extremities.  I have ordered PT OT evaluations.  5/17:  Continues to have severe pain with wound care cleaning.  Seen today while getting cleaned, his back wounds are healing, less inflamed and weeping, however he continues to stool himself thus contaminating his wounds.  Ordered rectal tube and lactulose tid in order to keep rectal tube in place.  He has soft, but formed stool.  Dilaudid IV increased for pain control.  ID asked to TN central  line left chest for PICC line instead.   Remains on unasyn and diflucan.  Finished daptomycin treatment.  5/18: Rectal tube in place.  Woods catheter in place.  Sodium increased to 157 therefore added D5W 75 an hour.  He remains on Augmentin and Diflucan.  His mentation does appear to be more drowsy on the increased dose of Dilaudid.  We will try to back down on his Dilaudid dose.  5/19: Patient is more altered today.  Does appear to be shaking more.  I have decreased Dilaudid IV dosing due to drowsiness yesterday.  He does not appear to be more pain.  His sodium levels have increased to 157.  Continue D5W at 75 an hour.  EKG ordered for QT interval.  Order to culture catheter tip placed.  Repeat blood cultures placed for rigors seen on exam.  Continue with the wound care, Woods and rectal tube to prevent contamination of multiple and extensive open skin lesions.  5/20: Patient appears to be alert today getting his wounds cleansed.  Of note I have cut back on his IV Dilaudid dose.  Wounds do appear to be healing but still remain open ulcerated extensive back and groin.  He remains with rectal tube and Woods catheter in place to keep wounds clean.  Sodium remains elevated 157 therefore increase D5W from 100-125 an hour.  Platelets hemoglobin and white count remained stable.  His blood pressure remains slightly low 85/57.  I am replacing potassium.      Consultants/Specialty  Critical Care.  Infectious disease  Oncology  Palliative care.    Code Status  full    Disposition    Lives in [place] with his wife.  Apparently patient takes care of the wife.  [facility] declined transfer of patient stating does not meet burn criteria.  PT OT rec transitional care.  LTACH ordered.    Review of Systems  Review of Systems   Constitutional: Positive for malaise/fatigue. Negative for chills, diaphoresis and fever.   HENT: Negative for congestion and sore throat.    Eyes: Negative for pain and discharge.   Respiratory: Negative  for cough, hemoptysis, sputum production, shortness of breath and wheezing.    Cardiovascular: Positive for leg swelling. Negative for chest pain, palpitations and claudication.   Gastrointestinal: Negative for abdominal pain, constipation, diarrhea, melena, nausea and vomiting.   Genitourinary: Negative for dysuria, frequency and urgency.   Musculoskeletal: Positive for back pain and myalgias. Negative for joint pain and neck pain.   Skin: Positive for itching and rash.   Neurological: Negative for dizziness, sensory change, speech change, focal weakness, loss of consciousness, weakness and headaches.   Endo/Heme/Allergies: Does not bruise/bleed easily.   Psychiatric/Behavioral: Negative for depression, substance abuse and suicidal ideas.        Physical Exam  Temp:  [36.4 °C (97.6 °F)-36.8 °C (98.3 °F)] 36.8 °C (98.3 °F)  Pulse:  [70-85] 77  Resp:  [16-20] 18  BP: ()/(48-59) 95/59  SpO2:  [91 %-98 %] 95 %    Physical Exam   Constitutional: He is oriented to person, place, and time. He appears well-developed. No distress.   HENT:   Dry mucous membranes  No facial droop  Chapped lips   Eyes: Right eye exhibits no discharge. Left eye exhibits no discharge. No scleral icterus.   Pale conjunctiva   Neck: Normal range of motion. Neck supple. No tracheal deviation present.   Cardiovascular: Normal rate and regular rhythm.    No murmur heard.  Pulmonary/Chest:   left chest cath site CD&I  Clear lung sounds bilaterally   Abdominal: Soft. He exhibits no distension. There is no tenderness.   Genitourinary:   Genitourinary Comments: Woods catheter, rectal tube in place.   Musculoskeletal: He exhibits edema and tenderness.   3+ edema of the lower extremities   Neurological: He is alert and oriented to person, place, and time.   Skin: Skin is warm and dry. He is not diaphoretic.   Extensive xerosis of the anterior skin more so on his scalp and face  The skin on his chest anterior surfaces is red and raised the skin of the  groin and back is wet and weeping of clear fluid. Large regions akin to jigsaw puzzle pieces with macerated beefy red tissue      Psychiatric: Judgment and thought content normal.   Nursing note and vitals reviewed.      Fluids    Intake/Output Summary (Last 24 hours) at 05/20/19 1624  Last data filed at 05/20/19 1000   Gross per 24 hour   Intake             2849 ml   Output             1700 ml   Net             1149 ml       Laboratory  Recent Labs      05/18/19   0120  05/19/19   0033   WBC  8.9  6.8   RBC  3.56*  3.28*   HEMOGLOBIN  9.6*  9.1*   HEMATOCRIT  33.3*  30.8*   MCV  93.5  93.9   MCH  27.0  27.7   MCHC  28.8*  29.5*   RDW  61.5*  62.4*   PLATELETCT  333  273   MPV  9.4  9.5     Recent Labs      05/18/19   0120  05/19/19   0830  05/20/19   0025   SODIUM  157*  156*  157*   POTASSIUM  3.7  3.4*  3.5*   CHLORIDE  123*  123*  124*   CO2  27  27  27   GLUCOSE  102*  105*  101*   BUN  18  17  17   CREATININE  1.60*  1.59*  1.69*   CALCIUM  7.5*  7.0*  7.1*                   Imaging  EC-ECHOCARDIOGRAM LTD W/O CONT   Final Result      IR-PICC LINE PLACEMENT W/ GUIDANCE > AGE 5   Final Result                  Ultrasound-guided PICC placement performed by qualified nursing staff as    above.          CT-CHEST,ABDOMEN,PELVIS WITH   Final Result      1.  Bilateral axillary adenopathy, more notable on the right. Some of these nodes are larger than on the prior PET/CT. There is no intrathoracic adenopathy.   2.  No abdominal adenopathy.   3.  Borderline enlarged inguinal lymph nodes, overall decreased in size compared with the prior PET/CT.      IR-CVC TUNNEL WITH PORT REMOVAL   Final Result      1.  Removal of RIGHT  internal jugular PowerPort venous implant.   2.  No evidence of infection at the port pocket.   3.  The port reservoir and port catheter were removed intact.      DX-CHEST-LIMITED (1 VIEW)   Final Result      Left central venous line in place. No pneumothorax.               INTERPRETING LOCATION: [address]  [facility], [id number]      DX-CHEST-PORTABLE (1 VIEW)   Final Result      No acute cardiopulmonary abnormality. No interval change.           Assessment/Plan  * Septic shock (HCC)- (present on admission)   Assessment & Plan    Septic shock present upon admission  Source consistent with cutaneous infection  IR removed the indwelling port  Infectious disease consult  He met the criteria for septic shock as evidenced by the need for intravenous pressor support  Organ system failure associated with this disease process is the cardiovascular as is noted by hypotension requiring pressors  Broad-spectrum IV antibiotics to cover for skin infection in the setting of immunocompromised host--IV Unasyn  Catheter tip positive for Candida.  ID following.  On antifungal.     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  Dr. Bradley, oncology, has been consulted.    CT done for staging  Code status has been addressed and he continues to request that everything is done.   Palliative care consult placed.  Palliative care did discuss CODE STATUS with patient who is hesitant to place stress on his wife by naming her for AD.  He wants to continue to care for his wife, whom he is worried about her wellbeing if he should pass.    Pain overall is improved with increased pain medications.     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 Gongora, trauma surgery, has evaluated the skin and agrees that a referral to a burn center is appropriate.   Mr. Ortiz states that he would be amenable to going to [facility] though no other facility as it would be closer to his wife.   IV and oral narcotic pain meds.  [facility] has refused him on 5/13 and may need to be re-addressed if his status  decompensated.   Continue on antifungal medication.       Sepsis due to coagulase-negative staphylococcal infection with metabolic encephalopathy (HCC)   Assessment & Plan    5/8 blood culture and cath tip + coag negative staph  Finished daptomycin IV per ID, augmentin po, antifungal for cath tip candida + culture.   right port removed.  Has new left chest port placed:    Order for IR removal left chest port per ID did not wait 48 hours for culture negative.  Order for PICC line placed.  5/10 and 5/13 Repeat BCs x2 negative.  TTE negative for endocarditis.  Patient has Woods catheter as well as rectal tube in place to prevent further contamination of his open skin wounds to back.       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 Mc for years.  5/15:  Plan for treatment while in hospital further chemotherapy after discussion with Dr. Mc, Dr. Barraza, pharmacy and patient.  Continue pain control  Continue rigorous wound care since patient unable to discern when he has BMs.  Stool tracks up his back and into his open skin wounds.  No diarrhea per bedside nursing.  Patient has successful placement of rectal tube and Woods catheter to prevent further contamination of open wounds.     BETO (acute kidney injury) (HCC)- (present on admission)   Assessment & Plan    Resolved acute kidney injury.  Continue with oral hydration.  Patient's legs are edematous.  Avoid IV fluids if possible.     Hypernatremia   Assessment & Plan    Remains mildly hypernatremic.  Continue to encourage p.o. Intake.  Appears to be due to skin fluid losses.  Sodium increased to 157 after start of lactulose for rectal tube placement.  Likely continued fluid losses.  Started D5W at 75 an hour and monitor closely for leg edema.     Edema- (present on admission)   Assessment & Plan     lower extremity swelling  Off IV lasix.  He is at risk of intravascular depletion given  the significant insensible fluid losses from skin     Iron deficiency anemia- (present on admission)   Assessment & Plan    Hemoglobin is stable.  Iron levels low, started iron bid with vit c.   b12, folate normal.     GERD (gastroesophageal reflux disease)- (present on admission)   Assessment & Plan    Hold outpatient due to currently on broad-spectrum antibiotics by ID.  Avoid C. difficile inducing medications such as PPIs.     Asthma   Assessment & Plan    Hemoglobin is 10.5          VTE prophylaxis: heparin

## 2019-05-20 NOTE — PROGRESS NOTES
Patient has rectal tube. At shift change, the bag was full and feces was backing up into the tube. Changed bag and cleared tube.

## 2019-05-20 NOTE — PROGRESS NOTES
Infectious Disease Progress Note    Author: Sharon Yañez M.D. Date & Time of service: 2019  2:09 PM    Chief Complaint:  Septic shock, cellulitis      Interval History:  68 y.o. Wm with hx of cutaneous T Cell lymphoma (Mycosis fungoides) dx in 2017, had extensive w/u at [facility] for his 20 year hx of widespread hyperkeratotic plaques who is now being followed by Dr. Mc. He had portacath that was placed in 2017 (per wife) and had last chemotherapy was ~3 months ago. Multiple hospital admissions for cellulitis from diffuse open widespread skin in 3/2019 and 2019. Admitted 2019 for septic shock due to cellulitis with fever, elevated lactate >4, and was hypotensive   Interval 24 hours of Vitals/Labs/Micro,and imaging results reviewed as available. See assessment.    Interval 24 hours of Vitals/Labs/Micro,and imaging results reviewed as available. See assessment.    AF WBC 6.8 c/o hallucinations worse in the morning-Pain (everywhere) controlled with dilaudid. Denies  abx Has PICC. Plan for new dressings for back-pictures viewed    Review of Systems:  Review of Systems   Constitutional: Positive for malaise/fatigue. Negative for chills and fever.   Cardiovascular: Positive for leg swelling.   Musculoskeletal: Positive for myalgias.   Skin: Positive for rash.        Diffuse with ulcerations   Psychiatric/Behavioral: Positive for hallucinations.   All other systems reviewed and are negative.      Hemodynamics:  Temp (24hrs), Av.7 °C (98 °F), Min:36.4 °C (97.6 °F), Max:36.8 °C (98.3 °F)  Temperature: 36.8 °C (98.3 °F)  Pulse  Av  Min: 49  Max: 104  Blood Pressure : (!) 95/59       Physical Exam:  Physical Exam   Constitutional: He is oriented to person, place, and time. He appears well-developed and well-nourished.   HENT:   Head: Normocephalic and atraumatic.   Eyes: Conjunctivae and EOM are normal.   Aniscoria   Cardiovascular: Normal rate, regular rhythm and normal heart  sounds.    Pulmonary/Chest: Effort normal and breath sounds normal.   Abdominal: Soft. Bowel sounds are normal. He exhibits no distension. There is no tenderness. There is no rebound and no guarding.   Musculoskeletal: Normal range of motion. He exhibits edema.   Neurological: He is alert and oriented to person, place, and time.   Skin: Skin is warm. There is erythema.   Multiple, diffuse plaques and skin lesons, + erythema  DIffuse ulcerations back and buttock   Psychiatric: He has a normal mood and affect. His behavior is normal.       Meds:    Current Facility-Administered Medications:   •  D5W  •  HYDROmorphone  •  lactulose  •  HYDROmorphone  •  ferrous sulfate  •  ascorbic acid  •  multivitamin  •  amoxicillin-clavulanate  •  midodrine  •  oxyCODONE immediate-release  •  fluconazole  •  acetaminophen **OR** acetaminophen  •  budesonide-formoterol  •  Pharmacy Consult Request  •  Respiratory Care per Protocol  •  NS  •  heparin  •  albuterol    Labs:  Recent Labs      05/18/19   0120  05/19/19   0033   WBC  8.9  6.8   RBC  3.56*  3.28*   HEMOGLOBIN  9.6*  9.1*   HEMATOCRIT  33.3*  30.8*   MCV  93.5  93.9   MCH  27.0  27.7   RDW  61.5*  62.4*   PLATELETCT  333  273   MPV  9.4  9.5   NEUTSPOLYS  61.90  50.40   LYMPHOCYTES  35.40  33.90   MONOCYTES  0.90  3.50   EOSINOPHILS  1.80  7.80*   BASOPHILS  0.00  0.90   RBCMORPHOLO  Normal  Present     Recent Labs      05/18/19   0120  05/19/19   0830  05/20/19   0025   SODIUM  157*  156*  157*   POTASSIUM  3.7  3.4*  3.5*   CHLORIDE  123*  123*  124*   CO2  27  27  27   GLUCOSE  102*  105*  101*   BUN  18  17  17     Recent Labs      05/18/19   0120  05/19/19   0830  05/20/19   0025   ALBUMIN  1.9*   --    --    TBILIRUBIN  0.3   --    --    ALKPHOSPHAT  86   --    --    TOTPROTEIN  4.8*   --    --    ALTSGPT  11   --    --    ASTSGOT  17   --    --    CREATININE  1.60*  1.59*  1.69*       Imaging:  Ct-chest,abdomen,pelvis With    Result Date: 5/9/2019 5/9/2019 9:08 PM  HISTORY/REASON FOR EXAM:  T cell lymphoma restaging,compare with prior PET scan TECHNIQUE/EXAM DESCRIPTION: CT scan of the chest, abdomen and pelvis with contrast. Thin-section helical scanning was obtained from the lung apices through the pubic symphysis to include the chest, abdomen and pelvis.  100 mL of nonionic contrast was administered intravenously without complication. Low dose optimization technique was utilized for this CT exam including automated exposure control and adjustment of the mA and/or kV according to patient size. COMPARISON: PET/CT 4/30/2018 is findings there are FINDINGS: CT CHEST:The lungs are fully expanded, with no parenchymal abnormality.  There is no pleural or pericardial effusion. There are no enlarged mediastinal or hilar nodes. There are enlarged axillary nodes bilaterally however, more notably on the right. The largest right-sided node measures about 15 mm in greatest short axis dimension. The bony thorax is unremarkable. CT ABDOMEN:The liver, spleen, pancreas, and stomach are unremarkable.  Adrenal glands are normal.  No opaque gallstones.  The kidneys show symmetric opacification with no hydronephrosis. A 12 mm exophytic lesion is seen along the lateral margin of the right kidney, likely a cyst. This is unchanged. The bowel and mesentery are grossly normal. No mesenteric or retroperitoneal adenopathy. No free fluid or air. CT PELVIS:The bladder is intact. No free fluid in the pelvis.  The pelvic bowel and mesentery are grossly normal.  The bony pelvis and visualized proximal femora are intact. Borderline enlarged inguinal lymph nodes are present bilaterally which are somewhat smaller than before.     1.  Bilateral axillary adenopathy, more notable on the right. Some of these nodes are larger than on the prior PET/CT. There is no intrathoracic adenopathy. 2.  No abdominal adenopathy. 3.  Borderline enlarged inguinal lymph nodes, overall decreased in size compared with the prior  PET/CT.    Dx-chest-limited (1 View)    Result Date: 5/9/2019  HISTORY/REASON FOR EXAM: central venous line placement. TECHNIQUE/EXAM DESCRIPTION:  Single view of the chest,  5/8/2019 11:47 PM COMPARISON:  Prior image today at 2043 hours FINDINGS:   A new left central venous line has been placed, the tip of which projects in the area of the mid SVC. There is no pneumothorax. Cardiopulmonary appearance is unchanged.     Left central venous line in place. No pneumothorax. INTERPRETING LOCATION: [address] [place] [address] [place], [id number]    Dx-chest-portable (1 View)    Result Date: 5/8/2019 5/8/2019 8:34 PM HISTORY/REASON FOR EXAM:  Shortness of breath. Suspicion of sepsis. TECHNIQUE/EXAM DESCRIPTION AND NUMBER OF VIEWS: Single portable view of the chest. COMPARISON: 4/8/2019 FINDINGS: The heart, mediastinum, and anand are unremarkable. The lungs are well expanded and show no evidence of infiltrate or other acute process. There is no obvious pleural effusion. The bony thorax is grossly normal. A right central venous line with an attached infusion port is unchanged in position.     No acute cardiopulmonary abnormality. No interval change.    Ir-cvc Tunnel With Port Removal    Result Date: 5/9/2019 5/9/2019 10:26 AM HISTORY/REASON FOR EXAM:  T-cell lymphoma. Diffuse skin lesions possibly exacerbated by blood stream infection. Port removal requested. TECHNIQUE/EXAM DESCRIPTION: Removal of RIGHT internal jugular PowerPort venous access implant. PROCEDURE:  Informed consent was obtained.  The RIGHT anterior chest wall was prepped and draped in a sterile manner with chlorhexidine. All elements of MAXIMAL STERILE BARRIER technique were followed. Moderate sedation with Fentanyl and Versed was administered during the procedure with appropriate continuous patient monitoring by the radiology nurse. SEDATION DURATION: 30 minutes FLUOROSCOPY TIME: 0.1 minutes NUMBER OF FILMS: 1 fluoroscopic images obtained. Following local anesthesia with 6 mL  1% lidocaine with epinephrine, a transverse incision was made over the site of the healed incision above the port reservoir.  Following blunt and sharp dissection, the port reservoir and catheter were removed intact.  The skin overlying the port and the port pocket were unremarkable with no evidence of infection.  There was no pus or exudate about the port reservoir.  The port catheter was removed intact. The port reservoir and catheter were submitted in a sterile container for cultures and Gram stain as well as fungal cultures. Manual compression was applied over the RIGHT  internal jugular catheter entry site and port pocket for about 5 minutes. The port pocket was closed with deep and subcuticular layers of interrupted 3 -- 0 Vicryl suture and the skin sealed with Dermabond.  A sterile dressing was applied. The patient tolerated the procedure well with no evidence of complication. COMPARISON:  1 view chest 5/9/2019 FINDINGS:  The port reservoir and catheter were removed intact.  There was no discharge or exudate at the port pocket.     1.  Removal of RIGHT  internal jugular PowerPort venous implant. 2.  No evidence of infection at the port pocket. 3.  The port reservoir and port catheter were removed intact.    Ec-echocardiogram Ltd W/o Cont    Result Date: 5/18/2019  Transthoracic Echo Report Echocardiography Laboratory CONCLUSIONS Prior echo done 12/13/18, this study is much more limited in quality however no obvious change. Technically difficult study incomplete information is obtained. Left ventricular ejection fraction is visually estimated to be 65%. Right ventricle not well visualized, may be mildly dilated, grossly normal function. Valves not well visualized. Unable to estimate pulmonary artery pressure due to an inadequate tricuspid regurgitant jet. DOMENICO MUNOZ Exam Date:         05/18/2019                    16:19 Exam Location:     Inpatient Priority:          Routine Ordering Physician:         JAMIA SALAS Referring Physician: Sonographer:               James Rodríguez RDCS Age:    68     Gender:    M MRN:    [id number] :    1950 BSA:    2.2    Ht (in):    68     Wt (lb):    238 Exam Type:     Limited Indications:     Endocarditis ICD Codes:       421 CPT Codes:       27005 BP:   103    /   61     HR: Technical Quality:       Technically difficult study                          incomplete information is                          obtained MEASUREMENTS  (Male / Female) Normal Values 2D ECHO LV Diastolic Diameter PLAX        4.5 cm                4.2 - 5.9 / 3.9 - 5.3 cm LV Systolic Diameter PLAX         2.9 cm                2.1 - 4.0 cm IVS Diastolic Thickness           0.91 cm               LVPW Diastolic Thickness          1.1 cm                Estimated LV Ejection Fraction    65 %                  * Indicates values subject to auto-interpretation LV EF:  65    % FINDINGS Left Ventricle Left ventricular systolic function appears to be normal. Wall motion is difficult to assess with the limitations of image quality, appears grossly normal. Left ventricular ejection fraction is visually estimated to be 65%. Indeterminate diastolic function. Normal left ventricular wall thickness. Right Ventricle Right ventricle not well visualized, may be mildly dilated, grossly normal function. Right Atrium Right atrium not well visualized. Left Atrium Normal left atrial size. Mitral Valve Structurally normal mitral valve without significant stenosis or regurgitation. Aortic Valve The aortic valve is not well visualized. No stenosis or regurgitation seen. Tricuspid Valve The tricuspid valve is not well visualized. No stenosis or regurgitation seen. Unable to estimate pulmonary artery pressure due to an inadequate tricuspid regurgitant jet. Pulmonic Valve The pulmonic valve is not well visualized. Pericardium Pericardium not well visualized. Aorta Ascending aorta not well visualized. Deanna Chand MD  "(Electronically Signed) Final Date:     18 May 2019 17:34    Ir-picc Line Placement W/ Guidance > Age 5    Result Date: 5/18/2019  HISTORY/REASON FOR EXAM:   PICC placement. TECHNIQUE/EXAM DESCRIPTION AND NUMBER OF VIEWS:   PICC line insertion with ultrasound guidance.  The procedure was performed using maximal sterile barrier technique including sterile gown, mask, cap, and donning of sterile gloves following appropriate hand hygiene and/or sterile scrub. Patient skin site was prepped with 2% Chlorhexidine solution. FINDINGS:  PICC line insertion with Ultrasound Guidance was performed by qualified nursing staff without the assistance of a Radiologist. PICC positioning appropriateness confirmed by 3CG technology; chest xray only needed in the instance 3CG unable to confirm placement.              Ultrasound-guided PICC placement performed by qualified nursing staff as above.       Micro:  Results     Procedure Component Value Units Date/Time    BLOOD CULTURE [[id number]] Collected:  05/19/19 1224    Order Status:  Completed Specimen:  Blood from Peripheral Updated:  05/20/19 0935     Significant Indicator NEG     Source BLD     Site PERIPHERAL     Culture Result No Growth  Note: Blood cultures are incubated for 5 days and  are monitored continuously.Positive blood cultures  are called to the RN and reported as soon as  they are identified.      Narrative:       Per Hospital Policy: Only change Specimen Src: to \"Line\" if  specified by physician order.  Left Hand    BLOOD CULTURE [[id number]] Collected:  05/19/19 1225    Order Status:  Completed Specimen:  Blood from Peripheral Updated:  05/20/19 0935     Significant Indicator NEG     Source BLD     Site PERIPHERAL     Culture Result No Growth  Note: Blood cultures are incubated for 5 days and  are monitored continuously.Positive blood cultures  are called to the RN and reported as soon as  they are identified.      Narrative:       Per Hospital Policy: Only change " "Specimen Src: to \"Line\" if  specified by physician order.  Left Wrist    BLOOD CULTURE [[id number]] Collected:  05/13/19 1435    Order Status:  Completed Specimen:  Blood from Peripheral Updated:  05/18/19 2100     Significant Indicator NEG     Source BLD     Site PERIPHERAL     Culture Result No growth after 5 days of incubation.    Narrative:       Per Hospital Policy: Only change Specimen Src: to \"Line\" if  specified by physician order.  Left Hand    BLOOD CULTURE [[id number]] Collected:  05/13/19 1403    Order Status:  Completed Specimen:  Blood from Peripheral Updated:  05/18/19 1700     Significant Indicator NEG     Source BLD     Site PERIPHERAL     Culture Result No growth after 5 days of incubation.    Narrative:       Per Hospital Policy: Only change Specimen Src: to \"Line\" if  specified by physician order.  Right Hand    BLOOD CULTURE [[id number]] Collected:  05/10/19 1335    Order Status:  Completed Specimen:  Blood from Peripheral Updated:  05/15/19 1500     Significant Indicator NEG     Source BLD     Site PERIPHERAL     Culture Result No growth after 5 days of incubation.    Narrative:       Collected By:[id number] FARIDA TUTTLE.  Per Hospital Policy: Only change Specimen Src: to \"Line\" if  specified by physician order.  Right Forearm/Arm    BLOOD CULTURE [[id number]] Collected:  05/10/19 1352    Order Status:  Completed Specimen:  Blood from Peripheral Updated:  05/15/19 1500     Significant Indicator NEG     Source BLD     Site PERIPHERAL     Culture Result No growth after 5 days of incubation.    Narrative:       Collected By:[id number] FARIDA TUTTLE.  Per Hospital Policy: Only change Specimen Src: to \"Line\" if  specified by physician order.  Left Forearm/Arm    BLOOD CULTURE [[id number]]  (Abnormal)  (Susceptibility) Collected:  05/08/19 2034    Order Status:  Completed Specimen:  Blood from Peripheral Updated:  05/15/19 0931     Significant Indicator POS (POS)     Source BLD     Site PERIPHERAL     " "Culture Result Growth detected by Bactec instrument. 05/09/2019  1830  Negative for Staphylococcus aureus and MRSA by PCR. Correlate  ongoing need for antibiotics with clinical condition.   (A)      Staphylococcus haemolyticus (A)    Narrative:       CALL  [place]  [facility] tel. [phone],  CALLED  [facility] tel. [phone] 05/09/2019, 20:44, RB PERF. RESULTS CALLED TO: RN  [phone]  Per Hospital Policy: Only change Specimen Src: to \"Line\" if  specified by physician order.  Left Forearm/Arm    Culture & Susceptibility     STAPHYLOCOCCUS HAEMOLYTICUS     Antibiotic Sensitivity Microscan Unit Status    Ampicillin/sulbactam Resistant >16/8 mcg/mL Final    Method: GREGORY    Clindamycin Resistant >4 mcg/mL Final    Method: GREGORY    Daptomycin Sensitive <=0.5 mcg/mL Final    Method: GREGORY    Erythromycin Resistant >4 mcg/mL Final    Method: GREGORY    Moxifloxacin Resistant >4 mcg/mL Final    Method: GREGORY    Oxacillin Resistant >2 mcg/mL Final    Method: GREGORY    Penicillin Resistant >8 mcg/mL Final    Method: GREGORY    Tetracycline Sensitive <=4 mcg/mL Final    Method: GREGORY    Trimeth/Sulfa Resistant >2/38 mcg/mL Final    Method: GREGORY    Vancomycin Sensitive 4 mcg/mL Final    Method: GREGORY                       BLOOD CULTURE [[id number]] Collected:  05/08/19 2034    Order Status:  Completed Specimen:  Blood from Peripheral Updated:  05/13/19 2300     Significant Indicator NEG     Source BLD     Site PERIPHERAL     Culture Result No growth after 5 days of incubation.    Narrative:       Per Hospital Policy: Only change Specimen Src: to \"Line\" if  specified by physician order.  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]   • Bacteremia due to coagulase-negative Staphylococcus [R78.81]   • BETO (acute kidney " injury) ([facility]) [N17.9]     Staph sepsis  A febrile  No current leukocytosis  BCxs + staph haemolyticus 5/8  Repeat Bcxs neg 5/10 and 5/13  Port removed 5/9  TTE neg; as unrevealing recommend KATH  Completed 10 days IV abx   DC Augmentin as staph resistant-start doxy    Candida infection port  Continue fluconazole through 6/2/2019  Denies visual changes    Cutaneous T Cell lymphoma (Mycosis fungoides)  Dx in 11/2017  Widespread hyperkeratotic plaques, ulcerated lesions in all pressure areas  CT chest abdomen pelvis on 5/9 with bilateral axillary adenopathy, no pleural or pericardial effusion.  No consolidations or evidence of pneumonia, abdomen pelvis with no significant findings.  Superinfected open skin lesions/wound (staph/strep)      BETO, persistent  Multifactorial incl sepsis and prior vancomycin  Dose adjust abx as needed     Diarrhea-  C. difficile negative on 5/11

## 2019-05-20 NOTE — ASSESSMENT & PLAN NOTE
This is severe sepsis with the following associated acute organ dysfunction(s): metabolic/septic encephalopathy.     Patient has waxing waning encephalopathy appears to be metabolic related to his fungal and coag negative sepsis based on blood cultures and central line cath tip cultures.  Removed all central lines.  Repeat blood cultures negative so far.  Continue treatment per ID.

## 2019-05-20 NOTE — PROGRESS NOTES
Received report from [facility], assumed care of pt 0700.  Pt has been medicated per MAR. Right PICC patent with + blood return, both lumens, dressing CDI.   Pt medicated prior to dressing change; wound cleanser, pat dry with gauze, and yellow petroleum gauze applied to cover backside, and groin/upper legs.   Woods to gravity and rectal tube. Rectal tube irrigated per order.   Bilat feet still quite edematous, heels floated on pillow.   Encouraging PO intake, seems to be eating more of his meals than when this writer cared for pt last week.   All needs met at this time.

## 2019-05-20 NOTE — CARE PLAN
Problem: Communication  Goal: The ability to communicate needs accurately and effectively will improve  Outcome: PROGRESSING AS EXPECTED  Plan of care discussed with patient, all questions answered.    Problem: Safety  Goal: Will remain free from injury  Outcome: PROGRESSING AS EXPECTED  Call light within reach, bed locked and in lowest position, room near nurses station, hourly rounding.

## 2019-05-20 NOTE — PROGRESS NOTES
[facility] Medicine Daily Progress Note    Date of Service  5/19/2019    Chief Complaint  68 y.o. male admitted 5/8/2019 with weakness and fevers.     Hospital Course    Mr. Ortiz has a past medical history of T-cell lymphoma followed by Dr. Mc the most recently admitted here for a skin infection was discharged on April 13 due to a rash from his T-cell lymphoma.  He was discharged home on oral doxycycline.  He presented to the emergency room in [facility] with weakness, feeling poorly, and fevers.  Is found to be hypotensive and in septic shock.  He was transferred here for higher level of care.  Despite IV fluids, is required intravenous pressors ongoing.      Interval Problem Update  5/9: Patient seen and evaluated in the intensive care unit this morning. A central line has been placed and his right-sided catheter has been removed.  Patient notes significant pain from his skin.  5/10: Mr. Ortiz was examined and evaluated in the intensive care unit this morning. Blood cultures are positive for gram positive cocci. 500 ml urine over night. He is on a dilaudid PCA due to pain. CT was done for staging purposes of his cancer.  I do long talk with patient about CODE STATUS and he states he does not want to make a decision until he can speak to his wife which will need to be over the phone as she does not have the resources to be able to come to [place] from [place].  Due to dry mouth, he has been drinking excessively and states that he cannot tolerate how dry his mouth this and thus feels he needs to drink very frequently.  5/11: Patient seen and evaluated in the intensive care unit this morning. He has been using the PCA due to pain. He had multiple episodes of diarrhea last night. I long talk with Mr. Ortiz today about possibly going to a burn center due to the degree of his skin involvement.  He states he wants to talk to his wife about this first and he would really use it [place] but he would  [facility] burn [place] if his wife is okay.  A Reyes catheter is been placed due to urinary retention and skin breakdown as he is unable to control his urination.  5/12: Mr. Ortiz was seen and evaluated in the intensive care unit this morning. He has been requiring oxycodone for pain. He has had adequate urine output from the reyes. He is on room air. I examined his back with Dr. Gongora, trauma surgery, and he agrees that a referral to a burn center is appropriate.   5/13: patient seen and evaluated in the ICU. He has been in a sinus rhythm. He is tolerating a dysphagia III diet. He has been refusing mobilization. She is on room air. I placed a referral to [facility] via our transfer center and they have refused him. Mr. Ortiz will only accept [place] as [facility] and [facility] are too far for his wife to travel.   5/14: Status post transfer from ICU to oncology unit day #1.  Patient appears to be in pain with any movement of his body due to his open sores lesions to his entire back as well as right shoulder from his cutaneous T-cell lymphoma.  He has refused dressing changes due to pain.  I have counseled palliative care to address pain management as well as to address his full CODE STATUS.  I did have conversation with patient but he was unable to focus on any clear discussion of his CODE STATUS.  He did state that we could call his wife for further discussion.  Discussed with ID.  His central line catheter tip was positive for Candida.  He is currently on broad-spectrum antibiotics and antifungal.  He repeatedly is incontinent of stool which does travel up his back and into his open wound lesions.  He is incontinent of urine he does have a Reyes catheter in place.  Palliative care did increase his IV morphine to IV Dilaudid with breakthrough pain continue with oxycodone.  He does have edema of his lower extremities and is off IV fluids.  Thrush noted of mouth started on nystatin swish and swallow.   Started on antidepressant SSRI, DC'd PPI due to C. difficile inducing.  On broad-spectrum antibiotics currently.  Diphenhydramine added for itching.  5/15: doing better today, appears to be in less pain with movement.  Answering questions appropriately.  Started daptomycin per ID for coag negative staph in blood culture and cath tip.  S/p catheter removal and port replacement. Itching decreased today.  Discussed treatment options with Dr. Mc and pharmacy is ordering a chemotherapy medication.  He is very concerned about his wife's well being if he should pass and thus is doing everything he can to continue to support her.  Palliative care is attempting to get an AD.  BP remains low, did receive 1 L ivfs overnight for low bp, but leg edema increased.  He is not on any BP meds.  Ordered midodrine 5mg tid to avoid ivf/edema overload.  He is taking in po fluids well.  Ordered to attempt rectal tube since loose stool today, patient unable to feel sensation of BM.  Again, bedside RN had to clean his back wounds from stool contamination.  He is not taking any stool softeners.  5/16: Patient's skin appears to be improving with antibiotics and wound care.  He is also in less pain with wound changes.  Iron levels were low added oral iron twice daily with vitamin C.  Blood pressure stabilized on Midrin 5 mg 3 times daily multivitamin added for skin healing.  I have also ordered ROBERTO hoses bilateral for the lower extremities since edema remains and to avoid skin breakdown of the lower extremities.  I have ordered PT OT evaluations.  5/17:  Continues to have severe pain with wound care cleaning.  Seen today while getting cleaned, his back wounds are healing, less inflamed and weeping, however he continues to stool himself thus contaminating his wounds.  Ordered rectal tube and lactulose tid in order to keep rectal tube in place.  He has soft, but formed stool.  Dilaudid IV increased for pain control.  ID asked to FL central  line left chest for PICC line instead.   Remains on unasyn and diflucan.  Finished daptomycin treatment.  5/18: Rectal tube in place.  Woods catheter in place.  Sodium increased to 157 therefore added D5W 75 an hour.  He remains on Augmentin and Diflucan.  His mentation does appear to be more drowsy on the increased dose of Dilaudid.  We will try to back down on his Dilaudid dose.  5/19: Patient is more altered today.  Does appear to be shaking more.  I have decreased Dilaudid IV dosing due to drowsiness yesterday.  He does not appear to be more pain.  His sodium levels have increased to 157.  Continue D5W at 75 an hour.  EKG ordered for QT interval.  Order to culture catheter tip placed.  Repeat blood cultures placed for rigors seen on exam.  Continue with the wound care, Woods and rectal tube to prevent contamination of multiple and extensive open skin lesions.      Consultants/Specialty  Critical Care.  Infectious disease  Oncology  Palliative care.    Code Status  full    Disposition    Lives in [place] with his wife.  Apparently patient takes care of the wife.  [facility] declined transfer of patient stating does not meet burn criteria.  PT OT rec transitional care.  LTACH ordered.    Review of Systems  Review of Systems   Constitutional: Positive for malaise/fatigue. Negative for chills, diaphoresis and fever.   HENT: Negative for congestion and sore throat.    Eyes: Negative for pain and discharge.   Respiratory: Negative for cough, hemoptysis, sputum production, shortness of breath and wheezing.    Cardiovascular: Positive for leg swelling. Negative for chest pain, palpitations and claudication.   Gastrointestinal: Negative for abdominal pain, constipation, diarrhea, melena, nausea and vomiting.   Genitourinary: Negative for dysuria, frequency and urgency.   Musculoskeletal: Positive for back pain and myalgias. Negative for joint pain and neck pain.   Skin: Positive for itching and rash.   Neurological:  Negative for dizziness, sensory change, speech change, focal weakness, loss of consciousness, weakness and headaches.   Endo/Heme/Allergies: Does not bruise/bleed easily.   Psychiatric/Behavioral: Negative for depression, substance abuse and suicidal ideas.        Physical Exam  Temp:  [36.4 °C (97.6 °F)-37.2 °C (98.9 °F)] 36.4 °C (97.6 °F)  Pulse:  [60-93] 80  Resp:  [16-20] 20  BP: ()/(54-62) 87/61  SpO2:  [90 %-91 %] 90 %    Physical Exam   Constitutional: He is oriented to person, place, and time. He appears well-developed. No distress.   HENT:   Dry mucous membranes  No facial droop  Chapped lips   Eyes: Right eye exhibits no discharge. Left eye exhibits no discharge. No scleral icterus.   Pale conjunctiva   Neck: Normal range of motion. Neck supple. No tracheal deviation present.   Cardiovascular: Normal rate and regular rhythm.    No murmur heard.  Pulmonary/Chest:   left chest cath site CD&I  Clear lung sounds bilaterally   Abdominal: Soft. He exhibits no distension. There is no tenderness.   Genitourinary:   Genitourinary Comments: Woods catheter, rectal tube in place.   Musculoskeletal: He exhibits edema and tenderness.   3+ edema of the lower extremities   Neurological: He is alert and oriented to person, place, and time.   Skin: Skin is warm and dry. He is not diaphoretic.   Extensive xerosis of the anterior skin more so on his scalp and face  The skin on his chest anterior surfaces is red and raised the skin of the groin and back is wet and weeping of clear fluid. Large regions akin to jigsaw puzzle pieces with macerated beefy red tissue      Psychiatric: Judgment and thought content normal.   Nursing note and vitals reviewed.      Fluids    Intake/Output Summary (Last 24 hours) at 05/19/19 1717  Last data filed at 05/19/19 0348   Gross per 24 hour   Intake                0 ml   Output              700 ml   Net             -700 ml       Laboratory  Recent Labs      05/17/19   0000  05/18/19   0120   05/19/19   0033   WBC  8.6  8.9  6.8   RBC  3.42*  3.56*  3.28*   HEMOGLOBIN  9.4*  9.6*  9.1*   HEMATOCRIT  31.9*  33.3*  30.8*   MCV  93.3  93.5  93.9   MCH  27.5  27.0  27.7   MCHC  29.5*  28.8*  29.5*   RDW  60.0*  61.5*  62.4*   PLATELETCT  323  333  273   MPV  9.4  9.4  9.5     Recent Labs      05/17/19   0000  05/18/19   0120  05/19/19   0830   SODIUM  154*  157*  156*   POTASSIUM  3.9  3.7  3.4*   CHLORIDE  119*  123*  123*   CO2  27  27  27   GLUCOSE  95  102*  105*   BUN  19 18 17   CREATININE  1.59*  1.60*  1.59*   CALCIUM  7.1*  7.5*  7.0*     Recent Labs      05/17/19   1425   APTT  35.9   INR  1.22*               Imaging  EC-ECHOCARDIOGRAM LTD W/O CONT   Final Result      IR-PICC LINE PLACEMENT W/ GUIDANCE > AGE 5   Final Result                  Ultrasound-guided PICC placement performed by qualified nursing staff as    above.          CT-CHEST,ABDOMEN,PELVIS WITH   Final Result      1.  Bilateral axillary adenopathy, more notable on the right. Some of these nodes are larger than on the prior PET/CT. There is no intrathoracic adenopathy.   2.  No abdominal adenopathy.   3.  Borderline enlarged inguinal lymph nodes, overall decreased in size compared with the prior PET/CT.      IR-CVC TUNNEL WITH PORT REMOVAL   Final Result      1.  Removal of RIGHT  internal jugular PowerPort venous implant.   2.  No evidence of infection at the port pocket.   3.  The port reservoir and port catheter were removed intact.      DX-CHEST-LIMITED (1 VIEW)   Final Result      Left central venous line in place. No pneumothorax.               INTERPRETING LOCATION: [address] [facility], [id number]      DX-CHEST-PORTABLE (1 VIEW)   Final Result      No acute cardiopulmonary abnormality. No interval change.           Assessment/Plan  * Septic shock (HCC)- (present on admission)   Assessment & Plan    Septic shock present upon admission  Source consistent with cutaneous infection  IR removed the indwelling port  Infectious  disease consult  He met the criteria for septic shock as evidenced by the need for intravenous pressor support  Organ system failure associated with this disease process is the cardiovascular as is noted by hypotension requiring pressors  Broad-spectrum IV antibiotics to cover for skin infection in the setting of immunocompromised host--IV Unasyn  Catheter tip positive for Candida.  ID following.  On antifungal.     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  Dr. Bradley, oncology, has been consulted.    CT done for staging  Code status has been addressed and he continues to request that everything is done.   Palliative care consult placed.  Palliative care did discuss CODE STATUS with patient who is hesitant to place stress on his wife by naming her for AD.  He wants to continue to care for his wife, whom he is worried about her wellbeing if he should pass.    Pain overall is improved with increased pain medications.     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 Gongora, trauma surgery, has evaluated the skin and agrees that a referral to a burn center is appropriate.   Mr. Ortiz states that he would be amenable to going to [facility] though no other facility as it would be closer to his wife.   IV and oral narcotic pain meds.  [facility] has refused him on 5/13 and may need to be re-addressed if his status decompensated.   Continue on antifungal medication.       Sepsis due to coagulase-negative staphylococcal infection with metabolic encephalopathy (HCC)   Assessment & Plan    5/8 blood culture and cath tip + coag negative staph  Finished daptomycin IV per ID, augmentin po, antifungal for cath tip candida + culture.   right port removed.  Has new left chest port  placed:    Order for IR removal left chest port per ID did not wait 48 hours for culture negative.  Order for PICC line placed.  5/10 and 5/13 Repeat BCs x2 negative.  TTE negative for endocarditis.  Patient has Woods catheter as well as rectal tube in place to prevent further contamination of his open skin wounds to back.       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 Mc for years.  5/15:  Plan for treatment while in hospital further chemotherapy after discussion with Dr. cM, Dr. Barraza, pharmacy and patient.  Continue pain control  Continue rigorous wound care since patient unable to discern when he has BMs.  Stool tracks up his back and into his open skin wounds.  No diarrhea per bedside nursing.  Patient has successful placement of rectal tube and Woods catheter to prevent further contamination of open wounds.     BETO (acute kidney injury) (HCC)- (present on admission)   Assessment & Plan    Resolved acute kidney injury.  Continue with oral hydration.  Patient's legs are edematous.  Avoid IV fluids if possible.     Hypernatremia   Assessment & Plan    Remains mildly hypernatremic.  Continue to encourage p.o. Intake.  Appears to be due to skin fluid losses.  Sodium increased to 157 after start of lactulose for rectal tube placement.  Likely continued fluid losses.  Started D5W at 75 an hour and monitor closely for leg edema.     Edema- (present on admission)   Assessment & Plan     lower extremity swelling  Off IV lasix.  He is at risk of intravascular depletion given the significant insensible fluid losses from skin     Iron deficiency anemia- (present on admission)   Assessment & Plan    Hemoglobin is stable.  Iron levels low, started iron bid with vit c.   b12, folate normal.     GERD (gastroesophageal reflux disease)- (present on admission)   Assessment & Plan    Hold outpatient due to currently on broad-spectrum  antibiotics by ID.  Avoid C. difficile inducing medications such as PPIs.     Asthma   Assessment & Plan    Hemoglobin is 10.5          VTE prophylaxis: heparin

## 2019-05-20 NOTE — PROGRESS NOTES
"Palliative Progress Note               Author: Makayla Power Date & Time created: 2019  12:56 PM     Reason for subsequent visit: cancer-related pain; generalized pain; symptom management    Interval History:  No acute events overnight.  Patient resting in bed, fairly alert and interactive, but would doze off occasionally.  When queried about his pain, patient said, \"It really hasn't been that bad.\"  Today he is reporting a constant, sharp pain in his \"back and butt,\" exacerbated by being in the same position too long.  Patient said, \"I feel like I can't turn.\"  Patient is also reporting pain in his gums, worse with chewing.  He said, \"It feels like needles going through there.\"  Patient denies nausea, vomiting, insomnia.    Review of Systems:  Negative for dyspnea. Positive for pain. Positive for anorexia (patient reports reduced PO intake d/t gum pain). Negative for fatigue. Negative for sedation. Negative for insomnia. Negative for nausea and vomiting. Positive for dysphagia (patient requesting pureed diet).      Physical Exam:    Recent vital signs  Temp (24hrs), Av.7 °C (98 °F), Min:36.4 °C (97.6 °F), Max:36.8 °C (98.3 °F)  Temperature: 36.8 °C (98.3 °F)  Pulse  Av  Min: 49  Max: 104  Blood Pressure : (!) 95/59       Physical Exam   Constitutional: He is oriented to person, place, and time. He is cooperative.   Cardiovascular: Normal rate and normal heart sounds.    Pulmonary/Chest: Effort normal and breath sounds normal. No respiratory distress. He has no wheezes. He has no rales.   Abdominal: Soft. Bowel sounds are normal. He exhibits distension. There is no tenderness.   Rectal tube in place   Musculoskeletal: He exhibits edema (3+ BLE; 1+ bilateral forearms).   Neurological: He is alert and oriented to person, place, and time.   Skin:   Generalized dry, flaky skin with diffuse maceration/scabs/lesions including face/neck, trunk, back, arms, groin.  Open oozing/bleeding wounds on back.  "   Psychiatric: He has a normal mood and affect. His behavior is normal. Thought content normal. He exhibits abnormal recent memory.     Recent Labs      05/18/19   0120  05/19/19   0830  05/20/19   0025   SODIUM  157*  156*  157*   POTASSIUM  3.7  3.4*  3.5*   CHLORIDE  123*  123*  124*   CO2  27  27  27   GLUCOSE  102*  105*  101*   BUN  18  17  17   CREATININE  1.60*  1.59*  1.69*   CALCIUM  7.5*  7.0*  7.1*     Recent Labs      05/18/19   0120  05/19/19   0033   WBC  8.9  6.8   RBC  3.56*  3.28*   HEMOGLOBIN  9.6*  9.1*   HEMATOCRIT  33.3*  30.8*   MCV  93.5  93.9   MCH  27.0  27.7   MCHC  28.8*  29.5*   RDW  61.5*  62.4*   PLATELETCT  333  273   MPV  9.4  9.5     Medications reviewed. Labs Reviewed.     EKG:  QTc = 479 (5/19/19)    Problem List:  1.  Cancer-related generalized cutaneous pain (active)  2.  Peripheral neuropathy (active)  3.  Generalized pruritus (active)  4.  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  Dry mouth/thrush (stable)  7.  Acute depression (active)  8.  BETO (active/stable)  9.  GERD (stable)  10.  Anemia (stable)  11.  Edema (improving in BUE; active in BLE)  12.  Fecal incontinence (active)  13.  Altered mental status (active)  14.  Intermittent mild hypotension (active)    Assessment/Plan:  Cancer-related cutaneous pain and peripheral neuropathy  Prefer not to use morphine d/t patient's BETO -- GFR = 43; Cr 1.59; BUN 17 (5/19/19)  -Estimated creatinine clearance per Cockcroft-Gault 52-67 mg/min     MEDD (morphine equivalent daily dose) is approximately 45 mg:  - hydromorphone 0.25 mg x 1 doses ~ 5 mg MEDD  - hydromorphone 0.5 mg X 1 dose ~ 10 mg MEDD  - oxycodone 10 mg X 2 doses = 20 mg ~ 30 mg MEDD     5/19/19 ~ 50 mg MEDD  5/18/19 ~ 60 mg MEDD  5/17/19 ~ 46.5 mg MEDD     Continue hydromorphone 0.5 mg IV QD prior to wound care dressing changes, if needed  Continue hydromorphone 0.25 mg IV  Q3 hrs PRN severe pain   - patient only received one dose in last 24 hours  - encouraged patient to ask for IV hydromorphone more frequently, if needed  Continue oxycodone from 5-10 mg PO Q3 hrs prn moderate pain     Generalized pruritus  Our team considers patient's pruritus a neuropathic pain syndrome, since it is caused by peripheral nerve damage -- see Fast Fact #37  Keep nails trimmed  Apply unsecured restraint mitts while patient sleeping to avoid him scratching inadvertently and causing more trauma     Altered mental status  Sodium = 157 5/20/19 - now receiving D5W @ 75mL/hr; hypernatremia could be a contributing factor  Bedside RN Leonor  feels patient's AMS has improved from yesterday     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 Mc; patient has had multiple treatment regimens  Plan was for further treatment if he stabilizes  Per oncology hospice has been discussed with the patient on several occasions but he has declined; goal for this hospitalization is to manage infection and skin-pending hospital course further treatment will be discussed outpatient.     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  Bag changed and tube cleared per nursing staff  - lactulose 3 times daily per primary team to keep stool soft for rectal tube  - Nursing to irrigate BMS q shift with  mL or until clear with warm tap  "water in irrigation port  Trapeze bar in place     Dry mouth/thrush  Thrush resolved; discontinue nystatin 100,000 units/mL swish & swallow, 5 mL suspension QID     Acute depression  Consider retrial of duloxetine when patient's mentation improves     Code Status: Full    Advance Care Planning/Current Goals of Care:  Discussed code status and educated patient about term.  Provided general overview of what is involved in a full code.  I shared with patient my worry that given his current frail condition, enduring a full code would result in increased suffering.  I shared my concern that if he was resuscitated, his QOL may be greatly diminished.  He said, \"I want to talk to Mandy.  I want to see what she wants me to do.\"  I validated patient's desire to get his wife's input, but advised him that he should make the choice that is best for him, not the choice Mandy wants him to make.  I gave my recommendation, based on patient's current health situation, including delay in receiving disease-modifying treatments d/t multiple recurrent infections, that he change his code status from Full to DNR.  He said, \"I have thought about that.  I would like Mandy's input before I make a final decision.\"  I encouraged him to continue thinking about his code status.  He stated he would talk to Mandy about it today.  17 minutes spend discussing advance care planning.    15 minutes spent with greater than 50% spent counseling and coordinating the patient's care regarding acute pain & symptom management.   Please refer to HPI and assessment/plan for details.     Thank you for allowing the palliative care team to participate in Marco Antonio's care. Please call with any questions/needs.     HERO Harrison.  Palliative Care Nurse Practitioner  [phone]      "

## 2019-05-20 NOTE — PROGRESS NOTES
2 RN skin check complete with MARIANNE Esteban  Devices in place rectal tube, reyes catheter, oxygen tubing.   Skin assessed under devices.  Confirmed pressure ulcers found N/A.  New potential pressure ulcers noted on N/A. Wound consult placed - wound has already been consulted for open wounds all over body.  The following interventions in place pt is on a waffle cushion, turned and repositioned frequently throughout shift, heels floated, silicone oxygen tubing, ensuring pt not lying on tubing.

## 2019-05-20 NOTE — PROGRESS NOTES
María from Lab called with critical result of sodium of 157 at 0210. Critical lab result read back to María.   This critical lab result is within parameters established by Dr. Erickson for this patient

## 2019-05-20 NOTE — PROGRESS NOTES
Oncology/Hematology Progress Note               Author: MARRY Santiago Date & Time created: 5/20/2019  11:25 AM     CC: T-cell lymphoma    Interval History:    There is no significant change from yesterday.  He does have extensive skin lesions.  ECOG performance status remains very poor at 4.  The patient continues to get broad-spectrum antibiotics ID on board.    Review of Systems:  Review of Systems   Unable to perform ROS: Acuity of condition   Constitutional: Positive for malaise/fatigue. Negative for chills, diaphoresis and fever.   Eyes: Negative.    Respiratory: Positive for hemoptysis. Negative for cough.    Cardiovascular: Negative.    Gastrointestinal: Negative for abdominal pain, constipation, diarrhea, heartburn, nausea and vomiting.   Musculoskeletal: Negative for back pain, myalgias and neck pain.   Skin: Positive for itching and rash.   Neurological: Positive for weakness. Negative for dizziness.       Physical Exam:  Physical Exam   Constitutional: He appears well-developed.   HENT:   Head: Normocephalic.   Eyes: Conjunctivae are normal.   Pulmonary/Chest: Effort normal and breath sounds normal.   Abdominal: Soft. Bowel sounds are normal.   Musculoskeletal: He exhibits edema and tenderness.   Lymphadenopathy:     He has no cervical adenopathy.   Neurological: He is alert.   Skin: Rash noted. There is erythema.   Essentially his whole body is skin is damaged by his T-cell lymphoma.       Labs:          Recent Labs      05/18/19   0120  05/19/19   0830   SODIUM  157*  156*   POTASSIUM  3.7  3.4*   CHLORIDE  123*  123*   CO2  27  27   BUN  18  17   CREATININE  1.60*  1.59*   CALCIUM  7.5*  7.0*     Recent Labs      05/18/19   0120  05/19/19   0830   ALTSGPT  11   --    ASTSGOT  17   --    ALKPHOSPHAT  86   --    TBILIRUBIN  0.3   --    GLUCOSE  102*  105*     Recent Labs      05/17/19   1425  05/18/19   0120  05/19/19   0033   RBC   --   3.56*  3.28*   HEMOGLOBIN   --   9.6*  9.1*   HEMATOCRIT   --   33.3*   30.8*   PLATELETCT   --   333  273   PROTHROMBTM  15.5*   --    --    APTT  35.9   --    --    INR  1.22*   --    --      Recent Labs      19   0120  19   0033   WBC  8.9  6.8   NEUTSPOLYS  61.90  50.40   LYMPHOCYTES  35.40  33.90   MONOCYTES  0.90  3.50   EOSINOPHILS  1.80  7.80*   BASOPHILS  0.00  0.90   ASTSGOT  17   --    ALTSGPT  11   --    ALKPHOSPHAT  86   --    TBILIRUBIN  0.3   --      Recent Labs      19   0120  19   0830   SODIUM  157*  156*   POTASSIUM  3.7  3.4*   CHLORIDE  123*  123*   CO2  27  27   GLUCOSE  102*  105*   BUN  18  17   CREATININE  1.60*  1.59*   CALCIUM  7.5*  7.0*     Hemodynamics:  Temp (24hrs), Av.7 °C (98 °F), Min:36.4 °C (97.6 °F), Max:36.8 °C (98.3 °F)  Temperature: 36.8 °C (98.3 °F)  Pulse  Av  Min: 49  Max: 104  Blood Pressure : (!) 95/59     Respiratory:    Respiration: 18, Pulse Oximetry: 95 %        RUL Breath Sounds: Diminished, RML Breath Sounds: Diminished, RLL Breath Sounds: Diminished, YAJAIRA Breath Sounds: Diminished, LLL Breath Sounds: Diminished  Fluids:    Intake/Output Summary (Last 24 hours) at 19 1119  Last data filed at 19 0900   Gross per 24 hour   Intake              600 ml   Output             1150 ml   Net             -550 ml        GI/Nutrition:  Orders Placed This Encounter   Procedures   • Diet Order Regular     Standing Status:   Standing     Number of Occurrences:   1     Order Specific Question:   Diet:     Answer:   Regular [1]     Order Specific Question:   Texture/Fiber modifications:     Answer:   Dysphagia 3(Mechanical Soft)specify fluid consistency(question 6) [3]     Comments:   no teeth     Order Specific Question:   Consistency/Fluid modifications:     Answer:   Thin Liquids [3]     Medical Decision Making, by Problem:  Active Hospital Problems    Diagnosis   • *Septic shock (HCC) [A41.9, R65.21]   • Severe sepsis (HCC) [A41.9, R65.20]   • 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  Oncology: Progressive surgery syndrome/T-cell lymphoma is had various treatment regimens by Dr. Mc.  The plan was in the future if he stabilizes for more treatment.  Hospice has been discussed with this patient on several occasions which he has declined.     Plan the patient will continue to recover.  He continues to have a very poor ECOG performance status.  The patient will be a candidate for  Istodax but the patient understands he needs to keep recovering from a recent septic shock  And recover from ongoing infection.    2.  Cellulitis, due to #1.  ID on board.  He continues on broad-spectrum antibiotics  3.  Septic shock, resolved.  ID on board.  4.  Poor ECOG performance status.  Should be on PT/OT.    High complexity, complex decision making  We will not follow the patient every day.    Quality-Core Measures

## 2019-05-21 NOTE — PROGRESS NOTES
María from Lab called with critical result of Ca+ of 6.7 at 0357. Critical lab result read back to María.   Dr. Mccracken notified of critical lab result at 0407.  Critical lab result read back by Dr. Mccracken.

## 2019-05-21 NOTE — PROGRESS NOTES
Received bedside report from MARIANNE Pelaez. Assumed care of pt [id number]. All needs met at this time per pt.

## 2019-05-21 NOTE — THERAPY
"Occupational Therapy Treatment completed with focus on ADLs, ADL transfers, patient education, cognition and upper extremity function.  Functional Status:  Mod A supine>sit EOB able use trapeze and better movement w/BLE off EOB. After max A to scoot able to sit EOB self supported. Also able to complete oral care w/set up when sitting EOB, pt agreeable to sit>stand completed 2x w/max A nearly full upright posture on 2nd attempt, however after was very fatigued. Max A supine>sit EOB, positioned in side laying on L side propped w/pillows and reported being comfortable. Rn aware of pt position and efforts   Plan of Care: Will benefit from Occupational Therapy 2 times per week  Discharge Recommendations:  Equipment Will Continue to Assess for Equipment Needs. Post-acute therapy Recommend inpatient transitional care services for continued occupational therapy services.       See \"Rehab Therapy-Acute\" Patient Summary Report for complete documentation.     Pt seen for OT tx pt demonstrating improved tolerance and motivation of therapy today, remains w/generally impaired skin integrity, however BLE appeared to have decreased edema. Overall pt remains deconditioned and weak w/on going pain issues related to skin lesions. Pt will continue to benefit from acute OT and continue to recommend post acute placement prior to d/c home   "

## 2019-05-21 NOTE — PROGRESS NOTES
Assumed care at 1900    Received report from day shift RN.    Reviewed recent lab results, notes, orders, and MAR  POC discussed and updated on care board  Bed is in the lowest and locked position, call light within reach      Q2 turns, skin check assessment, reyes in place, BMS in place (leakingx2 days), PICC line running D5W at 125ml/hr.      C/o pain, will medicate per MAR  Tolerating mechanical soft diet.

## 2019-05-21 NOTE — PROGRESS NOTES
"Palliative Progress Note               Author: Makayla Power Date & Time created: 2019  11:55 AM     Reason for subsequent visit: cancer-related pain, generalized pain, symptom management    Interval History:  Patient resting in bed, very alert and interactive today.  When asked about his pain he said, \"It's been up and down.\"  He reports an average pain intensity of 4/10 over the last 24 hours and states the IV hydromorphone he receives prior to dressing changes is helpful.  Patient reports he is still having intermittent gum pain with chewing.  Denies nausea/vomiting/insomia.  He reports his pruritus is \"minimal.\"    Review of Systems:  Negative for dyspnea. Positive for pain (gum pain). Negative for fatigue. Negative for depression. Negative for insomnia. Negative for diarrhea. Negative for nausea and vomiting. Negative for constipation.      Physical Exam:    Recent vital signs  Temp (24hrs), Av.8 °C (98.3 °F), Min:36.7 °C (98 °F), Max:36.9 °C (98.4 °F)  Temperature: 36.9 °C (98.4 °F)  Pulse  Av.1  Min: 49  Max: 104  Blood Pressure : 113/56       Physical Exam   Constitutional: He is oriented to person, place, and time. He is cooperative. He appears ill.   Cardiovascular: Normal rate and normal heart sounds.    distant   Pulmonary/Chest: Effort normal. He has wheezes in the right lower field and the left lower field. He has no rales.   Abdominal: Soft. Bowel sounds are normal. He exhibits no distension. There is no tenderness.   Musculoskeletal: He exhibits edema (2+ BLE, trace bilateral forearms).   Neurological: He is alert and oriented to person, place, and time.   Skin:   Generalized dry, flaky skin with diffuse maceration/scabs/lesions including face/neck, trunk, back, arms, groin.  Open oozing/bleeding wounds on back   Psychiatric: He has a normal mood and affect. His behavior is normal. Judgment and thought content normal.     Recent Labs      19   0830  19   0025  19   " 0250   SODIUM  156*  157*  148*   POTASSIUM  3.4*  3.5*  3.5*   CHLORIDE  123*  124*  118*   CO2  27  27  27   GLUCOSE  105*  101*  128*   BUN  17  17  18   CREATININE  1.59*  1.69*  1.73*   CALCIUM  7.0*  7.1*  6.7*     Recent Labs      05/19/19   0033  05/21/19   0250   WBC  6.8  5.2   RBC  3.28*  2.90*   HEMOGLOBIN  9.1*  8.2*   HEMATOCRIT  30.8*  27.7*   MCV  93.9  95.5   MCH  27.7  28.3   MCHC  29.5*  29.6*   RDW  62.4*  63.3*   PLATELETCT  273  186   MPV  9.5  9.8     Medications reviewed. Labs Reviewed.     Problem List:  1.  Cancer-related generalized cutaneous pain (active)  2.  Peripheral neuropathy (active)  3.  Generalized pruritus (active)  4.  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  Mouth blisters (active)  7.  Acute depression (active)  8.  BETO (active/stable)  9.  GERD (stable)  10.  Anemia (stable)  11.  Edema (improving in BUE; active in BLE)  12.  Fecal incontinence (active)  13.  Altered mental status (improving)  14.  Intermittent mild hypotension (active)  15.  Staph sepsis (improving) - completed 10 days IV abx; Per ID, augmentin discontinued; doxy started  16.  Candida infection port (improving) - Per ID, continue fluconazole through 6/2/19    Assessment/Plan:  Cancer-related cutaneous pain and peripheral neuropathy  Prefer not to use morphine d/t patient's BETO.  GFR continuing to trend down, while Cr trending up:  - GFR = 39; Cr 1.73; BUN 18 (5/21/19)  - GFR = 43; Cr 1.59; BUN 17 (5/19/19)     MEDD (morphine equivalent daily dose) is approximately 62.5 mg:  - hydromorphone 0.5 mg X 1 dose ~ 10 mg MEDD  - oxycodone 10 mg X 3 doses = 30 mg ~ 45 mg MEDD  - oxycodone 5 mg X 1 dose = 5 mg ~ 7.5 mg MEDD     5/20/19 ~ 45 mg MEDD  5/19/19 ~ 50 mg MEDD  5/18/19 ~ 60 mg MEDD  5/17/19 ~ 46.5 mg MEDD     Continue hydromorphone 0.5 mg IV QD prior to wound care dressing changes, if  "needed  Continue hydromorphone 0.25 mg IV Q3 hrs PRN severe pain   Continue oxycodone from 5-10 mg PO Q3 hrs prn moderate pain     Generalized pruritus  PC team considers patient's pruritus a neuropathic pain syndrome, since it is caused by peripheral nerve damage -- see Fast Fact #37  Keep nails trimmed  Apply unsecured restraint mitts prn     Altered mental status  Sodium = 148 (5/21/19); down from 157 (5/20/19); Continuous D5W infusion @ 125 mL/hr per primary team     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  Orders per wound care team:  - Nursing performing dressing changes with xeroform  - Trauma pad to be placed under patient's back  - Change dressing every 48 hrs or prn with soilage or dislodgement  BMS system placed 5/17/2019 for incontinence management  - lactulose 3 times daily per primary team to keep stool soft for rectal tube  - Nursing to irrigate BMS q shift with  mL or until clear with warm tap water in irrigation port  Trapeze bar in place     Mouth blisters  Continue MBX oral suspension 5 mL suspension PO Q6 hrs PRN mild pain     Acute depression  Consider retrial of duloxetine when patient's mentation improves     Code Status: Full    Advance Care Planning/Current Goals of Care: I asked patient if he remembered our code status discussion from yesterday.  He confirmed that he did, but stated he has not had a chance to talk to his wife about it.  He said it's important for him to get her opinion stating, \"the main thing between us both is what " "does she think.\"  I validated this desire, but again reinforced that he needs to make the decision based on what is best for him.  Again, shared my concern that in his fragile condition he would not endure a full code well and may come out of it with significantly reduced quality of life, in addition to the fact that he would still have the cancer.  Patient verbalized understanding.  17 minutes was spent discussing advance care planning.     15 minutes spent with greater than 50% spent counseling and coordinating the patient's care regarding acute pain & symptom management.   Please refer to HPI and assessment/plan for details.     Thank you for allowing the palliative care team to participate in Marco Antonio's care. Please call with any questions/needs.     Makayla Power A.P.R.N.  Palliative Care Nurse Practitioner  [phone]      "

## 2019-05-21 NOTE — PROGRESS NOTES
[facility] Medicine Daily Progress Note    Date of Service  5/21/2019    Chief Complaint  68 y.o. male admitted 5/8/2019 with fevers and sepsis, transferred from [facility].    Hospital Course    Patient admitted to ICU, had lactic acid >4 and required IV pressors to maintain blood pressure.  H had been discharged home in April for rash/infection related to his lymphoma and completed treatment with return to his baseline until 3 days prior to admission.  He had PORT removal due to bacteremia and also found to have bacterial and fungal infection of the catheter tip.      Interval Problem Update  Patient feeling slightly better compared to day prior.  Sodium has improved with initiation of D5W.  Patient still discussing with palliative care as he remains full code and has a high mortality associated with his lymphoma and skin compromise.      Consultants/Specialty  ID - Pari    Code Status  full    Disposition  tbd    Review of Systems  Review of Systems   Constitutional: Negative for chills and fever.   HENT: Negative for congestion.    Eyes: Negative for blurred vision and photophobia.   Respiratory: Negative for cough and shortness of breath.    Cardiovascular: Negative for chest pain, claudication and leg swelling.   Gastrointestinal: Negative for abdominal pain, constipation, diarrhea, heartburn and vomiting.   Genitourinary: Negative for dysuria and hematuria.   Musculoskeletal: Negative for joint pain and myalgias.   Skin: Positive for rash. Negative for itching.             Neurological: Negative for dizziness, sensory change, speech change, weakness and headaches.   Psychiatric/Behavioral: Negative for depression. The patient is not nervous/anxious and does not have insomnia.         Physical Exam  Temp:  [36.7 °C (98 °F)-36.9 °C (98.4 °F)] 36.9 °C (98.4 °F)  Pulse:  [80-94] 94  Resp:  [18-20] 20  BP: ()/(54-83) 116/83  SpO2:  [91 %-98 %] 91 %    Physical Exam   Constitutional: He is oriented to  person, place, and time. He appears well-developed and well-nourished. No distress.   HENT:   Head: Normocephalic and atraumatic.   Eyes: Conjunctivae are normal. No scleral icterus.   Neck: Neck supple. No JVD present.   Cardiovascular: Normal rate, regular rhythm and normal heart sounds.  Exam reveals no gallop and no friction rub.    No murmur heard.  Pulmonary/Chest: Effort normal and breath sounds normal. No respiratory distress. He has no wheezes. He exhibits no tenderness.   Abdominal: Soft. Bowel sounds are normal. He exhibits no distension and no mass. There is no tenderness.   Musculoskeletal: He exhibits no edema or tenderness.   Lymphadenopathy:     He has no cervical adenopathy.   Neurological: He is alert and oriented to person, place, and time. No cranial nerve deficit.   Skin: Skin is warm and dry. Rash noted. He is not diaphoretic. No erythema. No pallor.   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   Psychiatric: He has a normal mood and affect. His behavior is normal.   Nursing note and vitals reviewed.      Fluids    Intake/Output Summary (Last 24 hours) at 05/21/19 1552  Last data filed at 05/21/19 0836   Gross per 24 hour   Intake             3082 ml   Output             1850 ml   Net             1232 ml       Laboratory  Recent Labs      05/19/19   0033  05/21/19   0250   WBC  6.8  5.2   RBC  3.28*  2.90*   HEMOGLOBIN  9.1*  8.2*   HEMATOCRIT  30.8*  27.7*   MCV  93.9  95.5   MCH  27.7  28.3   MCHC  29.5*  29.6*   RDW  62.4*  63.3*   PLATELETCT  273  186   MPV  9.5  9.8     Recent Labs      05/19/19   0830  05/20/19   0025  05/21/19   0250   SODIUM  156*  157*  148*   POTASSIUM  3.4*  3.5*  3.5*   CHLORIDE  123*  124*  118*   CO2  27  27  27   GLUCOSE  105*  101*  128*   BUN  17  17  18   CREATININE  1.59*  1.69*  1.73*   CALCIUM  7.0*  7.1*  6.7*                   Imaging  EC-ECHOCARDIOGRAM LTD W/O CONT   Final  Result      IR-PICC LINE PLACEMENT W/ GUIDANCE > AGE 5   Final Result                  Ultrasound-guided PICC placement performed by qualified nursing staff as    above.          CT-CHEST,ABDOMEN,PELVIS WITH   Final Result      1.  Bilateral axillary adenopathy, more notable on the right. Some of these nodes are larger than on the prior PET/CT. There is no intrathoracic adenopathy.   2.  No abdominal adenopathy.   3.  Borderline enlarged inguinal lymph nodes, overall decreased in size compared with the prior PET/CT.      IR-CVC TUNNEL WITH PORT REMOVAL   Final Result      1.  Removal of RIGHT  internal jugular PowerPort venous implant.   2.  No evidence of infection at the port pocket.   3.  The port reservoir and port catheter were removed intact.      DX-CHEST-LIMITED (1 VIEW)   Final Result      Left central venous line in place. No pneumothorax.               INTERPRETING LOCATION: [facility], [facility], [address]      DX-CHEST-PORTABLE (1 VIEW)   Final Result      No acute cardiopulmonary abnormality. No interval change.           Assessment/Plan  * Septic shock (HCC)- (present on admission)   Assessment & Plan    Resolved  Septic shock present upon admission  Source consistent with cutaneous infection  IR removed the indwelling port  Infectious disease consult  He met the criteria for septic shock as evidenced by the need for intravenous pressor support  Organ system failure associated with this disease process is the cardiovascular as is noted by hypotension requiring pressors  Broad-spectrum IV antibiotics to cover for skin infection in the setting of immunocompromised host--IV Unasyn  Catheter tip positive for Candida.  ID following.  On antifungal.     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  Dr. Bradley, oncology, has been consulted.    CT done for staging  Code status has been  addressed and he continues to request that everything is done.   Palliative care consult placed.  Palliative care did discuss CODE STATUS with patient who is hesitant to place stress on his wife by naming her for AD.  He wants to continue to care for his wife, whom he is worried about her wellbeing if he should pass.    Pain overall is improved with increased pain medications.     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 Gongora, trauma surgery, has evaluated the skin and agrees that a referral to a burn center is appropriate.   Mr. Ortiz states that he would be amenable to going to [facility] though no other facility as it would be closer to his wife.   IV and oral narcotic pain meds.  [facility] has refused him on 5/13 and may need to be re-addressed if his status decompensated.          Sepsis due to coagulase-negative staphylococcal infection with metabolic encephalopathy (HCC)   Assessment & Plan    5/8 blood culture and cath tip + coag negative staph  Finished daptomycin IV per ID, augmentin po, antifungal for cath tip candida + culture.   right port removed.  Has new left chest port placed:    Order for IR removal left chest port per ID did not wait 48 hours for culture negative.  Order for PICC line placed.  5/10 and 5/13 Repeat BCs x2 negative.  TTE negative for endocarditis.  Patient has Woods catheter as well as rectal tube in place to prevent further contamination of his open skin wounds to back.       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 Mc for years.  5/15:  Plan for treatment while in hospital further chemotherapy after discussion with Dr. Mc, Dr. Barraza, pharmacy and patient.  Continue pain control  Continue rigorous wound care since patient unable to discern when he has BMs.  Stool  tracks up his back and into his open skin wounds.  No diarrhea per bedside nursing.  Patient has successful placement of rectal tube and Woods catheter to prevent further contamination of open wounds.     BETO (acute kidney injury) (HCC)- (present on admission)   Assessment & Plan    Resolved acute kidney injury.  Continue with oral hydration.  Patient's legs are edematous.  Avoid IV fluids if possible.     Hypernatremia   Assessment & Plan    Improved to 148 with D5W initiation.  Remains mildly hypernatremic.  Continue to encourage p.o. Intake.  Appears to be due to skin fluid losses.       Edema- (present on admission)   Assessment & Plan     lower extremity swelling  Off IV lasix.  He is at risk of intravascular depletion given the significant insensible fluid losses from skin     Iron deficiency anemia- (present on admission)   Assessment & Plan    Hemoglobin is stable.  Iron levels low, started iron bid with vit c.   b12, folate normal.     GERD (gastroesophageal reflux disease)- (present on admission)   Assessment & Plan    Hold outpatient due to currently on broad-spectrum antibiotics by ID.  Avoid C. difficile inducing medications such as PPIs.          VTE prophylaxis: heparin

## 2019-05-21 NOTE — PROGRESS NOTES
2 RN skin check complete. With RN Susanna    Devices in place: reyes, rectal tube, and nasal canula.  Skin assessed under devices  Confirmed pressure ulcers found on not applicable..  New potential pressure ulcers noted on NA Wound consult placed for BMS and open sores on back, buttocks, groin, and head.  Patient;s inside of his mouth is blistered as well.  Notified hospitalist for magic mouth wash.      The following interventions in place waffle cushion, Q2 turns, float heels, flushing rectal tube, reyes catheter, frequent dressing changes to help keep skin moist and open sores covered.

## 2019-05-21 NOTE — CARE PLAN
Problem: Safety  Goal: Will remain free from injury  Outcome: PROGRESSING AS EXPECTED  Fall precautions in place    Problem: Pain Management  Goal: Pain level will decrease to patient's comfort goal  Outcome: PROGRESSING AS EXPECTED  Pain well controlled with medication per MAR    Problem: Skin Integrity  Goal: Risk for impaired skin integrity will decrease  Outcome: PROGRESSING AS EXPECTED  Q2 turns, applied xeroform as needed, rectal tube hyperinflated ICU RN removed excess fluid- no leaking.

## 2019-05-21 NOTE — THERAPY
"Physical Therapy Treatment completed.   Bed Mobility:  Supine to Sit: Moderate Assist  Transfers: Sit to Stand: Maximal Assist  Gait: Level Of Assist: Unable to Participate with Will Continue to Assess for Equipment Needs       Plan of Care: Will benefit from Physical Therapy 2 times per week  Discharge Recommendations: Equipment: Will Continue to Assess for Equipment Needs. Recommend inpatient transitional care services for continued physical therapy services.      See \"Rehab Therapy-Acute\" Patient Summary Report for complete documentation.     Pt is progressing gradually with functional mobility as he was able to demonstrate improved bed mobility and activity tolerance as the patient was able to tolerate 2 sit<>stand with FWW use. Pt continues to be limited by pain and concerns with skin integrity. Pt was able to demonstrate Mod to Max A for all functional mobility at this time. Pt requires manual facilication at hips for increased upright standing. Pt demonstrated with poor eccentric control upon sitting and required cues to avoid uncontrolled sitting due to concerns of skin integrity and reducing risk for injury. Pt will continue to benefit from skilled PT while in house, with recommendation for post acute therapy prior to d/c home.   "

## 2019-05-21 NOTE — CARE PLAN
Problem: Nutritional:  Goal: Achieve adequate nutritional intake  Patient will consume >50% of meals   Outcome: PROGRESSING AS EXPECTED  PO intake improving.  Patient eating % of recent meals.  He is order more and a wider variety of foods per Nutrition Representative.  Of note, no new weight since admit.  Please obtain current weight.  Nutrition Representative will continue to see him for ongoing meal and snack preferences.  RD following.

## 2019-05-21 NOTE — CARE PLAN
Problem: Infection  Goal: Will remain free from infection    Intervention: Assess signs and symptoms of infection  Pt afebrile today, labs monitored. ABX administered per MAR. Skin kept as clean and dry as possible - pt has rectal tube and reyes. Skin cleaned with wound  and yellow petroleum gauze dressing placed over open wounds.       Problem: Mobility  Goal: Risk for activity intolerance will decrease  Outcome: NOT MET  Trapeze available for pt to use. Encouraged pt to use to assist with repositioning.     Problem: Psychosocial Needs:  Goal: Level of anxiety will decrease  Outcome: PROGRESSING AS EXPECTED  Emotional support provided to pt.

## 2019-05-21 NOTE — PROGRESS NOTES
Infectious Disease Progress Note    Author: Sharon Yañez M.D. Date & Time of service: 2019  1:07 PM    Chief Complaint:  Septic shock, cellulitis      Interval History:  68 y.o. Wm with hx of cutaneous T Cell lymphoma (Mycosis fungoides) dx in 2017, had extensive w/u at [place] for his 20 year hx of widespread hyperkeratotic plaques who is now being followed by Dr. Mc. He had portacath that was placed in 2017 (per wife) and had last chemotherapy was ~3 months ago. Multiple hospital admissions for cellulitis from diffuse open widespread skin in 3/2019 and 2019. Admitted 2019 for septic shock due to cellulitis with fever, elevated lactate >4, and was hypotensive   Interval 24 hours of Vitals/Labs/Micro,and imaging results reviewed as available. See assessment.    Interval 24 hours of Vitals/Labs/Micro,and imaging results reviewed as available. See assessment.    AF WBC 6.8 c/o hallucinations worse in the morning-Pain (everywhere) controlled with dilaudid. Denies SE zhongx Has PICC. Plan for new dressings for back-pictures viewed   AF WBC 5.2drinking 2nd carton of milk-no new complaints-tolerated dressing change with Dilaudid    Review of Systems:  Review of Systems   Constitutional: Positive for malaise/fatigue. Negative for chills and fever.   Cardiovascular: Positive for leg swelling.   Gastrointestinal: Negative for nausea and vomiting.   Musculoskeletal: Positive for myalgias.   Skin: Positive for rash.        Diffuse with ulcerations   All other systems reviewed and are negative.      Hemodynamics:  Temp (24hrs), Av.8 °C (98.3 °F), Min:36.7 °C (98 °F), Max:36.9 °C (98.4 °F)  Temperature: 36.9 °C (98.4 °F)  Pulse  Av.1  Min: 49  Max: 104  Blood Pressure : 113/56       Physical Exam:  Physical Exam   Constitutional: He is oriented to person, place, and time.   Obese   HENT:   Head: Normocephalic and atraumatic.   Mouth/Throat: No oropharyngeal exudate.   Eyes: EOM  are normal. No scleral icterus.   Aniscoria   Neck: Neck supple. No JVD present.   Cardiovascular: Normal rate, regular rhythm and normal heart sounds.    Pulmonary/Chest: Effort normal. No stridor. No respiratory distress. He has no wheezes.   Abdominal: Soft. Bowel sounds are normal. He exhibits no distension. There is no tenderness.   Musculoskeletal: Normal range of motion. He exhibits edema.   Neurological: He is alert and oriented to person, place, and time.   Skin: Skin is warm. He is not diaphoretic. There is erythema.   Multiple, diffuse plaques and skin lesons, + erythema  DIffuse ulcerations back and buttock   Psychiatric: He has a normal mood and affect. His behavior is normal.   Nursing note and vitals reviewed.      Meds:    Current Facility-Administered Medications:   •  MBX  •  doxycycline monohydrate  •  D5W  •  HYDROmorphone  •  lactulose  •  HYDROmorphone  •  ferrous sulfate  •  ascorbic acid  •  multivitamin  •  midodrine  •  oxyCODONE immediate-release  •  fluconazole  •  acetaminophen **OR** acetaminophen  •  budesonide-formoterol  •  Pharmacy Consult Request  •  Respiratory Care per Protocol  •  NS  •  heparin  •  albuterol    Labs:  Recent Labs      05/19/19   0033  05/21/19   0250   WBC  6.8  5.2   RBC  3.28*  2.90*   HEMOGLOBIN  9.1*  8.2*   HEMATOCRIT  30.8*  27.7*   MCV  93.9  95.5   MCH  27.7  28.3   RDW  62.4*  63.3*   PLATELETCT  273  186   MPV  9.5  9.8   NEUTSPOLYS  50.40  43.00*   LYMPHOCYTES  33.90  31.60   MONOCYTES  3.50  4.40   EOSINOPHILS  7.80*  10.50*   BASOPHILS  0.90  0.90   RBCMORPHOLO  Present   --      Recent Labs      05/19/19   0830  05/20/19   0025  05/21/19   0250   SODIUM  156*  157*  148*   POTASSIUM  3.4*  3.5*  3.5*   CHLORIDE  123*  124*  118*   CO2  27  27  27   GLUCOSE  105*  101*  128*   BUN  17  17  18     Recent Labs      05/19/19   0830  05/20/19   0025  05/21/19   0250   CREATININE  1.59*  1.69*  1.73*       Imaging:  Ct-chest,abdomen,pelvis  With    Result Date: 5/9/2019 5/9/2019 9:08 PM HISTORY/REASON FOR EXAM:  T cell lymphoma restaging,compare with prior PET scan TECHNIQUE/EXAM DESCRIPTION: CT scan of the chest, abdomen and pelvis with contrast. Thin-section helical scanning was obtained from the lung apices through the pubic symphysis to include the chest, abdomen and pelvis.  100 mL of nonionic contrast was administered intravenously without complication. Low dose optimization technique was utilized for this CT exam including automated exposure control and adjustment of the mA and/or kV according to patient size. COMPARISON: PET/CT 4/30/2018 is findings there are FINDINGS: CT CHEST:The lungs are fully expanded, with no parenchymal abnormality.  There is no pleural or pericardial effusion. There are no enlarged mediastinal or hilar nodes. There are enlarged axillary nodes bilaterally however, more notably on the right. The largest right-sided node measures about 15 mm in greatest short axis dimension. The bony thorax is unremarkable. CT ABDOMEN:The liver, spleen, pancreas, and stomach are unremarkable.  Adrenal glands are normal.  No opaque gallstones.  The kidneys show symmetric opacification with no hydronephrosis. A 12 mm exophytic lesion is seen along the lateral margin of the right kidney, likely a cyst. This is unchanged. The bowel and mesentery are grossly normal. No mesenteric or retroperitoneal adenopathy. No free fluid or air. CT PELVIS:The bladder is intact. No free fluid in the pelvis.  The pelvic bowel and mesentery are grossly normal.  The bony pelvis and visualized proximal femora are intact. Borderline enlarged inguinal lymph nodes are present bilaterally which are somewhat smaller than before.     1.  Bilateral axillary adenopathy, more notable on the right. Some of these nodes are larger than on the prior PET/CT. There is no intrathoracic adenopathy. 2.  No abdominal adenopathy. 3.  Borderline enlarged inguinal lymph nodes,  overall decreased in size compared with the prior PET/CT.    Dx-chest-limited (1 View)    Result Date: 5/9/2019  HISTORY/REASON FOR EXAM: central venous line placement. TECHNIQUE/EXAM DESCRIPTION:  Single view of the chest,  5/8/2019 11:47 PM COMPARISON:  Prior image today at 2043 hours FINDINGS:   A new left central venous line has been placed, the tip of which projects in the area of the mid SVC. There is no pneumothorax. Cardiopulmonary appearance is unchanged.     Left central venous line in place. No pneumothorax. INTERPRETING LOCATION: [facility], [place], [address]    Dx-chest-portable (1 View)    Result Date: 5/8/2019 5/8/2019 8:34 PM HISTORY/REASON FOR EXAM:  Shortness of breath. Suspicion of sepsis. TECHNIQUE/EXAM DESCRIPTION AND NUMBER OF VIEWS: Single portable view of the chest. COMPARISON: 4/8/2019 FINDINGS: The heart, mediastinum, and anand are unremarkable. The lungs are well expanded and show no evidence of infiltrate or other acute process. There is no obvious pleural effusion. The bony thorax is grossly normal. A right central venous line with an attached infusion port is unchanged in position.     No acute cardiopulmonary abnormality. No interval change.    Ir-cvc Tunnel With Port Removal    Result Date: 5/9/2019 5/9/2019 10:26 AM HISTORY/REASON FOR EXAM:  T-cell lymphoma. Diffuse skin lesions possibly exacerbated by blood stream infection. Port removal requested. TECHNIQUE/EXAM DESCRIPTION: Removal of RIGHT internal jugular PowerPort venous access implant. PROCEDURE:  Informed consent was obtained.  The RIGHT anterior chest wall was prepped and draped in a sterile manner with chlorhexidine. All elements of MAXIMAL STERILE BARRIER technique were followed. Moderate sedation with Fentanyl and Versed was administered during the procedure with appropriate continuous patient monitoring by the radiology nurse. SEDATION DURATION: 30 minutes FLUOROSCOPY TIME: 0.1 minutes NUMBER OF FILMS: 1 fluoroscopic  images obtained. Following local anesthesia with 6 mL 1% lidocaine with epinephrine, a transverse incision was made over the site of the healed incision above the port reservoir.  Following blunt and sharp dissection, the port reservoir and catheter were removed intact.  The skin overlying the port and the port pocket were unremarkable with no evidence of infection.  There was no pus or exudate about the port reservoir.  The port catheter was removed intact. The port reservoir and catheter were submitted in a sterile container for cultures and Gram stain as well as fungal cultures. Manual compression was applied over the RIGHT  internal jugular catheter entry site and port pocket for about 5 minutes. The port pocket was closed with deep and subcuticular layers of interrupted 3 -- 0 Vicryl suture and the skin sealed with Dermabond.  A sterile dressing was applied. The patient tolerated the procedure well with no evidence of complication. COMPARISON:  1 view chest 5/9/2019 FINDINGS:  The port reservoir and catheter were removed intact.  There was no discharge or exudate at the port pocket.     1.  Removal of RIGHT  internal jugular PowerPort venous implant. 2.  No evidence of infection at the port pocket. 3.  The port reservoir and port catheter were removed intact.    Ec-echocardiogram Ltd W/o Cont    Result Date: 5/18/2019  Transthoracic Echo Report Echocardiography Laboratory CONCLUSIONS Prior echo done 12/13/18, this study is much more limited in quality however no obvious change. Technically difficult study incomplete information is obtained. Left ventricular ejection fraction is visually estimated to be 65%. Right ventricle not well visualized, may be mildly dilated, grossly normal function. Valves not well visualized. Unable to estimate pulmonary artery pressure due to an inadequate tricuspid regurgitant jet. DOMENICO MUNOZ Exam Date:         05/18/2019                    16:19 Exam Location:     Inpatient  Priority:          Routine Ordering Physician:        JAMIA SALAS Referring Physician: Sonographer:               James Rodríguez RDCS Age:    68     Gender:    M MRN:    [id number] :    1950 BSA:    2.2    Ht (in):    68     Wt (lb):    238 Exam Type:     Limited Indications:     Endocarditis ICD Codes:       421 CPT Codes:       90154 BP:   103    /   61     HR: Technical Quality:       Technically difficult study                          incomplete information is                          obtained MEASUREMENTS  (Male / Female) Normal Values 2D ECHO LV Diastolic Diameter PLAX        4.5 cm                4.2 - 5.9 / 3.9 - 5.3 cm LV Systolic Diameter PLAX         2.9 cm                2.1 - 4.0 cm IVS Diastolic Thickness           0.91 cm               LVPW Diastolic Thickness          1.1 cm                Estimated LV Ejection Fraction    65 %                  * Indicates values subject to auto-interpretation LV EF:  65    % FINDINGS Left Ventricle Left ventricular systolic function appears to be normal. Wall motion is difficult to assess with the limitations of image quality, appears grossly normal. Left ventricular ejection fraction is visually estimated to be 65%. Indeterminate diastolic function. Normal left ventricular wall thickness. Right Ventricle Right ventricle not well visualized, may be mildly dilated, grossly normal function. Right Atrium Right atrium not well visualized. Left Atrium Normal left atrial size. Mitral Valve Structurally normal mitral valve without significant stenosis or regurgitation. Aortic Valve The aortic valve is not well visualized. No stenosis or regurgitation seen. Tricuspid Valve The tricuspid valve is not well visualized. No stenosis or regurgitation seen. Unable to estimate pulmonary artery pressure due to an inadequate tricuspid regurgitant jet. Pulmonic Valve The pulmonic valve is not well visualized. Pericardium Pericardium not well visualized. Aorta Ascending  "aorta not well visualized. Deanna Chand MD (Electronically Signed) Final Date:     18 May 2019 17:34    Ir-picc Line Placement W/ Guidance > Age 5    Result Date: 5/18/2019  HISTORY/REASON FOR EXAM:   PICC placement. TECHNIQUE/EXAM DESCRIPTION AND NUMBER OF VIEWS:   PICC line insertion with ultrasound guidance.  The procedure was performed using maximal sterile barrier technique including sterile gown, mask, cap, and donning of sterile gloves following appropriate hand hygiene and/or sterile scrub. Patient skin site was prepped with 2% Chlorhexidine solution. FINDINGS:  PICC line insertion with Ultrasound Guidance was performed by qualified nursing staff without the assistance of a Radiologist. PICC positioning appropriateness confirmed by 3CG technology; chest xray only needed in the instance 3CG unable to confirm placement.              Ultrasound-guided PICC placement performed by qualified nursing staff as above.       Micro:  Results     Procedure Component Value Units Date/Time    BLOOD CULTURE [[id number]] Collected:  05/19/19 1224    Order Status:  Completed Specimen:  Blood from Peripheral Updated:  05/20/19 0935     Significant Indicator NEG     Source BLD     Site PERIPHERAL     Culture Result No Growth  Note: Blood cultures are incubated for 5 days and  are monitored continuously.Positive blood cultures  are called to the RN and reported as soon as  they are identified.      Narrative:       Per Hospital Policy: Only change Specimen Src: to \"Line\" if  specified by physician order.  Left Hand    BLOOD CULTURE [[id number]] Collected:  05/19/19 1225    Order Status:  Completed Specimen:  Blood from Peripheral Updated:  05/20/19 0935     Significant Indicator NEG     Source BLD     Site PERIPHERAL     Culture Result No Growth  Note: Blood cultures are incubated for 5 days and  are monitored continuously.Positive blood cultures  are called to the RN and reported as soon as  they are identified.      " "Narrative:       Per Hospital Policy: Only change Specimen Src: to \"Line\" if  specified by physician order.  Left Wrist    BLOOD CULTURE [[id number]] Collected:  05/13/19 1435    Order Status:  Completed Specimen:  Blood from Peripheral Updated:  05/18/19 2100     Significant Indicator NEG     Source BLD     Site PERIPHERAL     Culture Result No growth after 5 days of incubation.    Narrative:       Per Hospital Policy: Only change Specimen Src: to \"Line\" if  specified by physician order.  Left Hand    BLOOD CULTURE [[id number]] Collected:  05/13/19 1403    Order Status:  Completed Specimen:  Blood from Peripheral Updated:  05/18/19 1700     Significant Indicator NEG     Source BLD     Site PERIPHERAL     Culture Result No growth after 5 days of incubation.    Narrative:       Per Hospital Policy: Only change Specimen Src: to \"Line\" if  specified by physician order.  Right Hand    BLOOD CULTURE [[id number]] Collected:  05/10/19 1335    Order Status:  Completed Specimen:  Blood from Peripheral Updated:  05/15/19 1500     Significant Indicator NEG     Source BLD     Site PERIPHERAL     Culture Result No growth after 5 days of incubation.    Narrative:       Collected By:[id number] FARIDA TUTTLE.  Per Hospital Policy: Only change Specimen Src: to \"Line\" if  specified by physician order.  Right Forearm/Arm    BLOOD CULTURE [[id number]] Collected:  05/10/19 1352    Order Status:  Completed Specimen:  Blood from Peripheral Updated:  05/15/19 1500     Significant Indicator NEG     Source BLD     Site PERIPHERAL     Culture Result No growth after 5 days of incubation.    Narrative:       Collected By:[id number] FARIDA MONTE  Per Hospital Policy: Only change Specimen Src: to \"Line\" if  specified by physician order.  Left Forearm/Arm    BLOOD CULTURE [[id number]]  (Abnormal)  (Susceptibility) Collected:  05/08/19 2034    Order Status:  Completed Specimen:  Blood from Peripheral Updated:  05/15/19 0931     Significant Indicator " "POS (POS)     Source BLD     Site PERIPHERAL     Culture Result Growth detected by Bactec instrument. 05/09/2019  1830  Negative for Staphylococcus aureus and MRSA by PCR. Correlate  ongoing need for antibiotics with clinical condition.   (A)      Staphylococcus haemolyticus (A)    Narrative:       CALL  [facility]  ICC tel. [phone],  CALLED  ICC tel. [phone] 05/09/2019, 20:44, RB PERF. RESULTS CALLED TO: RN  [phone]  Per Hospital Policy: Only change Specimen Src: to \"Line\" if  specified by physician order.  Left Forearm/Arm    Culture & Susceptibility     STAPHYLOCOCCUS HAEMOLYTICUS     Antibiotic Sensitivity Microscan Unit Status    Ampicillin/sulbactam Resistant >16/8 mcg/mL Final    Method: GREGORY    Clindamycin Resistant >4 mcg/mL Final    Method: GREGORY    Daptomycin Sensitive <=0.5 mcg/mL Final    Method: GREGORY    Erythromycin Resistant >4 mcg/mL Final    Method: GREGORY    Moxifloxacin Resistant >4 mcg/mL Final    Method: GREGORY    Oxacillin Resistant >2 mcg/mL Final    Method: GREGORY    Penicillin Resistant >8 mcg/mL Final    Method: GREGORY    Tetracycline Sensitive <=4 mcg/mL Final    Method: GREGORY    Trimeth/Sulfa Resistant >2/38 mcg/mL Final    Method: GREGORY    Vancomycin Sensitive 4 mcg/mL Final    Method: GREGORY                             Assessment:  Active Hospital Problems    Diagnosis   • *Septic shock ([facility]) [A41.9, R65.21]   • 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]   • Mycosis fungoides ([facility]) [C84.00]   • 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]   • Bacteremia due to coagulase-negative Staphylococcus [R78.81]   • BETO (acute kidney injury) ([facility]) [N17.9]     Staph sepsis, improved  Afebrile  No current leukocytosis  BCxs + staph haemolyticus 5/8  Repeat Bcxs neg 5/10 and 5/13  Port removed 5/9  TTE neg; as unrevealing recommend KATH  Completed 10 days IV abx   Continue doxy-avoid taking within 2 hours of dairy    Candida infection port, on treatment  Continue " fluconazole through 6/2/2019  Denies visual changes    Cutaneous T Cell lymphoma (Mycosis fungoides)  Dx in 11/2017  Widespread hyperkeratotic plaques, ulcerated lesions in all pressure areas  CT chest abdomen pelvis on 5/9 with bilateral axillary adenopathy, no pleural or pericardial effusion.  No consolidations or evidence of pneumonia, abdomen pelvis with no significant findings.  Superinfected open skin lesions/wound (staph/strep)      BETO, persistent and worse  Multifactorial incl sepsis and prior vancomycin  Dose adjust abx as needed     Diarrhea-  C. difficile negative on 5/11

## 2019-05-22 NOTE — PROGRESS NOTES
Report completed at pt bedside. This RN resumed care of patient.    Pt alert and oriented x4 upon arrival. Pt has generalized pain d/t skin breakdown and is receiving PRN Oxycodone for pain. Pt also has been complaining of a decreased output d/t sores in mouth.     aMrla Mccabe R.N.

## 2019-05-22 NOTE — PROGRESS NOTES
Infectious Disease Progress Note    Author: Sharon Yañez M.D. Date & Time of service: 2019  1:22 PM    Chief Complaint:  Septic shock, cellulitis      Interval History:  68 y.o. WM with hx of cutaneous T Cell lymphoma (Mycosis fungoides) dx in 2017, had extensive w/u at [facility] for his 20 year hx of widespread hyperkeratotic plaques who is now being followed by Dr. Mc. He had portacath that was placed in 2017 (per wife) and had last chemotherapy was ~3 months ago. Multiple hospital admissions for cellulitis from diffuse open widespread skin in 3/2019 and 2019. Admitted 2019 for septic shock due to cellulitis with fever, elevated lactate >4, and was hypotensive   Interval 24 hours of Vitals/Labs/Micro,and imaging results reviewed as available. See assessment.    Interval 24 hours of Vitals/Labs/Micro,and imaging results reviewed as available. See assessment.    AF WBC 6.8 c/o hallucinations worse in the morning-Pain (everywhere) controlled with dilaudid. Denies SE abx Has PICC. Plan for new dressings for back-pictures viewed   AF WBC 5.2drinking 2nd carton of milk-no new complaints-tolerated dressing change with Dilaudid   AF WBC 6.8 no new complaints-tolerating abx. No new positive cxs  Review of Systems:  Review of Systems   Constitutional: Positive for malaise/fatigue. Negative for chills and fever.   Cardiovascular: Positive for leg swelling.   Gastrointestinal: Negative for abdominal pain, diarrhea, nausea and vomiting.   Musculoskeletal: Positive for myalgias.   Skin: Positive for rash.        Diffuse with ulcerations   All other systems reviewed and are negative.      Hemodynamics:  Temp (24hrs), Av.8 °C (98.3 °F), Min:36.4 °C (97.6 °F), Max:37.2 °C (98.9 °F)  Temperature: 36.4 °C (97.6 °F)  Pulse  Av.4  Min: 49  Max: 104  Blood Pressure : 106/53       Physical Exam:  Physical Exam   Constitutional: He is oriented to person, place, and time.   Obese    HENT:   Head: Normocephalic and atraumatic.   Mouth/Throat: No oropharyngeal exudate.   Eyes: Pupils are equal, round, and reactive to light. EOM are normal. No scleral icterus.   Aniscoria   Neck: Neck supple. No JVD present.   Cardiovascular: Normal rate, regular rhythm and normal heart sounds.    Pulmonary/Chest: Effort normal. No stridor. He has no wheezes. He has no rales.   Abdominal: Soft. Bowel sounds are normal. He exhibits no distension. There is no rebound and no guarding.   Musculoskeletal: Normal range of motion. He exhibits edema.   Neurological: He is alert and oriented to person, place, and time. Coordination normal.   Skin: Skin is warm. He is not diaphoretic. There is erythema.   Multiple, diffuse plaques and skin lesons, + erythema  DIffuse ulcerations back and buttock   Psychiatric: He has a normal mood and affect. His behavior is normal.   Nursing note and vitals reviewed.      Meds:    Current Facility-Administered Medications:   •  MBX  •  doxycycline monohydrate  •  D5W  •  HYDROmorphone  •  lactulose  •  HYDROmorphone  •  ferrous sulfate  •  ascorbic acid  •  multivitamin  •  midodrine  •  oxyCODONE immediate-release  •  fluconazole  •  acetaminophen **OR** acetaminophen  •  budesonide-formoterol  •  Pharmacy Consult Request  •  Respiratory Care per Protocol  •  NS  •  heparin  •  albuterol    Labs:  Recent Labs      05/21/19   0250  05/22/19   0036   WBC  5.2  6.8   RBC  2.90*  3.50*   HEMOGLOBIN  8.2*  9.7*   HEMATOCRIT  27.7*  32.6*   MCV  95.5  93.1   MCH  28.3  27.7   RDW  63.3*  61.8*   PLATELETCT  186  195   MPV  9.8  10.0   NEUTSPOLYS  43.00*  60.90   LYMPHOCYTES  31.60  21.70*   MONOCYTES  4.40  5.20   EOSINOPHILS  10.50*  7.80*   BASOPHILS  0.90  0.00   RBCMORPHOLO   --   Normal     Recent Labs      05/20/19   0025  05/21/19   0250  05/22/19   0036   SODIUM  157*  148*  147*   POTASSIUM  3.5*  3.5*  4.1   CHLORIDE  124*  118*  117*   CO2  27  27  25   GLUCOSE  101*  128*  116*    BUN  17  18  19     Recent Labs      05/20/19   0025  05/21/19   0250  05/22/19   0036   CREATININE  1.69*  1.73*  1.56*       Imaging:  Ct-chest,abdomen,pelvis With    Result Date: 5/9/2019 5/9/2019 9:08 PM HISTORY/REASON FOR EXAM:  T cell lymphoma restaging,compare with prior PET scan TECHNIQUE/EXAM DESCRIPTION: CT scan of the chest, abdomen and pelvis with contrast. Thin-section helical scanning was obtained from the lung apices through the pubic symphysis to include the chest, abdomen and pelvis.  100 mL of nonionic contrast was administered intravenously without complication. Low dose optimization technique was utilized for this CT exam including automated exposure control and adjustment of the mA and/or kV according to patient size. COMPARISON: PET/CT 4/30/2018 is findings there are FINDINGS: CT CHEST:The lungs are fully expanded, with no parenchymal abnormality.  There is no pleural or pericardial effusion. There are no enlarged mediastinal or hilar nodes. There are enlarged axillary nodes bilaterally however, more notably on the right. The largest right-sided node measures about 15 mm in greatest short axis dimension. The bony thorax is unremarkable. CT ABDOMEN:The liver, spleen, pancreas, and stomach are unremarkable.  Adrenal glands are normal.  No opaque gallstones.  The kidneys show symmetric opacification with no hydronephrosis. A 12 mm exophytic lesion is seen along the lateral margin of the right kidney, likely a cyst. This is unchanged. The bowel and mesentery are grossly normal. No mesenteric or retroperitoneal adenopathy. No free fluid or air. CT PELVIS:The bladder is intact. No free fluid in the pelvis.  The pelvic bowel and mesentery are grossly normal.  The bony pelvis and visualized proximal femora are intact. Borderline enlarged inguinal lymph nodes are present bilaterally which are somewhat smaller than before.     1.  Bilateral axillary adenopathy, more notable on the right. Some of these  nodes are larger than on the prior PET/CT. There is no intrathoracic adenopathy. 2.  No abdominal adenopathy. 3.  Borderline enlarged inguinal lymph nodes, overall decreased in size compared with the prior PET/CT.    Dx-chest-limited (1 View)    Result Date: 5/9/2019  HISTORY/REASON FOR EXAM: central venous line placement. TECHNIQUE/EXAM DESCRIPTION:  Single view of the chest,  5/8/2019 11:47 PM COMPARISON:  Prior image today at 2043 hours FINDINGS:   A new left central venous line has been placed, the tip of which projects in the area of the mid SVC. There is no pneumothorax. Cardiopulmonary appearance is unchanged.     Left central venous line in place. No pneumothorax. INTERPRETING LOCATION: [facility], [facility], [address]    Dx-chest-portable (1 View)    Result Date: 5/8/2019 5/8/2019 8:34 PM HISTORY/REASON FOR EXAM:  Shortness of breath. Suspicion of sepsis. TECHNIQUE/EXAM DESCRIPTION AND NUMBER OF VIEWS: Single portable view of the chest. COMPARISON: 4/8/2019 FINDINGS: The heart, mediastinum, and anand are unremarkable. The lungs are well expanded and show no evidence of infiltrate or other acute process. There is no obvious pleural effusion. The bony thorax is grossly normal. A right central venous line with an attached infusion port is unchanged in position.     No acute cardiopulmonary abnormality. No interval change.    Ir-cvc Tunnel With Port Removal    Result Date: 5/9/2019 5/9/2019 10:26 AM HISTORY/REASON FOR EXAM:  T-cell lymphoma. Diffuse skin lesions possibly exacerbated by blood stream infection. Port removal requested. TECHNIQUE/EXAM DESCRIPTION: Removal of RIGHT internal jugular PowerPort venous access implant. PROCEDURE:  Informed consent was obtained.  The RIGHT anterior chest wall was prepped and draped in a sterile manner with chlorhexidine. All elements of MAXIMAL STERILE BARRIER technique were followed. Moderate sedation with Fentanyl and Versed was administered during the procedure with  appropriate continuous patient monitoring by the radiology nurse. SEDATION DURATION: 30 minutes FLUOROSCOPY TIME: 0.1 minutes NUMBER OF FILMS: 1 fluoroscopic images obtained. Following local anesthesia with 6 mL 1% lidocaine with epinephrine, a transverse incision was made over the site of the healed incision above the port reservoir.  Following blunt and sharp dissection, the port reservoir and catheter were removed intact.  The skin overlying the port and the port pocket were unremarkable with no evidence of infection.  There was no pus or exudate about the port reservoir.  The port catheter was removed intact. The port reservoir and catheter were submitted in a sterile container for cultures and Gram stain as well as fungal cultures. Manual compression was applied over the RIGHT  internal jugular catheter entry site and port pocket for about 5 minutes. The port pocket was closed with deep and subcuticular layers of interrupted 3 -- 0 Vicryl suture and the skin sealed with Dermabond.  A sterile dressing was applied. The patient tolerated the procedure well with no evidence of complication. COMPARISON:  1 view chest 5/9/2019 FINDINGS:  The port reservoir and catheter were removed intact.  There was no discharge or exudate at the port pocket.     1.  Removal of RIGHT  internal jugular PowerPort venous implant. 2.  No evidence of infection at the port pocket. 3.  The port reservoir and port catheter were removed intact.    Ec-echocardiogram Ltd W/o Cont    Result Date: 5/18/2019  Transthoracic Echo Report Echocardiography Laboratory CONCLUSIONS Prior echo done 12/13/18, this study is much more limited in quality however no obvious change. Technically difficult study incomplete information is obtained. Left ventricular ejection fraction is visually estimated to be 65%. Right ventricle not well visualized, may be mildly dilated, grossly normal function. Valves not well visualized. Unable to estimate pulmonary artery  pressure due to an inadequate tricuspid regurgitant jet. DOMENICO MUNOZ Exam Date:         2019                    16:19 Exam Location:     Inpatient Priority:          Routine Ordering Physician:        JAMIA SALAS Referring Physician: Sonographer:               James Rodríguez RDCS Age:    68     Gender:    M MRN:    [id number] :    1950 BSA:    2.2    Ht (in):    68     Wt (lb):    238 Exam Type:     Limited Indications:     Endocarditis ICD Codes:       421 CPT Codes:       04163 BP:   103    /   61     HR: Technical Quality:       Technically difficult study                          incomplete information is                          obtained MEASUREMENTS  (Male / Female) Normal Values 2D ECHO LV Diastolic Diameter PLAX        4.5 cm                4.2 - 5.9 / 3.9 - 5.3 cm LV Systolic Diameter PLAX         2.9 cm                2.1 - 4.0 cm IVS Diastolic Thickness           0.91 cm               LVPW Diastolic Thickness          1.1 cm                Estimated LV Ejection Fraction    65 %                  * Indicates values subject to auto-interpretation LV EF:  65    % FINDINGS Left Ventricle Left ventricular systolic function appears to be normal. Wall motion is difficult to assess with the limitations of image quality, appears grossly normal. Left ventricular ejection fraction is visually estimated to be 65%. Indeterminate diastolic function. Normal left ventricular wall thickness. Right Ventricle Right ventricle not well visualized, may be mildly dilated, grossly normal function. Right Atrium Right atrium not well visualized. Left Atrium Normal left atrial size. Mitral Valve Structurally normal mitral valve without significant stenosis or regurgitation. Aortic Valve The aortic valve is not well visualized. No stenosis or regurgitation seen. Tricuspid Valve The tricuspid valve is not well visualized. No stenosis or regurgitation seen. Unable to estimate pulmonary artery pressure due to an  inadequate tricuspid regurgitant jet. Pulmonic Valve The pulmonic valve is not well visualized. Pericardium Pericardium not well visualized. Aorta Ascending aorta not well visualized. Deanna Chand MD (Electronically Signed) Final Date:     18 May 2019 17:34    Ir-picc Line Placement W/ Guidance > Age 5    Result Date: 5/18/2019  HISTORY/REASON FOR EXAM:   PICC placement. TECHNIQUE/EXAM DESCRIPTION AND NUMBER OF VIEWS:   PICC line insertion with ultrasound guidance.  The procedure was performed using maximal sterile barrier technique including sterile gown, mask, cap, and donning of sterile gloves following appropriate hand hygiene and/or sterile scrub. Patient skin site was prepped with 2% Chlorhexidine solution. FINDINGS:  PICC line insertion with Ultrasound Guidance was performed by qualified nursing staff without the assistance of a Radiologist. PICC positioning appropriateness confirmed by 3CG technology; chest xray only needed in the instance 3CG unable to confirm placement.              Ultrasound-guided PICC placement performed by qualified nursing staff as above.       Micro:  Results     Procedure Component Value Units Date/Time    CULTURE WOUND W/ GRAM STAIN [[id number]]  (Abnormal)  (Susceptibility) Collected:  05/09/19 1115    Order Status:  Completed Specimen:  Wound Updated:  05/22/19 0922     Significant Indicator POS (POS)     Source WND     Site Port Catheter Tip     Culture Result Growth noted after further incubation, see below for  organism identification.   (A)     Gram Stain Result No organisms seen.     Culture Result Staphylococcus epidermidis  Rare growth   (A)      Candida albicans  Rare growth   (A)    Narrative:       Surgery Specimen    Culture & Susceptibility     STAPHYLOCOCCUS EPIDERMIDIS     Antibiotic Sensitivity Microscan Unit Status    Ampicillin/sulbactam Sensitive <=8/4 mcg/mL Final    Method: GREGORY    Clindamycin Sensitive <=0.5 mcg/mL Final    Method: GREGORY    Daptomycin  "Sensitive <=0.5 mcg/mL Final    Method: GREGORY    Erythromycin Sensitive <=0.5 mcg/mL Final    Method: GREGORY    Moxifloxacin Sensitive <=0.5 mcg/mL Final    Method: GREGORY    Oxacillin Sensitive <=0.25 mcg/mL Final    Method: GREGORY    Penicillin Resistant 2 mcg/mL Final    Method: GREGORY    Tetracycline Sensitive <=4 mcg/mL Final    Method: GREGORY    Trimeth/Sulfa Sensitive <=0.5/9.5 mcg/mL Final    Method: GREGORY    Vancomycin Sensitive 1 mcg/mL Final    Method: GREGORY                       Fungal Culture [[id number]]  (Abnormal) Collected:  05/09/19 1115    Order Status:  Completed Specimen:  Wound Updated:  05/22/19 0922     Significant Indicator POS (POS)     Source WND     Site Port Catheter Tip     Culture Result Growth noted after further incubation, see below for  organism identification.   (A)      Candida albicans  Rare growth   (A)    Narrative:       Surgery Specimen    BLOOD CULTURE [[id number]] Collected:  05/19/19 1224    Order Status:  Completed Specimen:  Blood from Peripheral Updated:  05/20/19 0935     Significant Indicator NEG     Source BLD     Site PERIPHERAL     Culture Result No Growth  Note: Blood cultures are incubated for 5 days and  are monitored continuously.Positive blood cultures  are called to the RN and reported as soon as  they are identified.      Narrative:       Per Hospital Policy: Only change Specimen Src: to \"Line\" if  specified by physician order.  Left Hand    BLOOD CULTURE [[id number]] Collected:  05/19/19 1225    Order Status:  Completed Specimen:  Blood from Peripheral Updated:  05/20/19 0935     Significant Indicator NEG     Source BLD     Site PERIPHERAL     Culture Result No Growth  Note: Blood cultures are incubated for 5 days and  are monitored continuously.Positive blood cultures  are called to the RN and reported as soon as  they are identified.      Narrative:       Per Hospital Policy: Only change Specimen Src: to \"Line\" if  specified by physician order.  Left Wrist    BLOOD CULTURE " "[[id number]] Collected:  05/13/19 1435    Order Status:  Completed Specimen:  Blood from Peripheral Updated:  05/18/19 2100     Significant Indicator NEG     Source BLD     Site PERIPHERAL     Culture Result No growth after 5 days of incubation.    Narrative:       Per Hospital Policy: Only change Specimen Src: to \"Line\" if  specified by physician order.  Left Hand    BLOOD CULTURE [[id number]] Collected:  05/13/19 1403    Order Status:  Completed Specimen:  Blood from Peripheral Updated:  05/18/19 1700     Significant Indicator NEG     Source BLD     Site PERIPHERAL     Culture Result No growth after 5 days of incubation.    Narrative:       Per Hospital Policy: Only change Specimen Src: to \"Line\" if  specified by physician order.  Right Hand    BLOOD CULTURE [[id number]] Collected:  05/10/19 1335    Order Status:  Completed Specimen:  Blood from Peripheral Updated:  05/15/19 1500     Significant Indicator NEG     Source BLD     Site PERIPHERAL     Culture Result No growth after 5 days of incubation.    Narrative:       Collected By:[id number] FARIDA MONTE  Per Hospital Policy: Only change Specimen Src: to \"Line\" if  specified by physician order.  Right Forearm/Arm    BLOOD CULTURE [[id number]] Collected:  05/10/19 1352    Order Status:  Completed Specimen:  Blood from Peripheral Updated:  05/15/19 1500     Significant Indicator NEG     Source BLD     Site PERIPHERAL     Culture Result No growth after 5 days of incubation.    Narrative:       Collected By:[id number] FARIDA MONTE  Per Hospital Policy: Only change Specimen Src: to \"Line\" if  specified by physician order.  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]   • Bacteremia due to coagulase-negative " Staphylococcus [R78.81]   • BETO (acute kidney injury) ([facility]) [N17.9]     Staph sepsis, improved  Afebrile  No current leukocytosis  BCxs + staph haemolyticus 5/8  Repeat Bcxs neg 5/10 and 5/13  Port removed 5/9  TTE neg; as unrevealing recommended KATH if feasible  Completed 10 days IV abx   Continue doxy-avoid taking within 2 hours of dairy-  Continue at least 4 weeks    Candida infection port, on treatment  Continue fluconazole through 6/2/2019  Denies visual changes    Cutaneous T Cell lymphoma (Mycosis fungoides)  Dx in 11/2017  Widespread hyperkeratotic plaques, ulcerated lesions in all pressure areas  CT chest abdomen pelvis on 5/9 with bilateral axillary adenopathy, no pleural or pericardial effusion.  No consolidations or evidence of pneumonia, abdomen pelvis with no significant findings.  Superinfected open skin lesions/wound (staph/strep)   Continued risk for infectious complications  Abx as above     BETO, persistent   Improved today  Multifactorial incl sepsis and prior vancomycin  Dose adjust abx as needed     Diarrhea-  C. difficile negative on 5/11      I have performed a physical exam and reviewed and updated ROS as of today.  In review of yesterday's note dated 5/21/2019 , there are no changes except as documented above.    KEITH IM Dr Sanderson

## 2019-05-22 NOTE — PROGRESS NOTES
2 RN skin check completed with MARIANNE Winn.   Devices in place: reyes, BMS, oxygen tubing.  Skin assessed under devices: redness, flaking, swelling, sloughing of tissue.  Confirmed pressure ulcers found on NA.  New potential pressure ulcers noted on NA. Wound consult placed for generalized scaling/flaking/sloughing skin.  The following interventions in place: waffle mattress, BMS, reyes, q2h burns, xeroform dressings to open bleeding areas, pillows for heel offloading.  Pt has bleeding, superficial wounds over entire body including: occipital head, back, groin, abdomen, legs, and arms.     Marla Mccabe R.N.

## 2019-05-22 NOTE — CARE PLAN
Problem: Safety  Goal: Will remain free from injury  Outcome: PROGRESSING AS EXPECTED  Fall precautions in place.     Problem: Skin Integrity  Goal: Risk for impaired skin integrity will decrease  Outcome: PROGRESSING SLOWER THAN EXPECTED  Pt has skin breakdown over entire body. Pt being turned q2h, dressings changed daily and PRN, barrier cream in use.

## 2019-05-22 NOTE — PROGRESS NOTES
[facility] Medicine Daily Progress Note    Date of Service  5/22/2019    Chief Complaint  68 y.o. male admitted 5/8/2019 with fevers and sepsis, transferred from [facility].    Hospital Course    Patient admitted to ICU, had lactic acid >4 and required IV pressors to maintain blood pressure.  H had been discharged home in April for rash/infection related to his lymphoma and completed treatment with return to his baseline until 3 days prior to admission.  He had PORT removal due to bacteremia and also found to have bacterial and fungal infection of the catheter tip.      Interval Problem Update  5/21 Patient feeling slightly better compared to day prior.  Sodium has improved with initiation of D5W.  Patient still discussing with palliative care as he remains full code and has a high mortality associated with his lymphoma and skin compromise.    5/22 Patient very fatigued when evaluated, able to wake but falls back to sleep quickly.  Na has improved but given how much of his skin is involved in lesions, he continues to lose fluids at a high rate thus will increase IVF up to 150ml/h.  Continue doxycycline and diflucan per ID.  Palliative care to continue to meet with patient and family as available.    Consultants/Specialty  ID - Pari    Code Status  full    Disposition  tbd    Review of Systems  Review of Systems   Constitutional: Negative for chills and fever.   HENT: Negative for congestion.    Eyes: Negative for blurred vision and photophobia.   Respiratory: Negative for cough and shortness of breath.    Cardiovascular: Negative for chest pain, claudication and leg swelling.   Gastrointestinal: Negative for abdominal pain, constipation, diarrhea, heartburn and vomiting.   Genitourinary: Negative for dysuria and hematuria.   Musculoskeletal: Negative for joint pain and myalgias.   Skin: Positive for rash. Negative for itching.   Neurological: Negative for dizziness, sensory change, speech change, weakness and  headaches.   Psychiatric/Behavioral: Negative for depression. The patient is not nervous/anxious and does not have insomnia.         Physical Exam  Temp:  [36.4 °C (97.6 °F)-37.2 °C (98.9 °F)] 36.4 °C (97.6 °F)  Pulse:  [75-94] 82  Resp:  [18-19] 18  BP: ()/(53-83) 106/53  SpO2:  [90 %-94 %] 94 %    Physical Exam   Constitutional: He is oriented to person, place, and time. He appears well-developed and well-nourished. No distress.   HENT:   Head: Normocephalic and atraumatic.   Eyes: Conjunctivae are normal. No scleral icterus.   Neck: Neck supple. No JVD present.   Cardiovascular: Normal rate, regular rhythm and normal heart sounds.  Exam reveals no gallop and no friction rub.    No murmur heard.  Pulmonary/Chest: Effort normal and breath sounds normal. No respiratory distress. He has no wheezes. He exhibits no tenderness.   Abdominal: Soft. Bowel sounds are normal. He exhibits no distension and no mass. There is no tenderness.   Musculoskeletal: He exhibits no edema or tenderness.   Lymphadenopathy:     He has no cervical adenopathy.   Neurological: He is alert and oriented to person, place, and time. No cranial nerve deficit.   Skin: Skin is warm and dry. Rash noted. He is not diaphoretic. No erythema. No pallor.   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   Psychiatric: He has a normal mood and affect. His behavior is normal.   Nursing note and vitals reviewed.      Fluids    Intake/Output Summary (Last 24 hours) at 05/22/19 1452  Last data filed at 05/22/19 0628   Gross per 24 hour   Intake             3710 ml   Output              810 ml   Net             2900 ml       Laboratory  Recent Labs      05/21/19   0250  05/22/19   0036   WBC  5.2  6.8   RBC  2.90*  3.50*   HEMOGLOBIN  8.2*  9.7*   HEMATOCRIT  27.7*  32.6*   MCV  95.5  93.1   MCH  28.3  27.7   MCHC  29.6*  29.8*   RDW  63.3*  61.8*   PLATELETCT  186  195   MPV  9.8   10.0     Recent Labs      05/20/19   0025  05/21/19   0250  05/22/19   0036   SODIUM  157*  148*  147*   POTASSIUM  3.5*  3.5*  4.1   CHLORIDE  124*  118*  117*   CO2  27  27  25   GLUCOSE  101*  128*  116*   BUN  17  18  19   CREATININE  1.69*  1.73*  1.56*   CALCIUM  7.1*  6.7*  7.0*                   Imaging  EC-ECHOCARDIOGRAM LTD W/O CONT   Final Result      IR-PICC LINE PLACEMENT W/ GUIDANCE > AGE 5   Final Result                  Ultrasound-guided PICC placement performed by qualified nursing staff as    above.          CT-CHEST,ABDOMEN,PELVIS WITH   Final Result      1.  Bilateral axillary adenopathy, more notable on the right. Some of these nodes are larger than on the prior PET/CT. There is no intrathoracic adenopathy.   2.  No abdominal adenopathy.   3.  Borderline enlarged inguinal lymph nodes, overall decreased in size compared with the prior PET/CT.      IR-CVC TUNNEL WITH PORT REMOVAL   Final Result      1.  Removal of RIGHT  internal jugular PowerPort venous implant.   2.  No evidence of infection at the port pocket.   3.  The port reservoir and port catheter were removed intact.      DX-CHEST-LIMITED (1 VIEW)   Final Result      Left central venous line in place. No pneumothorax.               INTERPRETING LOCATION: [address] [facility], [address]      DX-CHEST-PORTABLE (1 VIEW)   Final Result      No acute cardiopulmonary abnormality. No interval change.           Assessment/Plan  * Septic shock (HCC)- (present on admission)   Assessment & Plan    Resolved  Septic shock present upon admission  Source consistent with cutaneous infection  IR removed the indwelling port  Infectious disease consult  He met the criteria for septic shock as evidenced by the need for intravenous pressor support  Organ system failure associated with this disease process is the cardiovascular as is noted by hypotension requiring pressors  Broad-spectrum IV antibiotics to cover for skin infection in the setting of  immunocompromised host--IV Unasyn  Catheter tip positive for Candida.  ID following.  On antifungal.     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  Dr. Bradley, oncology, has been consulted.    CT done for staging  Code status has been addressed and he continues to request that everything is done.   Palliative care consult placed.  Palliative care did discuss CODE STATUS with patient who is hesitant to place stress on his wife by naming her for AD.  He wants to continue to care for his wife, whom he is worried about her wellbeing if he should pass.    Pain overall is improved with increased pain medications.     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 Gongora, trauma surgery, has evaluated the skin and agrees that a referral to a burn center is appropriate.   Mr. Ortiz states that he would be amenable to going to [facility] though no other facility as it would be closer to his wife.   IV and oral narcotic pain meds.  [facility] has refused him on 5/13 and may need to be re-addressed if his status decompensated.   Istodax being considered by oncology should patient show improvement from current status.     Sepsis due to coagulase-negative staphylococcal infection with metabolic encephalopathy (HCC)   Assessment & Plan    5/8 blood culture and cath tip + coag negative staph  Finished daptomycin IV per ID, augmentin po, antifungal for cath tip candida + culture.   right port removed.  Has new left chest port placed:    Order for IR removal left chest port per ID did not wait 48 hours for culture negative.  Order for PICC line placed.  5/10 and 5/13 Repeat BCs x2 negative.  TTE negative for endocarditis.  Patient has Woods catheter as well as rectal tube in place to  prevent further contamination of his open skin wounds to back.       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 Mc for years.  5/15:  Plan for treatment while in hospital further chemotherapy after discussion with Dr. Mc, Dr. Barraza, pharmacy and patient.  Continue pain control  Continue rigorous wound care since patient unable to discern when he has BMs.  Stool tracks up his back and into his open skin wounds.  No diarrhea per bedside nursing.  Patient has successful placement of rectal tube and Woods catheter to prevent further contamination of open wounds.     BETO (acute kidney injury) (HCC)- (present on admission)   Assessment & Plan    Resolved acute kidney injury.  Continue with oral hydration.  Patient's legs are edematous.  Avoid IV fluids if possible.     Hypernatremia   Assessment & Plan    Improved to 147 with D5W initiation.  Remains mildly hypernatremic.  Continue to encourage p.o. Intake.  Appears to be due to skin fluid losses. - IVF rate increase 5/22         Edema- (present on admission)   Assessment & Plan     lower extremity swelling  Off IV lasix.  He is at risk of intravascular depletion given the significant insensible fluid losses from skin     Iron deficiency anemia- (present on admission)   Assessment & Plan    Hemoglobin is stable.  Iron levels low, started iron bid with vit c.   b12, folate normal.     GERD (gastroesophageal reflux disease)- (present on admission)   Assessment & Plan    Hold outpatient due to currently on broad-spectrum antibiotics by ID.  Avoid C. difficile inducing medications such as PPIs.          VTE prophylaxis: heparin

## 2019-05-22 NOTE — CARE PLAN
Problem: Communication  Goal: The ability to communicate needs accurately and effectively will improve  Pt using call bell to alert staff to needs

## 2019-05-22 NOTE — PROGRESS NOTES
"Palliative Progress Note               Author: Makayla Power Date & Time created: 2019  1:24 PM     Reason for subsequent visit: cancer-related pain, generalized pain, symptom management    Interval History:  No acute events overnight.  Patient resting in bed, appears to be quite drowsy today.  He nodded off frequently during interview, but was easily awakened and re-directable.  Marco Antonio reports that his pain \"hasn't been that bad.  It's been about the same.\"  Patient denies nausea, vomiting and insomnia.  Patient reports his itching is manageable.     Review of Systems:  Negative for dyspnea. Positive for pain. Negative for fatigue. Negative for anxiety. Negative for depression. Negative for insomnia. Negative for diarrhea. Negative for nausea and vomiting. Negative for constipation.      Physical Exam:    Recent vital signs  Temp (24hrs), Av.8 °C (98.3 °F), Min:36.4 °C (97.6 °F), Max:37.2 °C (98.9 °F)  Temperature: 36.4 °C (97.6 °F)  Pulse  Av.4  Min: 49  Max: 104  Blood Pressure : 106/53       Physical Exam   Constitutional: He is easily aroused. He appears ill.   Sleepy, nodded off frequently   Cardiovascular: Normal rate and normal heart sounds.    distant   Pulmonary/Chest: Effort normal. He has decreased breath sounds in the right lower field and the left lower field. He has no wheezes. He has no rales.   Abdominal: Soft. Bowel sounds are normal. He exhibits no distension. There is no tenderness.   Musculoskeletal: He exhibits edema (2+ BLE, 1+ bilateral forearms).   Neurological: He is easily aroused.   Sleepy, forgetful   Skin:   Generalized dry, flaky skin with diffuse maceration/scabs/lesions including face/neck, trunk, back, arms, groin.  Open oozing/bleeding wounds on back.     Psychiatric: His speech is delayed and slurred.     Recent Labs      19   0025  19   0250  19   0036   SODIUM  157*  148*  147*   POTASSIUM  3.5*  3.5*  4.1   CHLORIDE  124*  118*  117*   CO2  27  " 27  25   GLUCOSE  101*  128*  116*   BUN  17 18 19   CREATININE  1.69*  1.73*  1.56*   CALCIUM  7.1*  6.7*  7.0*     Recent Labs      05/21/19   0250  05/22/19   0036   WBC  5.2  6.8   RBC  2.90*  3.50*   HEMOGLOBIN  8.2*  9.7*   HEMATOCRIT  27.7*  32.6*   MCV  95.5  93.1   MCH  28.3  27.7   MCHC  29.6*  29.8*   RDW  63.3*  61.8*   PLATELETCT  186  195   MPV  9.8  10.0     Medications reviewed. Labs Reviewed.     Problem List:  1.  Cancer-related generalized cutaneous pain (active)  2.  Peripheral neuropathy (active)  3.  Generalized pruritus (active)  4.  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  Dry mouth/thrush (stable)  7.  Acute depression (active)  8.  BETO (active/stable)  9.  GERD (stable)  10.  Anemia (stable)  11.  Edema (improving in BUE; active in BLE)  12.  Fecal incontinence (active)  13.  Altered mental status (active)  14.  Intermittent mild hypotension (active)    Assessment/Plan:  Advance Care Planning/Current Goals of Care: Cancer-related cutaneous pain and peripheral neuropathy  Prefer not to use morphine d/t patient's BETO.  GFR = 44; Cr 1.56; BUN 19 (5/22/19)     MEDD (morphine equivalent daily dose) is approximately 50 mg:  - hydromorphone 0.25 mg X 1 dose ~ 5 mg MEDD  - oxycodone 10 mg X 3 doses = 30 mg ~ 45 mg MEDD     5/21/19 ~ 62.6 mg MEDD  5/20/19 ~ 45 mg MEDD  5/19/19 ~ 50 mg MEDD  5/18/19 ~ 60 mg MEDD     Continue hydromorphone 0.5 mg IV QD prior to wound care dressing changes, if needed  Continue hydromorphone 0.25 mg IV Q3 hrs PRN severe pain   Continue oxycodone from 5-10 mg PO Q3 hrs prn moderate pain  Reminded patient that he can ask for PO or IV pain medication if he needs it.     Generalized pruritus  PC team considers patient's pruritus a neuropathic pain syndrome, since it is caused by peripheral nerve damage -- see Fast Fact #37  Keep nails trimmed  Apply unsecured restraint  "mitts prn to avoid any additional trauma through inadvertent scratching     Altered mental status  Sodium = 147 (5/22/19); down from 157 (5/20/19); Continuous D5W infusion @ 125 mL/hr per primary team     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  Orders per wound care team:  - Nursing performing dressing changes with xeroform  - Trauma pad to be placed under patient's back  - Change dressing every 48 hrs or prn with soilage or dislodgement  BMS system placed 5/17/2019 for incontinence management  - lactulose 3 times daily per primary team to keep stool soft for rectal tube  - Nursing to irrigate BMS q shift with  mL or until clear with warm tap water in irrigation port  Trapeze bar in place     Mouth blisters  Continue MBX oral suspension 5 mL suspension PO Q6 hrs PRN mild pain     Acute depression  Consider retrial of duloxetine when patient's mentation improves.    Code Status: Full     Advance Care Planning/Current Goals of Care: Patent was nodding off frequently during interview, so it was difficult to have a cohesive code status discussion today.  Marco Antonio shared that he did speak with his wife about his code status \"and she thinks I should be resuscitated.\"  I shared my concern with Marco Antonio that due to his current fragile condition, I feel he would not endure a full code well, and that if he was resuscitated, I worry that he would have decreased function and quality of life.  I gave my recommendation to Marco Antonio that he change his code status from Full to DNR.  " "Due to patient's waxing and waning, received permission from him to talk to his wife Mandy about code status.     Spoke with Marco Antonio's wife Mandy on the phone (tel. [phone]).  Shared my concerns with her about Marco Antonio's fragile condition, recurrent infections and multiple readmissions.  Discussed code status and educated Mandy about term.  She said, \"I don't think he would want to be resuscitated.  If he wasn't able to talk or communicate with anyone, I think he'd rather go.\"  Advised Mandy that me and my team are recommending he change his code status to DNR.  I shared that patient may need \"permission\" from Mandy to change his code status.  She said she would discuss code status with him next time they talked.       She shared her concern that patient may not be able to come back home, no only because he is so sick, but because she is unable to care for his wounds at home.  She said, \"I don't know how much time we've got.  I'd like to see if he can get some of his senses back.  I want to give him a little more time.\"  I advised Mandy that Marco Antonio has been resistant to the idea of hospice, but advised it may be a good option for patient at this time.   Goal to [place] back and re-address code status/GOC next week.  30 minutes was spent discussing advance care planning.     15 minutes spent with greater than 50% spent counseling and coordinating the patient's care regarding acute pain & symptom management.   Please refer to HPI and assessment/plan for details.     As appropriate, Palliative Care encourages medical team to engage in further conversation, education for patient/family at bedside regarding code status/GOC/POC    Thank you for allowing the palliative care team to participate in Marco Antonio's care. Please call with any questions/needs.     HERO Harrison.  Palliative Care Nurse Practitioner  [phone]      "

## 2019-05-22 NOTE — CARE PLAN
Problem: Safety  Goal: Will remain free from injury  Outcome: PROGRESSING AS EXPECTED  All fall precautions in place, bed alarm in place, hourly rounding complete, call light and personal belongings kept within reach at all times. Education done with pt on importance of calling before getting OOB, pt verbalizes an understanding. Will continue to monitor.     Problem: Pain Management  Goal: Pain level will decrease to patient's comfort goal  Outcome: PROGRESSING AS EXPECTED  Pain well controlled at this time with PO pain medication per pt    Problem: Skin Integrity  Goal: Risk for impaired skin integrity will decrease  Outcome: PROGRESSING AS EXPECTED  Q2 turns, applied xeroform as needed, BMS in place, q 2 hr turns in effect

## 2019-05-22 NOTE — PROGRESS NOTES
2 RN skin check complete with MARIANNE Wade.   Devices in place: BMS, Woods, NC.  Skin assessed under devices: redness, discoloration and swelling to all areas of body, unable to determine is all areas ken.  Confirmed pressure ulcers found on: n/a.  New potential pressure ulcers noted on: n/a  Redness, discoloration, swelling and open abrasion over entire body, unable to determine if all areas ken.   Full waffle mattress in place, BMS in place, Woods in place and q 3 hr runs in effect.

## 2019-05-22 NOTE — CARE PLAN
Problem: Pain Management  Goal: Pain level will decrease to patient's comfort goal  Rates pain as tolerable post interventions.

## 2019-05-23 NOTE — PROGRESS NOTES
Infectious Disease Progress Note    Author: Sharon Yañez M.D. Date & Time of service: 2019  12:19 PM    Chief Complaint:  Septic shock, cellulitis      Interval History:  68 y.o. WM with hx of cutaneous T Cell lymphoma (Mycosis fungoides) dx in 2017, had extensive w/u at [facility] for his 20 year hx of widespread hyperkeratotic plaques who is now being followed by Dr. Mc. He had portacath that was placed in 2017 (per wife) and had last chemotherapy was ~3 months ago. Multiple hospital admissions for cellulitis from diffuse open widespread skin in 3/2019 and 2019. Admitted 2019 for septic shock due to cellulitis with fever, elevated lactate >4, and was hypotensive   Interval 24 hours of Vitals/Labs/Micro,and imaging results reviewed as available. See assessment.    Interval 24 hours of Vitals/Labs/Micro,and imaging results reviewed as available. See assessment.    AF WBC 6.8 c/o hallucinations worse in the morning-Pain (everywhere) controlled with dilaudid. Denies SE abx Has PICC. Plan for new dressings for back-pictures viewed   AF WBC 5.2drinking 2nd carton of milk-no new complaints-tolerated dressing change with Dilaudid   AF WBC 6.8 no new complaints-tolerating abx. No new positive cxs   AF WBC 5.5 no new complaints-pain currently controlled  Review of Systems:  Review of Systems   Constitutional: Positive for malaise/fatigue. Negative for chills and fever.   Cardiovascular: Positive for leg swelling.   Gastrointestinal: Negative for abdominal pain, diarrhea, nausea and vomiting.   Musculoskeletal: Positive for myalgias.   Skin: Positive for rash.        Diffuse with ulcerations   All other systems reviewed and are negative.      Hemodynamics:  Temp (24hrs), Av.8 °C (98.2 °F), Min:36.6 °C (97.9 °F), Max:36.9 °C (98.5 °F)  Temperature: 36.7 °C (98 °F)  Pulse  Av.4  Min: 49  Max: 104  Blood Pressure : 104/61       Physical Exam:  Physical Exam    Constitutional:   Obese   HENT:   Head: Normocephalic and atraumatic.   Mouth/Throat: No oropharyngeal exudate.   Eyes: Pupils are equal, round, and reactive to light. EOM are normal. No scleral icterus.   Aniscoria   Neck: Neck supple. No JVD present.   Cardiovascular: Normal rate, regular rhythm and normal heart sounds.    Pulmonary/Chest: Effort normal. No stridor. No respiratory distress. He has no wheezes.   Abdominal: Soft. Bowel sounds are normal. He exhibits no distension. There is no tenderness.   Musculoskeletal: Normal range of motion. He exhibits edema.   Neurological:   Somnolent but awakens to voice   Skin: Skin is warm. He is not diaphoretic. There is erythema.   Multiple, diffuse plaques and skin lesons, + erythema  DIffuse ulcerations back and buttock   Psychiatric: He has a normal mood and affect. His behavior is normal.   Nursing note and vitals reviewed.      Meds:    Current Facility-Administered Medications:   •  MBX  •  doxycycline monohydrate  •  D5W  •  HYDROmorphone  •  lactulose  •  HYDROmorphone  •  ferrous sulfate  •  ascorbic acid  •  multivitamin  •  midodrine  •  oxyCODONE immediate-release  •  fluconazole  •  acetaminophen **OR** acetaminophen  •  budesonide-formoterol  •  Pharmacy Consult Request  •  Respiratory Care per Protocol  •  NS  •  heparin  •  albuterol    Labs:  Recent Labs      05/21/19 0250 05/22/19 0036 05/23/19   0009   WBC  5.2  6.8  5.5   RBC  2.90*  3.50*  3.39*   HEMOGLOBIN  8.2*  9.7*  9.5*   HEMATOCRIT  27.7*  32.6*  32.0*   MCV  95.5  93.1  94.4   MCH  28.3  27.7  28.0   RDW  63.3*  61.8*  62.3*   PLATELETCT  186  195  185   MPV  9.8  10.0  9.5   NEUTSPOLYS  43.00*  60.90  59.00   LYMPHOCYTES  31.60  21.70*  27.90   MONOCYTES  4.40  5.20  4.50   EOSINOPHILS  10.50*  7.80*  8.00*   BASOPHILS  0.90  0.00  0.20   RBCMORPHOLO   --   Normal   --      Recent Labs      05/21/19 0250 05/22/19 0036  05/23/19   0009   SODIUM  148*  147*  141   POTASSIUM  3.5*   4.1  3.9   CHLORIDE  118*  117*  113*   CO2  27  25  23   GLUCOSE  128*  116*  95   BUN  18  19  17     Recent Labs      05/21/19   0250  05/22/19   0036  05/23/19   0009   ALBUMIN   --    --   <1.5*   CREATININE  1.73*  1.56*  1.34       Imaging:  Ct-chest,abdomen,pelvis With    Result Date: 5/9/2019 5/9/2019 9:08 PM HISTORY/REASON FOR EXAM:  T cell lymphoma restaging,compare with prior PET scan TECHNIQUE/EXAM DESCRIPTION: CT scan of the chest, abdomen and pelvis with contrast. Thin-section helical scanning was obtained from the lung apices through the pubic symphysis to include the chest, abdomen and pelvis.  100 mL of nonionic contrast was administered intravenously without complication. Low dose optimization technique was utilized for this CT exam including automated exposure control and adjustment of the mA and/or kV according to patient size. COMPARISON: PET/CT 4/30/2018 is findings there are FINDINGS: CT CHEST:The lungs are fully expanded, with no parenchymal abnormality.  There is no pleural or pericardial effusion. There are no enlarged mediastinal or hilar nodes. There are enlarged axillary nodes bilaterally however, more notably on the right. The largest right-sided node measures about 15 mm in greatest short axis dimension. The bony thorax is unremarkable. CT ABDOMEN:The liver, spleen, pancreas, and stomach are unremarkable.  Adrenal glands are normal.  No opaque gallstones.  The kidneys show symmetric opacification with no hydronephrosis. A 12 mm exophytic lesion is seen along the lateral margin of the right kidney, likely a cyst. This is unchanged. The bowel and mesentery are grossly normal. No mesenteric or retroperitoneal adenopathy. No free fluid or air. CT PELVIS:The bladder is intact. No free fluid in the pelvis.  The pelvic bowel and mesentery are grossly normal.  The bony pelvis and visualized proximal femora are intact. Borderline enlarged inguinal lymph nodes are present bilaterally which  are somewhat smaller than before.     1.  Bilateral axillary adenopathy, more notable on the right. Some of these nodes are larger than on the prior PET/CT. There is no intrathoracic adenopathy. 2.  No abdominal adenopathy. 3.  Borderline enlarged inguinal lymph nodes, overall decreased in size compared with the prior PET/CT.    Dx-chest-limited (1 View)    Result Date: 5/9/2019  HISTORY/REASON FOR EXAM: central venous line placement. TECHNIQUE/EXAM DESCRIPTION:  Single view of the chest,  5/8/2019 11:47 PM COMPARISON:  Prior image today at 2043 hours FINDINGS:   A new left central venous line has been placed, the tip of which projects in the area of the mid SVC. There is no pneumothorax. Cardiopulmonary appearance is unchanged.     Left central venous line in place. No pneumothorax. INTERPRETING LOCATION: [address] [place] [address], [facility], [id number]    Dx-chest-portable (1 View)    Result Date: 5/8/2019 5/8/2019 8:34 PM HISTORY/REASON FOR EXAM:  Shortness of breath. Suspicion of sepsis. TECHNIQUE/EXAM DESCRIPTION AND NUMBER OF VIEWS: Single portable view of the chest. COMPARISON: 4/8/2019 FINDINGS: The heart, mediastinum, and anand are unremarkable. The lungs are well expanded and show no evidence of infiltrate or other acute process. There is no obvious pleural effusion. The bony thorax is grossly normal. A right central venous line with an attached infusion port is unchanged in position.     No acute cardiopulmonary abnormality. No interval change.    Ir-cvc Tunnel With Port Removal    Result Date: 5/9/2019 5/9/2019 10:26 AM HISTORY/REASON FOR EXAM:  T-cell lymphoma. Diffuse skin lesions possibly exacerbated by blood stream infection. Port removal requested. TECHNIQUE/EXAM DESCRIPTION: Removal of RIGHT internal jugular PowerPort venous access implant. PROCEDURE:  Informed consent was obtained.  The RIGHT anterior chest wall was prepped and draped in a sterile manner with chlorhexidine. All elements of MAXIMAL STERILE BARRIER  technique were followed. Moderate sedation with Fentanyl and Versed was administered during the procedure with appropriate continuous patient monitoring by the radiology nurse. SEDATION DURATION: 30 minutes FLUOROSCOPY TIME: 0.1 minutes NUMBER OF FILMS: 1 fluoroscopic images obtained. Following local anesthesia with 6 mL 1% lidocaine with epinephrine, a transverse incision was made over the site of the healed incision above the port reservoir.  Following blunt and sharp dissection, the port reservoir and catheter were removed intact.  The skin overlying the port and the port pocket were unremarkable with no evidence of infection.  There was no pus or exudate about the port reservoir.  The port catheter was removed intact. The port reservoir and catheter were submitted in a sterile container for cultures and Gram stain as well as fungal cultures. Manual compression was applied over the RIGHT  internal jugular catheter entry site and port pocket for about 5 minutes. The port pocket was closed with deep and subcuticular layers of interrupted 3 -- 0 Vicryl suture and the skin sealed with Dermabond.  A sterile dressing was applied. The patient tolerated the procedure well with no evidence of complication. COMPARISON:  1 view chest 5/9/2019 FINDINGS:  The port reservoir and catheter were removed intact.  There was no discharge or exudate at the port pocket.     1.  Removal of RIGHT  internal jugular PowerPort venous implant. 2.  No evidence of infection at the port pocket. 3.  The port reservoir and port catheter were removed intact.    Ec-echocardiogram Ltd W/o Cont    Result Date: 5/18/2019  Transthoracic Echo Report Echocardiography Laboratory CONCLUSIONS Prior echo done 12/13/18, this study is much more limited in quality however no obvious change. Technically difficult study incomplete information is obtained. Left ventricular ejection fraction is visually estimated to be 65%. Right ventricle not well visualized, may  be mildly dilated, grossly normal function. Valves not well visualized. Unable to estimate pulmonary artery pressure due to an inadequate tricuspid regurgitant jet. DOMENICO MUNOZ Exam Date:         2019                    16:19 Exam Location:     Inpatient Priority:          Routine Ordering Physician:        JAMIA SALAS Referring Physician: Sonographer:               James Rodríguez RDCS Age:    68     Gender:    M MRN:    [id number] :    1950 BSA:    2.2    Ht (in):    68     Wt (lb):    238 Exam Type:     Limited Indications:     Endocarditis ICD Codes:       421 CPT Codes:       69844 BP:   103    /   61     HR: Technical Quality:       Technically difficult study                          incomplete information is                          obtained MEASUREMENTS  (Male / Female) Normal Values 2D ECHO LV Diastolic Diameter PLAX        4.5 cm                4.2 - 5.9 / 3.9 - 5.3 cm LV Systolic Diameter PLAX         2.9 cm                2.1 - 4.0 cm IVS Diastolic Thickness           0.91 cm               LVPW Diastolic Thickness          1.1 cm                Estimated LV Ejection Fraction    65 %                  * Indicates values subject to auto-interpretation LV EF:  65    % FINDINGS Left Ventricle Left ventricular systolic function appears to be normal. Wall motion is difficult to assess with the limitations of image quality, appears grossly normal. Left ventricular ejection fraction is visually estimated to be 65%. Indeterminate diastolic function. Normal left ventricular wall thickness. Right Ventricle Right ventricle not well visualized, may be mildly dilated, grossly normal function. Right Atrium Right atrium not well visualized. Left Atrium Normal left atrial size. Mitral Valve Structurally normal mitral valve without significant stenosis or regurgitation. Aortic Valve The aortic valve is not well visualized. No stenosis or regurgitation seen. Tricuspid Valve The tricuspid valve is not  well visualized. No stenosis or regurgitation seen. Unable to estimate pulmonary artery pressure due to an inadequate tricuspid regurgitant jet. Pulmonic Valve The pulmonic valve is not well visualized. Pericardium Pericardium not well visualized. Aorta Ascending aorta not well visualized. Deanna Chand MD (Electronically Signed) Final Date:     18 May 2019 17:34    Ir-picc Line Placement W/ Guidance > Age 5    Result Date: 5/18/2019  HISTORY/REASON FOR EXAM:   PICC placement. TECHNIQUE/EXAM DESCRIPTION AND NUMBER OF VIEWS:   PICC line insertion with ultrasound guidance.  The procedure was performed using maximal sterile barrier technique including sterile gown, mask, cap, and donning of sterile gloves following appropriate hand hygiene and/or sterile scrub. Patient skin site was prepped with 2% Chlorhexidine solution. FINDINGS:  PICC line insertion with Ultrasound Guidance was performed by qualified nursing staff without the assistance of a Radiologist. PICC positioning appropriateness confirmed by 3CG technology; chest xray only needed in the instance 3CG unable to confirm placement.              Ultrasound-guided PICC placement performed by qualified nursing staff as above.       Micro:  Results     Procedure Component Value Units Date/Time    CULTURE WOUND W/ GRAM STAIN [[id number]]  (Abnormal)  (Susceptibility) Collected:  05/09/19 1115    Order Status:  Completed Specimen:  Wound Updated:  05/22/19 0922     Significant Indicator POS (POS)     Source WND     Site Port Catheter Tip     Culture Result Growth noted after further incubation, see below for  organism identification.   (A)     Gram Stain Result No organisms seen.     Culture Result Staphylococcus epidermidis  Rare growth   (A)      Candida albicans  Rare growth   (A)    Narrative:       Surgery Specimen    Culture & Susceptibility     STAPHYLOCOCCUS EPIDERMIDIS     Antibiotic Sensitivity Microscan Unit Status    Ampicillin/sulbactam Sensitive <=8/4  "mcg/mL Final    Method: GREGORY    Clindamycin Sensitive <=0.5 mcg/mL Final    Method: GREGORY    Daptomycin Sensitive <=0.5 mcg/mL Final    Method: GREGORY    Erythromycin Sensitive <=0.5 mcg/mL Final    Method: GREGORY    Moxifloxacin Sensitive <=0.5 mcg/mL Final    Method: GREGORY    Oxacillin Sensitive <=0.25 mcg/mL Final    Method: GREGORY    Penicillin Resistant 2 mcg/mL Final    Method: GREGORY    Tetracycline Sensitive <=4 mcg/mL Final    Method: GREGORY    Trimeth/Sulfa Sensitive <=0.5/9.5 mcg/mL Final    Method: GREGORY    Vancomycin Sensitive 1 mcg/mL Final    Method: GREGORY                       Fungal Culture [[id number]]  (Abnormal) Collected:  05/09/19 1115    Order Status:  Completed Specimen:  Wound Updated:  05/22/19 0922     Significant Indicator POS (POS)     Source WND     Site Port Catheter Tip     Culture Result Growth noted after further incubation, see below for  organism identification.   (A)      Candida albicans  Rare growth   (A)    Narrative:       Surgery Specimen    BLOOD CULTURE [[id number]] Collected:  05/19/19 1224    Order Status:  Completed Specimen:  Blood from Peripheral Updated:  05/20/19 0935     Significant Indicator NEG     Source BLD     Site PERIPHERAL     Culture Result No Growth  Note: Blood cultures are incubated for 5 days and  are monitored continuously.Positive blood cultures  are called to the RN and reported as soon as  they are identified.      Narrative:       Per Hospital Policy: Only change Specimen Src: to \"Line\" if  specified by physician order.  Left Hand    BLOOD CULTURE [[id number]] Collected:  05/19/19 1225    Order Status:  Completed Specimen:  Blood from Peripheral Updated:  05/20/19 0935     Significant Indicator NEG     Source BLD     Site PERIPHERAL     Culture Result No Growth  Note: Blood cultures are incubated for 5 days and  are monitored continuously.Positive blood cultures  are called to the RN and reported as soon as  they are identified.      Narrative:       Per Hospital Policy: " "Only change Specimen Src: to \"Line\" if  specified by physician order.  Left Wrist    BLOOD CULTURE [[id number]] Collected:  05/13/19 1435    Order Status:  Completed Specimen:  Blood from Peripheral Updated:  05/18/19 2100     Significant Indicator NEG     Source BLD     Site PERIPHERAL     Culture Result No growth after 5 days of incubation.    Narrative:       Per Hospital Policy: Only change Specimen Src: to \"Line\" if  specified by physician order.  Left Hand    BLOOD CULTURE [[id number]] Collected:  05/13/19 1403    Order Status:  Completed Specimen:  Blood from Peripheral Updated:  05/18/19 1700     Significant Indicator NEG     Source BLD     Site PERIPHERAL     Culture Result No growth after 5 days of incubation.    Narrative:       Per Hospital Policy: Only change Specimen Src: to \"Line\" if  specified by physician order.  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]   • Bacteremia due to coagulase-negative Staphylococcus [R78.81]   • BETO (acute kidney injury) ([facility]) [N17.9]     Staph sepsis, improved  Afebrile  No leukocytosis  BCxs + staph haemolyticus 5/8  Repeat Bcxs neg 5/10 and 5/13  Port removed 5/9  TTE neg; as unrevealing recommended KATH if feasible  Completed 10 days IV abx   Continue doxy-avoid taking within 2 hours of dairy-  Continue at least 4 weeks    Candida infection port, on treatment  Continue fluconazole through 6/2/2019  Denies visual changes    Cutaneous T Cell lymphoma (Mycosis fungoides)  Dx in 11/2017  Widespread hyperkeratotic plaques, ulcerated lesions in all pressure areas  CT chest abdomen pelvis on 5/9 with bilateral axillary adenopathy, no pleural or pericardial effusion.  No consolidations or evidence of pneumonia, abdomen pelvis " with no significant findings.  Superinfected open skin lesions/wound (staph/strep)   Continued risk for infectious complications  Abx as above     BETO, persistent   Improved today  Multifactorial incl sepsis and prior vancomycin  Dose adjust abx as needed     Diarrhea-  C. difficile negative on 5/11     I have performed a physical exam and reviewed and updated ROS as of today.  In review of yesterday's note dated  5/22/2019, there are no changes except as documented above.    WIll sign off-please reconsult if needed

## 2019-05-23 NOTE — CARE PLAN
Problem: Skin Integrity  Goal: Risk for impaired skin integrity will decrease  Wound care completed per order. Turning q 2. Encouraging mobility.

## 2019-05-23 NOTE — CARE PLAN
Problem: Pain Management  Goal: Pain level will decrease to patient's comfort goal  C/o generalized pain. Administering prn pain medication per order. Will follow up and ensure pain management is adequate.

## 2019-05-23 NOTE — CARE PLAN
Problem: Communication  Goal: The ability to communicate needs accurately and effectively will improve  Patient states needs as necessary.    Problem: Safety  Goal: Will remain free from injury  Call light is within the patient's reach.

## 2019-05-23 NOTE — PROGRESS NOTES
"Palliative Progress Note               Author: Ann Marie Nelson Date & Time created: 2019  1:55 PM     Reason for subsequent visit: Cancer-related generalized cutaneous pain    Interval History:  No acute events overnight.  Patient continues to have mild hypotension.  Patient denies pain currently but reports \"it's up and down.\"  Patient intermittently dozed off during our interview today but was able to wake up and answer questions appropriately, though he needed frequent waking up.  He has not been eating as he is not hungry.  He feels profoundly tired.     Review of Systems:  Positive for dyspnea (\"sometimes\"). Positive for pain (intermittent related to dressing changes). Positive for anorexia. Positive for fatigue. Positive for sedation. Negative for anxiety. Negative for nausea and vomiting. Negative for constipation.      Physical Exam:    Recent vital signs  Temp (24hrs), Av.8 °C (98.2 °F), Min:36.6 °C (97.9 °F), Max:36.9 °C (98.5 °F)  Temperature: 36.7 °C (98 °F)  Pulse  Av.4  Min: 49  Max: 104  Blood Pressure : 104/61       Physical Exam   Constitutional: He is oriented to person, place, and time. He is sleeping and cooperative. He is easily aroused. He appears ill. He appears distressed. Nasal cannula in place.   Eyes: Pupils are equal, round, and reactive to light.   Cardiovascular: Normal rate and normal heart sounds.    distant   Pulmonary/Chest: Effort normal. He has decreased breath sounds in the right lower field. He has wheezes in the right upper field.   Abdominal: Soft. Bowel sounds are normal. He exhibits no distension. There is no tenderness.   Musculoskeletal: He exhibits edema.   Neurological: He is oriented to person, place, and time and easily aroused.   Skin:   Generalized dry, flaky skin w/ diffuse maceration/scabs/leisions to face/neck, truck, arms, and groin - some noted to be open and oozing.    Psychiatric: He has a normal mood and affect. His speech is delayed and slurred " (clear when initially wakes up but slurs as he falls asleep). He is slowed.     Recent Labs      05/21/19   0250  05/22/19   0036  05/23/19   0009   SODIUM  148*  147*  141   POTASSIUM  3.5*  4.1  3.9   CHLORIDE  118*  117*  113*   CO2  27  25  23   GLUCOSE  128*  116*  95   BUN  18  19  17   CREATININE  1.73*  1.56*  1.34   CALCIUM  6.7*  7.0*  6.9*     Recent Labs      05/21/19   0250  05/22/19   0036  05/23/19   0009   WBC  5.2  6.8  5.5   RBC  2.90*  3.50*  3.39*   HEMOGLOBIN  8.2*  9.7*  9.5*   HEMATOCRIT  27.7*  32.6*  32.0*   MCV  95.5  93.1  94.4   MCH  28.3  27.7  28.0   MCHC  29.6*  29.8*  29.7*   RDW  63.3*  61.8*  62.3*   PLATELETCT  186  195  185   MPV  9.8  10.0  9.5     Medications reviewed. Labs Reviewed.     Problem List:  1.  Cancer-related generalized cutaneous pain (active)  2.  Peripheral neuropathy (active)  3.  Generalized pruritus (active)  4.  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  Dry mouth/thrush (resolved)  7.  Acute depression (active/stable)  8.  BETO (resolved)  9.  GERD (stable)  10.  Anemia (stable)  11.  Edema (stable)  12.  Fecal incontinence (active)  13.  Altered mental status (fluctuates/improved today)  14.  Intermittent mild hypotension (active)    Assessment/Plan:  Cancer-related cutaneous pain and peripheral neuropathy  MEDD (morphine equivalent daily dose) is approximately 37.5 mg:  - hydromorphone 0.25 mg X NONE  - oxycodone 10 mg X 3 doses = 30 mg ~ 45 mg MEDD  - oxycodone 5 mg X 1 doses = 7.5 mg MEDD    5/22/19 ~ 50 mg MEDD   5/21/19 ~ 62.6 mg MEDD  5/20/19 ~ 45 mg MEDD  5/19/19 ~ 50 mg MEDD  5/18/19 ~ 60 mg MEDD     Continue hydromorphone 0.5 mg IV QD prior to wound care dressing changes, if needed  Continue hydromorphone 0.25 mg IV Q3 hrs PRN severe pain   Continue oxycodone from 5-10 mg PO Q3 hrs prn moderate pain  Reminded patient that he can ask for PO or IV pain medication if he needs it.     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 Mc; patient has had multiple treatment regimens with plan for further treatment if patient's infections resolve and his performance status improve  Patient declined hospice today and would like to continue current treatment \"for now\" but did elect to have his code status changed to DNR/I     Mouth blisters  Continue MBX oral suspension 5 mL suspension PO Q6 hrs PRN mild pain     Acute depression  Consider retrial of duloxetine when patient's mentation improves    Code Status: Full; patient elected to have code status changed to DNR/DNI     Advance Care Planning/Current Goals of Care:  Provided overview of discussion between palliative care HORACIO Benavides and patient's wife yesterday.   Discussed my concern about patient's prognosis related to fluid loss and overall quality of life (pain with dressing changes, patient not eating or getting out of bed).  Based on this discussed DNR/DNI.  Patient expressed he agrees with change to DNR/DNI.  Also discussed GIP hospice due to patient's wound care needs as an option in the future.  He would like to continue with current care \"for now.\"  He provided permission to contact his wife to discuss changes and GIP hospice as a future option if patient does not improve.  Patient denies anxiety about death or dying.      Attempted " "to call patient's wife Mandy but call ended without anyone picking up.  Dialed back about 5 minutes later and Mandy answered.   Introduced myself and explained I was following up on her discussion with Makayla yesterday.  Provided overview of my discussion with patient and she stated \"Oh, well I told him I thought he should get the paddles or whatever.\"  Attempted to clarify and provide clarification but Mandy interrupted and said \"Makayla called at the same time yesterday and I am waiting to hear from my granddaughter.  Can you call later?\"  Asked if I could call in 1 hour which Mandy agreed to.  17 minutes was spent discussing advance care planning.     15 minutes spent with greater than 50% spent counseling and coordinating the patient's care regarding pain and symptom management.   Please refer to HPI and assessment/plan for details.     Thank you for allowing the palliative care team to participate in Marco Antonio's care. Please call with any questions/needs.     Ann Marie Nelson A.P.R.N.  Palliative Care Nurse Practitioner  [phone]        "

## 2019-05-24 NOTE — CARE PLAN
Problem: Pain Management  Goal: Pain level will decrease to patient's comfort goal  Outcome: PROGRESSING AS EXPECTED  Pt. Verbalizes satisfaction with pain control. Medicated for pain per MAR, hourly rounding in place.    Problem: Skin Integrity  Goal: Risk for impaired skin integrity will decrease  Outcome: PROGRESSING AS EXPECTED  Q2 turns and wound protocol in place

## 2019-05-24 NOTE — PROGRESS NOTES
Hospital Medicine Daily Progress Note    Date of Service  5/24/2019    Chief Complaint  68 y.o. male admitted 5/8/2019 with fevers and sepsis, transferred from [facility].    Hospital Course    Patient admitted to ICU, had lactic acid >4 and required IV pressors to maintain blood pressure.  H had been discharged home in April for rash/infection related to his lymphoma and completed treatment with return to his baseline until 3 days prior to admission.  He had PORT removal due to bacteremia and also found to have bacterial and fungal infection of the catheter tip.      Interval Problem Update  5/21 Patient feeling slightly better compared to day prior.  Sodium has improved with initiation of D5W.  Patient still discussing with palliative care as he remains full code and has a high mortality associated with his lymphoma and skin compromise.    5/22 Patient very fatigued when evaluated, able to wake but falls back to sleep quickly.  Na has improved but given how much of his skin is involved in lesions, he continues to lose fluids at a high rate thus will increase IVF up to 150ml/h.  Continue doxycycline and diflucan per ID.  Palliative care to continue to meet with patient and family as available.  5/23 Patient slightly more awake during evaluation today, states his pain was okay when asked.  Sodium has improved with the increase in IVF but this is his only favorable marker.  Patient's performance status and prognosis remains very poor.  5/24 Patient still with intermittent awareness, somnolent much of the time.  No acute changes.  Will change IVF to 1/2NS to make sure he does not have return of hyperkalemia but doesn't drop further.    Consultants/Specialty  ID - Pari  Oncology - re-consult if needed    Code Status  full    Disposition  tbd    Review of Systems  Review of Systems   Constitutional: Negative for chills and fever.   HENT: Negative for congestion.    Eyes: Negative for blurred vision and photophobia.    Respiratory: Negative for cough and shortness of breath.    Cardiovascular: Negative for chest pain, claudication and leg swelling.   Gastrointestinal: Negative for abdominal pain, constipation, diarrhea, heartburn and vomiting.   Genitourinary: Negative for dysuria and hematuria.   Musculoskeletal: Negative for joint pain and myalgias.   Skin: Positive for rash. Negative for itching.   Neurological: Negative for dizziness, sensory change, speech change, weakness and headaches.   Psychiatric/Behavioral: Negative for depression. The patient is not nervous/anxious and does not have insomnia.         Physical Exam  Temp:  [36.7 °C (98 °F)-36.9 °C (98.5 °F)] 36.7 °C (98 °F)  Pulse:  [70-84] 70  Resp:  [18-20] 19  BP: ()/(50-67) 100/60  SpO2:  [95 %-98 %] 96 %    Physical Exam   Constitutional: He is oriented to person, place, and time. He appears well-developed and well-nourished. No distress.   HENT:   Head: Normocephalic and atraumatic.   Eyes: Conjunctivae are normal. No scleral icterus.   Neck: Neck supple. No JVD present.   Cardiovascular: Normal rate, regular rhythm and normal heart sounds.  Exam reveals no gallop and no friction rub.    No murmur heard.  Pulmonary/Chest: Effort normal and breath sounds normal. No respiratory distress. He has no wheezes. He exhibits no tenderness.   Abdominal: Soft. Bowel sounds are normal. He exhibits no distension and no mass. There is no tenderness.   Musculoskeletal: He exhibits no edema or tenderness.   Lymphadenopathy:     He has no cervical adenopathy.   Neurological: He is alert and oriented to person, place, and time. No cranial nerve deficit.   Skin: Skin is warm and dry. Rash noted. He is not diaphoretic. No erythema. No pallor.   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   Psychiatric: He has a normal mood and affect. His behavior is normal.   Nursing note and vitals  reviewed.      Fluids    Intake/Output Summary (Last 24 hours) at 05/24/19 1616  Last data filed at 05/24/19 0632   Gross per 24 hour   Intake             2001 ml   Output              750 ml   Net             1251 ml       Laboratory  Recent Labs      05/22/19   0036  05/23/19   0009  05/24/19   0016   WBC  6.8  5.5  5.6   RBC  3.50*  3.39*  3.36*   HEMOGLOBIN  9.7*  9.5*  9.4*   HEMATOCRIT  32.6*  32.0*  30.6*   MCV  93.1  94.4  91.1   MCH  27.7  28.0  28.0   MCHC  29.8*  29.7*  30.7*   RDW  61.8*  62.3*  59.9*   PLATELETCT  195  185  175   MPV  10.0  9.5  9.8     Recent Labs      05/22/19   0036  05/23/19   0009  05/24/19   0016   SODIUM  147*  141  135   POTASSIUM  4.1  3.9  3.8   CHLORIDE  117*  113*  108   CO2  25  23  21   GLUCOSE  116*  95  110*   BUN  19  17  16   CREATININE  1.56*  1.34  1.29   CALCIUM  7.0*  6.9*  7.1*                   Imaging  IR-US GUIDED PIV   Final Result    Ultrasound-guided PERIPHERAL IV INSERTION performed by    qualified nursing staff as above.            EC-ECHOCARDIOGRAM LTD W/O CONT   Final Result      IR-PICC LINE PLACEMENT W/ GUIDANCE > AGE 5   Final Result                  Ultrasound-guided PICC placement performed by qualified nursing staff as    above.          CT-CHEST,ABDOMEN,PELVIS WITH   Final Result      1.  Bilateral axillary adenopathy, more notable on the right. Some of these nodes are larger than on the prior PET/CT. There is no intrathoracic adenopathy.   2.  No abdominal adenopathy.   3.  Borderline enlarged inguinal lymph nodes, overall decreased in size compared with the prior PET/CT.      IR-CVC TUNNEL WITH PORT REMOVAL   Final Result      1.  Removal of RIGHT  internal jugular PowerPort venous implant.   2.  No evidence of infection at the port pocket.   3.  The port reservoir and port catheter were removed intact.      DX-CHEST-LIMITED (1 VIEW)   Final Result      Left central venous line in place. No pneumothorax.               INTERPRETING LOCATION: [address]  [facility], [id number]      DX-CHEST-PORTABLE (1 VIEW)   Final Result      No acute cardiopulmonary abnormality. No interval change.           Assessment/Plan  * Septic shock (HCC)- (present on admission)   Assessment & Plan    Resolved  Septic shock present upon admission  Source consistent with cutaneous infection  IR removed the indwelling port  Infectious disease consult  He met the criteria for septic shock as evidenced by the need for intravenous pressor support  Organ system failure associated with this disease process is the cardiovascular as is noted by hypotension requiring pressors  Broad-spectrum IV antibiotics to cover for skin infection in the setting of immunocompromised host--IV Unasyn  Catheter tip positive for Candida.  ID following.  On antifungal.     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  Dr. Bradley, oncology, has been consulted.    CT done for staging  Code status has been addressed and he continues to request that everything is done.   Palliative care consult placed.  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 Gongora, trauma surgery, has evaluated the skin and agrees that a referral to a burn center is appropriate.   Mr. Ortiz states that he would be amenable to going to [facility] though no other facility as it would be closer to his wife.   IV and oral narcotic pain meds.  Istodax being considered by oncology should patient show improvement from current status.     Sepsis due to coagulase-negative staphylococcal infection with metabolic encephalopathy (HCC)   Assessment & Plan    5/8 blood culture and cath tip + coag negative staph  Finished daptomycin  IV per ID, augmentin po, antifungal for cath tip candida + culture.   right port removed.  Has new left chest port placed:    Order for IR removal left chest port per ID did not wait 48 hours for culture negative.  Order for PICC line placed.  5/10 and 5/13 Repeat BCs x2 negative.  TTE negative for endocarditis.  Patient has Woods catheter as well as rectal tube in place to prevent further contamination of his open skin wounds to back.       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 Mc for years.  5/15:  Plan for treatment while in hospital further chemotherapy after discussion with Dr. Mc, Dr. Barraza, pharmacy and patient.  Continue pain control  Continue rigorous wound care since patient unable to discern when he has BMs.  Stool tracks up his back and into his open skin wounds.  No diarrhea per bedside nursing.  Patient has successful placement of rectal tube and Woods catheter to prevent further contamination of open wounds.     BETO (acute kidney injury) (HCC)- (present on admission)   Assessment & Plan    Resolved acute kidney injury.  Continue with oral hydration.  Patient's legs are edematous.  Avoid IV fluids if possible.     Hypernatremia   Assessment & Plan    135 today, change D5W to 1/2NS so Na does not drop too low.  Continue to encourage p.o. Intake.  Appears to be due to skin fluid losses. - IVF rate increase 5/22         Edema- (present on admission)   Assessment & Plan     lower extremity swelling  Off IV lasix.  He is at risk of intravascular depletion given the significant insensible fluid losses from skin     Iron deficiency anemia- (present on admission)   Assessment & Plan    Hemoglobin is stable.  Iron levels low, started iron bid with vit c.   b12, folate normal.     GERD (gastroesophageal reflux disease)- (present on admission)   Assessment & Plan    Hold outpatient due to currently on broad-spectrum  antibiotics by ID.  Avoid C. difficile inducing medications such as PPIs.          VTE prophylaxis: heparin

## 2019-05-24 NOTE — DIETARY
Nutrition Services Update: following for nutritional intake. Breakfast at bedside untouched.  Per nursing, patient likely to transition to hospice.  Attempted to talk with patient.  He nodded when I asked if pain was preventing him from eating.  Patient would benefit from nutrition support if plan changes.    Day 16 of admit. Regular/dysphagia 3 diet. ADL documentation shows intake has been extremely poor, pt refusing majority of meals - or consuming < 25%.  No new weight noted since 5/8/19 - discussed with RN.    Plan/Recommend:   1. Continue to encourage PO intake as appropriate.  2. Consider enteral support if plan changes or is within patient/family wishes.    RD following and available PRN.

## 2019-05-24 NOTE — PROGRESS NOTES
Received change of shift report from jose m RN. Assumed pt. Care at 0700. Pt. Aox4, bed bound (Q2 turns), smiling and pleasant. Pt. Verbalizes satisfaction with pain control, medicated for pain per MAR. Pt. Denies having questions or concerns at this time. Woods and rectal tube in place. IV fluids infusing via PIV. P. Using trapeze to mobilize. Hourly rounding in place. Wound management per order. Bed in lowest position with wheels locked. Call light within reach.

## 2019-05-25 NOTE — WOUND TEAM
Asked to check the BMS. Patient turned with RN and CNA. Cleaned buttock with dimethicone wipes, and readjusted BMS tubing, and provided instructions how to place down drain bag to allow gravity draining. Applied barrier paste to back and buttock. Called [employer] to obtain low air loss bed which had already been ordered. Up dated order for dressing and skin care.

## 2019-05-25 NOTE — PROGRESS NOTES
· 2 RN skin check complete with MARIANNE Tsang.  · Devices in place: PIV, reyes, and BMS.  · Skin assessed under devices Yes .  · Confirmed pressure ulcers found on: N/A  · Open wounds observed on entire body.  · New potential pressure ulcers noted on: N/a.   · The following interventions in place Q2hr turns, xeroform dressing changes [id number], barrier wipes used, and waffle overlay.

## 2019-05-25 NOTE — CARE PLAN
Problem: Pain Management  Goal: Pain level will decrease to patient's comfort goal  Outcome: PROGRESSING AS EXPECTED  Pt. More agreeable to pain medication today, medicating for pain per MAR.     Problem: Skin Integrity  Goal: Risk for impaired skin integrity will decrease  Outcome: PROGRESSING AS EXPECTED  Pt. Agreeable to Q2 turns, waffle mattress and pillows for support/postioning in place. Wound protocol in place.

## 2019-05-25 NOTE — PROGRESS NOTES
A&Ox4. Patient does seem confused at times.  2L NC  Patient reporting 5/10 pain, medicated per MAR.  Tolerating a regular diet; poor oral intake. RN encouraged food and water consumption.  Denies N&V.  LBM: 5/25/19 via BMS  + void via reyes  Q2 turns implemented throughout shift. Frequent linen changes d/t wound drainage.   Reyes care provided.   All needs meet, call light within reach.

## 2019-05-25 NOTE — PROGRESS NOTES
Received change of shift report from jose m RN. Assumed pt. Care at 0700. Pt. Alert, slurred speech, able to communicate needs. Pt. Agreeable to pain meds today, being turned Q2, reyes and rectal tube in place. Pt. Drinking milk, but refused breakfast multiple times. Bed in lowest position with wheels locked. Room near nurse's station with door open. Call light within reach.

## 2019-05-25 NOTE — DOCUMENTATION QUERY
"                                                                         [facility]                                                                       Query Response Note      PATIENT:               DOMEINCO MUNOZ  ACCT #:                  [id number]  MRN:                     [id number]  :                      1950  ADMIT DATE:       2019 7:26 PM  DISCH DATE:          RESPONDING  PROVIDER #:        [id number]           QUERY TEXT:    Depression is documented in the Medical Record.  Please specify the type.    NOTE:  If an appropriate response is not listed below, please respond with a new note.              The patient's Clinical Indicators include:  \"Acute depression (active)\" is documented in the Consults on 19. \"He also endorses recent depression for the past 2 weeks\" also documented in the Consults on 19.    Treatment includes: Cymbalta.    Risk factors include: hx Sezary Disease, dx Septic Shock, and dx BETO.    Query created by: Vikas Soto on 2019 8:26 AM    RESPONSE TEXT:    Situational/Grief Reaction depression          Electronically signed by:  FRANK DAVALOS MD 2019 8:49 AM              "

## 2019-05-25 NOTE — PROGRESS NOTES
[facility] Medicine Daily Progress Note    Date of Service  5/25/2019    Chief Complaint  68 y.o. male admitted 5/8/2019 with fevers and sepsis, transferred from [facility].    Hospital Course    Patient admitted to ICU, had lactic acid >4 and required IV pressors to maintain blood pressure.  H had been discharged home in April for rash/infection related to his lymphoma and completed treatment with return to his baseline until 3 days prior to admission.  He had PORT removal due to bacteremia and also found to have bacterial and fungal infection of the catheter tip.      Interval Problem Update  5/21 Patient feeling slightly better compared to day prior.  Sodium has improved with initiation of D5W.  Patient still discussing with palliative care as he remains full code and has a high mortality associated with his lymphoma and skin compromise.    5/22 Patient very fatigued when evaluated, able to wake but falls back to sleep quickly.  Na has improved but given how much of his skin is involved in lesions, he continues to lose fluids at a high rate thus will increase IVF up to 150ml/h.  Continue doxycycline and diflucan per ID.  Palliative care to continue to meet with patient and family as available.  5/23 Patient slightly more awake during evaluation today, states his pain was okay when asked.  Sodium has improved with the increase in IVF but this is his only favorable marker.  Patient's performance status and prognosis remains very poor.  5/24 Patient still with intermittent awareness, somnolent much of the time.  No acute changes.  Will change IVF to 1/2NS to make sure he does not have return of hyperkalemia but doesn't drop further.  5/25 Patient sleeping much of the time, he became confused and pulled out his PIV.  Vascular access is able to place midline.  Patient is not showing any signs of improvement, if he continues to show decline will have to discuss again with wife and transition patient to comfort  care.    Consultants/Specialty  ID - Pari  Oncology - re-consult if needed    Code Status  full    Disposition  tbd    Review of Systems  Review of Systems   Constitutional: Negative for chills and fever.   HENT: Negative for congestion.    Eyes: Negative for blurred vision and photophobia.   Respiratory: Negative for cough and shortness of breath.    Cardiovascular: Negative for chest pain, claudication and leg swelling.   Gastrointestinal: Negative for abdominal pain, constipation, diarrhea, heartburn and vomiting.   Genitourinary: Negative for dysuria and hematuria.   Musculoskeletal: Positive for myalgias. Negative for joint pain.   Skin: Positive for rash. Negative for itching.   Neurological: Negative for dizziness, sensory change, speech change, weakness and headaches.   Psychiatric/Behavioral: Negative for depression. The patient is not nervous/anxious and does not have insomnia.         Physical Exam  Temp:  [36.3 °C (97.4 °F)-36.8 °C (98.3 °F)] 36.3 °C (97.4 °F)  Pulse:  [70-91] 88  Resp:  [17-20] 20  BP: ()/(61-73) 96/62  SpO2:  [94 %-98 %] 98 %    Physical Exam   Constitutional: He is oriented to person, place, and time. He appears well-developed and well-nourished. No distress.   HENT:   Head: Normocephalic and atraumatic.   Eyes: Conjunctivae are normal. No scleral icterus.   Neck: Neck supple. No JVD present.   Cardiovascular: Normal rate, regular rhythm and normal heart sounds.  Exam reveals no gallop and no friction rub.    No murmur heard.  Pulmonary/Chest: Effort normal and breath sounds normal. No respiratory distress. He has no wheezes. He exhibits no tenderness.   Abdominal: Soft. Bowel sounds are normal. He exhibits no distension and no mass. There is no tenderness.   Musculoskeletal: He exhibits no edema or tenderness.   Lymphadenopathy:     He has no cervical adenopathy.   Neurological: He is alert and oriented to person, place, and time. No cranial nerve deficit.   Skin: Skin is  warm and dry. Rash noted. He is not diaphoretic. No erythema. No pallor.   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   Psychiatric: He has a normal mood and affect. His behavior is normal.   Nursing note and vitals reviewed.      Fluids    Intake/Output Summary (Last 24 hours) at 05/25/19 1331  Last data filed at 05/25/19 0300   Gross per 24 hour   Intake                0 ml   Output              625 ml   Net             -625 ml       Laboratory  Recent Labs      05/23/19   0009  05/24/19   0016  05/25/19   0020   WBC  5.5  5.6  6.0   RBC  3.39*  3.36*  3.46*   HEMOGLOBIN  9.5*  9.4*  9.6*   HEMATOCRIT  32.0*  30.6*  31.6*   MCV  94.4  91.1  91.3   MCH  28.0  28.0  27.7   MCHC  29.7*  30.7*  30.4*   RDW  62.3*  59.9*  58.9*   PLATELETCT  185  175  225   MPV  9.5  9.8  9.8     Recent Labs      05/23/19   0009  05/24/19   0016  05/25/19   0020   SODIUM  141  135  139   POTASSIUM  3.9  3.8  3.9   CHLORIDE  113*  108  109   CO2  23  21  22   GLUCOSE  95  110*  78   BUN  17  16  15   CREATININE  1.34  1.29  1.19   CALCIUM  6.9*  7.1*  6.7*                   Imaging  IR-MIDLINE CATHETER INSERTION WO GUIDANCE > AGE 3   Final Result                  Ultrasound-guided midline placement performed by qualified nursing staff    as above.          IR-US GUIDED PIV   Final Result    Ultrasound-guided PERIPHERAL IV INSERTION performed by    qualified nursing staff as above.            EC-ECHOCARDIOGRAM LTD W/O CONT   Final Result      IR-PICC LINE PLACEMENT W/ GUIDANCE > AGE 5   Final Result                  Ultrasound-guided PICC placement performed by qualified nursing staff as    above.          CT-CHEST,ABDOMEN,PELVIS WITH   Final Result      1.  Bilateral axillary adenopathy, more notable on the right. Some of these nodes are larger than on the prior PET/CT. There is no intrathoracic adenopathy.   2.  No abdominal adenopathy.   3.  Borderline  enlarged inguinal lymph nodes, overall decreased in size compared with the prior PET/CT.      IR-CVC TUNNEL WITH PORT REMOVAL   Final Result      1.  Removal of RIGHT  internal jugular PowerPort venous implant.   2.  No evidence of infection at the port pocket.   3.  The port reservoir and port catheter were removed intact.      DX-CHEST-LIMITED (1 VIEW)   Final Result      Left central venous line in place. No pneumothorax.               INTERPRETING LOCATION: [address] [facility], [facility], [address]      DX-CHEST-PORTABLE (1 VIEW)   Final Result      No acute cardiopulmonary abnormality. No interval change.           Assessment/Plan  * Septic shock (HCC)- (present on admission)   Assessment & Plan    Resolved  Septic shock present upon admission  Source consistent with cutaneous infection  IR removed the indwelling port  Infectious disease consult  He met the criteria for septic shock as evidenced by the need for intravenous pressor support  Organ system failure associated with this disease process is the cardiovascular as is noted by hypotension requiring pressors  Broad-spectrum IV antibiotics to cover for skin infection in the setting of immunocompromised host--IV Unasyn  Catheter tip positive for Candida.  ID following.  On antifungal.     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  Dr. Bradley, oncology, has been consulted.    CT done for staging  Code status has been addressed and he continues to request that everything is done.   Palliative care consult placed.  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 Gongora, trauma surgery, has evaluated the skin and agrees that a referral to a burn  center is appropriate.   Mr. Ortiz states that he would be amenable to going to [facility] though no other facility as it would be closer to his wife.   IV and oral narcotic pain meds.  Istodax being considered by oncology should patient show improvement from current status.     Sepsis due to coagulase-negative staphylococcal infection with metabolic encephalopathy (HCC)   Assessment & Plan    5/8 blood culture and cath tip + coag negative staph  Finished daptomycin IV per ID, augmentin po, antifungal for cath tip candida + culture.   right port removed.  Has new left chest port placed:    Order for IR removal left chest port per ID did not wait 48 hours for culture negative.  Order for PICC line placed.  5/10 and 5/13 Repeat BCs x2 negative.  TTE negative for endocarditis.  Patient has Woods catheter as well as rectal tube in place to prevent further contamination of his open skin wounds to back.       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 Mc for years.  5/15:  Plan for treatment while in hospital further chemotherapy after discussion with Dr. Mc, Dr. Barraza, pharmacy and patient.  Continue pain control  Continue rigorous wound care since patient unable to discern when he has BMs.  Stool tracks up his back and into his open skin wounds.  No diarrhea per bedside nursing.  Patient has successful placement of rectal tube and Woods catheter to prevent further contamination of open wounds.     BETO (acute kidney injury) (HCC)- (present on admission)   Assessment & Plan    Resolved acute kidney injury.  Continue with oral hydration.  Patient's legs are edematous.  Avoid IV fluids if possible.     Hypernatremia   Assessment & Plan    139 today, continue 1/2NS so Na does not drop too low.  Continue to encourage p.o. Intake.  Appears to be due to skin fluid losses. - IVF rate increase 5/22         Edema- (present on admission)    Assessment & Plan     lower extremity swelling  Off IV lasix.  He is at risk of intravascular depletion given the significant insensible fluid losses from skin     Iron deficiency anemia- (present on admission)   Assessment & Plan    Hemoglobin is stable.  Iron levels low, started iron bid with vit c.   b12, folate normal.     GERD (gastroesophageal reflux disease)- (present on admission)   Assessment & Plan    Hold outpatient due to currently on broad-spectrum antibiotics by ID.  Avoid C. difficile inducing medications such as PPIs.          VTE prophylaxis: heparin

## 2019-05-25 NOTE — PROCEDURES
Vascular Access Team    Date of Insertion: 5/25/19  Arm Circumference: n/a  Line Length: 10cm  Line Size: 20g  Vein Occupancy %: 26  Reason for Midline: Access  Labs: WBC 6.0, , BUN 15, Cr 1.19, GFR >60, INR n/a    Orders confirmed, vessel patency confirmed with ultrasound. Risks and benefits of procedure explained to patient and education regarding line associated bloodstream infections provided. Questions answered.     PowerGlide Midline placed in LUE per MD order with ultrasound guidance. 20g, 10 cm line placed in brachial vein after 1 attempt(s).  Catheter inserted with good blood return. Secured with 0cm external from insertion site.  Flushed without resistance with 10 mL 0.9% normal saline. Midline secured with Biopatch and Tegaderm.     Midline placement is confirmed by nurse using ultrasound and ability to flush and draw blood. Midline is appropriate for use at this time.  No X-ray is needed for placement confirmation. Pt tolerated procedure well.  Patient condition relayed to unit RN or ordering physician via this post procedure note in the EMR.    Ultrasound images uploaded to PACS and viewable in the EMR - yes  Ultrasound imaged printed and placed in paper chart - no      BARD PowerGlide Midline ref # [id number], Lot # [id number]

## 2019-05-26 NOTE — PROGRESS NOTES
Called environmental services at [phone] for pt low airloss mattress and spoke to Karl. There are currently no beds available at the present moment.

## 2019-05-26 NOTE — PROGRESS NOTES
[facility] Medicine Daily Progress Note    Date of Service  5/26/2019    Chief Complaint  68 y.o. male admitted 5/8/2019 with fevers and sepsis, transferred from [facility].    Hospital Course    Patient admitted to ICU, had lactic acid >4 and required IV pressors to maintain blood pressure.  H had been discharged home in April for rash/infection related to his lymphoma and completed treatment with return to his baseline until 3 days prior to admission.  He had PORT removal due to bacteremia and also found to have bacterial and fungal infection of the catheter tip.      Interval Problem Update  5/21 Patient feeling slightly better compared to day prior.  Sodium has improved with initiation of D5W.  Patient still discussing with palliative care as he remains full code and has a high mortality associated with his lymphoma and skin compromise.    5/22 Patient very fatigued when evaluated, able to wake but falls back to sleep quickly.  Na has improved but given how much of his skin is involved in lesions, he continues to lose fluids at a high rate thus will increase IVF up to 150ml/h.  Continue doxycycline and diflucan per ID.  Palliative care to continue to meet with patient and family as available.  5/23 Patient slightly more awake during evaluation today, states his pain was okay when asked.  Sodium has improved with the increase in IVF but this is his only favorable marker.  Patient's performance status and prognosis remains very poor.  5/24 Patient still with intermittent awareness, somnolent much of the time.  No acute changes.  Will change IVF to 1/2NS to make sure he does not have return of hyperkalemia but doesn't drop further.  5/25 Patient sleeping much of the time, he became confused and pulled out his PIV.  Vascular access is able to place midline.  Patient is not showing any signs of improvement, if he continues to show decline will have to discuss again with wife and transition patient to comfort  care.  5/26 Patient now is not able to answer questions, he is moving constantly as if acting out his dreams.  I've tried to reach his wife to update her multiple times today without success.  Patient is deteriorating further over this week and is appropriate for comfort care and hospice, will continue to try and reach Mandy.  Patient is not eating anymore and his blood glucose was low this am, D5 added to IVF.    Consultants/Specialty  ID - Pari  Oncology - re-consult if needed    Code Status  full    Disposition  tbd    Review of Systems  Review of Systems   Unable to perform ROS: Mental status change        Physical Exam  Temp:  [36.4 °C (97.6 °F)-36.9 °C (98.4 °F)] 36.7 °C (98 °F)  Pulse:  [64-83] 81  Resp:  [16-20] 19  BP: ()/(46-67) 110/67  SpO2:  [95 %-98 %] 95 %    Physical Exam   Constitutional: He appears well-developed and well-nourished. No distress.   HENT:   Head: Normocephalic and atraumatic.   Eyes: Conjunctivae are normal. No scleral icterus.   Neck: Neck supple. No JVD present.   Cardiovascular: Normal rate, regular rhythm and normal heart sounds.  Exam reveals no gallop and no friction rub.    No murmur heard.  Pulmonary/Chest: Effort normal and breath sounds normal. No respiratory distress. He has no wheezes. He exhibits no tenderness.   Abdominal: Soft. Bowel sounds are normal. He exhibits no distension and no mass. There is no tenderness.   Musculoskeletal: He exhibits no edema or tenderness.   Lymphadenopathy:     He has no cervical adenopathy.   Neurological: No cranial nerve deficit.   Tremulous and unable to answer questions.     Skin: Skin is warm and dry. Rash noted. He is not diaphoretic. No erythema. No pallor.   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   Psychiatric: He has a normal mood and affect. His behavior is normal.   Nursing note and vitals reviewed.      Fluids    Intake/Output Summary (Last  24 hours) at 05/26/19 1320  Last data filed at 05/26/19 0603   Gross per 24 hour   Intake             6582 ml   Output                0 ml   Net             6582 ml       Laboratory  Recent Labs      05/24/19   0016  05/25/19   0020  05/26/19   0042   WBC  5.6  6.0  7.0   RBC  3.36*  3.46*  3.70*   HEMOGLOBIN  9.4*  9.6*  10.4*   HEMATOCRIT  30.6*  31.6*  34.2*   MCV  91.1  91.3  92.4   MCH  28.0  27.7  28.1   MCHC  30.7*  30.4*  30.4*   RDW  59.9*  58.9*  61.8*   PLATELETCT  175  225  252   MPV  9.8  9.8  9.7     Recent Labs      05/24/19   0016  05/25/19   0020  05/26/19   0042   SODIUM  135  139  139   POTASSIUM  3.8  3.9  4.4   CHLORIDE  108  109  112   CO2  21  22  19*   GLUCOSE  110*  78  64*   BUN  16  15  20   CREATININE  1.29  1.19  1.45*   CALCIUM  7.1*  6.7*  6.7*                   Imaging  IR-MIDLINE CATHETER INSERTION WO GUIDANCE > AGE 3   Final Result                  Ultrasound-guided midline placement performed by qualified nursing staff    as above.          IR-US GUIDED PIV   Final Result    Ultrasound-guided PERIPHERAL IV INSERTION performed by    qualified nursing staff as above.            EC-ECHOCARDIOGRAM LTD W/O CONT   Final Result      IR-PICC LINE PLACEMENT W/ GUIDANCE > AGE 5   Final Result                  Ultrasound-guided PICC placement performed by qualified nursing staff as    above.          CT-CHEST,ABDOMEN,PELVIS WITH   Final Result      1.  Bilateral axillary adenopathy, more notable on the right. Some of these nodes are larger than on the prior PET/CT. There is no intrathoracic adenopathy.   2.  No abdominal adenopathy.   3.  Borderline enlarged inguinal lymph nodes, overall decreased in size compared with the prior PET/CT.      IR-CVC TUNNEL WITH PORT REMOVAL   Final Result      1.  Removal of RIGHT  internal jugular PowerPort venous implant.   2.  No evidence of infection at the port pocket.   3.  The port reservoir and port catheter were removed intact.      DX-CHEST-LIMITED  (1 VIEW)   Final Result      Left central venous line in place. No pneumothorax.               INTERPRETING LOCATION: [address] [facility], [place], [address]      DX-CHEST-PORTABLE (1 VIEW)   Final Result      No acute cardiopulmonary abnormality. No interval change.           Assessment/Plan  * Septic shock (HCC)- (present on admission)   Assessment & Plan    Resolved  Septic shock present upon admission  Source consistent with cutaneous infection  IR removed the indwelling port  Infectious disease consult  He met the criteria for septic shock as evidenced by the need for intravenous pressor support  Organ system failure associated with this disease process is the cardiovascular as is noted by hypotension requiring pressors  Broad-spectrum IV antibiotics to cover for skin infection in the setting of immunocompromised host--IV Unasyn  Catheter tip positive for Candida.  ID following.  On antifungal.     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  Dr. Bradley, oncology, has been consulted.    CT done for staging  Code status has been addressed and he continues to request that everything is done.   Palliative care consult placed.  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 Gongora, trauma surgery, has evaluated the skin and agrees that a referral to a burn center is appropriate.   Mr. Ortiz states that he would be amenable to going to [facility] though no other facility as it would be closer to his wife.   IV and oral narcotic pain meds.  Istodax being considered by oncology should patient show improvement from current status.     Sepsis due to coagulase-negative staphylococcal infection with  metabolic encephalopathy (HCC)   Assessment & Plan    5/8 blood culture and cath tip + coag negative staph  Finished daptomycin IV per ID, augmentin po, antifungal for cath tip candida + culture.   right port removed.  Has new left chest port placed:    Order for IR removal left chest port per ID did not wait 48 hours for culture negative.  Order for PICC line placed.  5/10 and 5/13 Repeat BCs x2 negative.  TTE negative for endocarditis.  Patient has Woods catheter as well as rectal tube in place to prevent further contamination of his open skin wounds to back.       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 Mc for years.  5/15:  Plan for treatment while in hospital further chemotherapy after discussion with Dr. Mc, Dr. Barraza, pharmacy and patient.  Continue pain control  Continue rigorous wound care since patient unable to discern when he has BMs.  Stool tracks up his back and into his open skin wounds.  No diarrhea per bedside nursing.  Patient has successful placement of rectal tube and Woods catheter to prevent further contamination of open wounds.     BETO (acute kidney injury) (HCC)- (present on admission)   Assessment & Plan    Resolved acute kidney injury.  Continue with oral hydration.  Patient's legs are edematous.  Avoid IV fluids if possible.     Hypernatremia   Assessment & Plan    139 today, continue 1/2NS so Na does not drop too low.  Continue to encourage p.o. Intake.  Appears to be due to skin fluid losses. - IVF rate increase 5/22         Edema- (present on admission)   Assessment & Plan     lower extremity swelling  Off IV lasix.  He is at risk of intravascular depletion given the significant insensible fluid losses from skin     Iron deficiency anemia- (present on admission)   Assessment & Plan    Hemoglobin is stable.  Iron levels low, started iron bid with vit c.   b12, folate normal.     GERD  (gastroesophageal reflux disease)- (present on admission)   Assessment & Plan    Hold outpatient due to currently on broad-spectrum antibiotics by ID.  Avoid C. difficile inducing medications such as PPIs.          VTE prophylaxis: heparin

## 2019-05-26 NOTE — PROGRESS NOTES
Pt with intermittent confusion. Mumbles and hallucinates when he drifts off to sleep.  Medication per MAR for pain. Q2 hour turns. Skin care per orders. Woods and BMS to gravity, both with output. Pt did have a low blood sugar with lab draw, gave patient a snack and some juice and rechecked BS and it was 82, monitoring. Safety precautions in place. Room close to nurses station. All needs met at this moment.

## 2019-05-26 NOTE — PROGRESS NOTES
Jo Ann from Lab called with critical result of Calcium of 6.7 at 0138. Critical lab result read back to Jo Ann.   This critical lab result is within parameters established by [facility] policy for this patient.    Ordered albumin for corrected calcium.

## 2019-05-26 NOTE — PROGRESS NOTES
2 RN skin check completed with Rolando RN.   Devices in place: reyes, silicone oxygen.  Skin assessed under devices: intact. Pt with open lesions/wounds scattered through out body.   Confirmed pressure ulcers found on: none.   New potential pressure ulcers noted on: none.   The following interventions in place: waffle overlay, q2 turns, approved wound cleanser, barrier wipes, xeroform dressings, pillow for comfort.

## 2019-05-27 NOTE — DISCHARGE PLANNING
Anticipated Discharge Disposition: TBT     Action: Per chart review, pt may be hospice appropriate. Palliative RN and attending MD have attempted to reach pt's wife to discuss further with no call back.     Barriers: None     Plan: Await decision from pt's spouse on GOC/POC.

## 2019-05-27 NOTE — CARE PLAN
Problem: Communication  Goal: The ability to communicate needs accurately and effectively will improve  Outcome: PROGRESSING AS EXPECTED  Reviewed plan of care, pain medication regime and answered all questions at this time.  Reminded patient to use the call light for assistance.    Problem: Skin Integrity  Goal: Risk for impaired skin integrity will decrease  Outcome: PROGRESSING AS EXPECTED  Q2 turns continue, skin care and moisture wicking products in place.

## 2019-05-27 NOTE — PROGRESS NOTES
2 RN skin check complete with Trinh LOPES.   Devices in NA.  Skin assessed under devices reyes, rectal tube.  Confirmed pressure ulcers found on NA.  New potential pressure ulcers noted on NA. Wound consult placed already placed.  The following interventions in place Q2 turns, moisture wicking products in place.    Dressing change performed with three assist; premedicated  Multiple open blisters, ulcerations throughout back, buttocks, hips, groins, abdomen, chest, axilla.  Irrigated wounds with NS, freeing linens and chucks from skin.  Xeroform applied liberally over wounds.  Assisted patient to comfortable position afterwards; tolerated fairly well.

## 2019-05-28 NOTE — DISCHARGE PLANNING
Anticipated Discharge Disposition: Hospice    Action: Hospice Choice.    Received Hospice Referral. Pt does not possess orientation to sign choice.    TC to pt's spouse and NOK, Mandy [phone] ([phone]). Mandy stated that she has been in contact with the Palliative RN at [facility]. Mandy confirmed that she would like the pt to remain on CC. Spouse provided veral approval for the pt to be assessed for GIP. Spouse states she has extremely limited resources and is concerned about having enough money to pay her mortgage. Provided emotional support and provided Mandy with various community resource contact numbers as well as the return number for this LSW. Mandy stated she did not currently have plans to visit the the pt in the hospital. She states that she is able to get a ride into [place] from a friend if she plans on coming in.  Mandy states that the pt is a .     TC to [facility] . Informed by LSWMona that the pt is indeed a . [facility] [place] Lety provided the contact number to the [facility] dept director, Christina Warren [phone].    TC message left for Christina requesting she call this LSW back.      Completed Choice and faxed to, [facility].         Barriers to Discharge: Pt does not have reources at home to manage the pt's care. There are no community based hospices from the [facility].     Plan: Pt to be screened for GIP.

## 2019-05-28 NOTE — DISCHARGE SUMMARY
Discharge Summary    CHIEF COMPLAINT ON ADMISSION  Chief Complaint   Patient presents with   • Blood Infection     Transfer from [place] for sepsis related to recurrent skin infections    • Hypotension     Needing Levo to maintain a map >65       Reason for Admission  Hypotension and sepsis    CODE STATUS  DNAR/DNI    HPI & HOSPITAL COURSE  This is a 68 y.o. male here with fever and decompensation related to T cell lymphoma.  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  Patient was diagnosed with septic shock due to infection.  Patient failed to improve with antibiotics and IV fluid and supportive care.  Palliative consultation requested and discussed with patient family about goals of care and patient's family agreed to change patient's CODE STATUS to DNR initially.  Later on patient's condition continued to deteriorate and patient start having signs of clinical deterioration in terms of altered mental status and unable to be aroused.  Patient currently is in a situation and is intermittently death, I called patient's family who is patient's wife and recommend comfort care and she agreed.  Palliative consultation also requested and officially made patient comfort care and patient will be transferred to inpatient hospice.  Comfort care protocol also initiated.  Patient currently is comfortable.    Therefore, he is discharged in guarded and stable condition to hospice.    The patient met 2-midnight criteria for an inpatient stay at the time of discharge.      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  No follow-up provider specified.    MEDICATIONS ON DISCHARGE     Medication List      CONTINUE taking these medications      Instructions   albuterol 108 (90 Base) MCG/ACT Aers inhalation aerosol   Inhale 1 Puff by mouth every four hours as needed for Shortness of Breath.  Dose:  1 Puff     budesonide-formoterol 160-4.5 MCG/ACT Aero  Commonly known as:  SYMBICORT   Inhale 2 Puffs by mouth 2 Times a Day.  Dose:  2 Puff        STOP taking these medications    omeprazole 20 MG delayed-release capsule  Commonly known as:  PRILOSEC     silver sulfADIAZINE 1 % Crea  Commonly known as:  SILVADENE     vitamin B-12 1000 MCG Tabs        Ativan and morphine for comfort care    Allergies  No Known Allergies    DIET  No orders of the defined types were placed in this encounter.      ACTIVITY  As tolerated.  Weight bearing as tolerated    LINES, DRAINS, AND WOUNDS  This is an automated list. Peripheral IVs will be removed prior to discharge.  Midline IV 05/25/19 Left (Active)   Site Assessment Clean;Dry;Intact 5/28/2019  8:15 AM   Dressing Type Biopatch;Securing device;Skin barrier;Transparent 5/28/2019  8:15 AM   Line Status Infusing 5/28/2019  8:15 AM   Dressing Status Clean;Dry;Intact 5/28/2019  8:15 AM   Dressing Intervention Other (Comment) 5/28/2019  8:15 AM   Dressing Change Due 06/01/19 5/28/2019  8:15 AM   Date Primary Tubing Changed 05/25/19 5/27/2019  8:30 AM   NEXT Primary Tubing Change  05/28/19 5/27/2019  8:30 AM   Infiltration Grading ([facility]) 0 5/28/2019  8:15 AM   Phlebitis Scale ([facility] Only) 0 5/28/2019  8:15 AM       Peripheral IV 05/25/19 22 G Left Wrist (Active)   Site Assessment Clean;Dry;Intact 5/26/2019  9:00 AM   Dressing Type Transparent 5/26/2019  9:00 AM   Line Status  Flushed;Infusing;Scrubbed the hub prior to access 5/26/2019  9:00 AM   Dressing Status Dry;Clean;Intact 5/26/2019  9:00 AM   Dressing Intervention N/A 5/26/2019  9:00 AM   Date Primary Tubing Changed 05/25/19 5/25/2019  9:00 AM   NEXT Primary Tubing Change  05/28/19 5/25/2019  9:00 AM   Infiltration Grading ([facility]) 0 5/26/2019  9:00 AM   Phlebitis Scale ([facility] Only) 0 5/26/2019  9:00 AM     Rectal Tube (Active)   Skin Care Area cleansed 5/27/2019 10:00 PM   Skin Integrity Red 5/27/2019 10:00 PM   Flushed Per Protocol Yes 5/27/2019 10:00 PM   Weekly Remove/Re-Install Retention Balloon  No (Comments) 5/26/2019  9:35 PM   Rectal Tube Output 700 mL 5/24/2019  8:00 AM   WOUND NURSE ONLY - Time Spent with Patient (mins) 200 5/23/2019  6:00 AM       Urethral Catheter Latex 16 Fr. (Active)   Site Assessment Clean;Edema;Painful 5/27/2019 10:00 PM   Collection Container Standard drainage bag 5/27/2019 10:00 PM   Urinary Catheter Care Tamper Evident Seal in Place;Drainage Tube Extended;Drainage Bag Not Overfilled;Drainage Tubing Properly Secured;Drainage Bag Below Bladder Level and Not on Floor 5/27/2019 10:00 PM   Securement Method Other (Comment) 5/26/2019  9:35 PM   Output (mL) 300 mL 5/28/2019  3:43 AM      Wound 04/08/19 Full Thickness Wound Abdomen;Back;Axilla;Coccyx;Buttocks;Hip cutaneous T cell lymphoma  (Active)   Wound Image   4/9/2019  5:00 PM   Site Assessment Red;Bleeding;Excoriated;Fragile;Painful 5/27/2019  2:00 AM   Jesica-wound Assessment Purple;Fragile;Hyperpigmented;Maceration 5/27/2019  2:00 AM   Margins Undefined edges 5/27/2019  2:00 AM   Wound Length (cm) 0 cm 4/9/2019  5:00 PM   Wound Width (cm) 0 cm 4/9/2019  5:00 PM   Wound Surface Area (cm^2) 0 cm^2 4/9/2019  5:00 PM   Closure Secondary intention 5/26/2019  9:35 PM   Drainage Amount Moderate 5/27/2019  2:00 AM   Drainage Description Serosanguineous 5/27/2019  2:00 AM   Non-staged Wound Description Partial thickness 4/12/2019  3:50 PM    Treatments Irrigation;Site care 5/25/2019  7:00 AM   Cleansing Normal Saline Irrigation 5/27/2019  2:00 AM   Periwound Protectant Not Applicable 5/25/2019 10:00 PM   Dressing Options Open to Air 5/26/2019  7:54 AM   Dressing Cleansing/Solutions Normal Saline 5/25/2019 10:00 PM   Dressing Changed New 5/27/2019  2:00 AM   Dressing Status Dry;Intact;New drainage 5/25/2019 10:00 PM   Dressing Change Frequency Every 48 hrs 5/25/2019 10:00 PM   NEXT Dressing Change  04/13/19 4/13/2019  2:00 AM   NEXT Weekly Photo (Inpatient Only) 04/16/19 4/12/2019  3:50 PM   WOUND NURSE ONLY - Odor None 4/12/2019  3:50 PM   WOUND NURSE ONLY - Exposed Structures None 4/12/2019  3:50 PM   WOUND NURSE ONLY - Tissue Type and Percentage 100% red 4/12/2019  3:50 PM   WOUND NURSE ONLY - Time Spent with Patient (mins) 60 4/12/2019  3:50 PM       Wound 05/08/19 Abdomen;Back;Axilla;Buttocks;Flank;Leg;Arm;Chest Generalized Scabbing, Open weeping wounds (Active)   Site Assessment Drainage;Red;Swelling;Fragile;Bleeding 5/28/2019  8:15 AM   Jesica-wound Assessment Painful;Swelling;Maceration 5/28/2019  8:15 AM   Margins Undefined edges 5/28/2019  8:15 AM   Tunneling 0 cm 5/9/2019  1:00 PM   Undermining 0 cm 5/9/2019  1:00 PM   Closure Secondary intention 5/26/2019  9:35 PM   Drainage Amount Moderate 5/28/2019  8:15 AM   Drainage Description Serosanguineous 5/28/2019  8:15 AM   Treatments Site care 5/27/2019  8:30 AM   Cleansing Approved Wound Cleanser 5/28/2019  8:15 AM   Dressing Options Other (Comments) 5/26/2019  7:54 AM   Dressing Cleansing/Solutions Normal Saline 5/28/2019  8:15 AM   Dressing Changed Changed 5/27/2019  8:30 AM   Dressing Status Reinforced 5/28/2019  8:15 AM   Dressing Change Frequency Daily 5/28/2019  8:15 AM   NEXT Dressing Change  05/28/19 5/28/2019  8:15 AM   NEXT Weekly Photo (Inpatient Only) 05/16/19 5/17/2019  9:06 AM   WOUND NURSE ONLY - Odor None 5/9/2019  1:00 PM   WOUND NURSE ONLY - Exposed Structures None 5/9/2019   1:00 PM   WOUND NURSE ONLY - Tissue Type and Percentage 100% red 5/9/2019  1:00 PM   WOUND NURSE ONLY - Time Spent with Patient (mins) 60 5/9/2019  1:00 PM       Peripheral IV 05/25/19 22 G Left Wrist (Active)   Site Assessment Clean;Dry;Intact 5/26/2019  9:00 AM   Dressing Type Transparent 5/26/2019  9:00 AM   Line Status Flushed;Infusing;Scrubbed the hub prior to access 5/26/2019  9:00 AM   Dressing Status Dry;Clean;Intact 5/26/2019  9:00 AM   Dressing Intervention N/A 5/26/2019  9:00 AM   Date Primary Tubing Changed 05/25/19 5/25/2019  9:00 AM   NEXT Primary Tubing Change  05/28/19 5/25/2019  9:00 AM   Infiltration Grading ([facility] 0 5/26/2019  9:00 AM   Phlebitis Scale ([facility] Only) 0 5/26/2019  9:00 AM           Urethral Catheter Latex 16 Fr. (Active)   Site Assessment Clean;Edema;Painful 5/27/2019 10:00 PM   Collection Container Standard drainage bag 5/27/2019 10:00 PM   Urinary Catheter Care Tamper Evident Seal in Place;Drainage Tube Extended;Drainage Bag Not Overfilled;Drainage Tubing Properly Secured;Drainage Bag Below Bladder Level and Not on Floor 5/27/2019 10:00 PM   Securement Method Other (Comment) 5/26/2019  9:35 PM   Output (mL) 300 mL 5/28/2019  3:43 AM        MENTAL STATUS ON TRANSFER  Level of Consciousness: Obtunded  Orientation : Unable to Assess  Speech:  (edilma)    CONSULTATIONS  Palliative, infectious disease, oncology, pulmonology    PROCEDURES  Central line insertion    IR-MIDLINE CATHETER INSERTION WO GUIDANCE > AGE 3   Final Result                  Ultrasound-guided midline placement performed by qualified nursing staff    as above.          IR-US GUIDED PIV   Final Result    Ultrasound-guided PERIPHERAL IV INSERTION performed by    qualified nursing staff as above.            EC-ECHOCARDIOGRAM LTD W/O CONT   Final Result      IR-PICC LINE PLACEMENT W/ GUIDANCE > AGE 5   Final Result                  Ultrasound-guided PICC placement performed by qualified nursing staff as    above.           CT-CHEST,ABDOMEN,PELVIS WITH   Final Result      1.  Bilateral axillary adenopathy, more notable on the right. Some of these nodes are larger than on the prior PET/CT. There is no intrathoracic adenopathy.   2.  No abdominal adenopathy.   3.  Borderline enlarged inguinal lymph nodes, overall decreased in size compared with the prior PET/CT.      IR-CVC TUNNEL WITH PORT REMOVAL   Final Result      1.  Removal of RIGHT  internal jugular PowerPort venous implant.   2.  No evidence of infection at the port pocket.   3.  The port reservoir and port catheter were removed intact.      DX-CHEST-LIMITED (1 VIEW)   Final Result      Left central venous line in place. No pneumothorax.               INTERPRETING LOCATION: [address] [place] [address], [facility], [id number]      DX-CHEST-PORTABLE (1 VIEW)   Final Result      No acute cardiopulmonary abnormality. No interval change.          PE:  Gen: AAOx0, NAD  Eyes: PELLA  Neck: no JVD, no lymphadenopathy  Cardia: RRR, no mrg  Lungs: Patient is very lethargic, showing signs of difficulty breathing but comfortable  Abd: NABS, soft, non extended, no mass  EXT: No C/C/E, peripheral pulse 2+ b/l  Neuro: CN II-XII intact, non focal, reflex 2+ symmetrical  Psych: Appropriate.    LABORATORY  Lab Results   Component Value Date    SODIUM 143 05/28/2019    POTASSIUM 5.6 (H) 05/28/2019    CHLORIDE 118 (H) 05/28/2019    CO2 19 (L) 05/28/2019    GLUCOSE 126 (H) 05/28/2019    BUN 31 (H) 05/28/2019    CREATININE 2.34 (H) 05/28/2019        Lab Results   Component Value Date    WBC 12.2 (H) 05/28/2019    HEMOGLOBIN 10.0 (L) 05/28/2019    HEMATOCRIT 33.6 (L) 05/28/2019    PLATELETCT 164 05/28/2019      Total time spent on advanced care planning, excluding time spent on daily care: 16 minutes.    1st 30 minutes [id number]     I discussed extensively with patient and patient's family is regarding code status and plan of care. Confirmed with comfort care.    Total time of the discharge process exceeds 37 minutes  excluding advanced care planning discussion with the family.  .

## 2019-05-28 NOTE — PROGRESS NOTES
Pt is lethargic responds to voice, unable to assess orientation.  No family is at bedside. VSS.  Moaning when turning pt, gave pain meds per orders.  Two assist q 2 hour turns.  Generalized open blisters and ulcerations on back, upper thighs, buttocks, hips, groin, abdomen, chest, axilla.  Reapplied xeroform dressings that had become detached.   Pt updated on POC, needs met and questions answered.  Woods to DD, draining keegan urine with light sediment.  Rectal tube present, brown loose stool in bag.  Call light within reach and working properly.

## 2019-05-28 NOTE — CARE PLAN
Problem: Pain Management  Goal: Pain level will decrease to patient's comfort goal  Pain assessed Q2h. Pain medication given per orders, see MAR.

## 2019-05-28 NOTE — PROGRESS NOTES
Patient continues to decline per RN staff familiar with patient. Patient more lethargic today and intermittently follows commands. PRN pain medications given PO. Patient requires medications to be crushed and administered with applesauce. Low urine output and rectal tube in place and patent. Dressing change completed today, xeroform to open wounds.

## 2019-05-28 NOTE — DISCHARGE PLANNING
Received Choice form at 9655  Agency/Facility Name: [facility]  Referral sent per Choice form @ 0890

## 2019-05-28 NOTE — PROGRESS NOTES
Assumed care of the patient at 0815. Received report from MARIANNE Dowd. Patient's eyes are closed and patient is unarousable. Pt is moaning and is demonstrating the non verbal signs and symptoms of pain and restlessness: furrowed brow, agitated movement, withdrawing posture. IV dilaudid, morphine and ativan in use, see MAR.     MD updated regarding patient BP of 77/43. Patient code status has changed to comfort care and wife, Mandy, has been notified. This RN attempted to call Mandy at 0815, but there was no answer. RN left message.     Pt has significant pain with turning, therefore turning has not been performed at this time.     Pt has LUE midline w/ + blood return.     Pt has reyes to gravity and rectal tube in place. Rash ad ulcerations are present on the patient's entire body. Xeroform dressings in place and have been changed per wound care order.     Fall precautions in place. Call light within reach and hourly rounding implemented.

## 2019-05-28 NOTE — PROGRESS NOTES
Informed Dr. Chun pt is lethargic and does not follow commands, this RN does not feel comfortable giving PO meds and UA came back positive for yeast.  Dr. Chun orders pt NPO now and states he will address UA results when he rounds.   TO/ Dr. Chun RBO/ Dian Trujillo RN

## 2019-05-28 NOTE — PROGRESS NOTES
Received a critical CA 6.8.  Informed Dr. Pantoja of critical CA who orders Calicium chloride 1000 gm IVPB now and albumin lab.  Informed Dr. Pantoja WBC is 12.2, WBC 11.7 yesterday and there is sediment visible in reyes catheter.  Dr. Pantoja orders UA.   TO/Dr. Pantoja RBO/ Dian Trujillo RN

## 2019-05-28 NOTE — PROGRESS NOTES
RN skin check complete with Trinh LOPES.   Devices in place: reyes, rectal tube, oxygen tubing.    Skin assessed under devices reyes, rectal tube, and oxygen tubing.  Confirmed pressure ulcers found on NA.  New potential pressure ulcers noted behind left ear, behind right ear slow to ken. Wound consult placed already placed.  Multiple open blisters, ulcerations throughout back, buttocks, hips, groins, abdomen, chest, axilla. Xeroform dressings that had detached were reapplied over wounds.     The following interventions in place Q2 turns, moisture wicking products in place, xeroform dressings, foam applied to oxygen tubing.

## 2019-05-29 NOTE — PROGRESS NOTES
Received report & assumed care of patient at 1900. Assessment completed. Patient is comfort care, obtunded. L midline patent, SL. Patient grimacing and moaning in pain, medications provided per MAR. Rectal tube in place, reyes to gravity. POC discussed & questions answered. Bed locked and in lowest position, call light within reach, non-skid socks in place, hourly rounding.

## 2019-05-29 NOTE — PROGRESS NOTES
Assumed care of patient at 0700. Pt is obtunded, unable to assess orientation. L midline patent and saline locked. Pt grimacing, medicated per MAR. Woods draining to gravity. Rectal tube in place. Comfort measures in place. Will continue to round hourly.

## 2019-05-29 NOTE — H&P
HOSPICE GENERAL INPATIENT ADMISSION    ADMITTING DIAGNOSIS:  Cancer related pain, cutaneous T-cell lymphoma.    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  The patient was last admitted to [facility] on 05/08/2019 from an outlying facility due to hypotension and   sepsis from recurrent wounds.  He had previously been admitted 04/08/2019 for   the same problem and was discharged home with antibiotics, but had a rapid   decline in function.  Since this admission, the patient was aggressively   treated in the intensive care unit with IV fluids, IV antibiotics, and   supportive care.  He was ultimately transferred to the oncology unit.  The   patient has been treated by Dr. Yary Mc for his cutaneous T-cell   lymphoma.  The plan was for him to follow up with Dr. Mc once discharged;   however, the patient continued to decline significantly.  He has a large open   wound on his back related to the lymphoma, which is simply not clearing.  Over   the ensuing days, the patient has declined significantly requiring more and   more pain medication with increasing delirium and agitation.  The patient has   frequently deferred to his wife for decision making.  His wife lives in   [place] where he is from and she has opted to a comfort model of care.  Due   to significant pain, delirium and wound, the patient is being transitioned to   hospice general inpatient.    PAST MEDICAL HISTORY:  Remarkable for asthma, gastroesophageal reflux disease,   and acute kidney injury.    PAST SURGICAL HISTORY:  Abdominal surgery.    FAMILY HISTORY:  Predominantly related to CVA and myocardial infarction.    SOCIAL HISTORY:  Former smoker.  , lives in [place].  No significant   alcohol  use.    ALLERGIES:  No known medical allergies.    MEDICATIONS AS AN OUTPATIENT:  Symbicort, omeprazole, albuterol, vitamin B12,   and silver sulfadiazine.    PERTINENT LABORATORY DATA:  Albumin less than 1.5, BUN and creatinine 31 and   2.34 with a GFR of 28 mL/min, calcium 6.8.  White blood cell count 12.2, H and   H 10 and 33.6.    PHYSICAL EXAMINATION:  VITAL SIGNS:  BMI is 36.19, blood pressure 77/43, heart rate 84, temperature   97.5, respiratory rate 22-24.  GENERAL:  The patient is restless and moaning.  SKIN:  Reveals diffuse skin involvement with T-cell lymphoma, worse on the   face and trunk with scabbing weeping lesions throughout.  His back is covered   appropriately.  CARDIAC:  Tachycardic S1, S2.  No murmur.  LUNGS:  Wet breath sounds anterior.  EXTREMITIES:  Have edema bilateral lower extremities.    PROBLEM LIST:  1.  Likely recurrent sepsis from cutaneous T-cell lymphoma and wounds.  2.  T-cell lymphoma with Sezary syndrome.  3.  Significant wounds on his back related to T-cell lymphoma.  4.  Delirium, mixed, subacute and agitated.  5.  Severe protein-calorie malnutrition.  6.  Chronic kidney disease stage IV.  7.  Anemia.  8.  Hospice general inpatient DNR/DNI.    DISCUSSION:  I originally followed with the patient on palliative care to   attempt to control his pain.  The patient has been declining significantly   over the past couple of weeks.  After several discussions with several   providers and the patient's wife, he is being transitioned to comfort care,   followed by admission to hospice general inpatient due to his wounds and   significant pain.  He will be given IV morphine at 5-10 mg hourly as needed   for pain as well as lorazepam for agitation and restlessness.  The patient   will be followed daily by the [facility] team to assess for level of   comfort as well as appropriateness for hospice GIP level of care.       ____________________________________     Char Saunders,  MD [place] / JANELLE    DD:  05/29/2019 12:26:41  DT:  05/29/2019 13:43:29    D#:  [id number]  Job#:  [id number]

## 2019-05-30 NOTE — PROGRESS NOTES
Received bedside report from day shift RN, POC discussed. Assumed care of patient at 1900. Patient is comfort care, obtunded. L midline SL. Patient resting in bed. Rectal tube in place, reyes to gravity. Bed locked and in lowest position, call light within reach, non-skid socks in place, hourly rounding.

## 2019-05-30 NOTE — PROGRESS NOTES
Pt brother called for an update on pt. States he's trying to get here to see him. Updated on condition.

## 2019-06-01 NOTE — PROGRESS NOTES
Patient  at 1355. 2 RN pronouncement. Family notified Wife Mandy Ortiz at 1423, Called brother Gregorio Ortiz at 1430. Patient is not a corner's case and not a tissue bank donor.

## 2019-06-01 NOTE — CARE PLAN
Problem: Pain  Goal: Alleviation of Pain or a reduction in pain to the patient's comfort goal    Intervention: Pain Management--Medications  Pain medication given as needed. Non verbal assessment, Comfort care orders in place      Problem: Elimination  Goal: Bowel elimination support  Rectal tube in place

## 2019-06-01 NOTE — PROGRESS NOTES
Pt care assumed.  Comfort cCare.  Pt looks comfortable.  No grimacing or restlessness noted.  Will continue to monitor

## 2019-06-01 NOTE — CARE PLAN
Problem: Pain  Goal: Alleviation of Pain or a reduction in pain to the patient's comfort goal    Intervention: Pain Management--Medications  Pt on comfort care and receiving pain medications when pt appears in distress.  Medicated per MAR

## 2019-06-01 NOTE — PROGRESS NOTES
Morphine and Ativan given this am. Patient has labored breathing. Patient is non responsive. No family present. Will continue to moniotor for pain control. Comfort care orders in place.

## 2019-06-02 NOTE — DISCHARGE SUMMARY
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    Comorbid Conditions at the Time of Death  1.  Likely recurrent sepsis from cutaneous T-cell lymphoma and wounds.  2.  T-cell lymphoma with Sezary syndrome.  3.  Significant wounds on his back related to T-cell lymphoma.  4.  Delirium, mixed, subacute and agitated.  5.  Severe protein-calorie malnutrition.  6.  Chronic kidney disease stage IV.  7.  Anemia.  8.  Hospice general inpatient DNR/DNI.      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  The patient was last admitted to [facility] on 05/08/2019 from an outlying facility due to hypotension and   sepsis from recurrent wounds.  He had previously been admitted 04/08/2019 for   the same problem and was discharged home with antibiotics, but had a rapid   decline in function.  Since this admission, the patient was aggressively   treated in the intensive care unit with IV fluids, IV antibiotics, and   supportive care.  He was ultimately transferred to the oncology unit.  The   patient has been treated by Dr. Yary Mc for his cutaneous T-cell   lymphoma.  The plan was for him to follow up with Dr. Mc once discharged;   however, the patient continued to decline significantly.  He has a large open   wound on his back related to the lymphoma, which is simply not clearing.  Over   the ensuing days, the patient has declined significantly requiring more and   more pain medication with increasing delirium and agitation.  The patient has   frequently deferred to his wife for decision making.  His wife lives in   [place] where he is from and she has  opted to a comfort model of care.  Due   to significant pain, delirium and wound, the patient is being transitioned to   hospice general inpatient. The patient required escalating frequent dosing of IV morphine and lorazepam for his symptoms. He was comfortable at the time of his passing. His family will be admitted into [facility].    Death Date: 06/01/19   Death Time: 1355         Pronounced By (RN1): Silke Bray RN  Pronounced By (RN2): Christina Palacio RN

## 2019-06-05 LAB
FUNGUS SPEC CULT: ABNORMAL
FUNGUS SPEC CULT: ABNORMAL
SIGNIFICANT IND 70042: ABNORMAL
SITE SITE: ABNORMAL
SOURCE SOURCE: ABNORMAL

## 2019-06-07 SDOH — ECONOMIC STABILITY: GENERAL

## 2019-06-07 ASSESSMENT — ACTIVITIES OF DAILY LIVING (ADL): MONEY MANAGEMENT (EXPENSES/BILLS): NEEDS ASSISTANCE

## 2019-06-07 ASSESSMENT — ENCOUNTER SYMPTOMS
TREMORS: 1
AGITATION: 1

## 2020-09-25 NOTE — PROGRESS NOTES
· 2 RN skin check complete with MARIANNE Galvez.  · Devices in place reyes catheter.  · Skin assessed under devices wounds covering body r/t cutaneous t-cell lymphoma.  · Confirmed pressure ulcers found on n/a.  · New potential pressure ulcers noted on n/a. Wound consult placed and wound reported.Wound has already been consulted.   · The following interventions in place turning pt as he can tolerate. Keeping skin clean and dry; see wound orders. Reyes in place.      n/a
